# Patient Record
Sex: MALE | Race: BLACK OR AFRICAN AMERICAN | NOT HISPANIC OR LATINO | Employment: OTHER | URBAN - METROPOLITAN AREA
[De-identification: names, ages, dates, MRNs, and addresses within clinical notes are randomized per-mention and may not be internally consistent; named-entity substitution may affect disease eponyms.]

---

## 2018-05-19 ENCOUNTER — HOSPITAL ENCOUNTER (INPATIENT)
Facility: HOSPITAL | Age: 48
LOS: 27 days | Discharge: HOME HEALTH | DRG: 981 | End: 2018-06-15
Attending: PSYCHIATRY & NEUROLOGY | Admitting: PSYCHIATRY & NEUROLOGY
Payer: MEDICAID

## 2018-05-19 DIAGNOSIS — I10 ESSENTIAL HYPERTENSION: ICD-10-CM

## 2018-05-19 DIAGNOSIS — K29.01 ACUTE SUPERFICIAL GASTRITIS WITH HEMORRHAGE: ICD-10-CM

## 2018-05-19 DIAGNOSIS — Z99.2 HEMODIALYSIS STATUS: ICD-10-CM

## 2018-05-19 DIAGNOSIS — E87.20 METABOLIC ACIDOSIS: ICD-10-CM

## 2018-05-19 DIAGNOSIS — E66.01 CLASS 3 SEVERE OBESITY DUE TO EXCESS CALORIES WITH SERIOUS COMORBIDITY AND BODY MASS INDEX (BMI) OF 40.0 TO 44.9 IN ADULT: ICD-10-CM

## 2018-05-19 DIAGNOSIS — R09.2 RESPIRATORY ARREST: ICD-10-CM

## 2018-05-19 DIAGNOSIS — N17.9 AKI (ACUTE KIDNEY INJURY): ICD-10-CM

## 2018-05-19 DIAGNOSIS — I50.9 CHF (CONGESTIVE HEART FAILURE): ICD-10-CM

## 2018-05-19 DIAGNOSIS — R56.9 SEIZURE: ICD-10-CM

## 2018-05-19 DIAGNOSIS — R57.0 CARDIOGENIC SHOCK: ICD-10-CM

## 2018-05-19 DIAGNOSIS — J81.0 ACUTE PULMONARY EDEMA: ICD-10-CM

## 2018-05-19 DIAGNOSIS — G93.1 ANOXIC BRAIN INJURY: ICD-10-CM

## 2018-05-19 DIAGNOSIS — I50.41: ICD-10-CM

## 2018-05-19 DIAGNOSIS — T68.XXXA HYPOTHERMIA, INITIAL ENCOUNTER: ICD-10-CM

## 2018-05-19 DIAGNOSIS — N17.0 ACUTE RENAL FAILURE WITH TUBULAR NECROSIS: ICD-10-CM

## 2018-05-19 DIAGNOSIS — R20.9 COLD HANDS: ICD-10-CM

## 2018-05-19 DIAGNOSIS — D50.0 IRON DEFICIENCY ANEMIA DUE TO CHRONIC BLOOD LOSS: ICD-10-CM

## 2018-05-19 DIAGNOSIS — I50.9 ACUTE DECOMPENSATED HEART FAILURE: ICD-10-CM

## 2018-05-19 DIAGNOSIS — R53.81 DEBILITY: ICD-10-CM

## 2018-05-19 DIAGNOSIS — I47.19 ATRIAL TACHYCARDIA: ICD-10-CM

## 2018-05-19 DIAGNOSIS — G93.6 CYTOTOXIC CEREBRAL EDEMA: ICD-10-CM

## 2018-05-19 DIAGNOSIS — I42.9 CARDIOMYOPATHY, UNSPECIFIED TYPE: ICD-10-CM

## 2018-05-19 DIAGNOSIS — G93.1 ANOXIC BRAIN DAMAGE, NOT ELSEWHERE CLASSIFIED: ICD-10-CM

## 2018-05-19 DIAGNOSIS — I49.9 ARRHYTHMIA: ICD-10-CM

## 2018-05-19 DIAGNOSIS — R00.0 TACHYCARDIA: ICD-10-CM

## 2018-05-19 DIAGNOSIS — I63.512 ACUTE ISCHEMIC LEFT MCA STROKE: ICD-10-CM

## 2018-05-19 DIAGNOSIS — G93.6 CYTOTOXIC BRAIN EDEMA: ICD-10-CM

## 2018-05-19 DIAGNOSIS — I46.9 CARDIAC ARREST: ICD-10-CM

## 2018-05-19 DIAGNOSIS — I63.89 ACUTE BILAT WATERSHED INFARCTION: ICD-10-CM

## 2018-05-19 PROBLEM — R79.1 ELEVATED INR: Status: ACTIVE | Noted: 2018-05-19

## 2018-05-19 PROBLEM — J96.01 ACUTE RESPIRATORY FAILURE WITH HYPOXIA: Status: ACTIVE | Noted: 2018-05-19

## 2018-05-19 PROBLEM — J81.1 PULMONARY EDEMA: Status: ACTIVE | Noted: 2018-05-19

## 2018-05-19 LAB
ABO + RH BLD: NORMAL
ALBUMIN SERPL BCP-MCNC: 2.8 G/DL
ALBUMIN SERPL BCP-MCNC: 3 G/DL
ALLENS TEST: ABNORMAL
ALLENS TEST: ABNORMAL
ALP SERPL-CCNC: 103 U/L
ALP SERPL-CCNC: 97 U/L
ALT SERPL W/O P-5'-P-CCNC: 450 U/L
ALT SERPL W/O P-5'-P-CCNC: 485 U/L
ANION GAP SERPL CALC-SCNC: 15 MMOL/L
AST SERPL-CCNC: 725 U/L
AST SERPL-CCNC: 847 U/L
BACTERIA #/AREA URNS AUTO: ABNORMAL /HPF
BASOPHILS # BLD AUTO: 0.04 K/UL
BASOPHILS NFR BLD: 0.1 %
BILIRUB SERPL-MCNC: 1.2 MG/DL
BILIRUB SERPL-MCNC: 1.3 MG/DL
BILIRUB UR QL STRIP: NEGATIVE
BLD GP AB SCN CELLS X3 SERPL QL: NORMAL
BNP SERPL-MCNC: 3485 PG/ML
BUN SERPL-MCNC: 31 MG/DL
BUN SERPL-MCNC: 31 MG/DL
BUN SERPL-MCNC: 33 MG/DL
CALCIUM SERPL-MCNC: 8.4 MG/DL
CHLORIDE SERPL-SCNC: 104 MMOL/L
CHLORIDE SERPL-SCNC: 104 MMOL/L
CHLORIDE SERPL-SCNC: 105 MMOL/L
CK SERPL-CCNC: 346 U/L
CK SERPL-CCNC: 346 U/L
CLARITY UR REFRACT.AUTO: ABNORMAL
CO2 SERPL-SCNC: 19 MMOL/L
CO2 SERPL-SCNC: 19 MMOL/L
CO2 SERPL-SCNC: 21 MMOL/L
COLOR UR AUTO: ABNORMAL
CREAT SERPL-MCNC: 2.8 MG/DL
CREAT SERPL-MCNC: 2.8 MG/DL
CREAT SERPL-MCNC: 2.9 MG/DL
DELSYS: ABNORMAL
DELSYS: ABNORMAL
DIFFERENTIAL METHOD: ABNORMAL
EOSINOPHIL # BLD AUTO: 0 K/UL
EOSINOPHIL NFR BLD: 0 %
ERYTHROCYTE [DISTWIDTH] IN BLOOD BY AUTOMATED COUNT: 21.2 %
ERYTHROCYTE [SEDIMENTATION RATE] IN BLOOD BY WESTERGREN METHOD: 18 MM/H
ERYTHROCYTE [SEDIMENTATION RATE] IN BLOOD BY WESTERGREN METHOD: 18 MM/H
EST. GFR  (AFRICAN AMERICAN): 28.5 ML/MIN/1.73 M^2
EST. GFR  (AFRICAN AMERICAN): 29.7 ML/MIN/1.73 M^2
EST. GFR  (AFRICAN AMERICAN): 29.7 ML/MIN/1.73 M^2
EST. GFR  (NON AFRICAN AMERICAN): 24.6 ML/MIN/1.73 M^2
EST. GFR  (NON AFRICAN AMERICAN): 25.7 ML/MIN/1.73 M^2
EST. GFR  (NON AFRICAN AMERICAN): 25.7 ML/MIN/1.73 M^2
FIO2: 50
FIO2: 50
GLUCOSE SERPL-MCNC: 150 MG/DL
GLUCOSE SERPL-MCNC: 168 MG/DL
GLUCOSE UR QL STRIP: ABNORMAL
HCO3 UR-SCNC: 16.6 MMOL/L (ref 24–28)
HCO3 UR-SCNC: 19.1 MMOL/L (ref 24–28)
HCT VFR BLD AUTO: 30.8 %
HCT VFR BLD CALC: 32 %PCV (ref 36–54)
HGB BLD-MCNC: 8.5 G/DL
HGB UR QL STRIP: ABNORMAL
HYALINE CASTS UR QL AUTO: 0 /LPF
IMM GRANULOCYTES # BLD AUTO: 0.22 K/UL
IMM GRANULOCYTES NFR BLD AUTO: 0.7 %
INR PPP: 2
KETONES UR QL STRIP: NEGATIVE
LACTATE SERPL-SCNC: 6.4 MMOL/L
LEUKOCYTE ESTERASE UR QL STRIP: NEGATIVE
LYMPHOCYTES # BLD AUTO: 1 K/UL
LYMPHOCYTES NFR BLD: 3.3 %
MAGNESIUM SERPL-MCNC: 2 MG/DL
MAGNESIUM SERPL-MCNC: 2.1 MG/DL
MCH RBC QN AUTO: 18.2 PG
MCHC RBC AUTO-ENTMCNC: 27.6 G/DL
MCV RBC AUTO: 66 FL
MICROSCOPIC COMMENT: ABNORMAL
MIN VOL: 9.2
MIN VOL: 9.22
MODE: ABNORMAL
MODE: ABNORMAL
MONOCYTES # BLD AUTO: 1.8 K/UL
MONOCYTES NFR BLD: 5.9 %
NEUTROPHILS # BLD AUTO: 26.8 K/UL
NEUTROPHILS NFR BLD: 90 %
NITRITE UR QL STRIP: NEGATIVE
NRBC BLD-RTO: 0 /100 WBC
PCO2 BLDA: 38 MMHG (ref 35–45)
PCO2 BLDA: 48.9 MMHG (ref 35–45)
PEEP: 10
PEEP: 10
PH SMN: 7.14 [PH] (ref 7.35–7.45)
PH SMN: 7.31 [PH] (ref 7.35–7.45)
PH UR STRIP: 5 [PH] (ref 5–8)
PHOSPHATE SERPL-MCNC: 5.7 MG/DL
PHOSPHATE SERPL-MCNC: 6.7 MG/DL
PIP: 30
PIP: 31
PLATELET # BLD AUTO: 189 K/UL
PMV BLD AUTO: 9.8 FL
PO2 BLDA: 141 MMHG (ref 80–100)
PO2 BLDA: 45 MMHG (ref 40–60)
POC BE: -12 MMOL/L
POC BE: -7 MMOL/L
POC IONIZED CALCIUM: 1.15 MMOL/L (ref 1.06–1.42)
POC SATURATED O2: 67 % (ref 95–100)
POC SATURATED O2: 99 % (ref 95–100)
POC TCO2: 18 MMOL/L (ref 24–29)
POC TCO2: 20 MMOL/L (ref 23–27)
POTASSIUM BLD-SCNC: 4.7 MMOL/L (ref 3.5–5.1)
POTASSIUM SERPL-SCNC: 4.8 MMOL/L
POTASSIUM SERPL-SCNC: 4.9 MMOL/L
POTASSIUM SERPL-SCNC: 4.9 MMOL/L
PROT SERPL-MCNC: 6.1 G/DL
PROT SERPL-MCNC: 6.4 G/DL
PROT UR QL STRIP: ABNORMAL
PROTHROMBIN TIME: 19.4 SEC
RBC # BLD AUTO: 4.68 M/UL
RBC #/AREA URNS AUTO: 3 /HPF (ref 0–4)
SAMPLE: ABNORMAL
SAMPLE: ABNORMAL
SITE: ABNORMAL
SITE: ABNORMAL
SODIUM BLD-SCNC: 139 MMOL/L (ref 136–145)
SODIUM SERPL-SCNC: 138 MMOL/L
SODIUM SERPL-SCNC: 138 MMOL/L
SODIUM SERPL-SCNC: 141 MMOL/L
SP GR UR STRIP: 1 (ref 1–1.03)
SP02: 100
SP02: 100
SQUAMOUS #/AREA URNS AUTO: 1 /HPF
TROPONIN I SERPL DL<=0.01 NG/ML-MCNC: 3.38 NG/ML
URN SPEC COLLECT METH UR: ABNORMAL
UROBILINOGEN UR STRIP-ACNC: NEGATIVE EU/DL
VT: 500
VT: 500
WBC # BLD AUTO: 29.86 K/UL
WBC #/AREA URNS AUTO: 3 /HPF (ref 0–5)

## 2018-05-19 PROCEDURE — 82550 ASSAY OF CK (CPK): CPT

## 2018-05-19 PROCEDURE — 99900035 HC TECH TIME PER 15 MIN (STAT)

## 2018-05-19 PROCEDURE — 5A1955Z RESPIRATORY VENTILATION, GREATER THAN 96 CONSECUTIVE HOURS: ICD-10-PCS | Performed by: PSYCHIATRY & NEUROLOGY

## 2018-05-19 PROCEDURE — 81001 URINALYSIS AUTO W/SCOPE: CPT

## 2018-05-19 PROCEDURE — 25000003 PHARM REV CODE 250: Performed by: NURSE PRACTITIONER

## 2018-05-19 PROCEDURE — 84295 ASSAY OF SERUM SODIUM: CPT

## 2018-05-19 PROCEDURE — 85014 HEMATOCRIT: CPT

## 2018-05-19 PROCEDURE — 87205 SMEAR GRAM STAIN: CPT

## 2018-05-19 PROCEDURE — 82330 ASSAY OF CALCIUM: CPT

## 2018-05-19 PROCEDURE — 87070 CULTURE OTHR SPECIMN AEROBIC: CPT

## 2018-05-19 PROCEDURE — 93306 TTE W/DOPPLER COMPLETE: CPT | Mod: 26,,, | Performed by: INTERNAL MEDICINE

## 2018-05-19 PROCEDURE — 36415 COLL VENOUS BLD VENIPUNCTURE: CPT

## 2018-05-19 PROCEDURE — 37799 UNLISTED PX VASCULAR SURGERY: CPT

## 2018-05-19 PROCEDURE — 99291 CRITICAL CARE FIRST HOUR: CPT | Mod: ,,, | Performed by: PSYCHIATRY & NEUROLOGY

## 2018-05-19 PROCEDURE — 84100 ASSAY OF PHOSPHORUS: CPT | Mod: 91

## 2018-05-19 PROCEDURE — 86850 RBC ANTIBODY SCREEN: CPT

## 2018-05-19 PROCEDURE — 94002 VENT MGMT INPAT INIT DAY: CPT

## 2018-05-19 PROCEDURE — 63600175 PHARM REV CODE 636 W HCPCS: Performed by: STUDENT IN AN ORGANIZED HEALTH CARE EDUCATION/TRAINING PROGRAM

## 2018-05-19 PROCEDURE — 83520 IMMUNOASSAY QUANT NOS NONAB: CPT

## 2018-05-19 PROCEDURE — 85610 PROTHROMBIN TIME: CPT

## 2018-05-19 PROCEDURE — 80053 COMPREHEN METABOLIC PANEL: CPT

## 2018-05-19 PROCEDURE — 93306 TTE W/DOPPLER COMPLETE: CPT

## 2018-05-19 PROCEDURE — 87086 URINE CULTURE/COLONY COUNT: CPT

## 2018-05-19 PROCEDURE — 25000003 PHARM REV CODE 250: Performed by: STUDENT IN AN ORGANIZED HEALTH CARE EDUCATION/TRAINING PROGRAM

## 2018-05-19 PROCEDURE — 84484 ASSAY OF TROPONIN QUANT: CPT

## 2018-05-19 PROCEDURE — S0028 INJECTION, FAMOTIDINE, 20 MG: HCPCS | Performed by: PSYCHIATRY & NEUROLOGY

## 2018-05-19 PROCEDURE — 84132 ASSAY OF SERUM POTASSIUM: CPT

## 2018-05-19 PROCEDURE — 80053 COMPREHEN METABOLIC PANEL: CPT | Mod: 91

## 2018-05-19 PROCEDURE — 83880 ASSAY OF NATRIURETIC PEPTIDE: CPT

## 2018-05-19 PROCEDURE — 87040 BLOOD CULTURE FOR BACTERIA: CPT | Mod: 59

## 2018-05-19 PROCEDURE — 20000000 HC ICU ROOM

## 2018-05-19 PROCEDURE — 93010 ELECTROCARDIOGRAM REPORT: CPT | Mod: ,,, | Performed by: INTERNAL MEDICINE

## 2018-05-19 PROCEDURE — 63600175 PHARM REV CODE 636 W HCPCS: Performed by: NURSE PRACTITIONER

## 2018-05-19 PROCEDURE — 85025 COMPLETE CBC W/AUTO DIFF WBC: CPT

## 2018-05-19 PROCEDURE — 25000003 PHARM REV CODE 250: Performed by: PSYCHIATRY & NEUROLOGY

## 2018-05-19 PROCEDURE — 63600175 PHARM REV CODE 636 W HCPCS: Performed by: PSYCHIATRY & NEUROLOGY

## 2018-05-19 PROCEDURE — 99900026 HC AIRWAY MAINTENANCE (STAT)

## 2018-05-19 PROCEDURE — 83605 ASSAY OF LACTIC ACID: CPT

## 2018-05-19 PROCEDURE — 83735 ASSAY OF MAGNESIUM: CPT

## 2018-05-19 PROCEDURE — 82803 BLOOD GASES ANY COMBINATION: CPT

## 2018-05-19 RX ORDER — BUSPIRONE HYDROCHLORIDE 10 MG/1
30 TABLET ORAL ONCE
Status: DISCONTINUED | OUTPATIENT
Start: 2018-05-19 | End: 2018-05-21

## 2018-05-19 RX ORDER — HEPARIN SODIUM 5000 [USP'U]/ML
5000 INJECTION, SOLUTION INTRAVENOUS; SUBCUTANEOUS EVERY 8 HOURS
Status: DISCONTINUED | OUTPATIENT
Start: 2018-05-19 | End: 2018-05-21

## 2018-05-19 RX ORDER — NOREPINEPHRINE BITARTRATE/D5W 4MG/250ML
0.02 PLASTIC BAG, INJECTION (ML) INTRAVENOUS CONTINUOUS
Status: DISCONTINUED | OUTPATIENT
Start: 2018-05-19 | End: 2018-05-22

## 2018-05-19 RX ORDER — SODIUM CHLORIDE, SODIUM LACTATE, POTASSIUM CHLORIDE, CALCIUM CHLORIDE 600; 310; 30; 20 MG/100ML; MG/100ML; MG/100ML; MG/100ML
INJECTION, SOLUTION INTRAVENOUS CONTINUOUS
Status: DISCONTINUED | OUTPATIENT
Start: 2018-05-19 | End: 2018-05-19

## 2018-05-19 RX ORDER — DOBUTAMINE HYDROCHLORIDE 400 MG/100ML
2 INJECTION, SOLUTION INTRAVENOUS CONTINUOUS
Status: DISCONTINUED | OUTPATIENT
Start: 2018-05-19 | End: 2018-05-20

## 2018-05-19 RX ORDER — INDOMETHACIN 25 MG/1
100 CAPSULE ORAL ONCE
Status: COMPLETED | OUTPATIENT
Start: 2018-05-19 | End: 2018-05-19

## 2018-05-19 RX ORDER — CEFEPIME HYDROCHLORIDE 1 G/1
1 INJECTION, POWDER, FOR SOLUTION INTRAMUSCULAR; INTRAVENOUS
Status: DISCONTINUED | OUTPATIENT
Start: 2018-05-19 | End: 2018-05-19

## 2018-05-19 RX ORDER — MAGNESIUM SULFATE HEPTAHYDRATE 40 MG/ML
2 INJECTION, SOLUTION INTRAVENOUS
Status: DISCONTINUED | OUTPATIENT
Start: 2018-05-19 | End: 2018-05-21

## 2018-05-19 RX ORDER — SODIUM BICARBONATE 1 MEQ/ML
SYRINGE (ML) INTRAVENOUS
Status: DISPENSED
Start: 2018-05-19 | End: 2018-05-20

## 2018-05-19 RX ORDER — FAMOTIDINE 10 MG/ML
20 INJECTION INTRAVENOUS EVERY 12 HOURS
Status: DISCONTINUED | OUTPATIENT
Start: 2018-05-19 | End: 2018-05-21

## 2018-05-19 RX ORDER — CEFEPIME HYDROCHLORIDE 1 G/1
1 INJECTION, POWDER, FOR SOLUTION INTRAMUSCULAR; INTRAVENOUS
Status: DISCONTINUED | OUTPATIENT
Start: 2018-05-19 | End: 2018-05-21

## 2018-05-19 RX ORDER — ATROPINE SULFATE 0.4 MG/ML
INJECTION, SOLUTION ENDOTRACHEAL; INTRAMEDULLARY; INTRAMUSCULAR; INTRAVENOUS; SUBCUTANEOUS
Status: DISPENSED
Start: 2018-05-19 | End: 2018-05-20

## 2018-05-19 RX ORDER — SODIUM BICARBONATE 1 MEQ/ML
100 SYRINGE (ML) INTRAVENOUS ONCE
Status: COMPLETED | OUTPATIENT
Start: 2018-05-19 | End: 2018-05-19

## 2018-05-19 RX ORDER — SODIUM CHLORIDE 0.9 % (FLUSH) 0.9 %
3 SYRINGE (ML) INJECTION
Status: DISCONTINUED | OUTPATIENT
Start: 2018-05-19 | End: 2018-06-06

## 2018-05-19 RX ORDER — FUROSEMIDE 10 MG/ML
80 INJECTION INTRAMUSCULAR; INTRAVENOUS ONCE
Status: COMPLETED | OUTPATIENT
Start: 2018-05-19 | End: 2018-05-19

## 2018-05-19 RX ORDER — ONDANSETRON HYDROCHLORIDE 4 MG/5ML
4 SOLUTION ORAL EVERY 8 HOURS PRN
Status: DISCONTINUED | OUTPATIENT
Start: 2018-05-19 | End: 2018-06-06

## 2018-05-19 RX ORDER — ACETAMINOPHEN 650 MG/20.3ML
650 LIQUID ORAL EVERY 6 HOURS
Status: DISCONTINUED | OUTPATIENT
Start: 2018-05-19 | End: 2018-05-19

## 2018-05-19 RX ADMIN — VANCOMYCIN HYDROCHLORIDE 1750 MG: 1 INJECTION, POWDER, LYOPHILIZED, FOR SOLUTION INTRAVENOUS at 08:05

## 2018-05-19 RX ADMIN — FAMOTIDINE 20 MG: 10 INJECTION INTRAVENOUS at 08:05

## 2018-05-19 RX ADMIN — PROPOFOL 20 MCG/KG/MIN: 10 INJECTION, EMULSION INTRAVENOUS at 04:05

## 2018-05-19 RX ADMIN — DEXTROSE 1 MCG/KG/MIN: 50 INJECTION, SOLUTION INTRAVENOUS at 04:05

## 2018-05-19 RX ADMIN — SODIUM BICARBONATE 100 MEQ: 84 INJECTION, SOLUTION INTRAVENOUS at 07:05

## 2018-05-19 RX ADMIN — PROPOFOL 20 MCG/KG/MIN: 10 INJECTION, EMULSION INTRAVENOUS at 08:05

## 2018-05-19 RX ADMIN — FUROSEMIDE 80 MG: 10 INJECTION, SOLUTION INTRAMUSCULAR; INTRAVENOUS at 07:05

## 2018-05-19 RX ADMIN — MINERAL OIL AND WHITE PETROLATUM: 150; 830 OINTMENT OPHTHALMIC at 10:05

## 2018-05-19 RX ADMIN — SODIUM CHLORIDE, SODIUM LACTATE, POTASSIUM CHLORIDE, AND CALCIUM CHLORIDE: .6; .31; .03; .02 INJECTION, SOLUTION INTRAVENOUS at 05:05

## 2018-05-19 RX ADMIN — CEFEPIME 1 G: 1 INJECTION, POWDER, FOR SOLUTION INTRAMUSCULAR; INTRAVENOUS at 07:05

## 2018-05-19 RX ADMIN — SODIUM BICARBONATE 100 MEQ: 84 INJECTION, SOLUTION INTRAVENOUS at 04:05

## 2018-05-19 RX ADMIN — HEPARIN SODIUM 5000 UNITS: 5000 INJECTION, SOLUTION INTRAVENOUS; SUBCUTANEOUS at 10:05

## 2018-05-19 RX ADMIN — Medication 0.02 MCG/KG/MIN: at 05:05

## 2018-05-19 NOTE — ASSESSMENT & PLAN NOTE
- intubated on arrival  - last abg 5/19@ 7:20: 7.03/42/121/-20/11.3  Vent Mode: A/C  Resp Rate Total:  [18 br/min] 18 br/min  Vt Set:  [500 mL] 500 mL  PEEP/CPAP:  [10 cmH20] 10 cmH20  Mean Airway Pressure:  [18 cmH20] 18 cmH20  - 2 amps bicarb now  - f/u ABG

## 2018-05-19 NOTE — PROGRESS NOTES
1550- TTM restarted, pt currently at 34.4 C core esophageal, and 34.5 core bladder, goal temp 33C will continue to monitor closely

## 2018-05-19 NOTE — HOSPITAL COURSE
05/19/18:  Arrived intubated, sedated, artic sun blanket  05/20/18:  Pressor requirment going up, NO DIURESIS, continue TTM, nimbex off, LFT improving, trop improving, family updated.  05/21/18:  Reach normothermia, MRI today, remove EEG, place HD line, consult nephrology, LFTs trending down, family updated.  05/22/18:  Anoxic brain injury. Pt responding. Last night placed on propofol  MRI--L head of caudate and cerebellar small infarctions.  05/23/18:  Anoxic brain injury.  On CRRT. BNP 1046. Back on propofol, pressors. Still agitated. Started on Buspar. Trop I normalizing.  05/25/18:  CRRT today. Amiodarone dc'd, metoprolol started. CBC i5u--wl H/H continues to drop will consult GI. Vascular surgery consulted concerning  Possible arterial occlusion. RUE cold. Arterial line dcd. US RUE pending. Abdominal distention, KUB showed gas pattern. Bladder pressures q4h. Initial 21.  05/28/18:  H/H 6.8/23, 1 unit PRBC transfused. Keep Hgb greater than or equal to 8. CRRT stopped, line clotted. Changed trialysis catheter changed. Wean sedation today. Once off sedation begin weaning ventilator settings to CPAP.  Weaned vent to CPAP and able to Extubated in afternoon without s/s stridor or difficulty breathing. HR elevated 140s - 160s fentanyl x2 prn for pain/agitation. Metoprolol 5mg IV x3 given remains ST 140s.  Restless, will start precedex.  05/30/18:  Leukocytosis, pan cx; kelley discontinued; remove IJ, speech eval for diet   05/31/18:  No acute events, still with gastric secretions coming out of NG tube will start reglan; remains encephalopathic  06/01/18:  Will hold NGT suction, NPO per SLP.  Continue metoclopramide.  Hgb stable at 8.3 g/dL.  6/2 Continue NG tube for now. Will advance to pureed diet as recommended by SLP and evaluate intake before removing NG.. WBC remain elevated but afebrile, cultures negative and USUE/Diamond 5/24 showed no DVT only thrombophlebitis of LUE cephalic vein. Continue to monitor daily CBC.  Nephrology following will hold HD today and possibly tomorrow depending on labs  6/4: patient fell from bed en route to bathroom (unassisted), CT no ICH, patient neuro stable, step down  6/5: transfer to medicine

## 2018-05-19 NOTE — ASSESSMENT & PLAN NOTE
- secondary to anoxic brain injury, 22 minutes PEA  - hypothermia protocol in place  - MRI when stable  - monitor exam following hypothermia protocol

## 2018-05-19 NOTE — ASSESSMENT & PLAN NOTE
- unknown baseline, possible acute on CKD  - CMP daily, follow up echo prior to aggressive volume resuscitation given concern for undiagnosed HF

## 2018-05-19 NOTE — PROGRESS NOTES
Patient arrived to [facility] from [facility] by [employer]    Type of stroke/diagnosis: anoxic brain injury     Skin assessment done: Y  Wounds noted: none noted    NCC notified: Dr Cohen

## 2018-05-19 NOTE — ASSESSMENT & PLAN NOTE
Patient with dilated cardiomyopathy EF looks about 10-15% and LVEDD is 7.3 cm, unknown etiology at this time but could be related to heavy alcohol use as he drinks about 1 case of beer and 2/5 of vodka per day. Per the family he has not felt well over the past 2 weeks and had progressive shortness of breath. It is very likely that he was acutely decompensated and had VT or VF as the etiology of his syncope and cardiac arrest. Agree with continued cooling for now. Based on an O2 consumption of 441 his cardiac output is 10.5 which is high, His SVR is 655. This is more consistent with sepsis than cardiogenic shock. Would recommend d/c  for now as he is already over vasodilated but continue with low dose levophed as you are doing. Would consider broad spectrum Abx as he may have aspirated yesterday. Additionally he is very volume overloaded so would recommend an 80 mg bolus of  IV lasix and start a lasix gtt at 10 mg/hr with goal diuresis of ~2L negative per day. Should he recover from the this and be weaned off pressors would recommend GDMT with lisinopril and metoprolol but only when more euvolemic.

## 2018-05-19 NOTE — ASSESSMENT & PLAN NOTE
- no hx of HF, patient with URI symptoms for last month per discussion with transporting paramedic  - f/u ECHO, BNP  - lasix as needed

## 2018-05-19 NOTE — SUBJECTIVE & OBJECTIVE
No past medical history on file.    No past surgical history on file.    Review of patient's allergies indicates:  No Known Allergies    No current facility-administered medications on file prior to encounter.      No current outpatient prescriptions on file prior to encounter.     Family History     None        Social History Main Topics    Smoking status: Not on file    Smokeless tobacco: Not on file    Alcohol use Not on file    Drug use: Unknown    Sexual activity: Not on file     Review of Systems   All other systems reviewed and are negative.    Objective:     Vital Signs (Most Recent):  Temp: (!) 92.7 °F (33.7 °C) (05/19/18 1708)  Pulse: 63 (05/19/18 1708)  Resp: 18 (05/19/18 1708)  BP: 122/82 (05/19/18 1620)  SpO2: 100 % (05/19/18 1708) Vital Signs (24h Range):  Temp:  [92.7 °F (33.7 °C)-97 °F (36.1 °C)] 92.7 °F (33.7 °C)  Pulse:  [] 63  Resp:  [18-28] 18  SpO2:  [100 %] 100 %  BP: (122-140)/(81-86) 122/82  Arterial Line BP: (145)/(89) 145/89     Weight: 126 kg (277 lb 12.5 oz)  There is no height or weight on file to calculate BMI.    SpO2: 100 %       No intake or output data in the 24 hours ending 05/19/18 1803    Lines/Drains/Airways     Central Venous Catheter Line                 Percutaneous Central Line Insertion/Assessment - triple lumen  05/19/18 right internal jugular less than 1 day          Drain                 NG/OG Tube 05/19/18 orogastric less than 1 day         Urethral Catheter 05/19/18 Temperature probe less than 1 day          Airway                 Airway - Non-Surgical 05/18/18 0900 Endotracheal Tube 1 day          Arterial Line                 Arterial Line 05/19/18 1654 Right Radial less than 1 day          Peripheral Intravenous Line                 Peripheral IV - Single Lumen 05/19/18 Left Antecubital less than 1 day         Peripheral IV - Single Lumen 05/19/18 Left Hand less than 1 day         Peripheral IV - Single Lumen 05/19/18 Right Hand less than 1 day                 Physical Exam  GEN: Intubated and sedated  NECK: + JVD appreciated to ear  CVS: RRR, s1/s2, no MRG  PULM: CTAB no rales  ABD: NT/ND BS +  Extremities: warm and dry, palpable pulses, mild edema  NEURO: Intubated and sedated        Significant Labs:   Recent Lab Results       05/19/18  1625 05/19/18  1609 05/19/18  1607      Albumin  3.0(L)      Alkaline Phosphatase  103      Allens Test   N/A     ALT  450(H)      Anion Gap  15        15      AST  725(H)      Total Bilirubin  1.3  Comment:  For infants and newborns, interpretation of results should be based  on gestational age, weight and in agreement with clinical  observations.  Premature Infant recommended reference ranges:  Up to 24 hours.............<8.0 mg/dL  Up to 48 hours............<12.0 mg/dL  3-5 days..................<15.0 mg/dL  6-29 days.................<15.0 mg/dL  (H)      BNP  3,485  Comment:  Values of less than 100 pg/ml are consistent with non-CHF populations.(H)      Site   Other     BUN, Bld  31(H)        31(H)      Calcium  8.4(L)        8.4(L)      Chloride  104        104      CO2  19(L)        19(L)      CPK  346(H)        346(H)      Creatinine  2.8(H)        2.8(H)      DelSys   Adult Vent     eGFR if   29.7(A)        29.7(A)      eGFR if non   25.7  Comment:  Calculation used to obtain the estimated glomerular filtration  rate (eGFR) is the CKD-EPI equation.   (A)        25.7  Comment:  Calculation used to obtain the estimated glomerular filtration  rate (eGFR) is the CKD-EPI equation.   (A)      FiO2   50     Glucose  168(H)        168(H)        168(H)        168(H)      Group & Rh O POS       INDIRECT LAZARO NEG       Coumadin Monitoring INR  2.0  Comment:  Coumadin Therapy:  2.0 - 3.0 for INR for all indicators except mechanical heart valves  and antiphospholipid syndromes which should use 2.5 - 3.5.  (H)      Lactate, Salazar  6.4  Comment:  Falsely low lactic acid results can be found in samples    containing >=13.0 mg/dL total bilirubin and/or >=3.5 mg/dL   direct bilirubin.  *Critical value -   Results called to and read back by:JESSICA LIANG RN  ()      Magnesium  2.1      Min Vol   9.20     Mode   AC/PRVC     PEEP   10     Phosphorus  6.7(H)      PiP   30     POC BE   -12     POC HCO3   16.6(L)     POC PCO2   48.9(H)     POC PH   7.140(L)     POC PO2   45     POC SATURATED O2   67(L)     POC TCO2   18(L)     Potassium  4.9        4.9      Total Protein  6.4      Protime  19.4(H)      Rate   18     Sample   VENOUS     Sodium  138        138      Sp02   100     Troponin I  3.383  Comment:  The reference interval for Troponin I represents the 99th percentile   cutoff   for our facility and is consistent with 3rd generation assay   performance.  (H)      Vt   500           Significant Imaging: reviewed

## 2018-05-19 NOTE — H&P
[facility]  Neurocritical Care  History & Physical    Admit Date: 5/19/2018  Service Date: 05/19/2018  Length of Stay: 0    Subjective:     Chief Complaint: Anoxic brain injury    History of Present Illness: 46 y/o man w/ hx of HTN, COPD? Transferred from [facility] following witnessed cardiac arrest while in the ED waiting room. Patient had presented complaining of URI/SOB symptoms, while in the ED waiting room he was noted to be choking, became apneic, which led to witnessed seizure like activity followed by cardiac arrest. Per discussion with ED staff, patient required about 22 minutes of ACLS/5 shocks before returning to normal sinus rhythm. During intubation a lozenge was removed from patients airway. Hyperthermia protocol was initiated prior to transferring to [facility] for NCC care. Per ED records, patient was acidotic with ph of 7.1 this morning. At the time of arrival to [facility], patient was on paralytics, however pupillary reflex was present. Will continue hypothermia protocol for additional 24 h.    No past medical history on file.  No past surgical history on file.   No current facility-administered medications on file prior to encounter.      No current outpatient prescriptions on file prior to encounter.      Allergies: Patient has no known allergies.    No family history on file.  Social History   Substance Use Topics    Smoking status: Not on file    Smokeless tobacco: Not on file    Alcohol use Not on file     Review of Systems   Unable to perform ROS: Intubated     Objective:     Vitals:         Temp  Min: 97 °F (36.1 °C)  Max: 97 °F (36.1 °C)  Pulse  Min: 65  Max: 110  BP  Min: 125/81  Max: 133/82  Resp  Min: 27  Max: 28  SpO2  Min: 100 %  Max: 100 %    No intake/output data recorded.       Physical Exam   Constitutional: He appears well-developed and well-nourished. No distress.   Cardiovascular: Normal rate.    Pulmonary/Chest:   intubated   Neurological: GCS eye subscore is 1. GCS  verbal subscore is 1. GCS motor subscore is 1.   Intubated, sedated or paralytics; cooling protocol in place  Pupils reactive, sluggish     Nursing note and vitals reviewed.    Today I personally reviewed pertinent medications, lines/drains/airways, cardiology, notably:        Assessment/Plan:     Neuro   * Anoxic brain injury    - Cardiac arrest x 3 at OSH; longest code was 22 minutes  - intubated/sedated/paralyzed  - hypothermia protocol in place goal 33 degrees  - MRI in 24-48 hours to assess level of damage  - spot EEG to r/o NCSE  - follow neuro exam          Cytotoxic brain edema    - secondary to anoxic brain injury, 22 minutes PEA  - hypothermia protocol in place  - MRI when stable  - monitor exam following hypothermia protocol         Seizure    - witnessed seizure in ED waiting room, possibly secondary to hypoperfusion/hypoxia  - spot EEG to r/o NCSE  - patient at risk for myoclonic status epilepticus secondary to MARIA ELENA        Pulmonary   Pulmonary edema    - no hx of HF, patient with URI symptoms for last month per discussion with transporting paramedic  - f/u ECHO, BNP  - lasix as needed        Acute respiratory failure with hypoxia    - intubated on arrival  - last abg 5/19@ 7:20: 7.03/42/121/-20/11.3  Vent Mode: A/C  Resp Rate Total:  [18 br/min] 18 br/min  Vt Set:  [500 mL] 500 mL  PEEP/CPAP:  [10 cmH20] 10 cmH20  Mean Airway Pressure:  [18 cmH20] 18 cmH20  - 2 amps bicarb now  - f/u ABG        Cardiac/Vascular   Essential hypertension    - holding home medications        Cardiogenic shock    - possible undiagnosed cardiomyopathy based on CXR w/ evidence of pulmonary edema and multiple cardiac arrests over last 24 h  - f/u TTE  - f/u central venous O2 sat; titrate dobutamine to sVO2>70  - cardiology consult        Cardiac arrest    - 3 arrests prior to arrival    - currently in NSR  - f/u troponin         Renal/   MARY (acute kidney injury)    - unknown baseline, possible acute on CKD  - CMP daily,  follow up echo prior to aggressive volume resuscitation given concern for undiagnosed HF        Metabolic acidosis    - repeat ABG on arrival not much improved, ph 7.1  - 2 amps bicarb now; f/u ABG        Hematology   Elevated INR    - increased since PEA  - monitor INR, CBC        Endocrine   Morbid obesity    - nutrition consult        Orthopedic   Hypothermia    - therpeutic, on paralytics            Prophylaxis:  Venous Thromboembolism: chemical  Stress Ulcer: H2B  Ventilator Pneumonia: no     Activity Orders          None        Full Code    Mike Clarke MD  Neurocritical Care  [facility]

## 2018-05-19 NOTE — CONSULTS
[facility]  Cardiology  Consult Note    Patient Name: Los Mendez  MRN: [id number]  Admission Date: 5/19/2018  Hospital Length of Stay: 0 days  Code Status: Full Code   Attending Provider: Keith Cohen MD   Consulting Provider: Braxton Gonzalez MD  Primary Care Physician: Provider Notinsystem  Principal Problem:Anoxic brain injury    Patient information was obtained from family and ER records.     Inpatient consult to Cardiology  Consult performed by: BRAXTON GONZALEZ  Consult ordered by: KEITH COHEN  Reason for consult: cardiomyopathy        Subjective:     Chief Complaint:  Shortness of breath      HPI:   Pt is a 47 year old gentleman who we are consulted for possible cardiomyopathy.    He has a hx of hypertension, significant drinking hx and doesn't really follow with a doctor however over the past few weeks per his aunt he has not felt well as he has been very short of breath, fatigued, and overall weak so he was urged to present to the ER at [facility]. While there in the waiting room he had a choking episode had a witnessed possible seizure activity followed by cardiac arrest. He required 22 minutes of ACLS/5 shocks for VT/VF before ROSC. He was intubated and a lozenge was removed from airway. Hyperthermia protocol was initiated and he was transferred to [facility]. He is currently on paralytics and still has pupillary reflex.     On further questioning he drinks about 1 case of beer and 2/5 of vodka per day.     PMH: HTN, Obesity    No past surgical history on file.    Review of patient's allergies indicates:  No Known Allergies    No current facility-administered medications on file prior to encounter.      No current outpatient prescriptions on file prior to encounter.     Family History     None        Social History Main Topics    Smoking status: Not on file    Smokeless tobacco: Not on file    Alcohol use Not on file    Drug use: Unknown    Sexual activity: Not on file      Review of Systems   Intubated and sedated    Objective:     Vital Signs (Most Recent):  Temp: (!) 92.7 °F (33.7 °C) (05/19/18 1708)  Pulse: 63 (05/19/18 1708)  Resp: 18 (05/19/18 1708)  BP: 122/82 (05/19/18 1620)  SpO2: 100 % (05/19/18 1708) Vital Signs (24h Range):  Temp:  [92.7 °F (33.7 °C)-97 °F (36.1 °C)] 92.7 °F (33.7 °C)  Pulse:  [] 63  Resp:  [18-28] 18  SpO2:  [100 %] 100 %  BP: (122-140)/(81-86) 122/82  Arterial Line BP: (145)/(89) 145/89     Weight: 126 kg (277 lb 12.5 oz)  There is no height or weight on file to calculate BMI.    SpO2: 100 %       No intake or output data in the 24 hours ending 05/19/18 1803    Lines/Drains/Airways     Central Venous Catheter Line                 Percutaneous Central Line Insertion/Assessment - triple lumen  05/19/18 right internal jugular less than 1 day          Drain                 NG/OG Tube 05/19/18 orogastric less than 1 day         Urethral Catheter 05/19/18 Temperature probe less than 1 day          Airway                 Airway - Non-Surgical 05/18/18 0900 Endotracheal Tube 1 day          Arterial Line                 Arterial Line 05/19/18 1654 Right Radial less than 1 day          Peripheral Intravenous Line                 Peripheral IV - Single Lumen 05/19/18 Left Antecubital less than 1 day         Peripheral IV - Single Lumen 05/19/18 Left Hand less than 1 day         Peripheral IV - Single Lumen 05/19/18 Right Hand less than 1 day                Physical Exam  GEN: Intubated and sedated  NECK: + JVD appreciated to ear  CVS: RRR, s1/s2, no MRG  PULM: CTAB no rales  ABD: NT/ND BS +  Extremities: Cool, palpable pulses, mild edema  NEURO: Intubated and sedated        Significant Labs:   Recent Lab Results       05/19/18  1625 05/19/18  1609 05/19/18  1607      Albumin  3.0(L)      Alkaline Phosphatase  103      Allens Test   N/A     ALT  450(H)      Anion Gap  15        15      AST  725(H)      Total Bilirubin  1.3  Comment:  For infants and  newborns, interpretation of results should be based  on gestational age, weight and in agreement with clinical  observations.  Premature Infant recommended reference ranges:  Up to 24 hours.............<8.0 mg/dL  Up to 48 hours............<12.0 mg/dL  3-5 days..................<15.0 mg/dL  6-29 days.................<15.0 mg/dL  (H)      BNP  3,485  Comment:  Values of less than 100 pg/ml are consistent with non-CHF populations.(H)      Site   Other     BUN, Bld  31(H)        31(H)      Calcium  8.4(L)        8.4(L)      Chloride  104        104      CO2  19(L)        19(L)      CPK  346(H)        346(H)      Creatinine  2.8(H)        2.8(H)      DelSys   Adult Vent     eGFR if   29.7(A)        29.7(A)      eGFR if non   25.7  Comment:  Calculation used to obtain the estimated glomerular filtration  rate (eGFR) is the CKD-EPI equation.   (A)        25.7  Comment:  Calculation used to obtain the estimated glomerular filtration  rate (eGFR) is the CKD-EPI equation.   (A)      FiO2   50     Glucose  168(H)        168(H)        168(H)        168(H)      Group & Rh O POS       INDIRECT LAZARO NEG       Coumadin Monitoring INR  2.0  Comment:  Coumadin Therapy:  2.0 - 3.0 for INR for all indicators except mechanical heart valves  and antiphospholipid syndromes which should use 2.5 - 3.5.  (H)      Lactate, Salazar  6.4  Comment:  Falsely low lactic acid results can be found in samples   containing >=13.0 mg/dL total bilirubin and/or >=3.5 mg/dL   direct bilirubin.  *Critical value -   Results called to and read back by:JESSICA LIANG RN  ()      Magnesium  2.1      Min Vol   9.20     Mode   AC/PRVC     PEEP   10     Phosphorus  6.7(H)      PiP   30     POC BE   -12     POC HCO3   16.6(L)     POC PCO2   48.9(H)     POC PH   7.140(L)     POC PO2   45     POC SATURATED O2   67(L)     POC TCO2   18(L)     Potassium  4.9        4.9      Total Protein  6.4      Protime  19.4(H)      Rate   18      Sample   VENOUS     Sodium  138        138      Sp02   100     Troponin I  3.383  Comment:  The reference interval for Troponin I represents the 99th percentile   cutoff   for our facility and is consistent with 3rd generation assay   performance.  (H)      Vt   500           Significant Imaging: reviewed    Assessment and Plan:     Acute decompensated heart failure    Patient with dilated cardiomyopathy EF looks about 10-15% and LVEDD is 7.3 cm, unknown etiology at this time but could be related to heavy alcohol use as he drinks about 1 case of beer and 2/5 of vodka per day. Per the family he has not felt well over the past 2 weeks and had progressive shortness of breath. It is very likely that he was acutely decompensated and had VT or VF as the etiology of his syncope and cardiac arrest. Agree with continued cooling for now. Based on an O2 consumption of 441 his cardiac output is 10.5 which is high, His SVR is 655. This is more consistent with sepsis than cardiogenic shock. Would recommend d/c  for now as he is already over vasodilated but continue with low dose levophed as you are doing. Would consider broad spectrum Abx as he may have aspirated yesterday. Additionally he is very volume overloaded so would recommend an 80 mg bolus of  IV lasix and start a lasix gtt at 10 mg/hr with goal diuresis of ~2L negative per day. Should he recover from the this and be weaned off pressors would recommend GDMT with lisinopril and metoprolol but only when more euvolemic.             VTE Risk Mitigation         Ordered     heparin (porcine) injection 5,000 Units  Every 8 hours      05/19/18 1623     Place sequential compression device  Until discontinued      05/19/18 1529     IP VTE LOW RISK PATIENT  Once      05/19/18 1529          Thank you for your consult. I will follow-up with patient. Please contact us if you have any additional questions.    Braxton Gonzalez MD  Cardiology   [facility]

## 2018-05-19 NOTE — ASSESSMENT & PLAN NOTE
- Cardiac arrest x 3 at OSH; longest code was 22 minutes  - intubated/sedated/paralyzed  - hypothermia protocol in place goal 33 degrees  - MRI in 24-48 hours to assess level of damage  - spot EEG to r/o NCSE  - follow neuro exam

## 2018-05-19 NOTE — ASSESSMENT & PLAN NOTE
- witnessed seizure in ED waiting room, possibly secondary to hypoperfusion/hypoxia  - spot EEG to r/o NCSE  - patient at risk for myoclonic status epilepticus secondary to MARIA ELENA

## 2018-05-19 NOTE — PROGRESS NOTES
[facility]  Neurocritical Care  Progress Note    Admit Date: 5/19/2018  Service Date: 05/19/2018  Length of Stay: 0    Subjective:     Chief Complaint: Anoxic brain injury    History of Present Illness: 48 y/o man w/ hx of HTN, COPD? Transferred from [facility] following witnessed cardiac arrest while in the ED waiting room. Patient had presented complaining of URI/SOB symptoms, while in the ED waiting room he was noted to be choking, became apneic, which led to witnessed seizure like activity followed by cardiac arrest. Per discussion with ED staff, patient required about 22 minutes of ACLS/5 shocks before returning to normal sinus rhythm. During intubation a lozenge was removed from patients airway. Hyperthermia protocol was initiated prior to transferring to [facility] for NCC care. Per ED records, patient was acidotic with ph of 7.1 this morning. At the time of arrival to [facility], patient was on paralytics, however pupillary reflex was present. Will continue hypothermia protocol for additional 24 h.    Hospital Course: No notes on file    No past medical history on file.  No past surgical history on file.   No current facility-administered medications on file prior to encounter.      No current outpatient prescriptions on file prior to encounter.      Allergies: Patient has no known allergies.    No family history on file.  Social History   Substance Use Topics    Smoking status: Not on file    Smokeless tobacco: Not on file    Alcohol use Not on file     Review of Systems   Unable to perform ROS: Intubated     Objective:     Vitals:         Temp  Min: 97 °F (36.1 °C)  Max: 97 °F (36.1 °C)  Pulse  Min: 65  Max: 110  BP  Min: 125/81  Max: 133/82  Resp  Min: 27  Max: 28  SpO2  Min: 100 %  Max: 100 %    No intake/output data recorded.       Physical Exam   Constitutional: He appears well-developed and well-nourished. No distress.   Cardiovascular: Normal rate.    Neurological:   Intubated, sedated or  paralytics; cooling protocol in place  Pupils reactive, sluggish     Nursing note and vitals reviewed.    Today I personally reviewed pertinent medications, lines/drains/airways, cardiology, notably:        Assessment/Plan:     Neuro   * Anoxic brain injury    - Cardiac arrest x 3 at OSH; longest code was 22 minutes  - intubated/sedated/paralyzed  - hypothermia protocol in place goal 33 degrees  - MRI in 24-48 hours to assess level of damage  - spot EEG to r/o NCSE  - follow neuro exam          Cytotoxic brain edema    - secondary to anoxic brain injury, 22 minutes PEA  - hypothermia protocol in place  - MRI when stable  - monitor exam following hypothermia protocol         Seizure    - witnessed seizure in ED waiting room, possibly secondary to hypoperfusion/hypoxia  - spot EEG to r/o NCSE  - patient at risk for myoclonic status epilepticus secondary to MARIA ELENA        Pulmonary   Pulmonary edema    - no hx of HF, patient with URI symptoms for last month per discussion with transporting paramedic  - f/u ECHO, BNP  - CXR  - lasix as needed        Acute respiratory failure with hypoxia    - intubated on arrival  - last abg 5/19@ 7:20: 7.03/42/121/-20/11.3  Vent Mode: A/C  Resp Rate Total:  [18 br/min] 18 br/min  Vt Set:  [500 mL] 500 mL  PEEP/CPAP:  [10 cmH20] 10 cmH20  Mean Airway Pressure:  [18 cmH20] 18 cmH20  - 2 amps bicarb now  - f/u ABG        Cardiac/Vascular   Essential hypertension    - holding home medications        Cardiogenic shock    - possible undiagnosed cardiomyopathy based on CXR w/ evidence of pulmonary edema and multiple cardiac arrests over last 24 h  - f/u TTE  - f/u central venous O2 sat; titrate dobutamine to sVO2>70  - cardiology consult        Cardiac arrest    - 3 arrests prior to arrival    - currently in NSR  - f/u troponin         Renal/   MARY (acute kidney injury)    - unknown baseline, possible acute on CKD  - CMP daily, follow up echo prior to aggressive volume resuscitation given  concern for undiagnosed HF        Metabolic acidosis    - repeat ABG on arrival not much improved, ph 7.1  - 2 amps bicarb now; f/u ABG        Hematology   Elevated INR    - increased since PEA  - monitor INR, CBC        Endocrine   Morbid obesity    - nutrition consult        Orthopedic   Hypothermia    - therpeutic, on paralytics            Prophylaxis:  Venous Thromboembolism: chemical  Stress Ulcer: H2B  Ventilator Pneumonia: no     Activity Orders          None        Full Code    Mike Clarke MD  Neurocritical Care  [facility]

## 2018-05-19 NOTE — HPI
46 y/o man w/ hx of HTN, COPD? Transferred from [facility] following witnessed cardiac arrest while in the ED waiting room. Patient had presented complaining of URI/SOB symptoms, while in the ED waiting room he was noted to be choking, became apneic, which led to witnessed seizure like activity followed by cardiac arrest. Per discussion with ED staff, patient required about 22 minutes of ACLS/5 shocks before returning to normal sinus rhythm. During intubation a lozenge was removed from patients airway. Hyperthermia protocol was initiated prior to transferring to [facility] for NCC care. Per ED records, patient was acidotic with ph of 7.1 this morning. At the time of arrival to NICU, patient was on paralytics, however pupillary reflex was present. Will continue hypothermia protocol for additional 24 h.

## 2018-05-19 NOTE — ASSESSMENT & PLAN NOTE
- possible undiagnosed cardiomyopathy based on CXR w/ evidence of pulmonary edema and multiple cardiac arrests over last 24 h  - f/u TTE  - f/u central venous O2 sat; titrate dobutamine to sVO2>70  - cardiology consult

## 2018-05-19 NOTE — PROGRESS NOTES
Received patient from [facility] transfer from [facility]. Patient was intubated at outside hospital; inserted bite block and placed ETT in tube garcia.   ETT is 25cm at the lip and secure and patent.  Patient is on Ventilator at documented settings.

## 2018-05-19 NOTE — SUBJECTIVE & OBJECTIVE
No past medical history on file.  No past surgical history on file.   No current facility-administered medications on file prior to encounter.      No current outpatient prescriptions on file prior to encounter.      Allergies: Patient has no known allergies.    No family history on file.  Social History   Substance Use Topics    Smoking status: Not on file    Smokeless tobacco: Not on file    Alcohol use Not on file     Review of Systems   Unable to perform ROS: Intubated     Objective:     Vitals:         Temp  Min: 97 °F (36.1 °C)  Max: 97 °F (36.1 °C)  Pulse  Min: 65  Max: 110  BP  Min: 125/81  Max: 133/82  Resp  Min: 27  Max: 28  SpO2  Min: 100 %  Max: 100 %    No intake/output data recorded.       Physical Exam   Constitutional: He appears well-developed and well-nourished. No distress.   Cardiovascular: Normal rate.    Pulmonary/Chest:   intubated   Neurological: GCS eye subscore is 1. GCS verbal subscore is 1. GCS motor subscore is 1.   Intubated, sedated or paralytics; cooling protocol in place  Pupils reactive, sluggish     Nursing note and vitals reviewed.    Today I personally reviewed pertinent medications, lines/drains/airways, cardiology, notably:

## 2018-05-20 LAB
ALBUMIN SERPL BCP-MCNC: 2.6 G/DL
ALBUMIN SERPL BCP-MCNC: 2.7 G/DL
ALBUMIN SERPL BCP-MCNC: 2.7 G/DL
ALBUMIN SERPL BCP-MCNC: 2.8 G/DL
ALLENS TEST: ABNORMAL
ALP SERPL-CCNC: 87 U/L
ALP SERPL-CCNC: 88 U/L
ALP SERPL-CCNC: 89 U/L
ALP SERPL-CCNC: 90 U/L
ALP SERPL-CCNC: 91 U/L
ALP SERPL-CCNC: 92 U/L
ALP SERPL-CCNC: 93 U/L
ALT SERPL W/O P-5'-P-CCNC: 411 U/L
ALT SERPL W/O P-5'-P-CCNC: 412 U/L
ALT SERPL W/O P-5'-P-CCNC: 431 U/L
ALT SERPL W/O P-5'-P-CCNC: 437 U/L
ALT SERPL W/O P-5'-P-CCNC: 443 U/L
ALT SERPL W/O P-5'-P-CCNC: 463 U/L
ALT SERPL W/O P-5'-P-CCNC: 469 U/L
ANION GAP SERPL CALC-SCNC: 14 MMOL/L
ANION GAP SERPL CALC-SCNC: 15 MMOL/L
APTT BLDCRRT: 30.3 SEC
AST SERPL-CCNC: 533 U/L
AST SERPL-CCNC: 608 U/L
AST SERPL-CCNC: 659 U/L
AST SERPL-CCNC: 702 U/L
AST SERPL-CCNC: 785 U/L
AST SERPL-CCNC: 849 U/L
AST SERPL-CCNC: 868 U/L
BACTERIA UR CULT: NO GROWTH
BASOPHILS # BLD AUTO: 0.02 K/UL
BASOPHILS NFR BLD: 0.1 %
BILIRUB SERPL-MCNC: 1.1 MG/DL
BILIRUB SERPL-MCNC: 1.3 MG/DL
BILIRUB SERPL-MCNC: 1.4 MG/DL
BILIRUB SERPL-MCNC: 1.6 MG/DL
BILIRUB SERPL-MCNC: 1.7 MG/DL
BUN SERPL-MCNC: 35 MG/DL
BUN SERPL-MCNC: 36 MG/DL
BUN SERPL-MCNC: 38 MG/DL
BUN SERPL-MCNC: 41 MG/DL
BUN SERPL-MCNC: 41 MG/DL
BUN SERPL-MCNC: 43 MG/DL
BUN SERPL-MCNC: 44 MG/DL
CALCIUM SERPL-MCNC: 8.1 MG/DL
CALCIUM SERPL-MCNC: 8.2 MG/DL
CALCIUM SERPL-MCNC: 8.4 MG/DL
CALCIUM SERPL-MCNC: 8.5 MG/DL
CHLORIDE SERPL-SCNC: 104 MMOL/L
CHLORIDE SERPL-SCNC: 104 MMOL/L
CHLORIDE SERPL-SCNC: 105 MMOL/L
CHLORIDE SERPL-SCNC: 106 MMOL/L
CHLORIDE SERPL-SCNC: 106 MMOL/L
CO2 SERPL-SCNC: 18 MMOL/L
CO2 SERPL-SCNC: 19 MMOL/L
CO2 SERPL-SCNC: 20 MMOL/L
CO2 SERPL-SCNC: 21 MMOL/L
CO2 SERPL-SCNC: 21 MMOL/L
CREAT SERPL-MCNC: 3.1 MG/DL
CREAT SERPL-MCNC: 3.2 MG/DL
CREAT SERPL-MCNC: 3.2 MG/DL
CREAT SERPL-MCNC: 3.4 MG/DL
CREAT SERPL-MCNC: 3.6 MG/DL
CREAT SERPL-MCNC: 3.8 MG/DL
CREAT SERPL-MCNC: 3.9 MG/DL
DELSYS: ABNORMAL
DIFFERENTIAL METHOD: ABNORMAL
EOSINOPHIL # BLD AUTO: 0 K/UL
EOSINOPHIL NFR BLD: 0 %
ERYTHROCYTE [DISTWIDTH] IN BLOOD BY AUTOMATED COUNT: 20.7 %
ERYTHROCYTE [SEDIMENTATION RATE] IN BLOOD BY WESTERGREN METHOD: 18 MM/H
EST. GFR  (AFRICAN AMERICAN): 19.9 ML/MIN/1.73 M^2
EST. GFR  (AFRICAN AMERICAN): 20.5 ML/MIN/1.73 M^2
EST. GFR  (AFRICAN AMERICAN): 21.9 ML/MIN/1.73 M^2
EST. GFR  (AFRICAN AMERICAN): 23.5 ML/MIN/1.73 M^2
EST. GFR  (AFRICAN AMERICAN): 25.3 ML/MIN/1.73 M^2
EST. GFR  (AFRICAN AMERICAN): 25.3 ML/MIN/1.73 M^2
EST. GFR  (AFRICAN AMERICAN): 26.3 ML/MIN/1.73 M^2
EST. GFR  (NON AFRICAN AMERICAN): 17.2 ML/MIN/1.73 M^2
EST. GFR  (NON AFRICAN AMERICAN): 17.8 ML/MIN/1.73 M^2
EST. GFR  (NON AFRICAN AMERICAN): 19 ML/MIN/1.73 M^2
EST. GFR  (NON AFRICAN AMERICAN): 20.3 ML/MIN/1.73 M^2
EST. GFR  (NON AFRICAN AMERICAN): 21.9 ML/MIN/1.73 M^2
EST. GFR  (NON AFRICAN AMERICAN): 21.9 ML/MIN/1.73 M^2
EST. GFR  (NON AFRICAN AMERICAN): 22.7 ML/MIN/1.73 M^2
FIO2: 50
GLUCOSE SERPL-MCNC: 101 MG/DL
GLUCOSE SERPL-MCNC: 102 MG/DL
GLUCOSE SERPL-MCNC: 108 MG/DL
GLUCOSE SERPL-MCNC: 109 MG/DL
GLUCOSE SERPL-MCNC: 111 MG/DL
GLUCOSE SERPL-MCNC: 115 MG/DL
GLUCOSE SERPL-MCNC: 115 MG/DL
GLUCOSE SERPL-MCNC: 127 MG/DL
HCO3 UR-SCNC: 23.5 MMOL/L (ref 24–28)
HCT VFR BLD AUTO: 29 %
HGB BLD-MCNC: 8.3 G/DL
IMM GRANULOCYTES # BLD AUTO: 0.11 K/UL
IMM GRANULOCYTES NFR BLD AUTO: 0.5 %
LACTATE SERPL-SCNC: 3.7 MMOL/L
LYMPHOCYTES # BLD AUTO: 1.5 K/UL
LYMPHOCYTES NFR BLD: 6.6 %
MAGNESIUM SERPL-MCNC: 1.9 MG/DL
MAGNESIUM SERPL-MCNC: 2 MG/DL
MAGNESIUM SERPL-MCNC: 2.1 MG/DL
MAGNESIUM SERPL-MCNC: 2.2 MG/DL
MCH RBC QN AUTO: 18.4 PG
MCHC RBC AUTO-ENTMCNC: 28.6 G/DL
MCV RBC AUTO: 64 FL
MITRAL VALVE MOBILITY: NORMAL
MITRAL VALVE REGURGITATION: ABNORMAL
MODE: ABNORMAL
MONOCYTES # BLD AUTO: 1.5 K/UL
MONOCYTES NFR BLD: 6.6 %
NEUTROPHILS # BLD AUTO: 19.9 K/UL
NEUTROPHILS NFR BLD: 86.2 %
NRBC BLD-RTO: 0 /100 WBC
PCO2 BLDA: 43.3 MMHG (ref 35–45)
PEEP: 10
PH SMN: 7.34 [PH] (ref 7.35–7.45)
PHOSPHATE SERPL-MCNC: 4.8 MG/DL
PHOSPHATE SERPL-MCNC: 5.1 MG/DL
PHOSPHATE SERPL-MCNC: 5.5 MG/DL
PHOSPHATE SERPL-MCNC: 5.6 MG/DL
PHOSPHATE SERPL-MCNC: 5.6 MG/DL
PHOSPHATE SERPL-MCNC: 5.9 MG/DL
PLATELET # BLD AUTO: 182 K/UL
PMV BLD AUTO: 9.6 FL
PO2 BLDA: 192 MMHG (ref 80–100)
POC BE: -2 MMOL/L
POC SATURATED O2: 100 % (ref 95–100)
POC TCO2: 25 MMOL/L (ref 23–27)
POTASSIUM SERPL-SCNC: 4 MMOL/L
POTASSIUM SERPL-SCNC: 4.2 MMOL/L
POTASSIUM SERPL-SCNC: 4.3 MMOL/L
POTASSIUM SERPL-SCNC: 4.4 MMOL/L
POTASSIUM SERPL-SCNC: 4.4 MMOL/L
POTASSIUM SERPL-SCNC: 4.5 MMOL/L
POTASSIUM SERPL-SCNC: 4.6 MMOL/L
PROT SERPL-MCNC: 5.7 G/DL
PROT SERPL-MCNC: 5.8 G/DL
PROT SERPL-MCNC: 5.9 G/DL
PROT SERPL-MCNC: 6 G/DL
RBC # BLD AUTO: 4.52 M/UL
RETIRED EF AND QEF - SEE NOTES: 10 (ref 55–65)
SAMPLE: ABNORMAL
SITE: ABNORMAL
SODIUM SERPL-SCNC: 138 MMOL/L
SODIUM SERPL-SCNC: 139 MMOL/L
SODIUM SERPL-SCNC: 140 MMOL/L
SODIUM SERPL-SCNC: 140 MMOL/L
SODIUM SERPL-SCNC: 141 MMOL/L
TRICUSPID VALVE REGURGITATION: ABNORMAL
TROPONIN I SERPL DL<=0.01 NG/ML-MCNC: 1.38 NG/ML
TROPONIN I SERPL DL<=0.01 NG/ML-MCNC: 2.4 NG/ML
VT: 500
WBC # BLD AUTO: 23.1 K/UL

## 2018-05-20 PROCEDURE — 63600175 PHARM REV CODE 636 W HCPCS: Performed by: NURSE PRACTITIONER

## 2018-05-20 PROCEDURE — 80053 COMPREHEN METABOLIC PANEL: CPT | Mod: 91

## 2018-05-20 PROCEDURE — 84100 ASSAY OF PHOSPHORUS: CPT | Mod: 91

## 2018-05-20 PROCEDURE — 85730 THROMBOPLASTIN TIME PARTIAL: CPT

## 2018-05-20 PROCEDURE — 83735 ASSAY OF MAGNESIUM: CPT | Mod: 91

## 2018-05-20 PROCEDURE — 99233 SBSQ HOSP IP/OBS HIGH 50: CPT | Mod: ,,, | Performed by: INTERNAL MEDICINE

## 2018-05-20 PROCEDURE — 99900026 HC AIRWAY MAINTENANCE (STAT)

## 2018-05-20 PROCEDURE — 37799 UNLISTED PX VASCULAR SURGERY: CPT

## 2018-05-20 PROCEDURE — 27200966 HC CLOSED SUCTION SYSTEM

## 2018-05-20 PROCEDURE — 83735 ASSAY OF MAGNESIUM: CPT

## 2018-05-20 PROCEDURE — S0028 INJECTION, FAMOTIDINE, 20 MG: HCPCS | Performed by: PSYCHIATRY & NEUROLOGY

## 2018-05-20 PROCEDURE — 84100 ASSAY OF PHOSPHORUS: CPT

## 2018-05-20 PROCEDURE — 82803 BLOOD GASES ANY COMBINATION: CPT

## 2018-05-20 PROCEDURE — 80053 COMPREHEN METABOLIC PANEL: CPT

## 2018-05-20 PROCEDURE — 85025 COMPLETE CBC W/AUTO DIFF WBC: CPT

## 2018-05-20 PROCEDURE — 84484 ASSAY OF TROPONIN QUANT: CPT

## 2018-05-20 PROCEDURE — 99900035 HC TECH TIME PER 15 MIN (STAT)

## 2018-05-20 PROCEDURE — 83605 ASSAY OF LACTIC ACID: CPT

## 2018-05-20 PROCEDURE — 95951 HC EEG MONITORING/VIDEO RECORD: CPT

## 2018-05-20 PROCEDURE — 94761 N-INVAS EAR/PLS OXIMETRY MLT: CPT

## 2018-05-20 PROCEDURE — 25000003 PHARM REV CODE 250: Performed by: NURSE PRACTITIONER

## 2018-05-20 PROCEDURE — 25000003 PHARM REV CODE 250: Performed by: PSYCHIATRY & NEUROLOGY

## 2018-05-20 PROCEDURE — 95951 PR EEG MONITORING/VIDEORECORD: CPT | Mod: 26,,, | Performed by: PSYCHIATRY & NEUROLOGY

## 2018-05-20 PROCEDURE — 63600175 PHARM REV CODE 636 W HCPCS: Performed by: STUDENT IN AN ORGANIZED HEALTH CARE EDUCATION/TRAINING PROGRAM

## 2018-05-20 PROCEDURE — 99291 CRITICAL CARE FIRST HOUR: CPT | Mod: ,,, | Performed by: PSYCHIATRY & NEUROLOGY

## 2018-05-20 PROCEDURE — 94003 VENT MGMT INPAT SUBQ DAY: CPT

## 2018-05-20 PROCEDURE — 20000000 HC ICU ROOM

## 2018-05-20 PROCEDURE — 27000221 HC OXYGEN, UP TO 24 HOURS

## 2018-05-20 RX ORDER — FENTANYL CITRATE 50 UG/ML
50 INJECTION, SOLUTION INTRAMUSCULAR; INTRAVENOUS ONCE
Status: COMPLETED | OUTPATIENT
Start: 2018-05-20 | End: 2018-05-20

## 2018-05-20 RX ADMIN — PROPOFOL 50 MCG/KG/MIN: 10 INJECTION, EMULSION INTRAVENOUS at 09:05

## 2018-05-20 RX ADMIN — MINERAL OIL AND WHITE PETROLATUM: 150; 830 OINTMENT OPHTHALMIC at 05:05

## 2018-05-20 RX ADMIN — MINERAL OIL AND WHITE PETROLATUM: 150; 830 OINTMENT OPHTHALMIC at 01:05

## 2018-05-20 RX ADMIN — DEXTROSE 1 MCG/KG/MIN: 50 INJECTION, SOLUTION INTRAVENOUS at 02:05

## 2018-05-20 RX ADMIN — PROPOFOL 50 MCG/KG/MIN: 10 INJECTION, EMULSION INTRAVENOUS at 11:05

## 2018-05-20 RX ADMIN — PROPOFOL 50 MCG/KG/MIN: 10 INJECTION, EMULSION INTRAVENOUS at 07:05

## 2018-05-20 RX ADMIN — VANCOMYCIN HYDROCHLORIDE 1750 MG: 1 INJECTION, POWDER, LYOPHILIZED, FOR SOLUTION INTRAVENOUS at 07:05

## 2018-05-20 RX ADMIN — HEPARIN SODIUM 5000 UNITS: 5000 INJECTION, SOLUTION INTRAVENOUS; SUBCUTANEOUS at 09:05

## 2018-05-20 RX ADMIN — FAMOTIDINE 20 MG: 10 INJECTION INTRAVENOUS at 08:05

## 2018-05-20 RX ADMIN — CEFEPIME 1 G: 1 INJECTION, POWDER, FOR SOLUTION INTRAMUSCULAR; INTRAVENOUS at 02:05

## 2018-05-20 RX ADMIN — HEPARIN SODIUM 5000 UNITS: 5000 INJECTION, SOLUTION INTRAVENOUS; SUBCUTANEOUS at 01:05

## 2018-05-20 RX ADMIN — Medication 0.05 MCG/KG/MIN: at 10:05

## 2018-05-20 RX ADMIN — HEPARIN SODIUM 5000 UNITS: 5000 INJECTION, SOLUTION INTRAVENOUS; SUBCUTANEOUS at 05:05

## 2018-05-20 RX ADMIN — PROPOFOL 50 MCG/KG/MIN: 10 INJECTION, EMULSION INTRAVENOUS at 12:05

## 2018-05-20 RX ADMIN — PROPOFOL 40 MCG/KG/MIN: 10 INJECTION, EMULSION INTRAVENOUS at 01:05

## 2018-05-20 RX ADMIN — Medication 0.08 MCG/KG/MIN: at 02:05

## 2018-05-20 RX ADMIN — PROPOFOL 50 MCG/KG/MIN: 10 INJECTION, EMULSION INTRAVENOUS at 05:05

## 2018-05-20 RX ADMIN — PROPOFOL 50 MCG/KG/MIN: 10 INJECTION, EMULSION INTRAVENOUS at 02:05

## 2018-05-20 RX ADMIN — Medication 0.07 MCG/KG/MIN: at 08:05

## 2018-05-20 RX ADMIN — MINERAL OIL AND WHITE PETROLATUM: 150; 830 OINTMENT OPHTHALMIC at 09:05

## 2018-05-20 RX ADMIN — PROPOFOL 50 MCG/KG/MIN: 10 INJECTION, EMULSION INTRAVENOUS at 04:05

## 2018-05-20 RX ADMIN — CEFEPIME 1 G: 1 INJECTION, POWDER, FOR SOLUTION INTRAMUSCULAR; INTRAVENOUS at 07:05

## 2018-05-20 RX ADMIN — FENTANYL CITRATE 50 MCG: 50 INJECTION, SOLUTION INTRAMUSCULAR; INTRAVENOUS at 01:05

## 2018-05-20 RX ADMIN — CEFEPIME 1 G: 1 INJECTION, POWDER, FOR SOLUTION INTRAMUSCULAR; INTRAVENOUS at 11:05

## 2018-05-20 NOTE — ASSESSMENT & PLAN NOTE
- Cardiac arrest x 3 at OSH; longest code was 22 minutes  - intubated/sedated/paralyzed  - hypothermia protocol in place goal 33 degrees  - MRI in 24-48 hours to assess level of damage  - spot EEG to r/o NCSE  - follow neuro exam  - family updated wife and mother at bedside

## 2018-05-20 NOTE — ASSESSMENT & PLAN NOTE
- no hx of HF, patient with URI symptoms for last month per discussion with transporting paramedic  - f/u ECHO, BNP

## 2018-05-20 NOTE — PLAN OF CARE
Problem: Patient Care Overview  Goal: Plan of Care Review  POC reviewed with family at 0500. Family verbalized understanding. Pt progressing toward goals. Will continue to monitor. See flowsheets for full assessment and VS info

## 2018-05-20 NOTE — PROGRESS NOTES
[facility]  Cardiology  Progress Note    Patient Name: Los Mendez  MRN: [id number]  Admission Date: 5/19/2018  Hospital Length of Stay: 1 days  Code Status: Full Code   Attending Physician: Keith Cohen MD   Primary Care Physician: Provider Notinsystem  Expected Discharge Date:   Principal Problem:Anoxic brain injury    Subjective:     Hospital Course:   No notes on file    Interval History:   SVO2 was 70. Levophed 0.07  Bradycardic in 40's  Sedated and intubated  On hypotherimic protocol.   Anuric 10 cc documented.   Cr is 3.4 today    Review of Systems   Unable to perform ROS: intubated     Objective:     Vital Signs (Most Recent):  Temp: (!) 89.2 °F (31.8 °C) (05/20/18 1105)  Pulse: (!) 42 (05/20/18 1105)  Resp: 18 (05/20/18 1105)  BP: (!) 88/60 (05/20/18 1105)  SpO2: 100 % (05/20/18 1105) Vital Signs (24h Range):  Temp:  [86.4 °F (30.2 °C)-94.1 °F (34.5 °C)] 89.2 °F (31.8 °C)  Pulse:  [42-84] 42  Resp:  [10-27] 18  SpO2:  [95 %-100 %] 100 %  BP: ()/(58-95) 88/60  Arterial Line BP: ()/(60-95) 101/65     Weight: 126 kg (277 lb 12.5 oz)  Body mass index is 42.24 kg/m².     SpO2: 100 %  O2 Device (Oxygen Therapy): ventilator      Intake/Output Summary (Last 24 hours) at 05/20/18 1139  Last data filed at 05/20/18 1000   Gross per 24 hour   Intake          1292.23 ml   Output              224 ml   Net          1068.23 ml       Lines/Drains/Airways     Central Venous Catheter Line                 Percutaneous Central Line Insertion/Assessment - triple lumen  05/19/18 right internal jugular 1 day          Drain                 NG/OG Tube 05/19/18 orogastric 1 day         Urethral Catheter 05/19/18 Temperature probe 1 day          Airway                 Airway - Non-Surgical 05/18/18 0900 Endotracheal Tube 2 days          Arterial Line                 Arterial Line 05/19/18 1654 Right Radial less than 1 day          Peripheral Intravenous Line                 Peripheral IV - Single  Lumen 05/19/18 Left Antecubital 1 day         Peripheral IV - Single Lumen 05/19/18 Left Hand 1 day         Peripheral IV - Single Lumen 05/19/18 Right Hand 1 day                Physical Exam  GEN: Intubated and sedated  NECK: + JVD appreciated to ear  CVS: RRR, s1/s2, no MRG  PULM: CTAB no rales  ABD: NT/ND BS +  Extremities: Cool, palpable pulses, mild edema  NEURO: Intubated and sedated    Significant Labs:   CMP   Recent Labs  Lab 05/20/18  0211 05/20/18  0406 05/20/18  0806    139 139   K 4.4 4.5 4.4    105 105   CO2 20* 20* 20*    108 101   BUN 36* 38* 41*   CREATININE 3.2* 3.2* 3.4*   CALCIUM 8.4* 8.4* 8.4*   PROT 5.8* 5.8* 5.8*   ALBUMIN 2.7* 2.8* 2.8*   BILITOT 1.1* 1.1* 1.3*   ALKPHOS 93 88 89   * 785* 702*   * 431* 411*   ANIONGAP 15 14 14   ESTGFRAFRICA 25.3* 25.3* 23.5*   EGFRNONAA 21.9* 21.9* 20.3*   , CBC   Recent Labs  Lab 05/19/18  1818  05/20/18  0211   WBC 29.86*  --  23.10*   HGB 8.5*  --  8.3*   HCT 30.8*  < > 29.0*     --  182   < > = values in this interval not displayed., Lipid Panel No results for input(s): CHOL, HDL, LDLCALC, TRIG, CHOLHDL in the last 48 hours., Troponin   Recent Labs  Lab 05/19/18  1609 05/19/18  2307 05/20/18  0806   TROPONINI 3.383* 2.403* 1.376*    and All pertinent lab results from the last 24 hours have been reviewed.    Significant Imaging: Echocardiogram: 2D echo with color flow doppler: No results found for this or any previous visit.    Assessment and Plan:     Brief HPI:     Acute decompensated heart failure    Patient with dilated cardiomyopathy EF looks about 10-15% and LVEDD is 7.3 cm, unknown etiology at this time but could be related to heavy alcohol use as he drinks about 1 case of beer and 2/5 of vodka per day. Per the family he has not felt well over the past 2 weeks and had progressive shortness of breath. It is very likely that he was acutely decompensated and had VT or VF as the etiology of his syncope and  cardiac arrest.     Recommendations:  - c/w levophed as the patient has a SVO2 of 70 this morning  - Currently hypervolemic CVP was 18-20. Anuric. Recommend diuresis 80 mg IV x 1 and then lasix gtt at 20 mg/hr. Still no urine then could add diuril 250 mg x1. Likely secondary to ATN so may not responding to diuresis. Would involve nephrology for dialysis if patient's CVP remains elevated in the setting of CM of 10-15%.     - Should he recover from the this and be weaned off pressors would recommend GDMT with lisinopril and metoprolol but only when more euvolemic.     Discussed with Dr. Archer,            VTE Risk Mitigation         Ordered     heparin (porcine) injection 5,000 Units  Every 8 hours      05/19/18 1623     Place sequential compression device  Until discontinued      05/19/18 1529     IP VTE LOW RISK PATIENT  Once      05/19/18 1529          Vinita Baker MD  Cardiology  [facility]

## 2018-05-20 NOTE — PROGRESS NOTES
1430-Pt temp increasing currently 33.4C,  During hypothermia, extensor posturing spontaneously frequently without pain stimulus, notified Dr. Cohen will restart Nimbex. Will carry out orders will continue to monitor    1520- pt temp 33.7C ice packs applied to bring temp back to goal 33C, will continue to monitor closely    1530- pt temp 33.8C, notified Serg NP, no new orders at this time will continue to monitor

## 2018-05-20 NOTE — NURSING
NCC notified of pt spontaneously moving extremities, breathing over the vent and coughing, and BIS above 60. Orders given to increase propofol infusion from 30 mcg/kg/min to 40 mcg/kg/min and 50 mcg of fentanyl. Will continue to monitor pt.

## 2018-05-20 NOTE — ASSESSMENT & PLAN NOTE
- possible undiagnosed cardiomyopathy based on CXR w/ evidence of pulmonary edema and multiple cardiac arrests over last 24 h  - f/u TTE  -svo2 67 % yesterday  70% today  Trop improving  - cardiology consult  Do not diurese

## 2018-05-20 NOTE — ASSESSMENT & PLAN NOTE
Patient with dilated cardiomyopathy EF looks about 10-15% and LVEDD is 7.3 cm, unknown etiology at this time but could be related to heavy alcohol use as he drinks about 1 case of beer and 2/5 of vodka per day. Per the family he has not felt well over the past 2 weeks and had progressive shortness of breath. It is very likely that he was acutely decompensated and had VT or VF as the etiology of his syncope and cardiac arrest.     Recommendations:  - c/w levophed as the patient has a SVO2 of 70 this morning  - Currently hypervolemic CVP was 18-20. Anuric. Recommend diuresis 80 mg IV x 1 and then lasix gtt at 20 mg/hr. Still no urine then could add diuril 250 mg x1. Likely secondary to ATN so may not responding to diuresis. Would involve nephrology for dialysis if patient's CVP remains elevated in the setting of CM of 10-15%.     - Should he recover from the this and be weaned off pressors would recommend GDMT with lisinopril and metoprolol but only when more euvolemic.     Discussed with Dr. Archer,

## 2018-05-20 NOTE — PLAN OF CARE
Problem: Patient Care Overview  Goal: Plan of Care Review  Outcome: Ongoing (interventions implemented as appropriate)  POC reviewed with pt and family at 1400. Pt unable to verbalize an understanding, pt's wife verbalizes an understanding.  Questions and concerns addressed. No acute events today. Pt remains intubated, medically sedated and paralyzed, hypothermia in progress, plan to rewarm tonight at 1915, eeg, in place, cardiac ouput monitored with flow trac, skin assessed every 2 hrs, remains intact. Pt progressing toward goals. Will continue to monitor. See flowsheets for full assessment and VS info.

## 2018-05-20 NOTE — PROCEDURES
DATE OF PROCEDURE:  05/20/2018    ICU EEG/VIDEO MONITORING REPORT     METHODOLOGY:  Electroencephalographic (EEG) is recorded with electrodes placed   according to the International 10-20 placement system.  Thirty two (32) channels   of digital signal (sampling rate of 512/sec), including T1 and T2, were   simultaneously recorded from the scalp and may include EKG, EMG, and/or eye   monitors.  Recording band pass was 0.1 to 512 Hz.  Digital video recording of   the patient is simultaneously recorded with the EEG.  The patient is instructed   to report clinical symptoms which may occur during the recording session.  EEG   and video recording are stored and archived in digital format.  Activation   procedures, which include photic stimulation, hyperventilation and instructing   patients to perform simple tasks, are done in selected patients  The EEG is displayed on a monitor screen and can be reviewed using different   montages.  Computer-assisted analysis is employed to detect spike and   electrographic seizure activity.   The entire record is submitted for computer   analysis.  The entire recording is visually reviewed, and the times identified   by computer analysis as being spikes or seizures are reviewed again.    Compressed spectral analysis (CSA) is also performed on the activity recorded   from each individual channel.  This is displayed as a power display of   frequencies from 0 to 30 Hz over time.   The CSA is reviewed looking for   asymmetries in power between homologous areas of the scalp, then compared with   the original EEG recording.    Pivot[employer] software was also utilized in the review of this study.  This software   suite analyzes the EEG recording in multiple domains.  Coherence and rhythmicity   are computed to identify EEG sections which may contain organized seizures.    Each channel undergoes analysis to detect the presence of spike and sharp waves   which have special and morphological  characteristics of epileptic activity.  The   routine EEG recording is converted from special into frequency domain.  This is   then displayed comparing homologous areas to identify areas of significant   asymmetry.  Algorithm to identify non-cortically generated artifact is used to   separate artifact from the EEG.      RECORDING TIMES:  The study begins on 05/20/2018 at 0110 and ends on 05/20/2018   at 0933.  Duration of this portion of the study is 8 hours and 22 minutes.    FINDINGS:  This study is done with electrode cap overnight and is of marginal   technical quality with extensive electrical artifact throughout.  Overall, the   record displays low amplitude cerebral activity with predominantly low amplitude   delta and only minimal superimposed faster frequencies seen throughout   indicating global diffuse encephalopathy without focal findings or evidence of   seizures.  The amount of 60 Hz artifact on the record makes it impossible to   determine finer features of the record, but overall record is of sufficient   quality to rule out ongoing seizures and to confirm severe encephalopathy.    INTERPRETATION:  Abnormal overnight electrode cap EEG due to severe diffuse   slowing of the background without evidence of focal findings or seizures.      NBB/HN  dd: 05/20/2018 12:34:41 (CDT)  td: 05/20/2018 13:02:52 (CDT)  Doc ID   #[id number]  Job ID #[id number]    CC:

## 2018-05-20 NOTE — ASSESSMENT & PLAN NOTE
- intubated on arrival  Vent Mode: A/C  Oxygen Concentration (%):  [] 51  Resp Rate Total:  [18 br/min-19 br/min] 18 br/min  Vt Set:  [500 mL] 500 mL  PEEP/CPAP:  [10 cmH20] 10 cmH20  Mean Airway Pressure:  [15 chV60-94 cmH20] 15 cmH20  - f/u ABG

## 2018-05-20 NOTE — PROGRESS NOTES
[facility]  Neurocritical Care  Progress Note    Admit Date: 5/19/2018  Service Date: 05/20/2018  Length of Stay: 1    Subjective:     Chief Complaint: Anoxic brain injury    History of Present Illness: 46 y/o man w/ hx of HTN, COPD? Transferred from [facility] following witnessed cardiac arrest while in the ED waiting room. Patient had presented complaining of URI/SOB symptoms, while in the ED waiting room he was noted to be choking, became apneic, which led to witnessed seizure like activity followed by cardiac arrest. Per discussion with ED staff, patient required about 22 minutes of ACLS/5 shocks before returning to normal sinus rhythm. During intubation a lozenge was removed from patients airway. Hyperthermia protocol was initiated prior to transferring to [facility] for [place] care. Per ED records, patient was acidotic with ph of 7.1 this morning. At the time of arrival to NICU, patient was on paralytics, however pupillary reflex was present. Will continue hypothermia protocol for additional 24 h.    Hospital Course: 5/19: arrived intubated, sedated, artic sun blanket  5/20: pressor requirment going up, DO NOT DIURESE, continue TTM, nimbex off, LFT improving, trop improving, family updated    Review of Symptoms:   Unable to obtain, intubated, neuro-exam    Physical Exam:  GA: comfortable, no acute distress.   HEENT: No scleral icterus or JVD.   Pulmonary: Clear to auscultation A/L. No wheezing, crackles, or rhonchi. intubated  Cardiac: RRR S1 & S2 w/o rubs/murmurs/gallops.   Abdominal: Bowel sounds present x 4. No appreciable hepatosplenomegaly.  Skin: No jaundice, rashes, or visible lesions.arctic sun pads in place  Pulses: 1+ Dp bilat    Neuro:  --sedation: propofol  --GCS: [id number]  --Mental Status: sedated, encephalopathic   --CN II-XII grossly intact.   --Pupils 2-->1.5mm, PERRL.   --brainstem: weak cough and gag  --Motor: extensor trhoughout  --sensory: see motor  --Reflexes: not tested  --Gait:  deferred      Recent Labs  Lab 05/20/18  0211   WBC 23.10*   RBC 4.52*   HGB 8.3*   HCT 29.0*      MCV 64*   MCH 18.4*   MCHC 28.6*       Recent Labs  Lab 05/20/18  0806   CALCIUM 8.4*   PROT 5.8*      K 4.4   CO2 20*      BUN 41*   CREATININE 3.4*   ALKPHOS 89   *   *   BILITOT 1.3*       Recent Labs  Lab 05/19/18  1609 05/20/18  0211   INR 2.0*  --    APTT  --  30.3     Vent Mode: A/C  Oxygen Concentration (%):  [] 51  Resp Rate Total:  [18 br/min-19 br/min] 18 br/min  Vt Set:  [500 mL] 500 mL  PEEP/CPAP:  [10 cmH20] 10 cmH20  Mean Airway Pressure:  [15 vmN13-54 cmH20] 15 cmH20    Temp: (!) 89.8 °F (32.1 °C)  Pulse: (!) 44  Rhythm: sinus bradycardia  BP: (!) 92/58  MAP (mmHg): 69  CVP (mean): 17 mmHg  CI (L/min/m2): 0.9 L/min/m2  SVI: 20  SVV: 8 %  Resp: 20  SpO2: 100 %  Oxygen Concentration (%): 51  O2 Device (Oxygen Therapy): ventilator  Vent Mode: A/C  Set Rate: 18 bmp  Vt Set: 500 mL  PEEP/CPAP: 10 cmH20  Peak Airway Pressure: 28 cmH2O  Mean Airway Pressure: 15 cmH20  Plateau Pressure: 0 cmH20    Temp  Min: 86.4 °F (30.2 °C)  Max: 94.1 °F (34.5 °C)  Pulse  Min: 43  Max: 84  BP  Min: 91/62  Max: 140/86  MAP (mmHg)  Min: 69  Max: 107  CVP (mean)  Min: 13 mmHg  Max: 19 mmHg  CI (L/min/m2)  Min: 0.8 L/min/m2  Max: 2.7 L/min/m2  SVI  Min: 17  Max: 51  SVV  Min: 4 %  Max: 16 %  Resp  Min: 10  Max: 27  SpO2  Min: 95 %  Max: 100 %  Oxygen Concentration (%)  Min: 50  Max: 100    05/19 0701 - 05/20 0700  In: 987.1 [I.V.:487.1]  Out: 214 [Urine:214]   Unmeasured Output  Stool Occurrence: 0    Last Bowel Movement: 05/16/18    Body mass index is 42.24 kg/m².      I have personally reviewed all labs, imaging, and studies today      Assessment/Plan:     Neuro   * Anoxic brain injury    - Cardiac arrest x 3 at OSH; longest code was 22 minutes  - intubated/sedated/paralyzed  - hypothermia protocol in place goal 33 degrees  - MRI in 24-48 hours to assess level of damage  - spot EEG to r/o  NCSE  - follow neuro exam  - family updated wife and mother at bedside          Cytotoxic brain edema    - secondary to anoxic brain injury, 22 minutes PEA  - hypothermia protocol in place  - MRI when stable  - monitor exam following hypothermia protocol         Seizure    - witnessed seizure in ED waiting room, possibly secondary to hypoperfusion/hypoxia  - spot EEG to r/o NCSE  - patient at risk for myoclonic status epilepticus secondary to MARIA ELENA        Pulmonary   Pulmonary edema    - no hx of HF, patient with URI symptoms for last month per discussion with transporting paramedic  - f/u ECHO, BNP          Acute respiratory failure with hypoxia    - intubated on arrival  Vent Mode: A/C  Oxygen Concentration (%):  [] 51  Resp Rate Total:  [18 br/min-19 br/min] 18 br/min  Vt Set:  [500 mL] 500 mL  PEEP/CPAP:  [10 cmH20] 10 cmH20  Mean Airway Pressure:  [15 sjO62-04 cmH20] 15 cmH20  - f/u ABG        Cardiac/Vascular   Acute decompensated heart failure    Appreciate cards consult  Though will not further diurese patient        Cardiogenic shock    - possible undiagnosed cardiomyopathy based on CXR w/ evidence of pulmonary edema and multiple cardiac arrests over last 24 h  - f/u TTE  -svo2 67 % yesterday  70% today  Trop improving  - cardiology consult  Do not diurese        Cardiac arrest    - 3 arrests prior to arrival    - currently in NSR  - f/u troponin trending down        Renal/   MARY (acute kidney injury)    CRT climbing  Oliguric  Monitor closely  May need RRT  Hold diureses        Metabolic acidosis    Monitor  Improved after bicarb        Endocrine   Morbid obesity    - nutrition consult        Orthopedic   Hypothermia    - therpeutic, on paralytics  TTM            Prophylaxis:  Venous Thromboembolism: chemical  Stress Ulcer: H2B  Ventilator Pneumonia: yes     Activity Orders          None        Full Code    Keith Cohen MD  Neurocritical Care  [facility]    Cc time 40  minutes  Critical Care was time spent personally by me on the following activities: development of treatment plan with patient or surrogate and bedside caregivers, discussions with consultants, evaluation of patient's response to treatment, examination of patient, ordering and performing treatments and interventions, ordering and review of laboratory studies, ordering and review of radiographic studies, pulse oximetry, re-evaluation of patient's condition. This critical care time did not overlap with that of any other provider or involve time for any procedures.

## 2018-05-20 NOTE — NURSING
NCC notified of pt spontaneously moving upper and lower extremities. Propofol infusion increased from 20 mcg/kg/min to 30 mcg/kg/min. EEG cap ordered. Will continue to monitor pt.

## 2018-05-20 NOTE — SUBJECTIVE & OBJECTIVE
Interval History:   SVO2 was 70. Levophed 0.07  Bradycardic in 40's  Sedated and intubated  On hypotherimic protocol.   Anuric 10 cc documented.   Cr is 3.4 today    Review of Systems   Unable to perform ROS: intubated     Objective:     Vital Signs (Most Recent):  Temp: (!) 89.2 °F (31.8 °C) (05/20/18 1105)  Pulse: (!) 42 (05/20/18 1105)  Resp: 18 (05/20/18 1105)  BP: (!) 88/60 (05/20/18 1105)  SpO2: 100 % (05/20/18 1105) Vital Signs (24h Range):  Temp:  [86.4 °F (30.2 °C)-94.1 °F (34.5 °C)] 89.2 °F (31.8 °C)  Pulse:  [42-84] 42  Resp:  [10-27] 18  SpO2:  [95 %-100 %] 100 %  BP: ()/(58-95) 88/60  Arterial Line BP: ()/(60-95) 101/65     Weight: 126 kg (277 lb 12.5 oz)  Body mass index is 42.24 kg/m².     SpO2: 100 %  O2 Device (Oxygen Therapy): ventilator      Intake/Output Summary (Last 24 hours) at 05/20/18 1139  Last data filed at 05/20/18 1000   Gross per 24 hour   Intake          1292.23 ml   Output              224 ml   Net          1068.23 ml       Lines/Drains/Airways     Central Venous Catheter Line                 Percutaneous Central Line Insertion/Assessment - triple lumen  05/19/18 right internal jugular 1 day          Drain                 NG/OG Tube 05/19/18 orogastric 1 day         Urethral Catheter 05/19/18 Temperature probe 1 day          Airway                 Airway - Non-Surgical 05/18/18 0900 Endotracheal Tube 2 days          Arterial Line                 Arterial Line 05/19/18 1654 Right Radial less than 1 day          Peripheral Intravenous Line                 Peripheral IV - Single Lumen 05/19/18 Left Antecubital 1 day         Peripheral IV - Single Lumen 05/19/18 Left Hand 1 day         Peripheral IV - Single Lumen 05/19/18 Right Hand 1 day                Physical Exam  GEN: Intubated and sedated  NECK: + JVD appreciated to ear  CVS: RRR, s1/s2, no MRG  PULM: CTAB no rales  ABD: NT/ND BS +  Extremities: Cool, palpable pulses, mild edema  NEURO: Intubated and  sedated    Significant Labs:   CMP   Recent Labs  Lab 05/20/18  0211 05/20/18  0406 05/20/18  0806    139 139   K 4.4 4.5 4.4    105 105   CO2 20* 20* 20*    108 101   BUN 36* 38* 41*   CREATININE 3.2* 3.2* 3.4*   CALCIUM 8.4* 8.4* 8.4*   PROT 5.8* 5.8* 5.8*   ALBUMIN 2.7* 2.8* 2.8*   BILITOT 1.1* 1.1* 1.3*   ALKPHOS 93 88 89   * 785* 702*   * 431* 411*   ANIONGAP 15 14 14   ESTGFRAFRICA 25.3* 25.3* 23.5*   EGFRNONAA 21.9* 21.9* 20.3*   , CBC   Recent Labs  Lab 05/19/18  1818  05/20/18  0211   WBC 29.86*  --  23.10*   HGB 8.5*  --  8.3*   HCT 30.8*  < > 29.0*     --  182   < > = values in this interval not displayed., Lipid Panel No results for input(s): CHOL, HDL, LDLCALC, TRIG, CHOLHDL in the last 48 hours., Troponin   Recent Labs  Lab 05/19/18  1609 05/19/18  2307 05/20/18  0806   TROPONINI 3.383* 2.403* 1.376*    and All pertinent lab results from the last 24 hours have been reviewed.    Significant Imaging: Echocardiogram: 2D echo with color flow doppler: No results found for this or any previous visit.

## 2018-05-21 PROBLEM — N17.9 AKI (ACUTE KIDNEY INJURY): Status: ACTIVE | Noted: 2018-05-21

## 2018-05-21 PROBLEM — R09.2 RESPIRATORY ARREST: Status: ACTIVE | Noted: 2018-05-21

## 2018-05-21 PROBLEM — I63.29 ACUTE ISCHEMIC VERTEBROBASILAR ARTERY CEREBELLAR STROKE: Status: ACTIVE | Noted: 2018-05-21

## 2018-05-21 PROBLEM — N17.0 ACUTE RENAL FAILURE WITH TUBULAR NECROSIS: Status: ACTIVE | Noted: 2018-05-19

## 2018-05-21 PROBLEM — I63.441 CEREBRAL INFARCTION DUE TO EMBOLISM OF RIGHT CEREBELLAR ARTERY: Status: ACTIVE | Noted: 2018-05-21

## 2018-05-21 PROBLEM — I63.512 ACUTE ISCHEMIC LEFT MCA STROKE: Status: ACTIVE | Noted: 2018-05-21

## 2018-05-21 LAB
ALBUMIN SERPL BCP-MCNC: 2.6 G/DL
ALBUMIN SERPL BCP-MCNC: 2.8 G/DL
ALLENS TEST: ABNORMAL
ALLENS TEST: ABNORMAL
ALP SERPL-CCNC: 100 U/L
ALP SERPL-CCNC: 102 U/L
ALP SERPL-CCNC: 87 U/L
ALP SERPL-CCNC: 93 U/L
ALP SERPL-CCNC: 93 U/L
ALP SERPL-CCNC: 96 U/L
ALP SERPL-CCNC: 97 U/L
ALT SERPL W/O P-5'-P-CCNC: 365 U/L
ALT SERPL W/O P-5'-P-CCNC: 373 U/L
ALT SERPL W/O P-5'-P-CCNC: 385 U/L
ALT SERPL W/O P-5'-P-CCNC: 394 U/L
ALT SERPL W/O P-5'-P-CCNC: 394 U/L
ALT SERPL W/O P-5'-P-CCNC: 396 U/L
ALT SERPL W/O P-5'-P-CCNC: 407 U/L
ANION GAP SERPL CALC-SCNC: 11 MMOL/L
ANION GAP SERPL CALC-SCNC: 13 MMOL/L
ANION GAP SERPL CALC-SCNC: 14 MMOL/L
ANION GAP SERPL CALC-SCNC: 14 MMOL/L
ANION GAP SERPL CALC-SCNC: 15 MMOL/L
ANION GAP SERPL CALC-SCNC: 16 MMOL/L
APTT BLDCRRT: 29 SEC
AST SERPL-CCNC: 263 U/L
AST SERPL-CCNC: 279 U/L
AST SERPL-CCNC: 300 U/L
AST SERPL-CCNC: 361 U/L
AST SERPL-CCNC: 393 U/L
AST SERPL-CCNC: 393 U/L
AST SERPL-CCNC: 454 U/L
BACTERIA #/AREA URNS AUTO: ABNORMAL /HPF
BACTERIA SPEC AEROBE CULT: NO GROWTH
BASOPHILS # BLD AUTO: 0.02 K/UL
BASOPHILS NFR BLD: 0.1 %
BILIRUB SERPL-MCNC: 1.6 MG/DL
BILIRUB SERPL-MCNC: 1.6 MG/DL
BILIRUB SERPL-MCNC: 1.7 MG/DL
BILIRUB SERPL-MCNC: 1.8 MG/DL
BILIRUB SERPL-MCNC: 1.8 MG/DL
BILIRUB SERPL-MCNC: 1.9 MG/DL
BILIRUB SERPL-MCNC: 2 MG/DL
BILIRUB UR QL STRIP: NEGATIVE
BUN SERPL-MCNC: 36 MG/DL
BUN SERPL-MCNC: 44 MG/DL
BUN SERPL-MCNC: 48 MG/DL
BUN SERPL-MCNC: 48 MG/DL
BUN SERPL-MCNC: 50 MG/DL
BUN SERPL-MCNC: 51 MG/DL
BUN SERPL-MCNC: 52 MG/DL
BUN SERPL-MCNC: 52 MG/DL
BUN SERPL-MCNC: 53 MG/DL
CALCIUM SERPL-MCNC: 8.1 MG/DL
CALCIUM SERPL-MCNC: 8.3 MG/DL
CALCIUM SERPL-MCNC: 8.4 MG/DL
CHLORIDE SERPL-SCNC: 100 MMOL/L
CHLORIDE SERPL-SCNC: 101 MMOL/L
CHLORIDE SERPL-SCNC: 102 MMOL/L
CHLORIDE SERPL-SCNC: 102 MMOL/L
CHLORIDE SERPL-SCNC: 103 MMOL/L
CK SERPL-CCNC: 155 U/L
CK SERPL-CCNC: 155 U/L
CLARITY UR REFRACT.AUTO: ABNORMAL
CO2 SERPL-SCNC: 17 MMOL/L
CO2 SERPL-SCNC: 19 MMOL/L
CO2 SERPL-SCNC: 20 MMOL/L
CO2 SERPL-SCNC: 21 MMOL/L
CO2 SERPL-SCNC: 24 MMOL/L
COLOR UR AUTO: YELLOW
CREAT SERPL-MCNC: 3.8 MG/DL
CREAT SERPL-MCNC: 4 MG/DL
CREAT SERPL-MCNC: 4.1 MG/DL
CREAT SERPL-MCNC: 4.1 MG/DL
CREAT SERPL-MCNC: 4.6 MG/DL
CREAT SERPL-MCNC: 5.1 MG/DL
CREAT SERPL-MCNC: 5.2 MG/DL
CREAT SERPL-MCNC: 5.2 MG/DL
CREAT SERPL-MCNC: 5.4 MG/DL
DELSYS: ABNORMAL
DELSYS: ABNORMAL
DIFFERENTIAL METHOD: ABNORMAL
EOSINOPHIL # BLD AUTO: 0 K/UL
EOSINOPHIL NFR BLD: 0 %
ERYTHROCYTE [DISTWIDTH] IN BLOOD BY AUTOMATED COUNT: 20.1 %
ERYTHROCYTE [SEDIMENTATION RATE] IN BLOOD BY WESTERGREN METHOD: 18 MM/H
ERYTHROCYTE [SEDIMENTATION RATE] IN BLOOD BY WESTERGREN METHOD: 18 MM/H
EST. GFR  (AFRICAN AMERICAN): 13.4 ML/MIN/1.73 M^2
EST. GFR  (AFRICAN AMERICAN): 14.1 ML/MIN/1.73 M^2
EST. GFR  (AFRICAN AMERICAN): 14.1 ML/MIN/1.73 M^2
EST. GFR  (AFRICAN AMERICAN): 14.4 ML/MIN/1.73 M^2
EST. GFR  (AFRICAN AMERICAN): 16.3 ML/MIN/1.73 M^2
EST. GFR  (AFRICAN AMERICAN): 18.7 ML/MIN/1.73 M^2
EST. GFR  (AFRICAN AMERICAN): 18.7 ML/MIN/1.73 M^2
EST. GFR  (AFRICAN AMERICAN): 19.3 ML/MIN/1.73 M^2
EST. GFR  (AFRICAN AMERICAN): 20.5 ML/MIN/1.73 M^2
EST. GFR  (NON AFRICAN AMERICAN): 11.6 ML/MIN/1.73 M^2
EST. GFR  (NON AFRICAN AMERICAN): 12.2 ML/MIN/1.73 M^2
EST. GFR  (NON AFRICAN AMERICAN): 12.2 ML/MIN/1.73 M^2
EST. GFR  (NON AFRICAN AMERICAN): 12.4 ML/MIN/1.73 M^2
EST. GFR  (NON AFRICAN AMERICAN): 14.1 ML/MIN/1.73 M^2
EST. GFR  (NON AFRICAN AMERICAN): 16.2 ML/MIN/1.73 M^2
EST. GFR  (NON AFRICAN AMERICAN): 16.2 ML/MIN/1.73 M^2
EST. GFR  (NON AFRICAN AMERICAN): 16.7 ML/MIN/1.73 M^2
EST. GFR  (NON AFRICAN AMERICAN): 17.8 ML/MIN/1.73 M^2
FIO2: 50
FIO2: 50
GLUCOSE SERPL-MCNC: 119 MG/DL
GLUCOSE SERPL-MCNC: 86 MG/DL
GLUCOSE SERPL-MCNC: 86 MG/DL
GLUCOSE SERPL-MCNC: 93 MG/DL
GLUCOSE SERPL-MCNC: 96 MG/DL
GLUCOSE SERPL-MCNC: 99 MG/DL
GLUCOSE SERPL-MCNC: 99 MG/DL
GLUCOSE UR QL STRIP: NEGATIVE
GRAM STN SPEC: NORMAL
HCO3 UR-SCNC: 16.1 MMOL/L (ref 24–28)
HCO3 UR-SCNC: 21.8 MMOL/L (ref 24–28)
HCT VFR BLD AUTO: 26.9 %
HGB BLD-MCNC: 7.9 G/DL
HGB UR QL STRIP: ABNORMAL
HYALINE CASTS UR QL AUTO: 0 /LPF
IMM GRANULOCYTES # BLD AUTO: 0.18 K/UL
IMM GRANULOCYTES NFR BLD AUTO: 1 %
KETONES UR QL STRIP: NEGATIVE
LACTATE SERPL-SCNC: 1.5 MMOL/L
LEUKOCYTE ESTERASE UR QL STRIP: ABNORMAL
LYMPHOCYTES # BLD AUTO: 1.1 K/UL
LYMPHOCYTES NFR BLD: 5.6 %
MAGNESIUM SERPL-MCNC: 1.7 MG/DL
MAGNESIUM SERPL-MCNC: 1.8 MG/DL
MAGNESIUM SERPL-MCNC: 1.9 MG/DL
MAGNESIUM SERPL-MCNC: 2 MG/DL
MAGNESIUM SERPL-MCNC: 2.1 MG/DL
MAGNESIUM SERPL-MCNC: 2.1 MG/DL
MAGNESIUM SERPL-MCNC: 2.2 MG/DL
MCH RBC QN AUTO: 18.2 PG
MCHC RBC AUTO-ENTMCNC: 29.4 G/DL
MCV RBC AUTO: 62 FL
MICROSCOPIC COMMENT: ABNORMAL
MIN VOL: 9.17
MODE: ABNORMAL
MODE: ABNORMAL
MONOCYTES # BLD AUTO: 1.3 K/UL
MONOCYTES NFR BLD: 6.9 %
NEUTROPHILS # BLD AUTO: 16.2 K/UL
NEUTROPHILS NFR BLD: 86.4 %
NITRITE UR QL STRIP: NEGATIVE
NRBC BLD-RTO: 0 /100 WBC
PCO2 BLDA: 34.6 MMHG (ref 35–45)
PCO2 BLDA: 43.5 MMHG (ref 35–45)
PEEP: 10
PEEP: 10
PH SMN: 7.18 [PH] (ref 7.35–7.45)
PH SMN: 7.41 [PH] (ref 7.35–7.45)
PH UR STRIP: 5 [PH] (ref 5–8)
PHOSPHATE SERPL-MCNC: 4.8 MG/DL
PHOSPHATE SERPL-MCNC: 4.9 MG/DL
PHOSPHATE SERPL-MCNC: 5.6 MG/DL
PHOSPHATE SERPL-MCNC: 6.3 MG/DL
PHOSPHATE SERPL-MCNC: 6.9 MG/DL
PHOSPHATE SERPL-MCNC: 7 MG/DL
PHOSPHATE SERPL-MCNC: 7 MG/DL
PHOSPHATE SERPL-MCNC: 7.2 MG/DL
PIP: 29
PLATELET # BLD AUTO: 178 K/UL
PMV BLD AUTO: 9.9 FL
PO2 BLDA: 152 MMHG (ref 80–100)
PO2 BLDA: 96 MMHG (ref 80–100)
POC BE: -12 MMOL/L
POC BE: -3 MMOL/L
POC SATURATED O2: 95 % (ref 95–100)
POC SATURATED O2: 99 % (ref 95–100)
POC TCO2: 17 MMOL/L (ref 23–27)
POC TCO2: 23 MMOL/L (ref 23–27)
POTASSIUM SERPL-SCNC: 4.2 MMOL/L
POTASSIUM SERPL-SCNC: 4.2 MMOL/L
POTASSIUM SERPL-SCNC: 4.3 MMOL/L
POTASSIUM SERPL-SCNC: 4.3 MMOL/L
POTASSIUM SERPL-SCNC: 4.5 MMOL/L
POTASSIUM SERPL-SCNC: 4.6 MMOL/L
POTASSIUM SERPL-SCNC: 4.7 MMOL/L
POTASSIUM SERPL-SCNC: 4.9 MMOL/L
POTASSIUM SERPL-SCNC: 5 MMOL/L
PROT SERPL-MCNC: 5.7 G/DL
PROT SERPL-MCNC: 6.3 G/DL
PROT SERPL-MCNC: 6.4 G/DL
PROT UR QL STRIP: ABNORMAL
RBC # BLD AUTO: 4.35 M/UL
RBC #/AREA URNS AUTO: 53 /HPF (ref 0–4)
SAMPLE: ABNORMAL
SAMPLE: ABNORMAL
SITE: ABNORMAL
SITE: ABNORMAL
SODIUM SERPL-SCNC: 134 MMOL/L
SODIUM SERPL-SCNC: 135 MMOL/L
SODIUM SERPL-SCNC: 135 MMOL/L
SODIUM SERPL-SCNC: 136 MMOL/L
SODIUM SERPL-SCNC: 137 MMOL/L
SP GR UR STRIP: 1.01 (ref 1–1.03)
SP02: 100
SQUAMOUS #/AREA URNS AUTO: 1 /HPF
URATE SERPL-MCNC: 13.3 MG/DL
URN SPEC COLLECT METH UR: ABNORMAL
UROBILINOGEN UR STRIP-ACNC: NEGATIVE EU/DL
VANCOMYCIN SERPL-MCNC: 50.8 UG/ML
VT: 500
VT: 500
WBC # BLD AUTO: 18.79 K/UL
WBC #/AREA URNS AUTO: >100 /HPF (ref 0–5)
WBC CLUMPS UR QL AUTO: ABNORMAL
YEAST UR QL AUTO: ABNORMAL

## 2018-05-21 PROCEDURE — 63600175 PHARM REV CODE 636 W HCPCS: Performed by: NURSE PRACTITIONER

## 2018-05-21 PROCEDURE — 27000221 HC OXYGEN, UP TO 24 HOURS

## 2018-05-21 PROCEDURE — 94003 VENT MGMT INPAT SUBQ DAY: CPT

## 2018-05-21 PROCEDURE — 83735 ASSAY OF MAGNESIUM: CPT | Mod: 91

## 2018-05-21 PROCEDURE — 76937 US GUIDE VASCULAR ACCESS: CPT | Mod: 26,,, | Performed by: PSYCHIATRY & NEUROLOGY

## 2018-05-21 PROCEDURE — 37799 UNLISTED PX VASCULAR SURGERY: CPT

## 2018-05-21 PROCEDURE — 25000003 PHARM REV CODE 250: Performed by: INTERNAL MEDICINE

## 2018-05-21 PROCEDURE — 99900035 HC TECH TIME PER 15 MIN (STAT)

## 2018-05-21 PROCEDURE — 83735 ASSAY OF MAGNESIUM: CPT

## 2018-05-21 PROCEDURE — 82803 BLOOD GASES ANY COMBINATION: CPT

## 2018-05-21 PROCEDURE — 83605 ASSAY OF LACTIC ACID: CPT

## 2018-05-21 PROCEDURE — 63600175 PHARM REV CODE 636 W HCPCS: Performed by: STUDENT IN AN ORGANIZED HEALTH CARE EDUCATION/TRAINING PROGRAM

## 2018-05-21 PROCEDURE — 36800 INSERTION OF CANNULA: CPT | Mod: ,,, | Performed by: PSYCHIATRY & NEUROLOGY

## 2018-05-21 PROCEDURE — 81001 URINALYSIS AUTO W/SCOPE: CPT

## 2018-05-21 PROCEDURE — 80202 ASSAY OF VANCOMYCIN: CPT

## 2018-05-21 PROCEDURE — 85730 THROMBOPLASTIN TIME PARTIAL: CPT

## 2018-05-21 PROCEDURE — 80069 RENAL FUNCTION PANEL: CPT

## 2018-05-21 PROCEDURE — 94761 N-INVAS EAR/PLS OXIMETRY MLT: CPT

## 2018-05-21 PROCEDURE — 25000003 PHARM REV CODE 250: Performed by: PSYCHIATRY & NEUROLOGY

## 2018-05-21 PROCEDURE — 99900026 HC AIRWAY MAINTENANCE (STAT)

## 2018-05-21 PROCEDURE — 27200966 HC CLOSED SUCTION SYSTEM

## 2018-05-21 PROCEDURE — 82550 ASSAY OF CK (CPK): CPT

## 2018-05-21 PROCEDURE — 02HV33Z INSERTION OF INFUSION DEVICE INTO SUPERIOR VENA CAVA, PERCUTANEOUS APPROACH: ICD-10-PCS | Performed by: PSYCHIATRY & NEUROLOGY

## 2018-05-21 PROCEDURE — 90945 DIALYSIS ONE EVALUATION: CPT

## 2018-05-21 PROCEDURE — 36556 INSERT NON-TUNNEL CV CATH: CPT

## 2018-05-21 PROCEDURE — 63600175 PHARM REV CODE 636 W HCPCS: Performed by: PSYCHIATRY & NEUROLOGY

## 2018-05-21 PROCEDURE — 80053 COMPREHEN METABOLIC PANEL: CPT

## 2018-05-21 PROCEDURE — 85025 COMPLETE CBC W/AUTO DIFF WBC: CPT

## 2018-05-21 PROCEDURE — 25000003 PHARM REV CODE 250: Performed by: NURSE PRACTITIONER

## 2018-05-21 PROCEDURE — 84100 ASSAY OF PHOSPHORUS: CPT

## 2018-05-21 PROCEDURE — 83520 IMMUNOASSAY QUANT NOS NONAB: CPT

## 2018-05-21 PROCEDURE — 20000000 HC ICU ROOM

## 2018-05-21 PROCEDURE — 80053 COMPREHEN METABOLIC PANEL: CPT | Mod: 91

## 2018-05-21 PROCEDURE — 99291 CRITICAL CARE FIRST HOUR: CPT | Mod: 25,,, | Performed by: PSYCHIATRY & NEUROLOGY

## 2018-05-21 PROCEDURE — S0028 INJECTION, FAMOTIDINE, 20 MG: HCPCS | Performed by: PSYCHIATRY & NEUROLOGY

## 2018-05-21 PROCEDURE — 84550 ASSAY OF BLOOD/URIC ACID: CPT

## 2018-05-21 RX ORDER — HEPARIN SODIUM 5000 [USP'U]/ML
7500 INJECTION, SOLUTION INTRAVENOUS; SUBCUTANEOUS EVERY 8 HOURS
Status: DISCONTINUED | OUTPATIENT
Start: 2018-05-21 | End: 2018-05-23

## 2018-05-21 RX ORDER — PROPOFOL 10 MG/ML
5 INJECTION, EMULSION INTRAVENOUS CONTINUOUS
Status: DISCONTINUED | OUTPATIENT
Start: 2018-05-21 | End: 2018-05-22

## 2018-05-21 RX ORDER — MAGNESIUM SULFATE HEPTAHYDRATE 40 MG/ML
2 INJECTION, SOLUTION INTRAVENOUS
Status: DISCONTINUED | OUTPATIENT
Start: 2018-05-21 | End: 2018-05-22

## 2018-05-21 RX ORDER — CHLORHEXIDINE GLUCONATE ORAL RINSE 1.2 MG/ML
15 SOLUTION DENTAL 2 TIMES DAILY
Status: DISCONTINUED | OUTPATIENT
Start: 2018-05-21 | End: 2018-05-31

## 2018-05-21 RX ORDER — FAMOTIDINE 20 MG/1
20 TABLET, FILM COATED ORAL DAILY
Status: DISCONTINUED | OUTPATIENT
Start: 2018-05-22 | End: 2018-05-23

## 2018-05-21 RX ORDER — FENTANYL CITRATE 50 UG/ML
50 INJECTION, SOLUTION INTRAMUSCULAR; INTRAVENOUS ONCE
Status: DISCONTINUED | OUTPATIENT
Start: 2018-05-21 | End: 2018-05-21

## 2018-05-21 RX ORDER — CEFEPIME HYDROCHLORIDE 2 G/1
2 INJECTION, POWDER, FOR SOLUTION INTRAVENOUS
Status: DISCONTINUED | OUTPATIENT
Start: 2018-05-22 | End: 2018-05-23

## 2018-05-21 RX ORDER — LIDOCAINE HYDROCHLORIDE 20 MG/ML
INJECTION, SOLUTION INFILTRATION; PERINEURAL
Status: DISPENSED
Start: 2018-05-21 | End: 2018-05-22

## 2018-05-21 RX ORDER — AMOXICILLIN 250 MG
1 CAPSULE ORAL DAILY
Status: DISCONTINUED | OUTPATIENT
Start: 2018-05-21 | End: 2018-05-30

## 2018-05-21 RX ORDER — FAMOTIDINE 10 MG/ML
20 INJECTION INTRAVENOUS DAILY
Status: DISCONTINUED | OUTPATIENT
Start: 2018-05-22 | End: 2018-05-21

## 2018-05-21 RX ORDER — HYDROCODONE BITARTRATE AND ACETAMINOPHEN 500; 5 MG/1; MG/1
TABLET ORAL CONTINUOUS
Status: DISCONTINUED | OUTPATIENT
Start: 2018-05-21 | End: 2018-05-22

## 2018-05-21 RX ADMIN — PROPOFOL 50 MCG/KG/MIN: 10 INJECTION, EMULSION INTRAVENOUS at 08:05

## 2018-05-21 RX ADMIN — HEPARIN SODIUM 7500 UNITS: 5000 INJECTION, SOLUTION INTRAVENOUS; SUBCUTANEOUS at 09:05

## 2018-05-21 RX ADMIN — FAMOTIDINE 20 MG: 10 INJECTION INTRAVENOUS at 08:05

## 2018-05-21 RX ADMIN — CEFEPIME 1 G: 1 INJECTION, POWDER, FOR SOLUTION INTRAMUSCULAR; INTRAVENOUS at 03:05

## 2018-05-21 RX ADMIN — PROPOFOL 39.95 MCG/KG/MIN: 10 INJECTION, EMULSION INTRAVENOUS at 12:05

## 2018-05-21 RX ADMIN — HEPARIN SODIUM 5000 UNITS: 5000 INJECTION, SOLUTION INTRAVENOUS; SUBCUTANEOUS at 05:05

## 2018-05-21 RX ADMIN — HEPARIN SODIUM 7500 UNITS: 5000 INJECTION, SOLUTION INTRAVENOUS; SUBCUTANEOUS at 01:05

## 2018-05-21 RX ADMIN — Medication 0.1 MCG/KG/MIN: at 12:05

## 2018-05-21 RX ADMIN — CHLORHEXIDINE GLUCONATE 15 ML: 1.2 RINSE ORAL at 09:05

## 2018-05-21 RX ADMIN — Medication 0.08 MCG/KG/MIN: at 05:05

## 2018-05-21 RX ADMIN — Medication 0.06 MCG/KG/MIN: at 09:05

## 2018-05-21 RX ADMIN — STANDARDIZED SENNA CONCENTRATE AND DOCUSATE SODIUM 1 TABLET: 8.6; 5 TABLET, FILM COATED ORAL at 12:05

## 2018-05-21 RX ADMIN — CHLORHEXIDINE GLUCONATE 15 ML: 1.2 RINSE ORAL at 12:05

## 2018-05-21 RX ADMIN — MINERAL OIL AND WHITE PETROLATUM: 150; 830 OINTMENT OPHTHALMIC at 05:05

## 2018-05-21 RX ADMIN — PROPOFOL 50 MCG/KG/MIN: 10 INJECTION, EMULSION INTRAVENOUS at 10:05

## 2018-05-21 RX ADMIN — SODIUM CHLORIDE: 0.9 INJECTION, SOLUTION INTRAVENOUS at 08:05

## 2018-05-21 RX ADMIN — PROPOFOL 50 MCG/KG/MIN: 10 INJECTION, EMULSION INTRAVENOUS at 05:05

## 2018-05-21 NOTE — ASSESSMENT & PLAN NOTE
- witnessed seizure in ED waiting room, possibly secondary to hypoperfusion/hypoxia  - remove EEG  - no seizure

## 2018-05-21 NOTE — CONSULTS
[facility]  Nephrology  Consult Note    Patient Name: Los Mendez  MRN: [id number]  Admission Date: 5/19/2018  Hospital Length of Stay: 2 days  Attending Provider: Keith Cohen MD   Primary Care Physician: Provider Notinsystem  Principal Problem:Anoxic brain injury    Inpatient consult to Nephrology  Consult performed by: ANIKA RICO  Consult ordered by: VLADIMIR KIRK  Reason for consult: MARY, anuric        Subjective:     HPI: Los Mendez is a 47 year old male with past medical history of HTN, heavy alcohol user and COPD. Transferred from [facility] following witnessed cardiac arrest while in the ED waiting room. Patient had presented complaining of upper respiratory track infection and shortness of breath symptoms, while in the ED waiting room he was noted to be choking, became apneic, which led to witnessed seizure like activity followed by cardiac arrest. Per discussion with ED staff, patient required about 22 minutes of ACLS/5 shocks before returning to normal sinus rhythm. During intubation a lozenge was removed from patients airway. Hyperthermia protocol was initiated prior to [place] [facility] to [facility] Arrived with increased serum creatinine from 2.9-->5.1 (unkown baseline), BUN 35--> 52, Trop 2.4-->1.3, Lactic acidosis 6.4-->1.3, chest x ray with increased parenchymal interstitial attenuation and parenchymal opacities suggestive of pulmonary edema. On mechanical ventilation with a FiO2 50%. Currently also anuric at present moment. Per cardiology patient has a dilated cardiomyopathy EF looks about 10-15% and LVEDD is 7.3 cm.    History reviewed. No pertinent past medical history.    History reviewed. No pertinent surgical history.    Review of patient's allergies indicates:   Allergen Reactions    Penicillins      Current Facility-Administered Medications   Medication Frequency    [START ON 5/22/2018] ceFEPIme injection 2 g Q24H    chlorhexidine 0.12 %  solution 15 mL BID    [START ON 5/22/2018] famotidine tablet 20 mg Daily    heparin (porcine) injection 7,500 Units Q8H    lidocaine HCL 20 mg/ml (2%) 20 mg/mL (2 %) injection     norepinephrine 4 mg in dextrose 5% 250 mL infusion (premix) (titrating) Continuous    ondansetron 4 mg/5 mL solution 4 mg Q8H PRN    propofol (DIPRIVAN) 10 mg/mL infusion Continuous    senna-docusate 8.6-50 mg per tablet 1 tablet Daily    sodium chloride 0.9% flush 3 mL PRN     Family History     None        Social History Main Topics    Smoking status: Unknown If Ever Smoked    Smokeless tobacco: Current User     Types: Chew    Alcohol use Not on file    Drug use: Unknown    Sexual activity: Not on file     Review of Systems   Unable to perform ROS: Intubated     Objective:     Vital Signs (Most Recent):  Temp: 98.6 °F (37 °C) (05/21/18 1205)  Pulse: 91 (05/21/18 1324)  Resp: (!) 24 (05/21/18 1324)  BP: 120/61 (05/21/18 1208)  SpO2: 100 % (05/21/18 1324)  O2 Device (Oxygen Therapy): ventilator (05/21/18 1324) Vital Signs (24h Range):  Temp:  [86.9 °F (30.5 °C)-98.6 °F (37 °C)] 98.6 °F (37 °C)  Pulse:  [38-92] 91  Resp:  [15-41] 24  SpO2:  [96 %-100 %] 100 %  BP: ()/(53-97) 120/61  Arterial Line BP: (101-146)/(53-98) 102/53     Weight: 126 kg (277 lb 12.5 oz) (05/19/18 1815)  Body mass index is 42.24 kg/m².  Body surface area is 2.46 meters squared.    I/O last 3 completed shifts:  In: 3156.6 [I.V.:2156.6; IV Piggyback:1000]  Out: 269 [Urine:269]    Physical Exam   Constitutional: He is intubated.   Obesity    HENT:   Head: Normocephalic and atraumatic.   Right Ear: External ear normal.   Left Ear: External ear normal.   Eyes: Right eye exhibits no discharge. Left eye exhibits no discharge.   Neck: No JVD present.   Cardiovascular: Normal rate.    Pulmonary/Chest: Effort normal. He is intubated.   Abdominal: Soft.   Musculoskeletal: He exhibits edema.   Neurological: He is unresponsive.   Skin: Skin is warm.        Significant Labs:  ABGs:   Recent Labs  Lab 05/21/18  0340   PH 7.407   PCO2 34.6*   HCO3 21.8*   POCSATURATED 99   BE -3     BMP:   Recent Labs  Lab 05/21/18  1212   GLU 96      CO2 20*   BUN 52*   CREATININE 5.1*   CALCIUM 8.4*   MG 2.2     CBC:   Recent Labs  Lab 05/21/18  0340   WBC 18.79*   RBC 4.35*   HGB 7.9*   HCT 26.9*      MCV 62*   MCH 18.2*   MCHC 29.4*     CMP:   Recent Labs  Lab 05/21/18  1212   GLU 96   CALCIUM 8.4*   ALBUMIN 2.8*   PROT 6.4      K 4.6   CO2 20*      BUN 52*   CREATININE 5.1*   ALKPHOS 97   *   *   BILITOT 1.8*     All labs within the past 24 hours have been reviewed.      Assessment/Plan:     MARY (acute kidney injury)    Los Mendez is a 47 year old male who is consulted for MARY, which is mostly secondary to iATN from previous cardiac arrest where he presented with hypotension which decrease perfusion to the kidneys (required to be started on pressors) and currently have component of cardio renal with dilated cardiomyopathy EF looks about 10-15%.Their could be also high suspicion of rhabdomyolysis since on UA has +2 occult blood and only 2 RBCon high power field. He has started to become anuric.     Plan:  - Patient serum creatinine has been worsening   - Currently anuric  - Acid/base: Respiratory alkalosis with HAGMA  - Lactic acid trendind downward 6.4-->1.5  - Phosphate levels increasing, will repeat CPK if any evidence of rhabdomyolysis and repeat UA once able to obtain.   - Will have low threshold to start patient on dialysis (SLED) if worsening of acidosis, volume overload and electrolytes.  - Trialysis line will be placed by primary    - Blood pressure stable but require pressors for control   - On mechanical ventilation with FiO2 50%   - Will order renal ultrasound to discard any type of obstruction   - Once able to obtain urine will evaluate urine sediment   - Avoid nephrotoxic medications           Sascha Alcantar  [place]  Nephrology  Fellow  [facility]    Pager [phone]    Patient seen and examined with Dr Alcantar;   I have reviewed and agree with assessment and plan

## 2018-05-21 NOTE — PROGRESS NOTES
[facility]  Neurocritical Care  Progress Note    Admit Date: 5/19/2018  Service Date: 05/21/2018  Length of Stay: 2    Subjective:     Chief Complaint: Anoxic brain injury    History of Present Illness: 46 y/o man w/ hx of HTN, COPD? Transferred from [facility] following witnessed cardiac arrest while in the ED waiting room. Patient had presented complaining of URI/SOB symptoms, while in the ED waiting room he was noted to be choking, became apneic, which led to witnessed seizure like activity followed by cardiac arrest. Per discussion with ED staff, patient required about 22 minutes of ACLS/5 shocks before returning to normal sinus rhythm. During intubation a lozenge was removed from patients airway. Hyperthermia protocol was initiated prior to transferring to [facility] for NCC care. Per ED records, patient was acidotic with ph of 7.1 this morning. At the time of arrival to NICU, patient was on paralytics, however pupillary reflex was present. Will continue hypothermia protocol for additional 24 h.    Hospital Course: 5/19: arrived intubated, sedated, artic sun blanket  5/20: pressor requirment going up, DO NOT DIURESE, continue TTM, nimbex off, LFT improving, trop improving, family updated.  5/21: reach normothermia, MRI today, remove EEG, place HD line, consult nephrology, LFTs trending down, family updated    Review of Symptoms:   Unable to obtain, intubated, neuro-exam    Physical Exam:  GA: comfortable, no acute distress.   HEENT: No scleral icterus or JVD.   Pulmonary: Clear to auscultation A/L. No wheezing, crackles, or rhonchi. intubated  Cardiac: RRR S1 & S2 w/o rubs/murmurs/gallops.   Abdominal: Bowel sounds present x 4. No appreciable hepatosplenomegaly.  Skin: No jaundice, rashes, or visible lesions.  Pulses: 1+ Dp bilat    Neuro:  --sedation: propofol  --GCS: [id number]  --Mental Status: coma, sedated   --CN II-XII grossly intact.   --Pupils 4-->2mm, PERRL.   --brainstem: weak cough and gag,  + corneals, + pupils  --Motor: bilat upper flaccid to pain, bilat lower triple flexion  --sensory: see motor  --Reflexes: not tested  --Gait: deferred      Recent Labs  Lab 05/21/18  0340   WBC 18.79*   RBC 4.35*   HGB 7.9*   HCT 26.9*      MCV 62*   MCH 18.2*   MCHC 29.4*       Recent Labs  Lab 05/21/18  0758   CALCIUM 8.4*   PROT 6.3   *   K 4.5   CO2 21*      BUN 50*   CREATININE 4.6*   ALKPHOS 96   *   *   BILITOT 1.8*       Recent Labs  Lab 05/21/18  0340   APTT 29.0     Vent Mode: A/C  Oxygen Concentration (%):  [] 51  Resp Rate Total:  [18 br/min-29 br/min] 29 br/min  Vt Set:  [500 mL] 500 mL  PEEP/CPAP:  [10 cmH20] 10 cmH20  Mean Airway Pressure:  [15 zuV15-52 cmH20] 18 cmH20    Temp: 97.5 °F (36.4 °C)  Pulse: 92  Rhythm: normal sinus rhythm  BP: 120/61  MAP (mmHg): 101  CVP (mean): 27 mmHg  CI (L/min/m2): 2.4 L/min/m2  SVI: 27  SVV: 4 %  Resp: (!) 41  SpO2: 100 %  Oxygen Concentration (%): 51  O2 Device (Oxygen Therapy): ventilator  Vent Mode: A/C  Set Rate: 20 bmp  Vt Set: 500 mL  PEEP/CPAP: 10 cmH20  Peak Airway Pressure: 28 cmH2O  Mean Airway Pressure: 18 cmH20  Plateau Pressure: 0 cmH20    Temp  Min: 86.9 °F (30.5 °C)  Max: 97.5 °F (36.4 °C)  Pulse  Min: 38  Max: 92  BP  Min: 82/53  Max: 142/86  MAP (mmHg)  Min: 68  Max: 114  CVP (mean)  Min: 15 mmHg  Max: 27 mmHg  CI (L/min/m2)  Min: 1.2 L/min/m2  Max: 33 L/min/m2  SVI  Min: 18  Max: 40  SVV  Min: 3 %  Max: 20 %  Resp  Min: 15  Max: 41  SpO2  Min: 98 %  Max: 100 %  Oxygen Concentration (%)  Min: 50  Max: 100    05/20 0701 - 05/21 0700  In: 2304.3 [I.V.:1804.3]  Out: 85 [Urine:85]   Unmeasured Output  Stool Occurrence: 0    Last Bowel Movement: 05/26/18    Body mass index is 42.24 kg/m².      I have personally reviewed all labs, imaging, and studies today        Assessment/Plan:     Neuro   * Anoxic brain injury    - Cardiac arrest x 3 at OSH; longest code was 22 minutes  - intubated  - sedated wean sedation  -  normothermic now  - MRI today  - spot EEG to r/o NCSE  - follow neuro exam  - family updated wife and mother at bedside          Cytotoxic brain edema    - secondary to anoxic brain injury, 22 minutes PEA-->vfib  - MRI brain today for anoxic assessment           Seizure    - witnessed seizure in ED waiting room, possibly secondary to hypoperfusion/hypoxia  - remove EEG  - no seizure        Pulmonary   Pulmonary edema    - no hx of HF, patient with URI symptoms for last month per discussion with transporting paramedic  - f/u ECHO, BNP          Acute respiratory failure with hypoxia    - intubated on arrival  Vent Mode: A/C  Oxygen Concentration (%):  [] 51  Resp Rate Total:  [18 br/min-26 br/min] 22 br/min  Vt Set:  [500 mL] 500 mL  PEEP/CPAP:  [10 cmH20] 10 cmH20  Mean Airway Pressure:  [15 ojC72-94 cmH20] 16 cmH20  daily abg  daily cxr        Cardiac/Vascular   Acute decompensated heart failure    Appreciate cards consult  See cardiogenic shock        Cardiogenic shock    - possible undiagnosed cardiomyopathy based on CXR w/ evidence of pulmonary edema and multiple cardiac arrests over last 24 h  - f/u TTE  -svo2 70 % yesterday  79% today when extrapolated from ABG  Appreciate cards recs though will not diurese with lasix  Place HD line for clearance and volume off later  LFTs improving          Cardiac arrest    - 3 arrests prior to arrival  - trop trending down  - heart failure          Renal/   Acute renal failure with tubular necrosis    CRT climbing  Oliguric-->anuric   Monitor closely  will need RRT  Place HD line  Family updated  Consult nephrology for RRT        Metabolic acidosis    Monitor  Improved after bicarb  Daily ABG        Orthopedic   Hypothermia    - TTM  Now normothermic  MRI today          Other   Respiratory arrest    Arrest at OSH  Here for MARIA ELENA  TTM protocol  See ARF        Class 3 severe obesity due to excess calories with serious comorbidity and body mass index (BMI) of 40.0 to 44.9  in adult    - nutrition consult  Body mass index is 42.24 kg/m².              Prophylaxis:  Venous Thromboembolism: chemical  Stress Ulcer: H2B  Ventilator Pneumonia: yes     Activity Orders          None        Full Code       NOTE: After reviewing MRI there are multiple scattered bilateral cerebellar CVAs and left caudate punctate CVA. Currently there is no neurological reason to not expect a good chance of meaningful neurologic recovery from an anoxic standpoint (should he not suffer delayed post anoxic injury). Patient is hemodynamically tenuous currently with acute heart failure, renal failure, and improving liver dysfunction.  HD line placed for clearance and slow volume removal, should diuresis fail. May benefit from cardiac output augmentation, maybe balloon, LVAD.     Family updated    Keith Cohen MD  Neurocritical Care  [facility]    Cc time 60 mins  Upon my evaluation, this patient has a high probability of imminent or life-threatening deterioration due to cardiac arrest x3, Respiratory arrest, anoxic brain injury, renal failure, which required my direct attention, intervention, and personal management.  I have personally provided 60minutes of critical care time exclusive of time spent on separately billable procedures. Time includes review of laboratory data, radiology results, discussion with consultants, and monitoring for potential decompensation. Interventions were performed as documented above.

## 2018-05-21 NOTE — PLAN OF CARE
Problem: Patient Care Overview  Goal: Plan of Care Review  POC reviewed with pt and family at 1400. Pt unable to verbalize understanding due to underlying condition.  Spouse verbalized understanding. Questions and concerns addressed. Patient rewarmed and kept normothermic.  MRI obtained to assess damage done by anoxic event.  Dialysis catheter placed because of increasing kidney enzymes and lack of urine output.  Nephrology consulted.  Cardiology consulted for low EF. Pt progressing toward goals. Will continue to monitor. See flowsheets for full assessment and VS info.

## 2018-05-21 NOTE — ASSESSMENT & PLAN NOTE
CRT climbing  Oliguric-->anuric   Monitor closely  will need RRT  Place HD line  Family updated  Consult nephrology for RRT

## 2018-05-21 NOTE — HPI
Los Mendez is a 47 year old male with past medical history of HTN, heavy alcohol user and COPD. Transferred from [facility] following witnessed cardiac arrest while in the ED waiting room. Patient had presented complaining of upper respiratory track infection and shortness of breath symptoms, while in the ED waiting room he was noted to be choking, became apneic, which led to witnessed seizure like activity followed by cardiac arrest. Per discussion with ED staff, patient required about 22 minutes of ACLS/5 shocks before returning to normal sinus rhythm. During intubation a lozenge was removed from patients airway. Hypothermia protocol was initiated prior to [place] [facility] [facility] [place]rived with increased serum creatinine from 2.9-->5.1 (unkown baseline), BUN 35--> 52, Trop 2.4-->1.3, Lactic acidosis 6.4-->1.3, chest x ray with increased parenchymal interstitial attenuation and parenchymal opacities suggestive of pulmonary edema. On mechanical ventilation with a FiO2 50%. Currently also anuric at present moment. Per cardiology patient has a dilated cardiomyopathy EF looks about 10-15% and LVEDD is 7.3 cm.

## 2018-05-21 NOTE — PROCEDURES
ICU EEG/VIDEO MONITORING REPORT    Los Mendez  [id number]  1970    DATE OF SERVICE: 5/20-21/18    DATE OF ADMISSION: 5/19/2018  3:21 PM    ADMITTING PROVIDER: Keith Cohen MD    REASON FOR CONSULT: 48yo M s/p cardiac arrest     MEDICATIONS:   Current Facility-Administered Medications   Medication    ceFEPIme injection 1 g    cisatracurium (NIMBEX) 100 mg in dextrose 5 % 100 mL infusion    famotidine (PF) injection 20 mg    fentaNYL injection 50 mcg    heparin (porcine) injection 5,000 Units    magnesium sulfate 2g in water 50mL IVPB (premix)    norepinephrine 4 mg in dextrose 5% 250 mL infusion (premix) (titrating)    ondansetron 4 mg/5 mL solution 4 mg    propofol (DIPRIVAN) 10 mg/mL infusion (NON-TITRATING)    sodium chloride 0.9% flush 3 mL    vancomycin (VANCOCIN) 1,750 mg in sodium chloride 0.9% 500 mL IVPB    white petrolatum-mineral oil (LUBIFRESH P.M.) ophthalmic ointment     METHODOLOGY   Electroencephalographic (EEG) recording is with electrodes placed according to the International 10-20 placement system.  Thirty two (32) channels of digital signal are simultaneously recorded from the scalp and may include EKG, EMG, and/or eye monitors.   Recording band pass was 0.1 to 512 hz.  Digital video recording of the patient is simultaneously recorded with the EEG.  The nursing staff report clinical symptoms and may press an event button when the patient has symptoms of clinical interest to the treating physicians.  EEG and video recording is stored and archived in digital format.  The entire recording is visually reviewed and the times identified by computer analysis as being spikes or seizures are reviewed again.  Activation procedures which include photic stimulation, hyperventilation and instructing patients to perform simple task are done in selected patients.   Compresses spectral analysis (CSA) is also performed on the activity recorded from each individual channel.  This is  displayed as a power display of frequencies from 0 to 30 Hz over time.   The CSA analysis is done and displayed continuously.  This is reviewed for asymmetries in power between homologous areas of the scalp and for presence of changes in power which canbe seen when seizures occur.  Sections of suspected abnormalities on the CSA is then compared with the original EEG recording.     Baiyaxuan software was also utilized in the review of this study.  This software suite analyzes the EEG recording in multiple domains.  Coherence and rhythmicity is computed to identify EEG sections which may contain organized seizures.  Each channel undergoes analysis to detect presence of spike and sharp waves which have special and morphological characteristic of epileptic activity.  The routine EEG recording is converted from spacial into frequency domain.  This is then displayed comparing homologous areas to identify areas of significant asymmetry.  Algorithm to identify non-cortically generated artifact is used to separate eye movement, EMG and other artifact from the EEG.      Recording Times  Start on 5/20/18, 10:08  Stop on 5/21/18, 10:18    A total of 24 hours and 10 minutes of EEG was recorded.    EEG FINDINGS  Burst suppression pattern seen with periods of generalized suppression (3-10 seconds) alternating with bursts of sharply contoured delta-theta (3-5 Hz) activity that are prolonged (>10 seconds) at times.    After ~23:00 the background activity appears more continuous with occasional short periods of generalized suppression (1-2 seconds), until the end of the study.    Abnormal activity:   No epileptiform discharges, periodic discharges, lateralized rhythmic delta activity or electrographic seizures were seen.    Irregular heart rate noted on EKG.    IMPRESSION:   This is an abnormal C-EEG due to:  1) initial parts of the study showing the burst suppression pattern as described  2) latter parts of the study showing more  continuous severe slowing, suggestive of severe cerebral dysfunction.     Irregular heart rate noted on EKG.    CLINICAL CORRELATION IS RECOMMENDED.    Megan Samayoa MD, THANH(), [facility].  Neurology-Epilepsy.  [facility].

## 2018-05-21 NOTE — PLAN OF CARE
PCP DR MARTINEZ ON [facility] [place]  PHARMACY [facility] ON [place]   WIFE JOSH   KAYLAH RAHMAN       05/21/18 0952   Discharge Assessment   Assessment Type Discharge Planning Assessment   Confirmed/corrected address and phone number on facesheet? Yes   Assessment information obtained from? Caregiver   Expected Length of Stay (days) 7   Communicated expected length of stay with patient/caregiver no   Prior to hospitilization cognitive status: Alert/Oriented   Prior to hospitalization functional status: Assistive Equipment   Current cognitive status: Alert/Oriented   Current Functional Status: Assistive Equipment   Lives With spouse   Able to Return to Prior Arrangements unable to determine at this time (comments)   Is patient able to care for self after discharge? Unable to determine at this time (comments)   Patient's perception of discharge disposition home or selfcare   Readmission Within The Last 30 Days no previous admission in last 30 days   Patient currently being followed by outpatient case management? No   Patient currently receives any other outside agency services? No   Equipment Currently Used at Home cane, straight   Do you have any problems affording any of your prescribed medications? No   Is the patient taking medications as prescribed? no   Does the patient have transportation home? Yes   Transportation Available car;family or friend will provide   Does the patient receive services at the [facility]? No   Discharge Plan A Rehab   Discharge Plan B Skilled Nursing Facility   Patient/Family In Agreement With Plan unable to assess

## 2018-05-21 NOTE — SUBJECTIVE & OBJECTIVE
History reviewed. No pertinent past medical history.    History reviewed. No pertinent surgical history.    Review of patient's allergies indicates:   Allergen Reactions    Penicillins      Current Facility-Administered Medications   Medication Frequency    [START ON 5/22/2018] ceFEPIme injection 2 g Q24H    chlorhexidine 0.12 % solution 15 mL BID    [START ON 5/22/2018] famotidine tablet 20 mg Daily    heparin (porcine) injection 7,500 Units Q8H    lidocaine HCL 20 mg/ml (2%) 20 mg/mL (2 %) injection     norepinephrine 4 mg in dextrose 5% 250 mL infusion (premix) (titrating) Continuous    ondansetron 4 mg/5 mL solution 4 mg Q8H PRN    propofol (DIPRIVAN) 10 mg/mL infusion Continuous    senna-docusate 8.6-50 mg per tablet 1 tablet Daily    sodium chloride 0.9% flush 3 mL PRN     Family History     None        Social History Main Topics    Smoking status: Unknown If Ever Smoked    Smokeless tobacco: Current User     Types: Chew    Alcohol use Not on file    Drug use: Unknown    Sexual activity: Not on file     Review of Systems   Unable to perform ROS: Intubated     Objective:     Vital Signs (Most Recent):  Temp: 98.6 °F (37 °C) (05/21/18 1205)  Pulse: 91 (05/21/18 1324)  Resp: (!) 24 (05/21/18 1324)  BP: 120/61 (05/21/18 1208)  SpO2: 100 % (05/21/18 1324)  O2 Device (Oxygen Therapy): ventilator (05/21/18 1324) Vital Signs (24h Range):  Temp:  [86.9 °F (30.5 °C)-98.6 °F (37 °C)] 98.6 °F (37 °C)  Pulse:  [38-92] 91  Resp:  [15-41] 24  SpO2:  [96 %-100 %] 100 %  BP: ()/(53-97) 120/61  Arterial Line BP: (101-146)/(53-98) 102/53     Weight: 126 kg (277 lb 12.5 oz) (05/19/18 1815)  Body mass index is 42.24 kg/m².  Body surface area is 2.46 meters squared.    I/O last 3 completed shifts:  In: 3156.6 [I.V.:2156.6; IV Piggyback:1000]  Out: 269 [Urine:269]    Physical Exam   Constitutional: He is intubated.   Obesity    HENT:   Head: Normocephalic and atraumatic.   Right Ear: External ear normal.   Left  Ear: External ear normal.   Eyes: Right eye exhibits no discharge. Left eye exhibits no discharge.   Neck: No JVD present.   Cardiovascular: Normal rate.    Pulmonary/Chest: Effort normal. He is intubated.   Abdominal: Soft.   Musculoskeletal: He exhibits edema.   Neurological: He is unresponsive.   Skin: Skin is warm.       Significant Labs:  ABGs:   Recent Labs  Lab 05/21/18  0340   PH 7.407   PCO2 34.6*   HCO3 21.8*   POCSATURATED 99   BE -3     BMP:   Recent Labs  Lab 05/21/18  1212   GLU 96      CO2 20*   BUN 52*   CREATININE 5.1*   CALCIUM 8.4*   MG 2.2     CBC:   Recent Labs  Lab 05/21/18  0340   WBC 18.79*   RBC 4.35*   HGB 7.9*   HCT 26.9*      MCV 62*   MCH 18.2*   MCHC 29.4*     CMP:   Recent Labs  Lab 05/21/18  1212   GLU 96   CALCIUM 8.4*   ALBUMIN 2.8*   PROT 6.4      K 4.6   CO2 20*      BUN 52*   CREATININE 5.1*   ALKPHOS 97   *   *   BILITOT 1.8*     All labs within the past 24 hours have been reviewed.

## 2018-05-21 NOTE — NURSING
Manometry for Central Line Procedure     Manometry Performed: yes    Manometry performed by: Mukesh Cohen MD

## 2018-05-21 NOTE — ASSESSMENT & PLAN NOTE
Los Mendez is a 47 year old male who is consulted for MARY, which is mostly secondary to iATN from previous cardiac arrest where he presented with hypotension which decrease perfusion to the kidneys (required to be started on pressors) and currently have component of cardio renal with dilated cardiomyopathy EF looks about 10-15%.Their could be also high suspicion of rhabdomyolysis since on UA has +2 occult blood and only 2 RBCon high power field. He has started to become anuric.     Plan:  - Patient serum creatinine has been worsening   - Currently anuric  - Acid/base: Respiratory alkalosis with HAGMA  - Lactic acid trendind downward 6.4-->1.5  - Phosphate levels increasing, will repeat CPK if any evidence of rhabdomyolysis and repeat UA once able to obtain.   - Will have low threshold to start patient on dialysis (SLED) if worsening of acidosis, volume overload and electrolytes.  - Trialysis line will be placed by primary    - Blood pressure stable but require pressors for control   - On mechanical ventilation with FiO2 50%   - Will order renal ultrasound to discard any type of obstruction   - Once able to obtain urine will evaluate urine sediment   - Avoid nephrotoxic medications

## 2018-05-21 NOTE — ASSESSMENT & PLAN NOTE
- Cardiac arrest x 3 at OSH; longest code was 22 minutes  - intubated  - sedated wean sedation  - normothermic now  - MRI today  - spot EEG to r/o NCSE  - follow neuro exam  - family updated wife and mother at bedside

## 2018-05-21 NOTE — PROCEDURES
"Los Mendez is a 47 y.o. male patient.    Temp: 98.6 °F (37 °C) (05/21/18 1205)  Pulse: 91 (05/21/18 1324)  Resp: (!) 24 (05/21/18 1324)  BP: 120/61 (05/21/18 1208)  SpO2: 100 % (05/21/18 1324)  Weight: 126 kg (277 lb 12.5 oz) (05/19/18 1815)  Height: 5' 8" (172.7 cm) (05/19/18 1815)       Central Line  Date/Time: 5/21/2018 2:32 PM  Performed by: RICHARD MAYORGA  Supervising provider: TENISHA BAIG  Assisting provider: TENISHA BAIG  Consent Done: Yes  Time out: Immediately prior to procedure a "time out" was called to verify the correct patient, procedure, equipment, support staff and site/side marked as required.  Indications: med administration, hemodialysis and vascular access  Anesthesia: local infiltration and see MAR for details    Anesthesia:  Local Anesthetic: lidocaine 1% without epinephrine  Preparation: skin prepped with ChloraPrep  Skin prep agent dried: skin prep agent completely dried prior to procedure  Sterile barriers: all five maximum sterile barriers used - cap, mask, sterile gown, sterile gloves, and large sterile sheet  Hand hygiene: hand hygiene performed prior to central venous catheter insertion  Location details: left femoral  Site selection rationale: cardiac instability  Catheter type: dialysis  Ultrasound guidance: yes  Vessel Caliber: large, patent, compressibility normal  Needle advanced into vessel with real time Ultrasound guidance.  Guidewire confirmed in vessel.  Image recorded and saved.  Sterile sheath used.  Manometry: Yes  Number of attempts: 2  Assessment: successful placement  Complications: none  Estimated blood loss (mL): 15  Post-procedure: line sutured,  chlorhexidine patch,  sterile dressing applied and blood return through all ports                  Richard Mayorga  5/21/2018  "

## 2018-05-21 NOTE — ASSESSMENT & PLAN NOTE
- intubated on arrival  Vent Mode: A/C  Oxygen Concentration (%):  [] 51  Resp Rate Total:  [18 br/min-26 br/min] 22 br/min  Vt Set:  [500 mL] 500 mL  PEEP/CPAP:  [10 cmH20] 10 cmH20  Mean Airway Pressure:  [15 tbS28-13 cmH20] 16 cmH20  daily abg  daily cxr

## 2018-05-21 NOTE — ASSESSMENT & PLAN NOTE
- possible undiagnosed cardiomyopathy based on CXR w/ evidence of pulmonary edema and multiple cardiac arrests over last 24 h  - f/u TTE  -svo2 70 % yesterday  79% today when extrapolated from ABG  Appreciate cards recs though will not diurese with lasix  Place HD line for clearance and volume off later  LFTs improving

## 2018-05-21 NOTE — ASSESSMENT & PLAN NOTE
- secondary to anoxic brain injury, 22 minutes PEA-->vfib  - MRI brain today for anoxic assessment

## 2018-05-21 NOTE — PLAN OF CARE
Problem: Patient Care Overview  Goal: Plan of Care Review  POC reviewed with family at 0500. Family verbalized understanding. No acute events overnight. Pt progressing toward goals. Will continue to monitor. See flowsheets for full assessment and VS info

## 2018-05-22 LAB
ALBUMIN SERPL BCP-MCNC: 2.4 G/DL
ALBUMIN SERPL BCP-MCNC: 2.5 G/DL
ALBUMIN SERPL BCP-MCNC: 2.6 G/DL
ALBUMIN SERPL BCP-MCNC: 2.7 G/DL
ALP SERPL-CCNC: 100 U/L
ALP SERPL-CCNC: 102 U/L
ALP SERPL-CCNC: 103 U/L
ALP SERPL-CCNC: 109 U/L
ALP SERPL-CCNC: 115 U/L
ALP SERPL-CCNC: 98 U/L
ALT SERPL W/O P-5'-P-CCNC: 282 U/L
ALT SERPL W/O P-5'-P-CCNC: 292 U/L
ALT SERPL W/O P-5'-P-CCNC: 299 U/L
ALT SERPL W/O P-5'-P-CCNC: 307 U/L
ALT SERPL W/O P-5'-P-CCNC: 324 U/L
ALT SERPL W/O P-5'-P-CCNC: 329 U/L
ANION GAP SERPL CALC-SCNC: 10 MMOL/L
ANION GAP SERPL CALC-SCNC: 12 MMOL/L
ANION GAP SERPL CALC-SCNC: 13 MMOL/L
ANION GAP SERPL CALC-SCNC: 14 MMOL/L
APTT BLDCRRT: 29.8 SEC
AST SERPL-CCNC: 165 U/L
AST SERPL-CCNC: 171 U/L
AST SERPL-CCNC: 175 U/L
AST SERPL-CCNC: 178 U/L
AST SERPL-CCNC: 200 U/L
AST SERPL-CCNC: 201 U/L
BASOPHILS # BLD AUTO: 0.05 K/UL
BASOPHILS NFR BLD: 0.3 %
BILIRUB SERPL-MCNC: 2.1 MG/DL
BILIRUB SERPL-MCNC: 2.2 MG/DL
BILIRUB SERPL-MCNC: 2.3 MG/DL
BILIRUB SERPL-MCNC: 2.4 MG/DL
BILIRUB SERPL-MCNC: 2.5 MG/DL
BILIRUB SERPL-MCNC: 2.6 MG/DL
BNP SERPL-MCNC: 1046 PG/ML
BUN SERPL-MCNC: 23 MG/DL
BUN SERPL-MCNC: 25 MG/DL
BUN SERPL-MCNC: 25 MG/DL
BUN SERPL-MCNC: 26 MG/DL
BUN SERPL-MCNC: 26 MG/DL
BUN SERPL-MCNC: 27 MG/DL
BUN SERPL-MCNC: 30 MG/DL
BUN SERPL-MCNC: 31 MG/DL
CALCIUM SERPL-MCNC: 7.2 MG/DL
CALCIUM SERPL-MCNC: 8 MG/DL
CALCIUM SERPL-MCNC: 8.1 MG/DL
CALCIUM SERPL-MCNC: 8.1 MG/DL
CALCIUM SERPL-MCNC: 8.2 MG/DL
CALCIUM SERPL-MCNC: 8.2 MG/DL
CALCIUM SERPL-MCNC: 8.3 MG/DL
CALCIUM SERPL-MCNC: 8.7 MG/DL
CHLORIDE SERPL-SCNC: 100 MMOL/L
CHLORIDE SERPL-SCNC: 101 MMOL/L
CHLORIDE SERPL-SCNC: 98 MMOL/L
CHLORIDE SERPL-SCNC: 99 MMOL/L
CHLORIDE SERPL-SCNC: 99 MMOL/L
CK SERPL-CCNC: 107 U/L
CO2 SERPL-SCNC: 23 MMOL/L
CO2 SERPL-SCNC: 24 MMOL/L
CO2 SERPL-SCNC: 25 MMOL/L
CO2 SERPL-SCNC: 25 MMOL/L
CO2 SERPL-SCNC: 27 MMOL/L
CREAT SERPL-MCNC: 3.2 MG/DL
CREAT SERPL-MCNC: 3.3 MG/DL
CREAT SERPL-MCNC: 3.3 MG/DL
CREAT SERPL-MCNC: 3.5 MG/DL
CREAT SERPL-MCNC: 3.6 MG/DL
CREAT SERPL-MCNC: 3.6 MG/DL
CREAT SERPL-MCNC: 3.8 MG/DL
CREAT SERPL-MCNC: 4 MG/DL
DIFFERENTIAL METHOD: ABNORMAL
EOSINOPHIL # BLD AUTO: 0 K/UL
EOSINOPHIL NFR BLD: 0.1 %
ERYTHROCYTE [DISTWIDTH] IN BLOOD BY AUTOMATED COUNT: 19.9 %
EST. GFR  (AFRICAN AMERICAN): 19.3 ML/MIN/1.73 M^2
EST. GFR  (AFRICAN AMERICAN): 20.5 ML/MIN/1.73 M^2
EST. GFR  (AFRICAN AMERICAN): 21.9 ML/MIN/1.73 M^2
EST. GFR  (AFRICAN AMERICAN): 21.9 ML/MIN/1.73 M^2
EST. GFR  (AFRICAN AMERICAN): 22.7 ML/MIN/1.73 M^2
EST. GFR  (AFRICAN AMERICAN): 24.4 ML/MIN/1.73 M^2
EST. GFR  (AFRICAN AMERICAN): 24.4 ML/MIN/1.73 M^2
EST. GFR  (AFRICAN AMERICAN): 25.3 ML/MIN/1.73 M^2
EST. GFR  (NON AFRICAN AMERICAN): 16.7 ML/MIN/1.73 M^2
EST. GFR  (NON AFRICAN AMERICAN): 17.8 ML/MIN/1.73 M^2
EST. GFR  (NON AFRICAN AMERICAN): 19 ML/MIN/1.73 M^2
EST. GFR  (NON AFRICAN AMERICAN): 19 ML/MIN/1.73 M^2
EST. GFR  (NON AFRICAN AMERICAN): 19.6 ML/MIN/1.73 M^2
EST. GFR  (NON AFRICAN AMERICAN): 21.1 ML/MIN/1.73 M^2
EST. GFR  (NON AFRICAN AMERICAN): 21.1 ML/MIN/1.73 M^2
EST. GFR  (NON AFRICAN AMERICAN): 21.9 ML/MIN/1.73 M^2
ESTIMATED AVG GLUCOSE: 117 MG/DL
GLUCOSE SERPL-MCNC: 100 MG/DL
GLUCOSE SERPL-MCNC: 105 MG/DL
GLUCOSE SERPL-MCNC: 87 MG/DL
GLUCOSE SERPL-MCNC: 88 MG/DL
GLUCOSE SERPL-MCNC: 88 MG/DL
GLUCOSE SERPL-MCNC: 91 MG/DL
GLUCOSE SERPL-MCNC: 91 MG/DL
GLUCOSE SERPL-MCNC: 99 MG/DL
HBA1C MFR BLD HPLC: 5.7 %
HCT VFR BLD AUTO: 24.5 %
HGB BLD-MCNC: 7.3 G/DL
IMM GRANULOCYTES # BLD AUTO: 0.22 K/UL
IMM GRANULOCYTES NFR BLD AUTO: 1.3 %
LACTATE SERPL-SCNC: 1.2 MMOL/L
LYMPHOCYTES # BLD AUTO: 2 K/UL
LYMPHOCYTES NFR BLD: 11.6 %
MAGNESIUM SERPL-MCNC: 1.9 MG/DL
MAGNESIUM SERPL-MCNC: 1.9 MG/DL
MAGNESIUM SERPL-MCNC: 2 MG/DL
MAGNESIUM SERPL-MCNC: 2.1 MG/DL
MAGNESIUM SERPL-MCNC: 2.2 MG/DL
MAGNESIUM SERPL-MCNC: 2.3 MG/DL
MCH RBC QN AUTO: 18.3 PG
MCHC RBC AUTO-ENTMCNC: 29.8 G/DL
MCV RBC AUTO: 61 FL
MONOCYTES # BLD AUTO: 1.6 K/UL
MONOCYTES NFR BLD: 9.1 %
NEUTROPHILS # BLD AUTO: 13.2 K/UL
NEUTROPHILS NFR BLD: 77.6 %
NRBC BLD-RTO: 1 /100 WBC
NSE SERPL-MCNC: 22 NG/ML
NSE SERPL-MCNC: 24 NG/ML
PHOSPHATE SERPL-MCNC: 4 MG/DL
PHOSPHATE SERPL-MCNC: 4.4 MG/DL
PHOSPHATE SERPL-MCNC: 4.4 MG/DL
PHOSPHATE SERPL-MCNC: 4.5 MG/DL
PHOSPHATE SERPL-MCNC: 4.6 MG/DL
PHOSPHATE SERPL-MCNC: 4.6 MG/DL
PHOSPHATE SERPL-MCNC: 4.7 MG/DL
PHOSPHATE SERPL-MCNC: 4.7 MG/DL
PLATELET # BLD AUTO: 194 K/UL
PMV BLD AUTO: 11 FL
POCT GLUCOSE: 103 MG/DL (ref 70–110)
POCT GLUCOSE: 108 MG/DL (ref 70–110)
POTASSIUM SERPL-SCNC: 4.1 MMOL/L
POTASSIUM SERPL-SCNC: 4.2 MMOL/L
POTASSIUM SERPL-SCNC: 4.3 MMOL/L
POTASSIUM SERPL-SCNC: 5 MMOL/L
PROT SERPL-MCNC: 6.1 G/DL
PROT SERPL-MCNC: 6.2 G/DL
PROT SERPL-MCNC: 6.2 G/DL
PROT SERPL-MCNC: 6.3 G/DL
PROT SERPL-MCNC: 6.4 G/DL
PROT SERPL-MCNC: 6.6 G/DL
RBC # BLD AUTO: 3.99 M/UL
SODIUM SERPL-SCNC: 135 MMOL/L
SODIUM SERPL-SCNC: 136 MMOL/L
SODIUM SERPL-SCNC: 136 MMOL/L
SODIUM SERPL-SCNC: 137 MMOL/L
TROPONIN I SERPL DL<=0.01 NG/ML-MCNC: 0.34 NG/ML
TROPONIN I SERPL DL<=0.01 NG/ML-MCNC: 0.38 NG/ML
TSH SERPL DL<=0.005 MIU/L-ACNC: 1.56 UIU/ML
VANCOMYCIN SERPL-MCNC: 35.1 UG/ML
WBC # BLD AUTO: 17.07 K/UL

## 2018-05-22 PROCEDURE — 86920 COMPATIBILITY TEST SPIN: CPT

## 2018-05-22 PROCEDURE — 27100171 HC OXYGEN HIGH FLOW UP TO 24 HOURS

## 2018-05-22 PROCEDURE — 80053 COMPREHEN METABOLIC PANEL: CPT

## 2018-05-22 PROCEDURE — 83605 ASSAY OF LACTIC ACID: CPT

## 2018-05-22 PROCEDURE — 86901 BLOOD TYPING SEROLOGIC RH(D): CPT

## 2018-05-22 PROCEDURE — 84100 ASSAY OF PHOSPHORUS: CPT | Mod: 91

## 2018-05-22 PROCEDURE — 80202 ASSAY OF VANCOMYCIN: CPT

## 2018-05-22 PROCEDURE — 27000221 HC OXYGEN, UP TO 24 HOURS

## 2018-05-22 PROCEDURE — 25000003 PHARM REV CODE 250: Performed by: STUDENT IN AN ORGANIZED HEALTH CARE EDUCATION/TRAINING PROGRAM

## 2018-05-22 PROCEDURE — 25000003 PHARM REV CODE 250: Performed by: NURSE PRACTITIONER

## 2018-05-22 PROCEDURE — 94668 MNPJ CHEST WALL SBSQ: CPT

## 2018-05-22 PROCEDURE — 84484 ASSAY OF TROPONIN QUANT: CPT

## 2018-05-22 PROCEDURE — 94640 AIRWAY INHALATION TREATMENT: CPT

## 2018-05-22 PROCEDURE — 25000003 PHARM REV CODE 250: Performed by: PHYSICIAN ASSISTANT

## 2018-05-22 PROCEDURE — 87205 SMEAR GRAM STAIN: CPT

## 2018-05-22 PROCEDURE — 63600175 PHARM REV CODE 636 W HCPCS: Performed by: NURSE PRACTITIONER

## 2018-05-22 PROCEDURE — 63600175 PHARM REV CODE 636 W HCPCS: Performed by: INTERNAL MEDICINE

## 2018-05-22 PROCEDURE — 82550 ASSAY OF CK (CPK): CPT

## 2018-05-22 PROCEDURE — 63600175 PHARM REV CODE 636 W HCPCS: Performed by: ANESTHESIOLOGY

## 2018-05-22 PROCEDURE — 63600175 PHARM REV CODE 636 W HCPCS

## 2018-05-22 PROCEDURE — 83735 ASSAY OF MAGNESIUM: CPT | Mod: 91

## 2018-05-22 PROCEDURE — 25000003 PHARM REV CODE 250: Performed by: PSYCHIATRY & NEUROLOGY

## 2018-05-22 PROCEDURE — 84443 ASSAY THYROID STIM HORMONE: CPT

## 2018-05-22 PROCEDURE — 94761 N-INVAS EAR/PLS OXIMETRY MLT: CPT

## 2018-05-22 PROCEDURE — 99291 CRITICAL CARE FIRST HOUR: CPT | Mod: ,,, | Performed by: PSYCHIATRY & NEUROLOGY

## 2018-05-22 PROCEDURE — 83520 IMMUNOASSAY QUANT NOS NONAB: CPT

## 2018-05-22 PROCEDURE — 80053 COMPREHEN METABOLIC PANEL: CPT | Mod: 91

## 2018-05-22 PROCEDURE — 80069 RENAL FUNCTION PANEL: CPT | Mod: 91

## 2018-05-22 PROCEDURE — 83036 HEMOGLOBIN GLYCOSYLATED A1C: CPT

## 2018-05-22 PROCEDURE — 25000003 PHARM REV CODE 250: Performed by: INTERNAL MEDICINE

## 2018-05-22 PROCEDURE — 63600175 PHARM REV CODE 636 W HCPCS: Performed by: PSYCHIATRY & NEUROLOGY

## 2018-05-22 PROCEDURE — 25000242 PHARM REV CODE 250 ALT 637 W/ HCPCS: Performed by: STUDENT IN AN ORGANIZED HEALTH CARE EDUCATION/TRAINING PROGRAM

## 2018-05-22 PROCEDURE — 85730 THROMBOPLASTIN TIME PARTIAL: CPT

## 2018-05-22 PROCEDURE — 87070 CULTURE OTHR SPECIMN AEROBIC: CPT

## 2018-05-22 PROCEDURE — 20000000 HC ICU ROOM

## 2018-05-22 PROCEDURE — 94003 VENT MGMT INPAT SUBQ DAY: CPT

## 2018-05-22 PROCEDURE — 99900035 HC TECH TIME PER 15 MIN (STAT)

## 2018-05-22 PROCEDURE — 99900026 HC AIRWAY MAINTENANCE (STAT)

## 2018-05-22 PROCEDURE — 85025 COMPLETE CBC W/AUTO DIFF WBC: CPT

## 2018-05-22 PROCEDURE — 83880 ASSAY OF NATRIURETIC PEPTIDE: CPT

## 2018-05-22 RX ORDER — FENTANYL CITRATE/PF 100MCG/2ML
25 SYRINGE (ML) INTRAVENOUS
Status: DISCONTINUED | OUTPATIENT
Start: 2018-05-22 | End: 2018-05-23

## 2018-05-22 RX ORDER — FENTANYL CITRATE 50 UG/ML
INJECTION, SOLUTION INTRAMUSCULAR; INTRAVENOUS
Status: COMPLETED
Start: 2018-05-22 | End: 2018-05-22

## 2018-05-22 RX ORDER — MILRINONE LACTATE 0.2 MG/ML
0.2 INJECTION, SOLUTION INTRAVENOUS CONTINUOUS
Status: DISCONTINUED | OUTPATIENT
Start: 2018-05-22 | End: 2018-05-22

## 2018-05-22 RX ORDER — IPRATROPIUM BROMIDE AND ALBUTEROL SULFATE 2.5; .5 MG/3ML; MG/3ML
3 SOLUTION RESPIRATORY (INHALATION) EVERY 6 HOURS
Status: DISCONTINUED | OUTPATIENT
Start: 2018-05-22 | End: 2018-05-29

## 2018-05-22 RX ORDER — HYDROCODONE BITARTRATE AND ACETAMINOPHEN 500; 5 MG/1; MG/1
TABLET ORAL CONTINUOUS
Status: DISCONTINUED | OUTPATIENT
Start: 2018-05-22 | End: 2018-05-23

## 2018-05-22 RX ORDER — DEXMEDETOMIDINE HYDROCHLORIDE 4 UG/ML
0.2 INJECTION, SOLUTION INTRAVENOUS CONTINUOUS
Status: DISCONTINUED | OUTPATIENT
Start: 2018-05-22 | End: 2018-05-22

## 2018-05-22 RX ORDER — INSULIN ASPART 100 [IU]/ML
1-10 INJECTION, SOLUTION INTRAVENOUS; SUBCUTANEOUS EVERY 6 HOURS PRN
Status: DISCONTINUED | OUTPATIENT
Start: 2018-05-22 | End: 2018-06-06

## 2018-05-22 RX ORDER — PROPOFOL 10 MG/ML
5 INJECTION, EMULSION INTRAVENOUS CONTINUOUS
Status: DISCONTINUED | OUTPATIENT
Start: 2018-05-22 | End: 2018-05-28

## 2018-05-22 RX ORDER — GLUCAGON 1 MG
1 KIT INJECTION
Status: DISCONTINUED | OUTPATIENT
Start: 2018-05-22 | End: 2018-06-06

## 2018-05-22 RX ORDER — PROPOFOL 10 MG/ML
INJECTION, EMULSION INTRAVENOUS
Status: COMPLETED
Start: 2018-05-22 | End: 2018-05-22

## 2018-05-22 RX ORDER — MAGNESIUM SULFATE HEPTAHYDRATE 40 MG/ML
2 INJECTION, SOLUTION INTRAVENOUS
Status: DISCONTINUED | OUTPATIENT
Start: 2018-05-22 | End: 2018-05-23

## 2018-05-22 RX ORDER — SODIUM CHLORIDE 9 MG/ML
INJECTION, SOLUTION INTRAVENOUS CONTINUOUS
Status: DISCONTINUED | OUTPATIENT
Start: 2018-05-22 | End: 2018-05-23

## 2018-05-22 RX ADMIN — SODIUM CHLORIDE: 0.9 INJECTION, SOLUTION INTRAVENOUS at 07:05

## 2018-05-22 RX ADMIN — NOREPINEPHRINE BITARTRATE 0.02 MCG/KG/MIN: 1 INJECTION, SOLUTION, CONCENTRATE INTRAVENOUS at 10:05

## 2018-05-22 RX ADMIN — SODIUM CHLORIDE: 0.9 INJECTION, SOLUTION INTRAVENOUS at 05:05

## 2018-05-22 RX ADMIN — Medication 0.07 MCG/KG/MIN: at 06:05

## 2018-05-22 RX ADMIN — Medication 0.06 MCG/KG/MIN: at 04:05

## 2018-05-22 RX ADMIN — HEPARIN SODIUM 7500 UNITS: 5000 INJECTION, SOLUTION INTRAVENOUS; SUBCUTANEOUS at 01:05

## 2018-05-22 RX ADMIN — Medication 25 MCG: at 07:05

## 2018-05-22 RX ADMIN — MAGNESIUM SULFATE IN WATER 2 G: 40 INJECTION, SOLUTION INTRAVENOUS at 05:05

## 2018-05-22 RX ADMIN — PROPOFOL 50 MCG/KG/MIN: 10 INJECTION, EMULSION INTRAVENOUS at 09:05

## 2018-05-22 RX ADMIN — CHLORHEXIDINE GLUCONATE 15 ML: 1.2 RINSE ORAL at 09:05

## 2018-05-22 RX ADMIN — DEXMEDETOMIDINE HYDROCHLORIDE 1.4 MCG/KG/HR: 100 INJECTION, SOLUTION, CONCENTRATE INTRAVENOUS at 09:05

## 2018-05-22 RX ADMIN — HEPARIN SODIUM 7500 UNITS: 5000 INJECTION, SOLUTION INTRAVENOUS; SUBCUTANEOUS at 05:05

## 2018-05-22 RX ADMIN — PROPOFOL 50 MCG/KG/MIN: 10 INJECTION, EMULSION INTRAVENOUS at 12:05

## 2018-05-22 RX ADMIN — PROPOFOL 50 MCG/KG/MIN: 10 INJECTION, EMULSION INTRAVENOUS at 07:05

## 2018-05-22 RX ADMIN — CEFEPIME HYDROCHLORIDE 2 G: 2 INJECTION, POWDER, FOR SOLUTION INTRAVENOUS at 04:05

## 2018-05-22 RX ADMIN — STANDARDIZED SENNA CONCENTRATE AND DOCUSATE SODIUM 1 TABLET: 8.6; 5 TABLET, FILM COATED ORAL at 08:05

## 2018-05-22 RX ADMIN — IPRATROPIUM BROMIDE AND ALBUTEROL SULFATE 3 ML: .5; 3 SOLUTION RESPIRATORY (INHALATION) at 11:05

## 2018-05-22 RX ADMIN — HEPARIN SODIUM 7500 UNITS: 5000 INJECTION, SOLUTION INTRAVENOUS; SUBCUTANEOUS at 09:05

## 2018-05-22 RX ADMIN — DEXMEDETOMIDINE HYDROCHLORIDE 1.4 MCG/KG/HR: 100 INJECTION, SOLUTION, CONCENTRATE INTRAVENOUS at 06:05

## 2018-05-22 RX ADMIN — PROPOFOL 10 MCG/KG/MIN: 10 INJECTION, EMULSION INTRAVENOUS at 04:05

## 2018-05-22 RX ADMIN — Medication 0.06 MCG/KG/MIN: at 08:05

## 2018-05-22 RX ADMIN — DEXMEDETOMIDINE HYDROCHLORIDE 0.2 MCG/KG/HR: 100 INJECTION, SOLUTION, CONCENTRATE INTRAVENOUS at 04:05

## 2018-05-22 RX ADMIN — CHLORHEXIDINE GLUCONATE 15 ML: 1.2 RINSE ORAL at 08:05

## 2018-05-22 RX ADMIN — FAMOTIDINE 20 MG: 20 TABLET ORAL at 08:05

## 2018-05-22 RX ADMIN — PROPOFOL 5 MCG/KG/MIN: 10 INJECTION, EMULSION INTRAVENOUS at 09:05

## 2018-05-22 RX ADMIN — FENTANYL CITRATE 25 MCG: 50 INJECTION, SOLUTION INTRAMUSCULAR; INTRAVENOUS at 07:05

## 2018-05-22 RX ADMIN — PROPOFOL 50 MCG/KG/MIN: 10 INJECTION, EMULSION INTRAVENOUS at 04:05

## 2018-05-22 NOTE — ASSESSMENT & PLAN NOTE
Los Mendez is a 47 year old male who is consulted for MARY, which is mostly secondary to iATN from previous cardiac arrest where he presented with hypotension which decrease perfusion to the kidneys (required to be started on pressors) and currently have component of cardio renal with dilated cardiomyopathy EF looks about 10-15%.Their could be also high suspicion of rhabdomyolysis since on UA has +2 occult blood and only 2 RBCon high power field. He has started to become anuric.     Plan:  - Continues to be anuric  - Lactic acid trendind downward 6.4-->1.5  - Will switch to SCUF for volume assistance,  cc/hr.   - Last CVP =15  - Blood pressure stable but require pressors for control   - On mechanical ventilation with FiO2 50%   - Will order renal ultrasound without any sign of obstruction, normal size kidney   - Urine sediment with abundant hyaline nay, RBCs (non dysmorphic) and few muddy brown, cast confirming ATN, no crystals.   - Avoid nephrotoxic medications   - CPK negative for rhabdomyolysis

## 2018-05-22 NOTE — PLAN OF CARE
Problem: Patient Care Overview  Goal: Plan of Care Review  POC reviewed with pt and pt's wife at 0500. Pt unable to verbalized understanding, wife verbalized understanding and is agreeable to POC. Questions and concerns addressed. Pt started on CRRT overnight, tolerated well. Pt remains intubated. Restraints initiated overnight, pt and pt's wife educated on need for restraints. Levophed and Propofol infusing. No acute events overnight. Pt progressing toward goals. Will continue to monitor. See flowsheets for full assessment and VS info

## 2018-05-22 NOTE — NURSING
Dr. Parks made aware that while RT was suctioning patient, patient had 13 beat run of VT. Vital signs currently stable. Patient currently in NSR. Patient did not lose pulse during event. No new orders at this time. RN to monitor.

## 2018-05-22 NOTE — SUBJECTIVE & OBJECTIVE
Interval History:  Patient evaluated at bedside, no significant event overnight. Has been on SLED overnight. Started to make some urine. His current CVP 15. Total intake yesterday 3.7 L and output 2.4 L (244 cc of UOP). Net positive 4 L.     Review of patient's allergies indicates:   Allergen Reactions    Penicillins      Current Facility-Administered Medications   Medication Frequency    0.9%  NaCl infusion (CRRT USE ONLY) Continuous    ceFEPIme injection 2 g Q24H    chlorhexidine 0.12 % solution 15 mL BID    famotidine tablet 20 mg Daily    heparin (porcine) injection 7,500 Units Q8H    magnesium sulfate 2g in water 50mL IVPB (premix) PRN    norepinephrine 4 mg in dextrose 5% 250 mL infusion (premix) (titrating) Continuous    ondansetron 4 mg/5 mL solution 4 mg Q8H PRN    propofol (DIPRIVAN) 10 mg/mL infusion Continuous    senna-docusate 8.6-50 mg per tablet 1 tablet Daily    sodium chloride 0.9% flush 3 mL PRN    sodium phosphate 20.01 mmol in dextrose 5 % 250 mL IVPB PRN    sodium phosphate 30 mmol in dextrose 5 % 250 mL IVPB PRN    sodium phosphate 39.99 mmol in dextrose 5 % 250 mL IVPB PRN       Objective:     Vital Signs (Most Recent):  Temp: 98.2 °F (36.8 °C) (05/22/18 0705)  Pulse: 81 (05/22/18 0905)  Resp: 20 (05/22/18 0905)  BP: 111/62 (05/22/18 0905)  SpO2: 100 % (05/22/18 0905)  O2 Device (Oxygen Therapy): ventilator (05/22/18 0905) Vital Signs (24h Range):  Temp:  [97.3 °F (36.3 °C)-98.8 °F (37.1 °C)] 98.2 °F (36.8 °C)  Pulse:  [80-92] 81  Resp:  [20-41] 20  SpO2:  [96 %-100 %] 100 %  BP: (105-135)/(61-88) 111/62  Arterial Line BP: ()/(52-90) 96/54     Weight: (!) 144.6 kg (318 lb 12.6 oz) (05/22/18 0500)  Body mass index is 48.47 kg/m².  Body surface area is 2.63 meters squared.    I/O last 3 completed shifts:  In: 4981 [I.V.:4481; IV Piggyback:500]  Out: 2775 [Urine:304; Other:2471]    Physical Exam   Constitutional: He is intubated.   Obesity    HENT:   Head: Normocephalic and  atraumatic.   Right Ear: External ear normal.   Left Ear: External ear normal.   Eyes: Right eye exhibits no discharge. Left eye exhibits no discharge.   Neck: No JVD present.   Cardiovascular: Normal rate.    Pulmonary/Chest: Effort normal. He is intubated.   Abdominal: Soft.   Musculoskeletal: He exhibits edema.   Neurological: He is unresponsive.   Skin: Skin is warm.       Significant Labs:  ABGs:   Recent Labs  Lab 05/21/18  0340   PH 7.407   PCO2 34.6*   HCO3 21.8*   POCSATURATED 99   BE -3     BMP:   Recent Labs  Lab 05/22/18  0838   GLU 91      CO2 24   BUN 26*   CREATININE 3.3*   CALCIUM 8.1*   MG 2.1     CBC:   Recent Labs  Lab 05/22/18  0339   WBC 17.07*   RBC 3.99*   HGB 7.3*   HCT 24.5*      MCV 61*   MCH 18.3*   MCHC 29.8*     CMP:   Recent Labs  Lab 05/22/18  0838   GLU 91   CALCIUM 8.1*   ALBUMIN 2.5*   PROT 6.1      K 4.1   CO2 24      BUN 26*   CREATININE 3.3*   ALKPHOS 100   *   *   BILITOT 2.3*     All labs within the past 24 hours have been reviewed.

## 2018-05-22 NOTE — PLAN OF CARE
Problem: Patient Care Overview  Goal: Plan of Care Review  Outcome: Ongoing (interventions implemented as appropriate)  POC reviewed with patient and family at 1600. No evidence of learning from the patient. Questions and concerns addressed with spouse. NCC team discontinued propofol. NS at 40 ml/hr. RN to wean levo off, patient's BP is labile. MAPs range from 100s - 40s. Patient is currently on Levo at 0.05 for MAPs > 65. Patient is on 1.4 of precedex. Patient is restless and is fighting the ventilator.  RN will continue to monitor. See flowsheets for full assessment and VS info.

## 2018-05-23 LAB
ABO + RH BLD: NORMAL
ALBUMIN SERPL BCP-MCNC: 2.6 G/DL
ALBUMIN SERPL BCP-MCNC: 2.7 G/DL
ALBUMIN SERPL BCP-MCNC: 2.8 G/DL
ALLENS TEST: ABNORMAL
ALP SERPL-CCNC: 110 U/L
ALP SERPL-CCNC: 111 U/L
ALP SERPL-CCNC: 111 U/L
ALP SERPL-CCNC: 120 U/L
ALP SERPL-CCNC: 120 U/L
ALT SERPL W/O P-5'-P-CCNC: 256 U/L
ALT SERPL W/O P-5'-P-CCNC: 267 U/L
ALT SERPL W/O P-5'-P-CCNC: 274 U/L
ALT SERPL W/O P-5'-P-CCNC: 276 U/L
ALT SERPL W/O P-5'-P-CCNC: 296 U/L
ANION GAP SERPL CALC-SCNC: 10 MMOL/L
ANION GAP SERPL CALC-SCNC: 12 MMOL/L
ANION GAP SERPL CALC-SCNC: 12 MMOL/L
ANION GAP SERPL CALC-SCNC: 15 MMOL/L
ANION GAP SERPL CALC-SCNC: 15 MMOL/L
ANION GAP SERPL CALC-SCNC: 8 MMOL/L
ANION GAP SERPL CALC-SCNC: 9 MMOL/L
ANION GAP SERPL CALC-SCNC: 9 MMOL/L
ANISOCYTOSIS BLD QL SMEAR: SLIGHT
APTT BLDCRRT: 28.9 SEC
AST SERPL-CCNC: 155 U/L
AST SERPL-CCNC: 155 U/L
AST SERPL-CCNC: 156 U/L
AST SERPL-CCNC: 163 U/L
AST SERPL-CCNC: 172 U/L
BASOPHILS # BLD AUTO: 0.04 K/UL
BASOPHILS # BLD AUTO: 0.05 K/UL
BASOPHILS NFR BLD: 0.3 %
BASOPHILS NFR BLD: 0.3 %
BILIRUB SERPL-MCNC: 2.6 MG/DL
BILIRUB SERPL-MCNC: 2.7 MG/DL
BILIRUB SERPL-MCNC: 2.7 MG/DL
BILIRUB SERPL-MCNC: 2.8 MG/DL
BILIRUB SERPL-MCNC: 3 MG/DL
BLD GP AB SCN CELLS X3 SERPL QL: NORMAL
BUN SERPL-MCNC: 10 MG/DL
BUN SERPL-MCNC: 12 MG/DL
BUN SERPL-MCNC: 13 MG/DL
BUN SERPL-MCNC: 15 MG/DL
BUN SERPL-MCNC: 20 MG/DL
BUN SERPL-MCNC: 27 MG/DL
BUN SERPL-MCNC: 27 MG/DL
BUN SERPL-MCNC: 9 MG/DL
BURR CELLS BLD QL SMEAR: ABNORMAL
CALCIUM SERPL-MCNC: 8.3 MG/DL
CALCIUM SERPL-MCNC: 8.4 MG/DL
CALCIUM SERPL-MCNC: 8.5 MG/DL
CALCIUM SERPL-MCNC: 8.6 MG/DL
CALCIUM SERPL-MCNC: 9 MG/DL
CHLORIDE SERPL-SCNC: 100 MMOL/L
CHLORIDE SERPL-SCNC: 101 MMOL/L
CHLORIDE SERPL-SCNC: 98 MMOL/L
CHLORIDE SERPL-SCNC: 99 MMOL/L
CO2 SERPL-SCNC: 22 MMOL/L
CO2 SERPL-SCNC: 22 MMOL/L
CO2 SERPL-SCNC: 25 MMOL/L
CO2 SERPL-SCNC: 26 MMOL/L
CO2 SERPL-SCNC: 28 MMOL/L
CO2 SERPL-SCNC: 28 MMOL/L
CO2 SERPL-SCNC: 29 MMOL/L
CO2 SERPL-SCNC: 31 MMOL/L
CREAT SERPL-MCNC: 1.8 MG/DL
CREAT SERPL-MCNC: 1.8 MG/DL
CREAT SERPL-MCNC: 2 MG/DL
CREAT SERPL-MCNC: 2.3 MG/DL
CREAT SERPL-MCNC: 2.5 MG/DL
CREAT SERPL-MCNC: 3 MG/DL
CREAT SERPL-MCNC: 3.9 MG/DL
CREAT SERPL-MCNC: 3.9 MG/DL
DELSYS: ABNORMAL
DIFFERENTIAL METHOD: ABNORMAL
DIFFERENTIAL METHOD: ABNORMAL
EOSINOPHIL # BLD AUTO: 0 K/UL
EOSINOPHIL # BLD AUTO: 0.1 K/UL
EOSINOPHIL NFR BLD: 0.2 %
EOSINOPHIL NFR BLD: 0.4 %
ERYTHROCYTE [DISTWIDTH] IN BLOOD BY AUTOMATED COUNT: 20.1 %
ERYTHROCYTE [DISTWIDTH] IN BLOOD BY AUTOMATED COUNT: 20.3 %
ERYTHROCYTE [SEDIMENTATION RATE] IN BLOOD BY WESTERGREN METHOD: 20 MM/H
ERYTHROCYTE [SEDIMENTATION RATE] IN BLOOD BY WESTERGREN METHOD: 20 MM/H
ERYTHROCYTE [SEDIMENTATION RATE] IN BLOOD BY WESTERGREN METHOD: 25 MM/H
EST. GFR  (AFRICAN AMERICAN): 19.9 ML/MIN/1.73 M^2
EST. GFR  (AFRICAN AMERICAN): 19.9 ML/MIN/1.73 M^2
EST. GFR  (AFRICAN AMERICAN): 27.3 ML/MIN/1.73 M^2
EST. GFR  (AFRICAN AMERICAN): 34.1 ML/MIN/1.73 M^2
EST. GFR  (AFRICAN AMERICAN): 37.7 ML/MIN/1.73 M^2
EST. GFR  (AFRICAN AMERICAN): 44.6 ML/MIN/1.73 M^2
EST. GFR  (AFRICAN AMERICAN): 50.7 ML/MIN/1.73 M^2
EST. GFR  (AFRICAN AMERICAN): 50.7 ML/MIN/1.73 M^2
EST. GFR  (NON AFRICAN AMERICAN): 17.2 ML/MIN/1.73 M^2
EST. GFR  (NON AFRICAN AMERICAN): 17.2 ML/MIN/1.73 M^2
EST. GFR  (NON AFRICAN AMERICAN): 23.6 ML/MIN/1.73 M^2
EST. GFR  (NON AFRICAN AMERICAN): 29.5 ML/MIN/1.73 M^2
EST. GFR  (NON AFRICAN AMERICAN): 32.6 ML/MIN/1.73 M^2
EST. GFR  (NON AFRICAN AMERICAN): 38.6 ML/MIN/1.73 M^2
EST. GFR  (NON AFRICAN AMERICAN): 43.8 ML/MIN/1.73 M^2
EST. GFR  (NON AFRICAN AMERICAN): 43.8 ML/MIN/1.73 M^2
FIO2: 40
FIO2: 40
FIO2: 50
GLUCOSE SERPL-MCNC: 77 MG/DL
GLUCOSE SERPL-MCNC: 78 MG/DL
GLUCOSE SERPL-MCNC: 78 MG/DL
GLUCOSE SERPL-MCNC: 82 MG/DL
GLUCOSE SERPL-MCNC: 83 MG/DL
GLUCOSE SERPL-MCNC: 87 MG/DL
GLUCOSE SERPL-MCNC: 87 MG/DL
GLUCOSE SERPL-MCNC: 94 MG/DL
HCO3 UR-SCNC: 25.4 MMOL/L (ref 24–28)
HCO3 UR-SCNC: 29.2 MMOL/L (ref 24–28)
HCO3 UR-SCNC: 29.6 MMOL/L (ref 24–28)
HCT VFR BLD AUTO: 22.7 %
HCT VFR BLD AUTO: 24.8 %
HGB BLD-MCNC: 6.5 G/DL
HGB BLD-MCNC: 7.2 G/DL
HYPOCHROMIA BLD QL SMEAR: ABNORMAL
IMM GRANULOCYTES # BLD AUTO: 0.16 K/UL
IMM GRANULOCYTES # BLD AUTO: 0.26 K/UL
IMM GRANULOCYTES NFR BLD AUTO: 1.3 %
IMM GRANULOCYTES NFR BLD AUTO: 1.7 %
INR PPP: 1.2
IP: 16
IP: 16
IT: 0.9
IT: 0.9
LYMPHOCYTES # BLD AUTO: 1.4 K/UL
LYMPHOCYTES # BLD AUTO: 1.5 K/UL
LYMPHOCYTES NFR BLD: 11.8 %
LYMPHOCYTES NFR BLD: 9 %
MAGNESIUM SERPL-MCNC: 1.9 MG/DL
MAGNESIUM SERPL-MCNC: 2 MG/DL
MAGNESIUM SERPL-MCNC: 2 MG/DL
MAGNESIUM SERPL-MCNC: 2.1 MG/DL
MAP: 16
MAP: 85
MCH RBC QN AUTO: 17.9 PG
MCH RBC QN AUTO: 18.2 PG
MCHC RBC AUTO-ENTMCNC: 28.6 G/DL
MCHC RBC AUTO-ENTMCNC: 29 G/DL
MCV RBC AUTO: 63 FL
MCV RBC AUTO: 63 FL
MIN VOL: 10
MODE: ABNORMAL
MONOCYTES # BLD AUTO: 0.9 K/UL
MONOCYTES # BLD AUTO: 2.9 K/UL
MONOCYTES NFR BLD: 19.1 %
MONOCYTES NFR BLD: 7.5 %
NEUTROPHILS # BLD AUTO: 10.4 K/UL
NEUTROPHILS # BLD AUTO: 9.8 K/UL
NEUTROPHILS NFR BLD: 69.5 %
NEUTROPHILS NFR BLD: 78.9 %
NRBC BLD-RTO: 1 /100 WBC
NRBC BLD-RTO: 1 /100 WBC
NSE SERPL-MCNC: NORMAL UG/L
PCO2 BLDA: 34.6 MMHG (ref 35–45)
PCO2 BLDA: 50.5 MMHG (ref 35–45)
PCO2 BLDA: 52.8 MMHG (ref 35–45)
PEEP: 10
PEEP: 8
PEEP: 8
PH SMN: 7.36 [PH] (ref 7.35–7.45)
PH SMN: 7.37 [PH] (ref 7.35–7.45)
PH SMN: 7.47 [PH] (ref 7.35–7.45)
PHOSPHATE SERPL-MCNC: 3.3 MG/DL
PHOSPHATE SERPL-MCNC: 3.5 MG/DL
PHOSPHATE SERPL-MCNC: 4.1 MG/DL
PHOSPHATE SERPL-MCNC: 4.6 MG/DL
PHOSPHATE SERPL-MCNC: 4.6 MG/DL
PIP: 25
PIP: 26
PIP: 26
PLATELET # BLD AUTO: 188 K/UL
PLATELET # BLD AUTO: 198 K/UL
PMV BLD AUTO: 10.1 FL
PMV BLD AUTO: 10.9 FL
PO2 BLDA: 22 MMHG (ref 40–60)
PO2 BLDA: 234 MMHG (ref 80–100)
PO2 BLDA: 35 MMHG (ref 40–60)
POC BE: 2 MMOL/L
POC BE: 4 MMOL/L
POC BE: 4 MMOL/L
POC SATURATED O2: 100 % (ref 95–100)
POC SATURATED O2: 34 % (ref 95–100)
POC SATURATED O2: 64 % (ref 95–100)
POC TCO2: 26 MMOL/L (ref 23–27)
POC TCO2: 31 MMOL/L (ref 24–29)
POC TCO2: 31 MMOL/L (ref 24–29)
POCT GLUCOSE: 108 MG/DL (ref 70–110)
POCT GLUCOSE: 64 MG/DL (ref 70–110)
POCT GLUCOSE: 85 MG/DL (ref 70–110)
POCT GLUCOSE: 91 MG/DL (ref 70–110)
POIKILOCYTOSIS BLD QL SMEAR: SLIGHT
POLYCHROMASIA BLD QL SMEAR: ABNORMAL
POTASSIUM SERPL-SCNC: 4.2 MMOL/L
POTASSIUM SERPL-SCNC: 4.4 MMOL/L
POTASSIUM SERPL-SCNC: 4.5 MMOL/L
POTASSIUM SERPL-SCNC: 5.1 MMOL/L
POTASSIUM SERPL-SCNC: 5.1 MMOL/L
PROT SERPL-MCNC: 6.7 G/DL
PROT SERPL-MCNC: 6.7 G/DL
PROT SERPL-MCNC: 6.9 G/DL
PROT SERPL-MCNC: 6.9 G/DL
PROT SERPL-MCNC: 7.1 G/DL
PROTHROMBIN TIME: 12.2 SEC
RBC # BLD AUTO: 3.63 M/UL
RBC # BLD AUTO: 3.96 M/UL
SAMPLE: ABNORMAL
SITE: ABNORMAL
SODIUM SERPL-SCNC: 136 MMOL/L
SODIUM SERPL-SCNC: 136 MMOL/L
SODIUM SERPL-SCNC: 137 MMOL/L
SODIUM SERPL-SCNC: 137 MMOL/L
SODIUM SERPL-SCNC: 138 MMOL/L
SODIUM SERPL-SCNC: 139 MMOL/L
SP02: 100
SP02: 100
SP02: 99
TARGETS BLD QL SMEAR: ABNORMAL
TROPONIN I SERPL DL<=0.01 NG/ML-MCNC: 0.28 NG/ML
TROPONIN I SERPL DL<=0.01 NG/ML-MCNC: 0.28 NG/ML
VANCOMYCIN SERPL-MCNC: 31.5 UG/ML
VT: 412
VT: 475
VT: 560
WBC # BLD AUTO: 12.46 K/UL
WBC # BLD AUTO: 14.93 K/UL

## 2018-05-23 PROCEDURE — 83735 ASSAY OF MAGNESIUM: CPT | Mod: 91

## 2018-05-23 PROCEDURE — 20000000 HC ICU ROOM

## 2018-05-23 PROCEDURE — 37799 UNLISTED PX VASCULAR SURGERY: CPT

## 2018-05-23 PROCEDURE — 82271 OCCULT BLOOD OTHER SOURCES: CPT

## 2018-05-23 PROCEDURE — 63600175 PHARM REV CODE 636 W HCPCS: Performed by: STUDENT IN AN ORGANIZED HEALTH CARE EDUCATION/TRAINING PROGRAM

## 2018-05-23 PROCEDURE — 93005 ELECTROCARDIOGRAM TRACING: CPT

## 2018-05-23 PROCEDURE — 80069 RENAL FUNCTION PANEL: CPT

## 2018-05-23 PROCEDURE — 25000003 PHARM REV CODE 250: Performed by: STUDENT IN AN ORGANIZED HEALTH CARE EDUCATION/TRAINING PROGRAM

## 2018-05-23 PROCEDURE — 63600175 PHARM REV CODE 636 W HCPCS: Performed by: PSYCHIATRY & NEUROLOGY

## 2018-05-23 PROCEDURE — 85025 COMPLETE CBC W/AUTO DIFF WBC: CPT | Mod: 91

## 2018-05-23 PROCEDURE — 93010 ELECTROCARDIOGRAM REPORT: CPT | Mod: 76,,, | Performed by: INTERNAL MEDICINE

## 2018-05-23 PROCEDURE — 25000003 PHARM REV CODE 250: Performed by: PHYSICIAN ASSISTANT

## 2018-05-23 PROCEDURE — 82803 BLOOD GASES ANY COMBINATION: CPT

## 2018-05-23 PROCEDURE — 90945 DIALYSIS ONE EVALUATION: CPT

## 2018-05-23 PROCEDURE — P9021 RED BLOOD CELLS UNIT: HCPCS

## 2018-05-23 PROCEDURE — 25000003 PHARM REV CODE 250: Performed by: NURSE PRACTITIONER

## 2018-05-23 PROCEDURE — 94640 AIRWAY INHALATION TREATMENT: CPT

## 2018-05-23 PROCEDURE — 99231 SBSQ HOSP IP/OBS SF/LOW 25: CPT | Mod: ,,, | Performed by: INTERNAL MEDICINE

## 2018-05-23 PROCEDURE — 94668 MNPJ CHEST WALL SBSQ: CPT

## 2018-05-23 PROCEDURE — 85610 PROTHROMBIN TIME: CPT

## 2018-05-23 PROCEDURE — 84100 ASSAY OF PHOSPHORUS: CPT | Mod: 91

## 2018-05-23 PROCEDURE — 63600175 PHARM REV CODE 636 W HCPCS: Performed by: PHYSICIAN ASSISTANT

## 2018-05-23 PROCEDURE — 80053 COMPREHEN METABOLIC PANEL: CPT | Mod: 91

## 2018-05-23 PROCEDURE — 99900035 HC TECH TIME PER 15 MIN (STAT)

## 2018-05-23 PROCEDURE — 80202 ASSAY OF VANCOMYCIN: CPT

## 2018-05-23 PROCEDURE — 93010 ELECTROCARDIOGRAM REPORT: CPT | Mod: ,,, | Performed by: INTERNAL MEDICINE

## 2018-05-23 PROCEDURE — 25000003 PHARM REV CODE 250: Performed by: PSYCHIATRY & NEUROLOGY

## 2018-05-23 PROCEDURE — 99291 CRITICAL CARE FIRST HOUR: CPT | Mod: ,,, | Performed by: PSYCHIATRY & NEUROLOGY

## 2018-05-23 PROCEDURE — C9113 INJ PANTOPRAZOLE SODIUM, VIA: HCPCS | Performed by: PHYSICIAN ASSISTANT

## 2018-05-23 PROCEDURE — 25000003 PHARM REV CODE 250: Performed by: INTERNAL MEDICINE

## 2018-05-23 PROCEDURE — 80100008 HC CRRT DAILY MAINTENANCE

## 2018-05-23 PROCEDURE — 94003 VENT MGMT INPAT SUBQ DAY: CPT

## 2018-05-23 PROCEDURE — 84484 ASSAY OF TROPONIN QUANT: CPT

## 2018-05-23 PROCEDURE — 63600175 PHARM REV CODE 636 W HCPCS: Performed by: ANESTHESIOLOGY

## 2018-05-23 PROCEDURE — 85730 THROMBOPLASTIN TIME PARTIAL: CPT

## 2018-05-23 PROCEDURE — 25000242 PHARM REV CODE 250 ALT 637 W/ HCPCS: Performed by: STUDENT IN AN ORGANIZED HEALTH CARE EDUCATION/TRAINING PROGRAM

## 2018-05-23 PROCEDURE — 99900026 HC AIRWAY MAINTENANCE (STAT)

## 2018-05-23 PROCEDURE — 27100171 HC OXYGEN HIGH FLOW UP TO 24 HOURS

## 2018-05-23 PROCEDURE — 94761 N-INVAS EAR/PLS OXIMETRY MLT: CPT

## 2018-05-23 RX ORDER — HYDROCODONE BITARTRATE AND ACETAMINOPHEN 500; 5 MG/1; MG/1
TABLET ORAL CONTINUOUS
Status: ACTIVE | OUTPATIENT
Start: 2018-05-23 | End: 2018-05-24

## 2018-05-23 RX ORDER — HYDROCODONE BITARTRATE AND ACETAMINOPHEN 500; 5 MG/1; MG/1
TABLET ORAL
Status: DISCONTINUED | OUTPATIENT
Start: 2018-05-23 | End: 2018-05-24

## 2018-05-23 RX ORDER — MAGNESIUM SULFATE HEPTAHYDRATE 40 MG/ML
2 INJECTION, SOLUTION INTRAVENOUS
Status: ACTIVE | OUTPATIENT
Start: 2018-05-23 | End: 2018-05-24

## 2018-05-23 RX ORDER — ACETAMINOPHEN 325 MG/1
325 TABLET ORAL EVERY 6 HOURS PRN
Status: DISCONTINUED | OUTPATIENT
Start: 2018-05-23 | End: 2018-06-06

## 2018-05-23 RX ORDER — BUSPIRONE HYDROCHLORIDE 5 MG/1
10 TABLET ORAL 3 TIMES DAILY
Status: DISCONTINUED | OUTPATIENT
Start: 2018-05-23 | End: 2018-06-02

## 2018-05-23 RX ORDER — DEXMEDETOMIDINE HYDROCHLORIDE 4 UG/ML
0.2 INJECTION, SOLUTION INTRAVENOUS CONTINUOUS
Status: DISCONTINUED | OUTPATIENT
Start: 2018-05-23 | End: 2018-05-25

## 2018-05-23 RX ORDER — CEFEPIME HYDROCHLORIDE 2 G/1
2 INJECTION, POWDER, FOR SOLUTION INTRAVENOUS
Status: DISCONTINUED | OUTPATIENT
Start: 2018-05-23 | End: 2018-05-24

## 2018-05-23 RX ORDER — FUROSEMIDE 10 MG/ML
80 INJECTION INTRAMUSCULAR; INTRAVENOUS ONCE
Status: DISCONTINUED | OUTPATIENT
Start: 2018-05-23 | End: 2018-05-23

## 2018-05-23 RX ORDER — FENTANYL CITRATE 50 UG/ML
25 INJECTION, SOLUTION INTRAMUSCULAR; INTRAVENOUS
Status: DISCONTINUED | OUTPATIENT
Start: 2018-05-23 | End: 2018-06-06

## 2018-05-23 RX ADMIN — DEXTROSE MONOHYDRATE 12.5 G: 25 INJECTION, SOLUTION INTRAVENOUS at 06:05

## 2018-05-23 RX ADMIN — CEFEPIME HYDROCHLORIDE 2 G: 2 INJECTION, POWDER, FOR SOLUTION INTRAVENOUS at 03:05

## 2018-05-23 RX ADMIN — IPRATROPIUM BROMIDE AND ALBUTEROL SULFATE 3 ML: .5; 3 SOLUTION RESPIRATORY (INHALATION) at 08:05

## 2018-05-23 RX ADMIN — FAMOTIDINE 20 MG: 20 TABLET ORAL at 09:05

## 2018-05-23 RX ADMIN — NOREPINEPHRINE BITARTRATE 0.06 MCG/KG/MIN: 1 INJECTION, SOLUTION, CONCENTRATE INTRAVENOUS at 10:05

## 2018-05-23 RX ADMIN — CEFEPIME HYDROCHLORIDE 2 G: 2 INJECTION, POWDER, FOR SOLUTION INTRAVENOUS at 04:05

## 2018-05-23 RX ADMIN — PROPOFOL 30 MCG/KG/MIN: 10 INJECTION, EMULSION INTRAVENOUS at 06:05

## 2018-05-23 RX ADMIN — BUSPIRONE HYDROCHLORIDE 10 MG: 10 TABLET ORAL at 03:05

## 2018-05-23 RX ADMIN — IPRATROPIUM BROMIDE AND ALBUTEROL SULFATE 3 ML: .5; 3 SOLUTION RESPIRATORY (INHALATION) at 01:05

## 2018-05-23 RX ADMIN — CHLORHEXIDINE GLUCONATE 15 ML: 1.2 RINSE ORAL at 09:05

## 2018-05-23 RX ADMIN — ACETAMINOPHEN 325 MG: 325 TABLET, FILM COATED ORAL at 12:05

## 2018-05-23 RX ADMIN — IPRATROPIUM BROMIDE AND ALBUTEROL SULFATE 3 ML: .5; 3 SOLUTION RESPIRATORY (INHALATION) at 07:05

## 2018-05-23 RX ADMIN — BUSPIRONE HYDROCHLORIDE 10 MG: 10 TABLET ORAL at 11:05

## 2018-05-23 RX ADMIN — STANDARDIZED SENNA CONCENTRATE AND DOCUSATE SODIUM 1 TABLET: 8.6; 5 TABLET, FILM COATED ORAL at 09:05

## 2018-05-23 RX ADMIN — SODIUM CHLORIDE: 0.9 INJECTION, SOLUTION INTRAVENOUS at 09:05

## 2018-05-23 RX ADMIN — BUSPIRONE HYDROCHLORIDE 10 MG: 10 TABLET ORAL at 09:05

## 2018-05-23 RX ADMIN — HEPARIN SODIUM 7500 UNITS: 5000 INJECTION, SOLUTION INTRAVENOUS; SUBCUTANEOUS at 02:05

## 2018-05-23 RX ADMIN — PROPOFOL 20 MCG/KG/MIN: 10 INJECTION, EMULSION INTRAVENOUS at 03:05

## 2018-05-23 RX ADMIN — HEPARIN SODIUM 7500 UNITS: 5000 INJECTION, SOLUTION INTRAVENOUS; SUBCUTANEOUS at 05:05

## 2018-05-23 RX ADMIN — PROPOFOL 30 MCG/KG/MIN: 10 INJECTION, EMULSION INTRAVENOUS at 09:05

## 2018-05-23 RX ADMIN — PANTOPRAZOLE SODIUM 40 MG: 40 INJECTION, POWDER, FOR SOLUTION INTRAVENOUS at 10:05

## 2018-05-23 NOTE — PROGRESS NOTES
Would recommend titrating pressors to SBP > 85, start afterload reduction with hydralazine 10mg tid and isordil 10mg tid when able.  Afterload reduction will actually help with forward flow (cardiac output) in this patient with a severely reduced EF.  Would prefer to avoid dobutamine at this time in this patient with an arrhythmic arrest and not on optimal heart failure therapy, will repeat SvO2 to determine cardiac output.  Would consider transfusion to goal Hb > 8 (ideally 10) to improve oxygen delivery.     Chago Allen MD  Cardiology Fellow    Addendum: SvO2 on VBG from IJ 64.  Would not favor dobutamine at this time as cardiac output appears adequate.  Would titrate levophed as above and favor volume removal to get CVP less than 10.

## 2018-05-23 NOTE — SUBJECTIVE & OBJECTIVE
Interval History: >4 elements OR status of 3 inpatient conditions  Anoxic brain injury.  On CRRT. BNP 1046. Got back on Propofol and pressors. Still agitated. He will be started on Buspar.  Trop I normalizing.  Review of Systems   Unable to perform ROS: Intubated     2 systems OR Unable to obtain a complete ROS due to level of consciousness.  Objective:     Vitals:  Temp: 99.3 °F (37.4 °C)  Pulse: 85  Rhythm: sinus arrhythmia  BP: 114/61  MAP (mmHg): 97  CVP (mean): 12 mmHg  CI (L/min/m2): 2.1 L/min/m2  SVI: 27  SVV: 9 %  Resp: (!) 25  SpO2: 99 %  Oxygen Concentration (%): 99  O2 Device (Oxygen Therapy): ventilator  Vent Mode: A/C  Set Rate: 20 bmp  PEEP/CPAP: 8 cmH20  Peak Airway Pressure: 25 cmH2O  Mean Airway Pressure: 13 cmH20  Plateau Pressure: 0 cmH20    Temp  Min: 97.9 °F (36.6 °C)  Max: 100.2 °F (37.9 °C)  Pulse  Min: 67  Max: 129  BP  Min: 71/39  Max: 136/86  MAP (mmHg)  Min: 51  Max: 103  CVP (mean)  Min: 7 mmHg  Max: 16 mmHg  CI (L/min/m2)  Min: 1.7 L/min/m2  Max: 2.8 L/min/m2  SVI  Min: 24  Max: 35  SVV  Min: 2 %  Max: 36 %  Resp  Min: 10  Max: 36  SpO2  Min: 99 %  Max: 100 %  Oxygen Concentration (%)  Min: 40  Max: 99    05/22 0701 - 05/23 0700  In: 6218.1 [I.V.:6218.1]  Out: 7103 [Urine:136]   Unmeasured Output  Stool Occurrence: 1       Physical Exam  Unable to test orientation, language, memory, judgment, insight, fund of knowledge, hearing, shoulder shrug, tongue protrusion, coordination, gait due to level of consciousness.    General   HEENT: ETT  Chest Heart RRR / Lungs Clear to auscultation  Abdomen: Distended hypoactive abdomen.  Extremities: OK distal pulses.  Skin: UK  Neurological Exam:  MS; Coma/ sedation Gets agitated off sedation.  CN: II-XII  Limited.  Motor: LUE   3/5 / RUE  3/5  Tone normal bilaterally Limited assessment because of sedation             LLE  3 /5 /  RLE 3 /5  Tone normal bilaterally Limited assessment because of sedation  Sensory: LT/PP/T/ Vibration Limited assessment  because of sedation                 Complex sensory modalities: not tested  DTR:  normal throughout.  Coordination /Fine motor: Limited assessment because of sedation  Gait: Not tested.  Meningeal signs: Absent    Medications:  Continuous  sodium chloride 0.9% Last Rate: 200 mL/hr at 05/23/18 1305   dexmedetomidine (PRECEDEX) infusion    norepinephrine bitartrate-D5W Last Rate: 0.06 mcg/kg/min (05/23/18 1200)   propofol Last Rate: 25.012 mcg/kg/min (05/23/18 1000)   Scheduled  albuterol-ipratropium 3 mL Q6H   busPIRone 10 mg TID   ceFEPime (MAXIPIME) IVPB 2 g Q12H   chlorhexidine 15 mL BID   famotidine 20 mg Daily   heparin (porcine) 7,500 Units Q8H   senna-docusate 8.6-50 mg 1 tablet Daily   PRN  acetaminophen 325 mg Q6H PRN   dextrose 50% 12.5 g PRN   fentaNYL 25 mcg Q2H PRN   glucagon (human recombinant) 1 mg PRN   insulin aspart U-100 1-10 Units Q6H PRN   magnesium sulfate IVPB 2 g PRN   ondansetron 4 mg Q8H PRN   sodium chloride 0.9% 3 mL PRN   sodium phosphate IVPB 20.01 mmol PRN   sodium phosphate IVPB 30 mmol PRN   sodium phosphate IVPB 39.99 mmol PRN     Today I personally reviewed pertinent medications, lines/drains/airways, imaging, cardiology, lab results, microbiology results, notably:    Diet  Diet NPO  CMP:      Recent Labs  Lab 05/23/18  0826   CALCIUM 8.3*   ALBUMIN 2.7*   PROT 6.9      K 4.4   CO2 25   CL 99   BUN 20   CREATININE 3.0*   ALKPHOS 111   *   *   BILITOT 2.6*      BMP:      Recent Labs  Lab 05/23/18  0826      K 4.4   CL 99   CO2 25   BUN 20   CREATININE 3.0*   CALCIUM 8.3*      CBC:      Recent Labs  Lab 05/23/18  0316   WBC 12.46   RBC 3.96*   HGB 7.2*   HCT 24.8*      MCV 63*   MCH 18.2*   MCHC 29.0*      Lipid Panel: No results for input(s): CHOL, LDLCALC, HDL, TRIG in the last 168 hours.  Coagulation:   Recent Labs  Lab 05/19/18  1609   05/23/18  0316   INR 2.0*  --   --    APTT  --   < > 28.9   < > = values in this interval not displayed.  Platelet  Aggregation Study: No results for input(s): PLTAGG, PLTAGINTERP, PLTAGREGLACO, ADPPLTAGGREG in the last 168 hours.  Hgb A1C:      Recent Labs  Lab 05/22/18  1459   HGBA1C 5.7*      TSH:      Recent Labs  Lab 05/22/18  1500   TSH 1.561         Recent Labs  Lab 05/23/18  0409   PH 7.473*   PCO2 34.6*   PO2 234*   HCO3 25.4   POCSATURATED 100   BE 2

## 2018-05-23 NOTE — PROGRESS NOTES
[facility]  Neurocritical Care  Progress Note    Admit Date: 5/19/2018  Service Date: 05/22/2018  Length of Stay: 3    Subjective:     Chief Complaint: Anoxic brain injury    History of Present Illness: 46 y/o man w/ hx of HTN, COPD? Transferred from [facility] following witnessed cardiac arrest while in the ED waiting room. Patient had presented complaining of URI/SOB symptoms, while in the ED waiting room he was noted to be choking, became apneic, which led to witnessed seizure like activity followed by cardiac arrest. Per discussion with ED staff, patient required about 22 minutes of ACLS/5 shocks before returning to normal sinus rhythm. During intubation a lozenge was removed from patients airway. Hyperthermia protocol was initiated prior to transferring to [facility] for NCC care. Per ED records, patient was acidotic with ph of 7.1 this morning. At the time of arrival to NICU, patient was on paralytics, however pupillary reflex was present. Will continue hypothermia protocol for additional 24 h.    Hospital Course: 5/19: arrived intubated, sedated, artic sun blanket  5/20: pressor requirment going up, DO NOT DIURESE, continue TTM, nimbex off, LFT improving, trop improving, family updated.  5/21: reach normothermia, MRI today, remove EEG, place HD line, consult nephrology, LFTs trending down, family updated  5/22: Anoxic brain injury. Has been responding. Last night placed on propofol  MRI only with L head of caudate infarc and some cerebellar small infarctions    IntervAnoxic brain injury. Has been responding. Last night placed on propofol  MRI only with L head of caudate infarc and some cerebellar small infarctionsal History:  >4 elements OR status of 3 inpatient conditions  Review of Systems   Unable to perform ROS: Intubated     2 systems OR Unable to obtain a complete ROS due to level of consciousness.  Objective:     Vitals:  Temp: 98.8 °F (37.1 °C)  Pulse: 71  Rhythm: normal sinus rhythm  BP:  116/71  MAP (mmHg): 88  CVP (mean): 15 mmHg  CI (L/min/m2): 2 L/min/m2  SVI: 28  SVV: 4 %  Resp: 20  SpO2: 100 %  Oxygen Concentration (%): 51  O2 Device (Oxygen Therapy): ventilator  Vent Mode: A/C  Set Rate: 20 bmp  PEEP/CPAP: 10 cmH20  Peak Airway Pressure: 26 cmH2O  Mean Airway Pressure: 15 cmH20  Plateau Pressure: 0 cmH20    Temp  Min: 97.9 °F (36.6 °C)  Max: 98.8 °F (37.1 °C)  Pulse  Min: 67  Max: 92  BP  Min: 71/39  Max: 128/80  MAP (mmHg)  Min: 51  Max: 99  CVP (mean)  Min: 7 mmHg  Max: 15 mmHg  CI (L/min/m2)  Min: 1.7 L/min/m2  Max: 2.8 L/min/m2  SVI  Min: 24  Max: 35  SVV  Min: 2 %  Max: 36 %  Resp  Min: 20  Max: 29  SpO2  Min: 100 %  Max: 100 %  Oxygen Concentration (%)  Min: 50  Max: 51    05/22 0701 - 05/23 0700  In: 5935.8 [I.V.:5935.8]  Out: 6872 [Urine:136]   Unmeasured Output  Stool Occurrence: 1       Physical Exam  Unable to test orientation, language, memory, judgment, insight, fund of knowledge, hearing, shoulder shrug, tongue protrusion, coordination, gait due to level of consciousness.  General   HEENT: ETT  Chest Heart RRR / Lungs Clear to auscultation  Abdomen: Soft nontender + BS  Extremities: OK distal pulses.  Skin: UK  Neurological Exam:  MS; Coma/ sedation  CN: II-XII   Motor: LUE   /5 / RUE  /5  Tone normal bilaterally Limited assessment because of sedation             LLE   /5 /  RLE  /5  Tone normal bilaterally Limited assessment because of sedation  Sensory: LT/PP/T/ Vibration Limited assessment because of sedation                 Complex sensory modalities: not tested  DTR:  normal throughout.  Coordination /Fine motor: Limited assessment because of sedation  Gait: Not tested.s: Absent  Medications:  Continuous  sodium chloride 0.9% Last Rate: 200 mL/hr at 05/23/18 0500   sodium chloride 0.9% Last Rate: 40 mL/hr at 05/23/18 0500   norepinephrine bitartrate-D5W Last Rate: 0.06 mcg/kg/min (05/23/18 0500)   propofol Last Rate: 30 mcg/kg/min (05/23/18 0500)    Scheduled  albuterol-ipratropium 3 mL Q6H   ceFEPime (MAXIPIME) IVPB 2 g Q24H   chlorhexidine 15 mL BID   famotidine 20 mg Daily   heparin (porcine) 7,500 Units Q8H   senna-docusate 8.6-50 mg 1 tablet Daily   PRN  dextrose 50% 12.5 g PRN   fentaNYL 25 mcg Q2H PRN   glucagon (human recombinant) 1 mg PRN   insulin aspart U-100 1-10 Units Q6H PRN   magnesium sulfate IVPB 2 g PRN   ondansetron 4 mg Q8H PRN   sodium chloride 0.9% 3 mL PRN   sodium phosphate IVPB 20.01 mmol PRN   sodium phosphate IVPB 30 mmol PRN   sodium phosphate IVPB 39.99 mmol PRN     Today I personally reviewed pertinent medications, lines/drains/airways, imaging, cardiology, lab results, microbiology results, notably:    Diet  Diet NPO    CMP:      Recent Labs  Lab 05/22/18  1213 05/22/18  1357   CALCIUM 8.0* 8.3*   ALBUMIN 2.5* 2.4*   PROT 6.2  --    * 137   K 4.1 4.2   CO2 24 27   CL 99 100   BUN 25* 23*   CREATININE 3.3* 3.2*   ALKPHOS 98  --    *  --    *  --    BILITOT 2.4*  --       BMP:      Recent Labs  Lab 05/22/18  1357      K 4.2      CO2 27   BUN 23*   CREATININE 3.2*   CALCIUM 8.3*      CBC:      Recent Labs  Lab 05/22/18  0339   WBC 17.07*   RBC 3.99*   HGB 7.3*   HCT 24.5*      MCV 61*   MCH 18.3*   MCHC 29.8*      Lipid Panel: No results for input(s): CHOL, LDLCALC, HDL, TRIG in the last 168 hours.  Coagulation:   Recent Labs  Lab 05/19/18  1609   05/22/18  0339   INR 2.0*  --   --    APTT  --   < > 29.8   < > = values in this interval not displayed.  Platelet Aggregation Study: No results for input(s): PLTAGG, PLTAGINTERP, PLTAGREGLACO, ADPPLTAGGREG in the last 168 hours.  Hgb A1C: No results for input(s): HGBA1C in the last 168 hours.  TSH: No results for input(s): TSH in the last 168 hours.  No results for input(s): PH, PCO2, PO2, HCO3, POCSATURATED, BE in the last 24 hours.               Assessment/Plan:     Active Problem List:   1. S/P Cardiac Arrest with secondary anoxic  encephalopathy  2. COPD  3. HTN  4. Severe cardiomyopathy.  5. Aute on CRF on CRRT.       Assessment / Plan:     Neuro:  -Wean Propofol  -Precedex titrate to REJI of 0 PRN  Resp: CXR  : effusion and infiltrate, ABG OK.  Vent Mode: A/C  Oxygen Concentration (%):  [50-51] 51  Resp Rate Total:  [20 br/min-30 br/min] 20 br/min  PEEP/CPAP:  [10 cmH20] 10 cmH20  Mean Airway Pressure:  [15 nmF82-08 cmH20] 15 cmH20  -Dual nebs q 6hrs   -Chest PT q 6hrs  CV: Keep  MAPs > 65 mmHg. TTE: 10-15 % LVDDF, LVH  -A line  -Flow track  -Levophed wean to off.  -Pending to start Carvediolol 3.125 mg q 12hrs.   -Keep SVVV < 10% at all times.  -Trop I q 8hrs  -Daily ECG  -BNP  -Cardiology consult.  IVF/nutrition/Renal/GI:  -D/C Lactic acid and CPK,  -CRRT  -Renal following.  -NS at  40cc/hrs.  -BR  Hem / ID:  WBC normalizing.  -Cefepime  -Vanc diosed by level.  -Vanc random levels q 24hrs.  -Mini BAL.  Endo:  -SSI q6hrs  -HgA1c  -TSH /Free T4.   -Prophylaxis:  -SC Heparin 7500 U q 8hrs  -Famotidine  -VAP  Advance Directives and Disposition:    Full Code. Keep in the NICU.        Uninterrupted Critical Care/Counseling Time (directly spent today by me not including procedures 30-74 min (92222)): = 35  min      Activity Orders          None        Full Code    Phil Cervantes MD  Neurocritical Care  [facility]

## 2018-05-23 NOTE — PROGRESS NOTES
Notified of [facility] of patient's flotrac again. Unable to obtain numbers due to waveforms, switched out flotrac machine and troubleshoot with RNx2.  Also notified of pt's abdominal breathing and tachypnea. No new orders received at this time. Will continue to monitor.

## 2018-05-23 NOTE — SUBJECTIVE & OBJECTIVE
Interval History:   Patient evaluated at bedside, yesterday did present with one episode of clotting, Blood pressure stable, but still require pressors for control. On mechanical ventilation. Total intake yesterday 6.2 L and output 7.1 L. For net negative 884 cc.      Review of patient's allergies indicates:   Allergen Reactions    Penicillins      Current Facility-Administered Medications   Medication Frequency    0.9%  NaCl infusion (CRRT USE ONLY) Continuous    0.9%  NaCl infusion Continuous    albuterol-ipratropium 2.5 mg-0.5 mg/3 mL nebulizer solution 3 mL Q6H    ceFEPIme injection 2 g Q24H    chlorhexidine 0.12 % solution 15 mL BID    dextrose 50% injection 12.5 g PRN    famotidine tablet 20 mg Daily    fentaNYL injection 25 mcg Q2H PRN    glucagon (human recombinant) injection 1 mg PRN    heparin (porcine) injection 7,500 Units Q8H    insulin aspart U-100 pen 1-10 Units Q6H PRN    magnesium sulfate 2g in water 50mL IVPB (premix) PRN    norepinephrine 8 mg in dextrose 5 % 250 mL infusion Continuous    ondansetron 4 mg/5 mL solution 4 mg Q8H PRN    propofol (DIPRIVAN) 10 mg/mL infusion Continuous    senna-docusate 8.6-50 mg per tablet 1 tablet Daily    sodium chloride 0.9% flush 3 mL PRN    sodium phosphate 20.01 mmol in dextrose 5 % 250 mL IVPB PRN    sodium phosphate 30 mmol in dextrose 5 % 250 mL IVPB PRN    sodium phosphate 39.99 mmol in dextrose 5 % 250 mL IVPB PRN       Objective:     Vital Signs (Most Recent):  Temp: 99.5 °F (37.5 °C) (05/23/18 1000)  Pulse: 79 (05/23/18 1000)  Resp: (!) 32 (05/23/18 1000)  BP: 109/77 (05/23/18 1000)  SpO2: 100 % (05/23/18 1000)  O2 Device (Oxygen Therapy): ventilator (05/23/18 0900) Vital Signs (24h Range):  Temp:  [97.9 °F (36.6 °C)-99.5 °F (37.5 °C)] 99.5 °F (37.5 °C)  Pulse:  [67-92] 79  Resp:  [19-36] 32  SpO2:  [100 %] 100 %  BP: ()/(39-86) 109/77  Arterial Line BP: ()/(38-75) 109/65     Weight: (!) 144.6 kg (318 lb 12.6 oz)  (05/22/18 6260)  Body mass index is 48.47 kg/m².  Body surface area is 2.63 meters squared.    I/O last 3 completed shifts:  In: 8935.8 [I.V.:8935.8]  Out: 9768 [Urine:330; Other:9438]    Physical Exam   Constitutional: He is intubated.   Obesity    HENT:   Head: Normocephalic and atraumatic.   Right Ear: External ear normal.   Left Ear: External ear normal.   Eyes: Right eye exhibits no discharge. Left eye exhibits no discharge.   Neck: No JVD present.   Cardiovascular: Normal rate.    Pulmonary/Chest: Effort normal. He is intubated.   Abdominal: Soft.   Musculoskeletal: He exhibits edema.   Neurological: He is unresponsive.   Skin: Skin is warm.       Significant Labs:  ABGs:   Recent Labs  Lab 05/23/18  0409   PH 7.473*   PCO2 34.6*   HCO3 25.4   POCSATURATED 100   BE 2     BMP:   Recent Labs  Lab 05/23/18  0826   GLU 82   CL 99   CO2 25   BUN 20   CREATININE 3.0*   CALCIUM 8.3*   MG 2.0     CBC:   Recent Labs  Lab 05/23/18  0316   WBC 12.46   RBC 3.96*   HGB 7.2*   HCT 24.8*      MCV 63*   MCH 18.2*   MCHC 29.0*     CMP:   Recent Labs  Lab 05/23/18  0826   GLU 82   CALCIUM 8.3*   ALBUMIN 2.7*   PROT 6.9      K 4.4   CO2 25   CL 99   BUN 20   CREATININE 3.0*   ALKPHOS 111   *   *   BILITOT 2.6*     All labs within the past 24 hours have been reviewed.

## 2018-05-23 NOTE — PROGRESS NOTES
[facility]  Nephrology  Progress Note    Patient Name: Los Mendez  MRN: [id number]  Admission Date: 5/19/2018  Hospital Length of Stay: 4 days  Attending Provider: Phil Cervantes MD   Primary Care Physician: Provider Notinsystem  Principal Problem:Anoxic brain injury    Subjective:     HPI: Los Mendez is a 47 year old male with past medical history of HTN, heavy alcohol user and COPD. Transferred from [facility] following witnessed cardiac arrest while in the ED waiting room. Patient had presented complaining of upper respiratory track infection and shortness of breath symptoms, while in the ED waiting room he was noted to be choking, became apneic, which led to witnessed seizure like activity followed by cardiac arrest. Per discussion with ED staff, patient required about 22 minutes of ACLS/5 shocks before returning to normal sinus rhythm. During intubation a lozenge was removed from patients airway. Hyperthermia protocol was initiated prior to transferring to [facility]. On arrival to [facility] Arrived with increased serum creatinine from 2.9-->5.1 (unkown baseline), BUN 35--> 52, Trop 2.4-->1.3, Lactic acidosis 6.4-->1.3, chest x ray with increased parenchymal interstitial attenuation and parenchymal opacities suggestive of pulmonary edema. On mechanical ventilation with a FiO2 50%. Currently also anuric at present moment. Per cardiology patient has a dilated cardiomyopathy EF looks about 10-15% and LVEDD is 7.3 cm.    Interval History:  Patient evaluated at bedside, no significant event overnight. Has been on SLED overnight. Started to make some urine. His current CVP 15. Total intake yesterday 3.7 L and output 2.4 L (244 cc of UOP). Net positive 4 L.     Review of patient's allergies indicates:   Allergen Reactions    Penicillins      Current Facility-Administered Medications   Medication Frequency    0.9%  NaCl infusion (CRRT USE ONLY) Continuous    ceFEPIme injection 2 g Q24H    chlorhexidine  0.12 % solution 15 mL BID    famotidine tablet 20 mg Daily    heparin (porcine) injection 7,500 Units Q8H    magnesium sulfate 2g in water 50mL IVPB (premix) PRN    norepinephrine 4 mg in dextrose 5% 250 mL infusion (premix) (titrating) Continuous    ondansetron 4 mg/5 mL solution 4 mg Q8H PRN    propofol (DIPRIVAN) 10 mg/mL infusion Continuous    senna-docusate 8.6-50 mg per tablet 1 tablet Daily    sodium chloride 0.9% flush 3 mL PRN    sodium phosphate 20.01 mmol in dextrose 5 % 250 mL IVPB PRN    sodium phosphate 30 mmol in dextrose 5 % 250 mL IVPB PRN    sodium phosphate 39.99 mmol in dextrose 5 % 250 mL IVPB PRN       Objective:     Vital Signs (Most Recent):  Temp: 98.2 °F (36.8 °C) (05/22/18 0705)  Pulse: 81 (05/22/18 0905)  Resp: 20 (05/22/18 0905)  BP: 111/62 (05/22/18 0905)  SpO2: 100 % (05/22/18 0905)  O2 Device (Oxygen Therapy): ventilator (05/22/18 0905) Vital Signs (24h Range):  Temp:  [97.3 °F (36.3 °C)-98.8 °F (37.1 °C)] 98.2 °F (36.8 °C)  Pulse:  [80-92] 81  Resp:  [20-41] 20  SpO2:  [96 %-100 %] 100 %  BP: (105-135)/(61-88) 111/62  Arterial Line BP: ()/(52-90) 96/54     Weight: (!) 144.6 kg (318 lb 12.6 oz) (05/22/18 0500)  Body mass index is 48.47 kg/m².  Body surface area is 2.63 meters squared.    I/O last 3 completed shifts:  In: 4981 [I.V.:4481; IV Piggyback:500]  Out: 2775 [Urine:304; Other:2471]    Physical Exam   Constitutional: He is intubated.   Obesity    HENT:   Head: Normocephalic and atraumatic.   Right Ear: External ear normal.   Left Ear: External ear normal.   Eyes: Right eye exhibits no discharge. Left eye exhibits no discharge.   Neck: No JVD present.   Cardiovascular: Normal rate.    Pulmonary/Chest: Effort normal. He is intubated.   Abdominal: Soft.   Musculoskeletal: He exhibits edema.   Neurological: He is unresponsive.   Skin: Skin is warm.       Significant Labs:  ABGs:   Recent Labs  Lab 05/21/18  0340   PH 7.407   PCO2 34.6*   HCO3 21.8*   POCSATURATED  99   BE -3     BMP:   Recent Labs  Lab 05/22/18  0838   GLU 91      CO2 24   BUN 26*   CREATININE 3.3*   CALCIUM 8.1*   MG 2.1     CBC:   Recent Labs  Lab 05/22/18  0339   WBC 17.07*   RBC 3.99*   HGB 7.3*   HCT 24.5*      MCV 61*   MCH 18.3*   MCHC 29.8*     CMP:   Recent Labs  Lab 05/22/18  0838   GLU 91   CALCIUM 8.1*   ALBUMIN 2.5*   PROT 6.1      K 4.1   CO2 24      BUN 26*   CREATININE 3.3*   ALKPHOS 100   *   *   BILITOT 2.3*     All labs within the past 24 hours have been reviewed.     Assessment/Plan:     MARY (acute kidney injury)    Los Mendez is a 47 year old male who is consulted for MARY, which is mostly secondary to iATN from previous cardiac arrest where he presented with hypotension which decrease perfusion to the kidneys (required to be started on pressors) and currently have component of cardio renal with dilated cardiomyopathy EF looks about 10-15%.Their could be also high suspicion of rhabdomyolysis since on UA has +2 occult blood and only 2 RBCon high power field. He has started to become anuric.     Plan:  - Continues to be anuric  - Lactic acid trendind downward 6.4-->1.5  - Will switch to SCUF for volume assistance,  cc/hr.   - Last CVP =15  - Blood pressure stable but require pressors for control   - On mechanical ventilation with FiO2 50%   - Will order renal ultrasound without any sign of obstruction, normal size kidney   - Urine sediment with abundant hyaline nay, RBCs (non dysmorphic) and few muddy brown, cast confirming ATN, no crystals.   - Avoid nephrotoxic medications   - CPK negative for rhabdomyolysis             Sascha Leone  Nephrology  Fellow  [facility]    Pager [phone]    Patient seen and examined with Dr Alcantar;  I have reviewed and agree with assessment and plan

## 2018-05-23 NOTE — PLAN OF CARE
Problem: Patient Care Overview  Goal: Plan of Care Review  POC reviewed with pt and family at 1100. Pt family verbalized understanding. Questions and concerns addressed. Pt progressing toward goals. Will continue to monitor. See flowsheets for full assessment and VS info.

## 2018-05-23 NOTE — PROGRESS NOTES
[facility]  Neurocritical Care  Progress Note    Admit Date: 5/19/2018  Service Date: 05/23/2018  Length of Stay: 4    Subjective:     Chief Complaint: Anoxic brain injury    History of Present Illness: 46 y/o man w/ hx of HTN, COPD? Transferred from [facility] following witnessed cardiac arrest while in the ED waiting room. Patient had presented complaining of URI/SOB symptoms, while in the ED waiting room he was noted to be choking, became apneic, which led to witnessed seizure like activity followed by cardiac arrest. Per discussion with ED staff, patient required about 22 minutes of ACLS/5 shocks before returning to normal sinus rhythm. During intubation a lozenge was removed from patients airway. Hyperthermia protocol was initiated prior to transferring to [facility] for NCC care. Per ED records, patient was acidotic with ph of 7.1 this morning. At the time of arrival to NICU, patient was on paralytics, however pupillary reflex was present. Will continue hypothermia protocol for additional 24 h.    Hospital Course: 5/19: arrived intubated, sedated, artic sun blanket  5/20: pressor requirment going up, DO NOT DIURESE, continue TTM, nimbex off, LFT improving, trop improving, family updated.  5/21: reach normothermia, MRI today, remove EEG, place HD line, consult nephrology, LFTs trending down, family updated  5/22: Anoxic brain injury. Has been responding. Last night placed on propofol  MRI only with L head of caudate infarc and some cerebellar small infarctions.  5/23: Anoxic brain injury.  On CRRT. BNP 1046. Got back on Propofol and pressors. Still agitated. He will be started on Buspar.  Trop I normalizing.    Interval History: >4 elements OR status of 3 inpatient conditions  Anoxic brain injury.  On CRRT. BNP 1046. Got back on Propofol and pressors. Still agitated. He will be started on Buspar.  Trop I normalizing.  Review of Systems   Unable to perform ROS: Intubated     2 systems OR Unable to  obtain a complete ROS due to level of consciousness.  Objective:     Vitals:  Temp: 99.3 °F (37.4 °C)  Pulse: 85  Rhythm: sinus arrhythmia  BP: 114/61  MAP (mmHg): 97  CVP (mean): 12 mmHg  CI (L/min/m2): 2.1 L/min/m2  SVI: 27  SVV: 9 %  Resp: (!) 25  SpO2: 99 %  Oxygen Concentration (%): 99  O2 Device (Oxygen Therapy): ventilator  Vent Mode: A/C  Set Rate: 20 bmp  PEEP/CPAP: 8 cmH20  Peak Airway Pressure: 25 cmH2O  Mean Airway Pressure: 13 cmH20  Plateau Pressure: 0 cmH20    Temp  Min: 97.9 °F (36.6 °C)  Max: 100.2 °F (37.9 °C)  Pulse  Min: 67  Max: 129  BP  Min: 71/39  Max: 136/86  MAP (mmHg)  Min: 51  Max: 103  CVP (mean)  Min: 7 mmHg  Max: 16 mmHg  CI (L/min/m2)  Min: 1.7 L/min/m2  Max: 2.8 L/min/m2  SVI  Min: 24  Max: 35  SVV  Min: 2 %  Max: 36 %  Resp  Min: 10  Max: 36  SpO2  Min: 99 %  Max: 100 %  Oxygen Concentration (%)  Min: 40  Max: 99    05/22 0701 - 05/23 0700  In: 6218.1 [I.V.:6218.1]  Out: 7103 [Urine:136]   Unmeasured Output  Stool Occurrence: 1       Physical Exam  Unable to test orientation, language, memory, judgment, insight, fund of knowledge, hearing, shoulder shrug, tongue protrusion, coordination, gait due to level of consciousness.    General   HEENT: ETT  Chest Heart RRR / Lungs Clear to auscultation  Abdomen: Distended hypoactive abdomen.  Extremities: OK distal pulses.  Skin: UK  Neurological Exam:  MS; Coma/ sedation Gets agitated off sedation.  CN: II-XII  Limited.  Motor: LUE   3/5 / RUE  3/5  Tone normal bilaterally Limited assessment because of sedation             LLE  3 /5 /  RLE 3 /5  Tone normal bilaterally Limited assessment because of sedation  Sensory: LT/PP/T/ Vibration Limited assessment because of sedation                 Complex sensory modalities: not tested  DTR:  normal throughout.  Coordination /Fine motor: Limited assessment because of sedation  Gait: Not tested.  Meningeal signs: Absent    Medications:  Continuous  sodium chloride 0.9% Last Rate: 200 mL/hr at 05/23/18  1305   dexmedetomidine (PRECEDEX) infusion    norepinephrine bitartrate-D5W Last Rate: 0.06 mcg/kg/min (05/23/18 1200)   propofol Last Rate: 25.012 mcg/kg/min (05/23/18 1000)   Scheduled  albuterol-ipratropium 3 mL Q6H   busPIRone 10 mg TID   ceFEPime (MAXIPIME) IVPB 2 g Q12H   chlorhexidine 15 mL BID   famotidine 20 mg Daily   heparin (porcine) 7,500 Units Q8H   senna-docusate 8.6-50 mg 1 tablet Daily   PRN  acetaminophen 325 mg Q6H PRN   dextrose 50% 12.5 g PRN   fentaNYL 25 mcg Q2H PRN   glucagon (human recombinant) 1 mg PRN   insulin aspart U-100 1-10 Units Q6H PRN   magnesium sulfate IVPB 2 g PRN   ondansetron 4 mg Q8H PRN   sodium chloride 0.9% 3 mL PRN   sodium phosphate IVPB 20.01 mmol PRN   sodium phosphate IVPB 30 mmol PRN   sodium phosphate IVPB 39.99 mmol PRN     Today I personally reviewed pertinent medications, lines/drains/airways, imaging, cardiology, lab results, microbiology results, notably:    Diet  Diet NPO  CMP:      Recent Labs  Lab 05/23/18  0826   CALCIUM 8.3*   ALBUMIN 2.7*   PROT 6.9      K 4.4   CO2 25   CL 99   BUN 20   CREATININE 3.0*   ALKPHOS 111   *   *   BILITOT 2.6*      BMP:      Recent Labs  Lab 05/23/18  0826      K 4.4   CL 99   CO2 25   BUN 20   CREATININE 3.0*   CALCIUM 8.3*      CBC:      Recent Labs  Lab 05/23/18  0316   WBC 12.46   RBC 3.96*   HGB 7.2*   HCT 24.8*      MCV 63*   MCH 18.2*   MCHC 29.0*      Lipid Panel: No results for input(s): CHOL, LDLCALC, HDL, TRIG in the last 168 hours.  Coagulation:   Recent Labs  Lab 05/19/18  1609   05/23/18  0316   INR 2.0*  --   --    APTT  --   < > 28.9   < > = values in this interval not displayed.  Platelet Aggregation Study: No results for input(s): PLTAGG, PLTAGINTERP, PLTAGREGLACO, ADPPLTAGGREG in the last 168 hours.  Hgb A1C:      Recent Labs  Lab 05/22/18  1459   HGBA1C 5.7*      TSH:      Recent Labs  Lab 05/22/18  1500   TSH 1.561         Recent Labs  Lab 05/23/18  0409   PH 7.473*   PCO2  34.6*   PO2 234*   HCO3 25.4   POCSATURATED 100   BE 2                Assessment/Plan:     No new Assessment & Plan notes have been filed under this hospital service since the last note was generated.  Service: Neuro Critical Care      Active Problem List:   1. S/P Cardiac Arrest with secondary anoxic encephalopathy  2. COPD  3. HTN  4. Severe cardiomyopathy. Cardiogenic shock.  5. Agitation  6. Ileus  7. Aute on CRF on CRRT.       Assessment / Plan:     Neuro:  -Wean Propofol  -Precedex titrate to REJI of 0 PRN  -Buspar 10mg q 8hrs. Start.  Resp: CXR  : effusion and infiltrate, ABG OK.  Vent Mode: A/C  Oxygen Concentration (%):  [50-51] 51  Resp Rate Total:  [20 br/min-34 br/min] 34 br/min  PEEP/CPAP:  [8 cmH20-10 cmH20] 8 cmH20  Mean Airway Pressure:  [13 soB86-83 cmH20] 14 cmH20  -Dual nebs q 6hrs   -Chest PT q 6hrs  -Decrease FIO2 to 40%.   CV: Keep  MAPs > 65 mmHg. TTE: 10-15 % LVDDF, LVH  -A line  -Flow track  -Levophed wean to off.  -Pending to start Carvediolol 3.125 mg q 12hrs.   -Keep SVVV < 10% at all times.  -Trop I q 8hrs  -Daily ECG  -Cardiology consult.  IVF/nutrition/Renal/GI: Abdominal distension   -CRRT  -Renal following.  -KUB stat.  -NS at  40cc/hrs.D/C.  -BR  Hem / ID:  WBC normalizing. Random still high.  -Cefepime  -Vanc diosed by level.  -Vanc random levels q 24hrs.  -Mini BAL.  Endo:  -SSI q6hrs  -Prophylaxis:  -SC Heparin 7500 U q 8hrs  -Famotidine  -VAP  Advance Directives and Disposition:    Full Code. Keep in the NICU.                 Uninterrupted Critical Care/Counseling Time (directly spent today by me not including procedures 30-74 min (27059)): = 35  min    Activity Orders          None        Full Code    Phil Cervantes MD  Neurocritical Care  [facility]

## 2018-05-23 NOTE — PROGRESS NOTES
[facility]  Nephrology  Progress Note    Patient Name: Los Mendez  MRN: [id number]  Admission Date: 5/19/2018  Hospital Length of Stay: 4 days  Attending Provider: Phil Cervantes MD   Primary Care Physician: Provider Notinsystem  Principal Problem:Anoxic brain injury    Subjective:     HPI: Los Mendez is a 47 year old male with past medical history of HTN, heavy alcohol user and COPD. Transferred from [facility] following witnessed cardiac arrest while in the ED waiting room. Patient had presented complaining of upper respiratory track infection and shortness of breath symptoms, while in the ED waiting room he was noted to be choking, became apneic, which led to witnessed seizure like activity followed by cardiac arrest. Per discussion with ED staff, patient required about 22 minutes of ACLS/5 shocks before returning to normal sinus rhythm. During intubation a lozenge was removed from patients airway. Hyperthermia protocol was initiated prior to transferring to [facility]. On arrival to [facility] Arrived with increased serum creatinine from 2.9-->5.1 (unkown baseline), BUN 35--> 52, Trop 2.4-->1.3, Lactic acidosis 6.4-->1.3, chest x ray with increased parenchymal interstitial attenuation and parenchymal opacities suggestive of pulmonary edema. On mechanical ventilation with a FiO2 50%. Currently also anuric at present moment. Per cardiology patient has a dilated cardiomyopathy EF looks about 10-15% and LVEDD is 7.3 cm.    Interval History:   Patient evaluated at bedside, yesterday did present with one episode of clotting, Blood pressure stable, but still require pressors for control. On mechanical ventilation. Total intake yesterday 6.2 L and output 7.1 L. For net negative 884 cc.      Review of patient's allergies indicates:   Allergen Reactions    Penicillins      Current Facility-Administered Medications   Medication Frequency    0.9%  NaCl infusion (CRRT USE ONLY) Continuous    0.9%  NaCl infusion  Continuous    albuterol-ipratropium 2.5 mg-0.5 mg/3 mL nebulizer solution 3 mL Q6H    ceFEPIme injection 2 g Q24H    chlorhexidine 0.12 % solution 15 mL BID    dextrose 50% injection 12.5 g PRN    famotidine tablet 20 mg Daily    fentaNYL injection 25 mcg Q2H PRN    glucagon (human recombinant) injection 1 mg PRN    heparin (porcine) injection 7,500 Units Q8H    insulin aspart U-100 pen 1-10 Units Q6H PRN    magnesium sulfate 2g in water 50mL IVPB (premix) PRN    norepinephrine 8 mg in dextrose 5 % 250 mL infusion Continuous    ondansetron 4 mg/5 mL solution 4 mg Q8H PRN    propofol (DIPRIVAN) 10 mg/mL infusion Continuous    senna-docusate 8.6-50 mg per tablet 1 tablet Daily    sodium chloride 0.9% flush 3 mL PRN    sodium phosphate 20.01 mmol in dextrose 5 % 250 mL IVPB PRN    sodium phosphate 30 mmol in dextrose 5 % 250 mL IVPB PRN    sodium phosphate 39.99 mmol in dextrose 5 % 250 mL IVPB PRN       Objective:     Vital Signs (Most Recent):  Temp: 99.5 °F (37.5 °C) (05/23/18 1000)  Pulse: 79 (05/23/18 1000)  Resp: (!) 32 (05/23/18 1000)  BP: 109/77 (05/23/18 1000)  SpO2: 100 % (05/23/18 1000)  O2 Device (Oxygen Therapy): ventilator (05/23/18 0900) Vital Signs (24h Range):  Temp:  [97.9 °F (36.6 °C)-99.5 °F (37.5 °C)] 99.5 °F (37.5 °C)  Pulse:  [67-92] 79  Resp:  [19-36] 32  SpO2:  [100 %] 100 %  BP: ()/(39-86) 109/77  Arterial Line BP: ()/(38-75) 109/65     Weight: (!) 144.6 kg (318 lb 12.6 oz) (05/22/18 0500)  Body mass index is 48.47 kg/m².  Body surface area is 2.63 meters squared.    I/O last 3 completed shifts:  In: 8935.8 [I.V.:8935.8]  Out: 9768 [Urine:330; Other:9438]    Physical Exam   Constitutional: He is intubated.   Obesity    HENT:   Head: Normocephalic and atraumatic.   Right Ear: External ear normal.   Left Ear: External ear normal.   Eyes: Right eye exhibits no discharge. Left eye exhibits no discharge.   Neck: No JVD present.   Cardiovascular: Normal rate.     Pulmonary/Chest: Effort normal. He is intubated.   Abdominal: Soft.   Musculoskeletal: He exhibits edema.   Neurological: He is unresponsive.   Skin: Skin is warm.       Significant Labs:  ABGs:   Recent Labs  Lab 05/23/18  0409   PH 7.473*   PCO2 34.6*   HCO3 25.4   POCSATURATED 100   BE 2     BMP:   Recent Labs  Lab 05/23/18  0826   GLU 82   CL 99   CO2 25   BUN 20   CREATININE 3.0*   CALCIUM 8.3*   MG 2.0     CBC:   Recent Labs  Lab 05/23/18  0316   WBC 12.46   RBC 3.96*   HGB 7.2*   HCT 24.8*      MCV 63*   MCH 18.2*   MCHC 29.0*     CMP:   Recent Labs  Lab 05/23/18  0826   GLU 82   CALCIUM 8.3*   ALBUMIN 2.7*   PROT 6.9      K 4.4   CO2 25   CL 99   BUN 20   CREATININE 3.0*   ALKPHOS 111   *   *   BILITOT 2.6*     All labs within the past 24 hours have been reviewed.       Assessment/Plan:     MARY (acute kidney injury)    Los Mendez is a 47 year old male who is consulted for MARY, which is mostly secondary to iATN from previous cardiac arrest where he presented with hypotension which decrease perfusion to the kidneys (required to be started on pressors) and currently have component of cardio renal with dilated cardiomyopathy EF looks about 10-15%.Their could be also high suspicion of rhabdomyolysis since on UA has +2 occult blood and only 2 RBCon high power field. He has started to become anuric.     Plan:  - Continues to be anuric  - Will continue with SLED today for clearance and volume assistance,  cc/hr as tolerated by patient, maintain MAP>65   - Last CVP =15-16  - Blood pressure stable but require pressors for control   - On mechanical ventilation with FiO2 50%   - Renal ultrasound without any sign of obstruction, normal size kidney   - Urine sediment with abundant hyaline nay, RBCs (non dysmorphic) and few muddy brown, cast confirming ATN, no crystals.   - Avoid nephrotoxic medications               Sascha Leone  Nephrology  Fellow  [facility]    Pager [phone]    Patient seen and examined with Dr Alcantar;  I have reviewed and agree with assessment and plan

## 2018-05-23 NOTE — PHYSICIAN QUERY
PT Name: Los Mendez  MR #: [id number]     Physician Query Form - Documentation Clarification      CDS/: Keara Kulkarni RN, CDS               Contact information: [email or web address]    This form is a permanent document in the medical record.     Query Date: May 23, 2018    By submitting this query, we are merely seeking further clarification of documentation. Please utilize your independent clinical judgment when addressing the question(s) below.    The Medical record reflects the following:    Supporting Clinical Findings Location in Medical Record     --Active Problem List:       -6. Ileus  --Abdominal distension   --Abdomen: Distended hypoactive abdomen     NCC Note 5/23 (Dr Cervantes)     --Orders given to increase propofol infusion from 30 mcg/kg/min to 40 mcg/kg/min and 50 mcg of fentanyl.    --No significant localized bowel dilatation       Nursing Note 5/20 @ 145a      Abd XR 5/23                                                                            Doctor, Please specify diagnosis or diagnoses associated with above clinical findings.    Please further specify the ileus:    Provider Use Only      [X  ] Paralytic Ileus    [  ] Other Ileus (please specify): ___________    [  ] Other diagnosis (please specify): ________________    [  ] Clinically undetermined

## 2018-05-23 NOTE — PROGRESS NOTES
NSCC Serg, NP at bedside to assess arterial waveform. No new orders received. States ok to for flotrac q4h. Pt still febrile, ice packs in place, tylenol given, NSCC aware. No other orders. Will continue to monitor.

## 2018-05-23 NOTE — SUBJECTIVE & OBJECTIVE
IntervAnoxic brain injury. Has been responding. Last night placed on propofol  MRI only with L head of caudate infarc and some cerebellar small infarctionsal History:  >4 elements OR status of 3 inpatient conditions  Review of Systems   Unable to perform ROS: Intubated     2 systems OR Unable to obtain a complete ROS due to level of consciousness.  Objective:     Vitals:  Temp: 98.8 °F (37.1 °C)  Pulse: 71  Rhythm: normal sinus rhythm  BP: 116/71  MAP (mmHg): 88  CVP (mean): 15 mmHg  CI (L/min/m2): 2 L/min/m2  SVI: 28  SVV: 4 %  Resp: 20  SpO2: 100 %  Oxygen Concentration (%): 51  O2 Device (Oxygen Therapy): ventilator  Vent Mode: A/C  Set Rate: 20 bmp  PEEP/CPAP: 10 cmH20  Peak Airway Pressure: 26 cmH2O  Mean Airway Pressure: 15 cmH20  Plateau Pressure: 0 cmH20    Temp  Min: 97.9 °F (36.6 °C)  Max: 98.8 °F (37.1 °C)  Pulse  Min: 67  Max: 92  BP  Min: 71/39  Max: 128/80  MAP (mmHg)  Min: 51  Max: 99  CVP (mean)  Min: 7 mmHg  Max: 15 mmHg  CI (L/min/m2)  Min: 1.7 L/min/m2  Max: 2.8 L/min/m2  SVI  Min: 24  Max: 35  SVV  Min: 2 %  Max: 36 %  Resp  Min: 20  Max: 29  SpO2  Min: 100 %  Max: 100 %  Oxygen Concentration (%)  Min: 50  Max: 51    05/22 0701 - 05/23 0700  In: 5935.8 [I.V.:5935.8]  Out: 6872 [Urine:136]   Unmeasured Output  Stool Occurrence: 1       Physical Exam  Unable to test orientation, language, memory, judgment, insight, fund of knowledge, hearing, shoulder shrug, tongue protrusion, coordination, gait due to level of consciousness.  General   HEENT: ETT  Chest Heart RRR / Lungs Clear to auscultation  Abdomen: Soft nontender + BS  Extremities: OK distal pulses.  Skin: UK  Neurological Exam:  MS; Coma/ sedation  CN: II-XII UK  Motor: LUE   /5 / RUE  /5  Tone normal bilaterally Limited assessment because of sedation             LLE   /5 /  RLE  /5  Tone normal bilaterally Limited assessment because of sedation  Sensory: LT/PP/T/ Vibration Limited assessment because of sedation                 Complex  sensory modalities: not tested  DTR:  normal throughout.  Coordination /Fine motor: Limited assessment because of sedation  Gait: Not tested.s: Absent  Medications:  Continuous  sodium chloride 0.9% Last Rate: 200 mL/hr at 05/23/18 0500   sodium chloride 0.9% Last Rate: 40 mL/hr at 05/23/18 0500   norepinephrine bitartrate-D5W Last Rate: 0.06 mcg/kg/min (05/23/18 0500)   propofol Last Rate: 30 mcg/kg/min (05/23/18 0500)   Scheduled  albuterol-ipratropium 3 mL Q6H   ceFEPime (MAXIPIME) IVPB 2 g Q24H   chlorhexidine 15 mL BID   famotidine 20 mg Daily   heparin (porcine) 7,500 Units Q8H   senna-docusate 8.6-50 mg 1 tablet Daily   PRN  dextrose 50% 12.5 g PRN   fentaNYL 25 mcg Q2H PRN   glucagon (human recombinant) 1 mg PRN   insulin aspart U-100 1-10 Units Q6H PRN   magnesium sulfate IVPB 2 g PRN   ondansetron 4 mg Q8H PRN   sodium chloride 0.9% 3 mL PRN   sodium phosphate IVPB 20.01 mmol PRN   sodium phosphate IVPB 30 mmol PRN   sodium phosphate IVPB 39.99 mmol PRN     Today I personally reviewed pertinent medications, lines/drains/airways, imaging, cardiology, lab results, microbiology results, notably:    Diet  Diet NPO    CMP:      Recent Labs  Lab 05/22/18  1213 05/22/18  1357   CALCIUM 8.0* 8.3*   ALBUMIN 2.5* 2.4*   PROT 6.2  --    * 137   K 4.1 4.2   CO2 24 27   CL 99 100   BUN 25* 23*   CREATININE 3.3* 3.2*   ALKPHOS 98  --    *  --    *  --    BILITOT 2.4*  --       BMP:      Recent Labs  Lab 05/22/18  1357      K 4.2      CO2 27   BUN 23*   CREATININE 3.2*   CALCIUM 8.3*      CBC:      Recent Labs  Lab 05/22/18  0339   WBC 17.07*   RBC 3.99*   HGB 7.3*   HCT 24.5*      MCV 61*   MCH 18.3*   MCHC 29.8*      Lipid Panel: No results for input(s): CHOL, LDLCALC, HDL, TRIG in the last 168 hours.  Coagulation:   Recent Labs  Lab 05/19/18  1609   05/22/18  0339   INR 2.0*  --   --    APTT  --   < > 29.8   < > = values in this interval not displayed.  Platelet Aggregation  Study: No results for input(s): PLTAGG, PLTAGINTERP, PLTAGREGLACO, ADPPLTAGGREG in the last 168 hours.  Hgb A1C: No results for input(s): HGBA1C in the last 168 hours.  TSH: No results for input(s): TSH in the last 168 hours.  No results for input(s): PH, PCO2, PO2, HCO3, POCSATURATED, BE in the last 24 hours.

## 2018-05-23 NOTE — PLAN OF CARE
Problem: Patient Care Overview  Goal: Plan of Care Review  POC reviewed with pt and pt's wife at 0500. Pt unable to verbalized understanding, pt's wife verbalized understanding and is agreeable to POC. Questions and concerns addressed. Pt switched back to propofol from precedex s/t pt being at high risk for self extubation/line removal/fall while maxed at 1.4mcg/kg/hr of precedex. Levophed strength increased overnight to 8mg/250ml, infusing at 0.06mcg/kg/min. Propofol infusing at 30mcg/kg/min, RASS -1. Pt remained in bilat soft wrist restraints s/t attempting to remove medical devices. Pt remained intubated and on vent throughout shift, tolerated vent settings. Pt remained on CRRT throughout shift, urine output remains low, trending lab work per MD's orders. Pt progressing toward goals. Will continue to monitor. See flowsheets for full assessment and VS info

## 2018-05-23 NOTE — PROGRESS NOTES
[facility] notified of pt's rhythm change -130. 12 Lead EKG shows accelerated junctional rhythm. Notified of patient's tachypnea and abdominal mm use. Pt's temperature increasing. No prn tylenol orders. Stated would put orders in and place ice packs.    1200- Pt now sinus rhythm. HR in 80s. Ice packs placed to groin and arm pits. No new orders received at this time. Will continue to monitor. Per MD, ok to hold precedex until pt has received third dose of buspar and then begin titrating off propofol and switching to precedex. Will continue to monitor.

## 2018-05-23 NOTE — NURSING
Dr. Taylor and LUTHER Leung called to bedside. Upon attempting to do neuro exam, patient wakes up, follows commands, and then attempts to get out of bed. Patient thrashing head, attempting to sit up. Patient at risk for self extubation, fall, and central line removal.     Orders given to given Fentanyl 25mcg IVP now and restart patient on Propofol.

## 2018-05-23 NOTE — PROGRESS NOTES
[facility]  Cardiology Progress Note    Attending Physician: Phil Cervantes MD  Reason for Consult: Cardiomyopathy    Subjective/Interval History:   Still on sedation, agitated but following some commands with sedation weaned.  Minimal urine output, still on CRRT, shock liver and acidosis improving. Troponin trended down.    Objective:     Vitals:  Temp:  [97.9 °F (36.6 °C)-99.9 °F (37.7 °C)]   Pulse:  [67-92]   Resp:  [19-36]   BP: ()/(39-86)   SpO2:  [100 %]   Arterial Line BP: ()/(38-75)   I/O's:    Intake/Output Summary (Last 24 hours) at 05/23/18 1114  Last data filed at 05/23/18 1100   Gross per 24 hour   Intake          6144.94 ml   Output             7329 ml   Net         -1184.06 ml      Physical Exam:  GEN: Intubated and sedated  NECK: central line in place.  CV: R/R/R, no m,r,g  PULM: Mechanically ventilated  ABD: non-distended  EXT: warm throughout  NEURO: Sedated    Medications:   albuterol-ipratropium  3 mL Nebulization Q6H    ceFEPime (MAXIPIME) IVPB  2 g Intravenous Q24H    chlorhexidine  15 mL Mouth/Throat BID    famotidine  20 mg Per OG tube Daily    heparin (porcine)  7,500 Units Subcutaneous Q8H    senna-docusate 8.6-50 mg  1 tablet Per OG tube Daily       Labs:  Recent Results (from the past 336 hour(s))   CBC auto differential    Collection Time: 05/23/18  3:16 AM   Result Value Ref Range    WBC 12.46 3.90 - 12.70 K/uL    Hemoglobin 7.2 (L) 14.0 - 18.0 g/dL    Hematocrit 24.8 (L) 40.0 - 54.0 %    Platelets 188 150 - 350 K/uL   CBC auto differential    Collection Time: 05/22/18  3:39 AM   Result Value Ref Range    WBC 17.07 (H) 3.90 - 12.70 K/uL    Hemoglobin 7.3 (L) 14.0 - 18.0 g/dL    Hematocrit 24.5 (L) 40.0 - 54.0 %    Platelets 194 150 - 350 K/uL   CBC auto differential    Collection Time: 05/21/18  3:40 AM   Result Value Ref Range    WBC 18.79 (H) 3.90 - 12.70 K/uL    Hemoglobin 7.9 (L) 14.0 - 18.0 g/dL    Hematocrit 26.9 (L) 40.0 - 54.0 %    Platelets 178 150  - 350 K/uL       Recent Results (from the past 336 hour(s))   Basic metabolic panel    Collection Time: 05/19/18  4:09 PM   Result Value Ref Range    Sodium 138 136 - 145 mmol/L    Potassium 4.9 3.5 - 5.1 mmol/L    Chloride 104 95 - 110 mmol/L    CO2 19 (L) 23 - 29 mmol/L    BUN, Bld 31 (H) 6 - 20 mg/dL    Creatinine 2.8 (H) 0.5 - 1.4 mg/dL    Calcium 8.4 (L) 8.7 - 10.5 mg/dL    Anion Gap 15 8 - 16 mmol/L       Recent Labs  Lab 05/20/18  0806  05/22/18  0339 05/22/18  1621 05/22/18  2256 05/23/18  0826   TROPONINI 1.376*  --   --  0.379* 0.345* 0.280*   CPK  --   < > 107  --   --   --    < > = values in this interval not displayed.    Lab Results   Component Value Date    HGBA1C 5.7 (H) 05/22/2018       Estimated Creatinine Clearance: 42.6 mL/min (A) (based on SCr of 3 mg/dL (H)).    Echo:  5/19/2018  CONCLUSIONS     1 - Severe left ventricular enlargement.     2 - Eccentric hypertrophy.     3 - Severely depressed left ventricular systolic function (EF 10-15%).     4 - Impaired LV relaxation, normal LAP (grade 1 diastolic dysfunction).     5 - Right atrial enlargement.     6 - Right ventricular enlargement with moderately depressed systolic function.     7 - Moderate mitral regurgitation.     8 - Mild tricuspid regurgitation.     9 - Increased central venous pressure.       Assessment & Recommendations:     47 y.o. Man with witnessed cardiac arrest, s/p multiple defibrillations in the field, prolonged resuscitation, now with multi-organ anoxic injury.  Found to have dilated cardiomyopathy.    Acute decompensated heart failure     Patient with dilated cardiomyopathy EF looks about 10-15% and LVEDD is 7.3 cm, unknown etiology at this time but could be related to heavy alcohol use as he drinks about 1 case of beer and 2/5 of vodka per day. Per the family he has not felt well over the past 2 weeks and had progressive shortness of breath. It is very likely that he was acutely decompensated and had VT or VF as the  etiology of his syncope and cardiac arrest.      Recommendations:  - Continue levophed to support blood pressure, with shock liver improving no evidence of low cardiac output contributing to symptoms.  No indication for advanced mechanical support at this time.  - If he should have any recurrent arrhythmias can consider anti-arrhythmic therapy/amiodarone however currently stable in sinus  - If he develops full functional recovery then can consider ICD prior to discharge for secondary prevention.  - Should he recover from the this and be weaned off pressors would recommend GDMT with lisinopril and metoprolol but only when more euvolemic.             Signed:  Tyrel Peng M.D.  Cardiology Fellow  Pager: [phone]  5/23/2018 11:14 AM    Attending Addendum:

## 2018-05-23 NOTE — PHYSICIAN QUERY
"PT Name: Los Mendez  MR #: [id number]    Physician Query Form - Heart  Condition Clarification     CDS/: Keara Kulkarni RN, CDS               Contact information: [email or web address]  This form is a permanent document in the medical record.     Query Date: May 23, 2018    By submitting this query, we are merely seeking further clarification of documentation. Please utilize your independent clinical judgment when addressing the question(s) below.    The medical record contains the following   Indicators     Supporting Clinical Findings Location in Medical Record   x BNP BNP: 3485->1046   Labs: 5/19->5/22   x EF EF: 10%   Echo 5/19    Radiology findings     x Echo Results --3 - Severely depressed left ventricular systolic function (EF 10-15%).     4 - Impaired LV relaxation, normal LAP (grade 1 diastolic dysfunction).     5 - Right atrial enlargement.     6 - Right ventricular enlargement with moderately depressed systolic function.    Echo 5/19    "Ascites" documented      "SOB" or "BATISTA" documented      "Hypoxia" documented     x Heart Failure documented --Acute decompensated heart failure        -could be related to heavy alcohol use         -Per the family he has not felt well over the past 2 weeks and had progressive shortness of breath. It is very likely that he was acutely decompensated and had VT or VF as the etiology of his syncope and cardiac arrest    --- no hx of HF  --Patient is hemodynamically tenuous currently with acute heart failure   Cards C/S 5/19                NCC Note 5/20 (DR Cohen)    "Edema" documented      Diuretics/Meds      Treatment:     x Other:  --cardiogenic Shock, cardiac Arrest x 3 prior to arrival  --Pulmonary edema, possible undiagnosed cardiomyopathy     --Patient with dilated cardiomyopathy    NCC Note 5/21 (Dr Cohen)        Cards C/S 5/19       Provider, please specify diagnosis or diagnoses associated with above clinical findings.    Please further " clarify the acuity and type of Heart Failure:                               [  ] Acute Systolic Heart Failure ( EF < 40)*  [  ] Acute on Chronic Systolic Heart Failure ( EF < 40)*    [  ] Acute Combined Systolic and Diastolic Heart Failure  [  ] Acute on Chronic Combined Systolic and Diastolic Heart Failure    [X  ] Other (please specify): Chronic Cardiomyopathy with Left ventricular systolic and diastolic dysfunction___________________________________  [  ] Clinically Undetermined            *American [employer] Association                                                                                                          Please document in your progress notes daily for the duration of treatment until resolved and include in your discharge summary.

## 2018-05-23 NOTE — NURSING
1917- Call placed to [facility] informing the team that patient attempting to get out of bed and is going to self extubate. Patient currently maxed on Precedex gtt at 1.4mcg/kg/hr. Patient in bilat upper wrist restraints. RN currently attempting to keep patient in bed.   Serg NP at bedside. Orders given for 25mg Fentanyl IVP stat.   Medication administered per NP's orders. RN to monitor.

## 2018-05-23 NOTE — PHYSICIAN QUERY
PT Name: Los Mendez  MR #: [id number]     Physician Query Form - Alcohol Clarification      CDS/: Keara Kulkarni RN, CDS               Contact information: [email or web address]    This form is a permanent document in the medical record.     Query Date: May 23, 2018    By submitting this query, we are merely seeking further clarification of documentation.  Please utilize your independent clinical judgment when addressing the question(s) below.     The medical record contains the following:    Findings Supporting Clinical Information Location in Medical Record      --On further questioning he drinks about 1 case of beer and 2/5 of vodka per day.   --could be related to heavy alcohol use as he drinks about 1 case of beer and 2/5 of vodka per day.   Cards C/S 5/19       (1a) Please document the applicable diagnosis for the above clinical findings:   [ ] Alcohol Use   [X ] Alcoholism   [ ] Alcohol Abuse   [ ] Alcohol Dependence   [ ] Clinically Undetermined   [ ] Other/Clarification of findings: _________________    (1b) Please clarify/document the course of illness as:   [X ] Continuous   [ ] Episodic   [ ] In Remission   [ ] Unspecified      [  ] Clinically Undetermined     Please document in your progress notes daily for the duration of treatment, until resolved, and include in your discharge summary.

## 2018-05-24 LAB
ALBUMIN SERPL BCP-MCNC: 2.6 G/DL
ALBUMIN SERPL BCP-MCNC: 2.8 G/DL
ALLENS TEST: ABNORMAL
ALP SERPL-CCNC: 117 U/L
ALP SERPL-CCNC: 123 U/L
ALP SERPL-CCNC: 125 U/L
ALP SERPL-CCNC: 125 U/L
ALP SERPL-CCNC: 128 U/L
ALT SERPL W/O P-5'-P-CCNC: 206 U/L
ALT SERPL W/O P-5'-P-CCNC: 221 U/L
ALT SERPL W/O P-5'-P-CCNC: 228 U/L
ALT SERPL W/O P-5'-P-CCNC: 248 U/L
ALT SERPL W/O P-5'-P-CCNC: 269 U/L
ANION GAP SERPL CALC-SCNC: 10 MMOL/L
ANION GAP SERPL CALC-SCNC: 11 MMOL/L
ANION GAP SERPL CALC-SCNC: 12 MMOL/L
ANION GAP SERPL CALC-SCNC: 12 MMOL/L
ANION GAP SERPL CALC-SCNC: 9 MMOL/L
ANISOCYTOSIS BLD QL SMEAR: ABNORMAL
ANISOCYTOSIS BLD QL SMEAR: SLIGHT
APTT BLDCRRT: 30.1 SEC
AST SERPL-CCNC: 139 U/L
AST SERPL-CCNC: 144 U/L
AST SERPL-CCNC: 147 U/L
AST SERPL-CCNC: 148 U/L
AST SERPL-CCNC: 158 U/L
BACTERIA BLD CULT: NORMAL
BACTERIA BLD CULT: NORMAL
BACTERIA SPEC AEROBE CULT: NORMAL
BASOPHILS # BLD AUTO: 0.04 K/UL
BASOPHILS # BLD AUTO: 0.06 K/UL
BASOPHILS NFR BLD: 0.3 %
BASOPHILS NFR BLD: 0.4 %
BILIRUB SERPL-MCNC: 2.9 MG/DL
BILIRUB SERPL-MCNC: 3 MG/DL
BILIRUB SERPL-MCNC: 3.1 MG/DL
BLD PROD TYP BPU: NORMAL
BLOOD UNIT EXPIRATION DATE: NORMAL
BLOOD UNIT TYPE CODE: 5100
BLOOD UNIT TYPE: NORMAL
BUN SERPL-MCNC: 10 MG/DL
BUN SERPL-MCNC: 14 MG/DL
BUN SERPL-MCNC: 7 MG/DL
BUN SERPL-MCNC: 8 MG/DL
BUN SERPL-MCNC: 8 MG/DL
CALCIUM SERPL-MCNC: 8.7 MG/DL
CALCIUM SERPL-MCNC: 8.8 MG/DL
CALCIUM SERPL-MCNC: 8.9 MG/DL
CALCIUM SERPL-MCNC: 9 MG/DL
CALCIUM SERPL-MCNC: 9.1 MG/DL
CHLORIDE SERPL-SCNC: 100 MMOL/L
CHLORIDE SERPL-SCNC: 101 MMOL/L
CHLORIDE SERPL-SCNC: 102 MMOL/L
CHLORIDE SERPL-SCNC: 99 MMOL/L
CO2 SERPL-SCNC: 25 MMOL/L
CO2 SERPL-SCNC: 26 MMOL/L
CO2 SERPL-SCNC: 26 MMOL/L
CO2 SERPL-SCNC: 27 MMOL/L
CO2 SERPL-SCNC: 28 MMOL/L
CO2 SERPL-SCNC: 29 MMOL/L
CO2 SERPL-SCNC: 29 MMOL/L
CODING SYSTEM: NORMAL
CREAT SERPL-MCNC: 1.5 MG/DL
CREAT SERPL-MCNC: 1.7 MG/DL
CREAT SERPL-MCNC: 1.7 MG/DL
CREAT SERPL-MCNC: 1.8 MG/DL
CREAT SERPL-MCNC: 1.8 MG/DL
CREAT SERPL-MCNC: 2.1 MG/DL
CREAT SERPL-MCNC: 2.5 MG/DL
DELSYS: ABNORMAL
DIASTOLIC DYSFUNCTION: YES
DIFFERENTIAL METHOD: ABNORMAL
DISPENSE STATUS: NORMAL
EOSINOPHIL # BLD AUTO: 0.1 K/UL
EOSINOPHIL # BLD AUTO: 0.1 K/UL
EOSINOPHIL # BLD AUTO: 0.2 K/UL
EOSINOPHIL # BLD AUTO: 0.2 K/UL
EOSINOPHIL NFR BLD: 0.5 %
EOSINOPHIL NFR BLD: 0.9 %
EOSINOPHIL NFR BLD: 1.2 %
EOSINOPHIL NFR BLD: 2 %
ERYTHROCYTE [DISTWIDTH] IN BLOOD BY AUTOMATED COUNT: 22.4 %
ERYTHROCYTE [DISTWIDTH] IN BLOOD BY AUTOMATED COUNT: 22.6 %
ERYTHROCYTE [DISTWIDTH] IN BLOOD BY AUTOMATED COUNT: 24.4 %
ERYTHROCYTE [DISTWIDTH] IN BLOOD BY AUTOMATED COUNT: 25.5 %
ERYTHROCYTE [SEDIMENTATION RATE] IN BLOOD BY WESTERGREN METHOD: 20 MM/H
EST. GFR  (AFRICAN AMERICAN): 34.1 ML/MIN/1.73 M^2
EST. GFR  (AFRICAN AMERICAN): 42.1 ML/MIN/1.73 M^2
EST. GFR  (AFRICAN AMERICAN): 50.7 ML/MIN/1.73 M^2
EST. GFR  (AFRICAN AMERICAN): 50.7 ML/MIN/1.73 M^2
EST. GFR  (AFRICAN AMERICAN): 54.3 ML/MIN/1.73 M^2
EST. GFR  (AFRICAN AMERICAN): 54.3 ML/MIN/1.73 M^2
EST. GFR  (AFRICAN AMERICAN): >60 ML/MIN/1.73 M^2
EST. GFR  (NON AFRICAN AMERICAN): 29.5 ML/MIN/1.73 M^2
EST. GFR  (NON AFRICAN AMERICAN): 36.4 ML/MIN/1.73 M^2
EST. GFR  (NON AFRICAN AMERICAN): 43.8 ML/MIN/1.73 M^2
EST. GFR  (NON AFRICAN AMERICAN): 43.8 ML/MIN/1.73 M^2
EST. GFR  (NON AFRICAN AMERICAN): 47 ML/MIN/1.73 M^2
EST. GFR  (NON AFRICAN AMERICAN): 47 ML/MIN/1.73 M^2
EST. GFR  (NON AFRICAN AMERICAN): 54.7 ML/MIN/1.73 M^2
ESTIMATED PA SYSTOLIC PRESSURE: 37.47
FIO2: 40
GIANT PLATELETS BLD QL SMEAR: PRESENT
GIANT PLATELETS BLD QL SMEAR: PRESENT
GLUCOSE SERPL-MCNC: 101 MG/DL
GLUCOSE SERPL-MCNC: 79 MG/DL
GLUCOSE SERPL-MCNC: 81 MG/DL
GLUCOSE SERPL-MCNC: 89 MG/DL
GLUCOSE SERPL-MCNC: 91 MG/DL
GRAM STN SPEC: NORMAL
HCO3 UR-SCNC: 28.9 MMOL/L (ref 24–28)
HCT VFR BLD AUTO: 24.1 %
HCT VFR BLD AUTO: 24.2 %
HCT VFR BLD AUTO: 24.3 %
HCT VFR BLD AUTO: 25.8 %
HGB BLD-MCNC: 6.7 G/DL
HGB BLD-MCNC: 6.9 G/DL
HGB BLD-MCNC: 7.1 G/DL
HGB BLD-MCNC: 7.6 G/DL
HYPOCHROMIA BLD QL SMEAR: ABNORMAL
IMM GRANULOCYTES # BLD AUTO: 0.12 K/UL
IMM GRANULOCYTES # BLD AUTO: 0.18 K/UL
IMM GRANULOCYTES # BLD AUTO: 0.19 K/UL
IMM GRANULOCYTES # BLD AUTO: 0.24 K/UL
IMM GRANULOCYTES NFR BLD AUTO: 1 %
IMM GRANULOCYTES NFR BLD AUTO: 1.4 %
IMM GRANULOCYTES NFR BLD AUTO: 1.5 %
IMM GRANULOCYTES NFR BLD AUTO: 1.6 %
INR PPP: 1
LYMPHOCYTES # BLD AUTO: 1.3 K/UL
LYMPHOCYTES # BLD AUTO: 1.4 K/UL
LYMPHOCYTES NFR BLD: 11 %
LYMPHOCYTES NFR BLD: 11.4 %
LYMPHOCYTES NFR BLD: 11.7 %
LYMPHOCYTES NFR BLD: 8.5 %
MAGNESIUM SERPL-MCNC: 1.9 MG/DL
MAGNESIUM SERPL-MCNC: 2 MG/DL
MAGNESIUM SERPL-MCNC: 2 MG/DL
MAGNESIUM SERPL-MCNC: 2.2 MG/DL
MCH RBC QN AUTO: 18.4 PG
MCH RBC QN AUTO: 18.8 PG
MCH RBC QN AUTO: 19.5 PG
MCH RBC QN AUTO: 20.1 PG
MCHC RBC AUTO-ENTMCNC: 27.8 G/DL
MCHC RBC AUTO-ENTMCNC: 28.5 G/DL
MCHC RBC AUTO-ENTMCNC: 29.2 G/DL
MCHC RBC AUTO-ENTMCNC: 29.5 G/DL
MCV RBC AUTO: 66 FL
MCV RBC AUTO: 66 FL
MCV RBC AUTO: 67 FL
MCV RBC AUTO: 68 FL
MITRAL VALVE REGURGITATION: ABNORMAL
MODE: ABNORMAL
MONOCYTES # BLD AUTO: 2.5 K/UL
MONOCYTES # BLD AUTO: 2.5 K/UL
MONOCYTES # BLD AUTO: 2.6 K/UL
MONOCYTES # BLD AUTO: 3 K/UL
MONOCYTES NFR BLD: 19.4 %
MONOCYTES NFR BLD: 19.7 %
MONOCYTES NFR BLD: 20.7 %
MONOCYTES NFR BLD: 21.4 %
NEUTROPHILS # BLD AUTO: 10.4 K/UL
NEUTROPHILS # BLD AUTO: 7.8 K/UL
NEUTROPHILS # BLD AUTO: 7.9 K/UL
NEUTROPHILS # BLD AUTO: 8.5 K/UL
NEUTROPHILS NFR BLD: 64.2 %
NEUTROPHILS NFR BLD: 64.3 %
NEUTROPHILS NFR BLD: 67 %
NEUTROPHILS NFR BLD: 69.3 %
NRBC BLD-RTO: 0 /100 WBC
NRBC BLD-RTO: 1 /100 WBC
OB PNL GAST: POSITIVE
PCO2 BLDA: 43.5 MMHG (ref 35–45)
PEEP: 8
PH SMN: 7.43 [PH] (ref 7.35–7.45)
PHOSPHATE SERPL-MCNC: 3.2 MG/DL
PHOSPHATE SERPL-MCNC: 3.3 MG/DL
PHOSPHATE SERPL-MCNC: 3.8 MG/DL
PHOSPHATE SERPL-MCNC: 4.3 MG/DL
PHOSPHATE SERPL-MCNC: 4.8 MG/DL
PIP: 16
PLATELET # BLD AUTO: 158 K/UL
PLATELET # BLD AUTO: 169 K/UL
PLATELET # BLD AUTO: 182 K/UL
PLATELET # BLD AUTO: 209 K/UL
PLATELET BLD QL SMEAR: ABNORMAL
PMV BLD AUTO: 10.1 FL
PMV BLD AUTO: 10.2 FL
PMV BLD AUTO: 10.3 FL
PMV BLD AUTO: 9.9 FL
PO2 BLDA: 147 MMHG (ref 80–100)
POC BE: 5 MMOL/L
POC SATURATED O2: 99 % (ref 95–100)
POC TCO2: 30 MMOL/L (ref 23–27)
POCT GLUCOSE: 83 MG/DL (ref 70–110)
POCT GLUCOSE: 91 MG/DL (ref 70–110)
POCT GLUCOSE: 91 MG/DL (ref 70–110)
POCT GLUCOSE: 98 MG/DL (ref 70–110)
POCT GLUCOSE: 99 MG/DL (ref 70–110)
POIKILOCYTOSIS BLD QL SMEAR: ABNORMAL
POIKILOCYTOSIS BLD QL SMEAR: SLIGHT
POIKILOCYTOSIS BLD QL SMEAR: SLIGHT
POLYCHROMASIA BLD QL SMEAR: ABNORMAL
POTASSIUM SERPL-SCNC: 4.3 MMOL/L
POTASSIUM SERPL-SCNC: 4.4 MMOL/L
POTASSIUM SERPL-SCNC: 4.4 MMOL/L
POTASSIUM SERPL-SCNC: 4.5 MMOL/L
POTASSIUM SERPL-SCNC: 4.6 MMOL/L
PROT SERPL-MCNC: 7 G/DL
PROT SERPL-MCNC: 7 G/DL
PROT SERPL-MCNC: 7.1 G/DL
PROT SERPL-MCNC: 7.1 G/DL
PROT SERPL-MCNC: 7.3 G/DL
PROTHROMBIN TIME: 10.7 SEC
RBC # BLD AUTO: 3.64 M/UL
RBC # BLD AUTO: 3.65 M/UL
RBC # BLD AUTO: 3.68 M/UL
RBC # BLD AUTO: 3.78 M/UL
RETIRED EF AND QEF - SEE NOTES: 10 (ref 55–65)
SAMPLE: ABNORMAL
SITE: ABNORMAL
SODIUM SERPL-SCNC: 136 MMOL/L
SODIUM SERPL-SCNC: 137 MMOL/L
SODIUM SERPL-SCNC: 138 MMOL/L
SODIUM SERPL-SCNC: 139 MMOL/L
SODIUM SERPL-SCNC: 140 MMOL/L
SPHEROCYTES BLD QL SMEAR: ABNORMAL
SPHEROCYTES BLD QL SMEAR: ABNORMAL
TARGETS BLD QL SMEAR: ABNORMAL
TRANS ERYTHROCYTES VOL PATIENT: NORMAL ML
TRICUSPID VALVE REGURGITATION: ABNORMAL
TROPONIN I SERPL DL<=0.01 NG/ML-MCNC: 0.27 NG/ML
VANCOMYCIN SERPL-MCNC: 16.5 UG/ML
WBC # BLD AUTO: 12.08 K/UL
WBC # BLD AUTO: 12.36 K/UL
WBC # BLD AUTO: 12.69 K/UL
WBC # BLD AUTO: 15.04 K/UL

## 2018-05-24 PROCEDURE — 25000003 PHARM REV CODE 250: Performed by: PSYCHIATRY & NEUROLOGY

## 2018-05-24 PROCEDURE — 63600175 PHARM REV CODE 636 W HCPCS: Performed by: STUDENT IN AN ORGANIZED HEALTH CARE EDUCATION/TRAINING PROGRAM

## 2018-05-24 PROCEDURE — 83735 ASSAY OF MAGNESIUM: CPT | Mod: 91

## 2018-05-24 PROCEDURE — 80069 RENAL FUNCTION PANEL: CPT | Mod: 91

## 2018-05-24 PROCEDURE — 36430 TRANSFUSION BLD/BLD COMPNT: CPT

## 2018-05-24 PROCEDURE — P9021 RED BLOOD CELLS UNIT: HCPCS

## 2018-05-24 PROCEDURE — 25000242 PHARM REV CODE 250 ALT 637 W/ HCPCS: Performed by: STUDENT IN AN ORGANIZED HEALTH CARE EDUCATION/TRAINING PROGRAM

## 2018-05-24 PROCEDURE — C8929 TTE W OR WO FOL WCON,DOPPLER: HCPCS

## 2018-05-24 PROCEDURE — C9113 INJ PANTOPRAZOLE SODIUM, VIA: HCPCS | Performed by: PHYSICIAN ASSISTANT

## 2018-05-24 PROCEDURE — 84100 ASSAY OF PHOSPHORUS: CPT

## 2018-05-24 PROCEDURE — 84484 ASSAY OF TROPONIN QUANT: CPT

## 2018-05-24 PROCEDURE — 99900035 HC TECH TIME PER 15 MIN (STAT)

## 2018-05-24 PROCEDURE — 63600175 PHARM REV CODE 636 W HCPCS: Performed by: PHYSICIAN ASSISTANT

## 2018-05-24 PROCEDURE — 63600175 PHARM REV CODE 636 W HCPCS: Performed by: ANESTHESIOLOGY

## 2018-05-24 PROCEDURE — 85025 COMPLETE CBC W/AUTO DIFF WBC: CPT

## 2018-05-24 PROCEDURE — 94640 AIRWAY INHALATION TREATMENT: CPT

## 2018-05-24 PROCEDURE — 94003 VENT MGMT INPAT SUBQ DAY: CPT

## 2018-05-24 PROCEDURE — 97802 MEDICAL NUTRITION INDIV IN: CPT

## 2018-05-24 PROCEDURE — 63600175 PHARM REV CODE 636 W HCPCS: Performed by: PSYCHIATRY & NEUROLOGY

## 2018-05-24 PROCEDURE — 25000003 PHARM REV CODE 250: Performed by: STUDENT IN AN ORGANIZED HEALTH CARE EDUCATION/TRAINING PROGRAM

## 2018-05-24 PROCEDURE — 90945 DIALYSIS ONE EVALUATION: CPT

## 2018-05-24 PROCEDURE — 94761 N-INVAS EAR/PLS OXIMETRY MLT: CPT

## 2018-05-24 PROCEDURE — 80202 ASSAY OF VANCOMYCIN: CPT

## 2018-05-24 PROCEDURE — 85730 THROMBOPLASTIN TIME PARTIAL: CPT

## 2018-05-24 PROCEDURE — 85610 PROTHROMBIN TIME: CPT

## 2018-05-24 PROCEDURE — 80100008 HC CRRT DAILY MAINTENANCE

## 2018-05-24 PROCEDURE — 93306 TTE W/DOPPLER COMPLETE: CPT | Mod: 26,,, | Performed by: INTERNAL MEDICINE

## 2018-05-24 PROCEDURE — 94668 MNPJ CHEST WALL SBSQ: CPT

## 2018-05-24 PROCEDURE — 83735 ASSAY OF MAGNESIUM: CPT

## 2018-05-24 PROCEDURE — 37799 UNLISTED PX VASCULAR SURGERY: CPT

## 2018-05-24 PROCEDURE — 25000003 PHARM REV CODE 250: Performed by: NURSE PRACTITIONER

## 2018-05-24 PROCEDURE — 80053 COMPREHEN METABOLIC PANEL: CPT | Mod: 91

## 2018-05-24 PROCEDURE — 20000000 HC ICU ROOM

## 2018-05-24 PROCEDURE — 99900026 HC AIRWAY MAINTENANCE (STAT)

## 2018-05-24 PROCEDURE — 99291 CRITICAL CARE FIRST HOUR: CPT | Mod: ,,, | Performed by: ANESTHESIOLOGY

## 2018-05-24 PROCEDURE — 84100 ASSAY OF PHOSPHORUS: CPT | Mod: 91

## 2018-05-24 PROCEDURE — 82803 BLOOD GASES ANY COMBINATION: CPT

## 2018-05-24 RX ORDER — HYDROCODONE BITARTRATE AND ACETAMINOPHEN 500; 5 MG/1; MG/1
TABLET ORAL CONTINUOUS
Status: ACTIVE | OUTPATIENT
Start: 2018-05-24 | End: 2018-05-25

## 2018-05-24 RX ORDER — MAGNESIUM SULFATE HEPTAHYDRATE 40 MG/ML
2 INJECTION, SOLUTION INTRAVENOUS
Status: ACTIVE | OUTPATIENT
Start: 2018-05-24 | End: 2018-05-25

## 2018-05-24 RX ORDER — CEFEPIME HYDROCHLORIDE 2 G/1
2 INJECTION, POWDER, FOR SOLUTION INTRAVENOUS
Status: DISCONTINUED | OUTPATIENT
Start: 2018-05-24 | End: 2018-05-25

## 2018-05-24 RX ORDER — HYDROCODONE BITARTRATE AND ACETAMINOPHEN 500; 5 MG/1; MG/1
TABLET ORAL
Status: DISCONTINUED | OUTPATIENT
Start: 2018-05-24 | End: 2018-05-25

## 2018-05-24 RX ADMIN — IPRATROPIUM BROMIDE AND ALBUTEROL SULFATE 3 ML: .5; 3 SOLUTION RESPIRATORY (INHALATION) at 12:05

## 2018-05-24 RX ADMIN — Medication 1250 MG: at 08:05

## 2018-05-24 RX ADMIN — IPRATROPIUM BROMIDE AND ALBUTEROL SULFATE 3 ML: .5; 3 SOLUTION RESPIRATORY (INHALATION) at 08:05

## 2018-05-24 RX ADMIN — PANTOPRAZOLE SODIUM 40 MG: 40 INJECTION, POWDER, FOR SOLUTION INTRAVENOUS at 08:05

## 2018-05-24 RX ADMIN — CEFEPIME HYDROCHLORIDE 2 G: 2 INJECTION, POWDER, FOR SOLUTION INTRAVENOUS at 04:05

## 2018-05-24 RX ADMIN — DEXMEDETOMIDINE HYDROCHLORIDE 0.2 MCG/KG/HR: 4 INJECTION, SOLUTION INTRAVENOUS at 11:05

## 2018-05-24 RX ADMIN — STANDARDIZED SENNA CONCENTRATE AND DOCUSATE SODIUM 1 TABLET: 8.6; 5 TABLET, FILM COATED ORAL at 10:05

## 2018-05-24 RX ADMIN — PROPOFOL 45 MCG/KG/MIN: 10 INJECTION, EMULSION INTRAVENOUS at 12:05

## 2018-05-24 RX ADMIN — CHLORHEXIDINE GLUCONATE 15 ML: 1.2 RINSE ORAL at 10:05

## 2018-05-24 RX ADMIN — CEFEPIME HYDROCHLORIDE 2 G: 2 INJECTION, POWDER, FOR SOLUTION INTRAVENOUS at 08:05

## 2018-05-24 RX ADMIN — PROPOFOL 35 MCG/KG/MIN: 10 INJECTION, EMULSION INTRAVENOUS at 06:05

## 2018-05-24 RX ADMIN — AMIODARONE HYDROCHLORIDE 1 MG/MIN: 1.8 INJECTION, SOLUTION INTRAVENOUS at 11:05

## 2018-05-24 RX ADMIN — BUSPIRONE HYDROCHLORIDE 10 MG: 10 TABLET ORAL at 10:05

## 2018-05-24 RX ADMIN — CHLORHEXIDINE GLUCONATE 15 ML: 1.2 RINSE ORAL at 08:05

## 2018-05-24 RX ADMIN — BUSPIRONE HYDROCHLORIDE 10 MG: 10 TABLET ORAL at 09:05

## 2018-05-24 RX ADMIN — PROPOFOL 35.04 MCG/KG/MIN: 10 INJECTION, EMULSION INTRAVENOUS at 10:05

## 2018-05-24 RX ADMIN — BUSPIRONE HYDROCHLORIDE 10 MG: 10 TABLET ORAL at 03:05

## 2018-05-24 RX ADMIN — PROPOFOL 14.98 MCG/KG/MIN: 10 INJECTION, EMULSION INTRAVENOUS at 01:05

## 2018-05-24 RX ADMIN — PANTOPRAZOLE SODIUM 40 MG: 40 INJECTION, POWDER, FOR SOLUTION INTRAVENOUS at 10:05

## 2018-05-24 RX ADMIN — CEFEPIME HYDROCHLORIDE 2 G: 2 INJECTION, POWDER, FOR SOLUTION INTRAVENOUS at 12:05

## 2018-05-24 RX ADMIN — IPRATROPIUM BROMIDE AND ALBUTEROL SULFATE 3 ML: .5; 3 SOLUTION RESPIRATORY (INHALATION) at 01:05

## 2018-05-24 RX ADMIN — PROPOFOL 20 MCG/KG/MIN: 10 INJECTION, EMULSION INTRAVENOUS at 09:05

## 2018-05-24 NOTE — ASSESSMENT & PLAN NOTE
Patient with dilated cardiomyopathy EF looks about 10-15% and LVEDD is 7.3 cm, unknown etiology at this time but could be related to heavy alcohol use as he drinks about 1 case of beer and 2/5 of vodka per day. Per the family he has not felt well over the past 2 weeks and had progressive shortness of breath. It is very likely that he was acutely decompensated and had VT or VF as the etiology of his syncope and cardiac arrest.     Recommendations:  - Currently hypervolemic CVP was 14. Anuric. Continue CRRT as CVP remains elevated in the setting of CM of 10-15% with goal CVP of 10.   - would start with hydralazine 10mg tid and isordil 10mg tid for afterload reduction  - would transfuse for goal Hb >8  - if/when patient does recover from a neurological standpoint, patient will require ischemic evaluation and ICD    Cardiology will sign off at this point - please re-consult when patient has made significant neurological recovery/response    Discussed with Dr. Archer

## 2018-05-24 NOTE — PROGRESS NOTES
Patient identified via spelling of name and date of birth. Patient denies blood transfusion reaction. Consent obtained from wife for use of contrast. Optison 3ml IVP vorvicki Chavez. Saline lock flushed pre and post use under aseptic technique. Optison 3ml IVP used as contrast for 2 d imaging. Tolerated procedure well.

## 2018-05-24 NOTE — SUBJECTIVE & OBJECTIVE
Interval History: see hospital course    Review of Systems   Unable to perform ROS: intubated     Objective:     Vital Signs (Most Recent):  Temp: 99.7 °F (37.6 °C) (05/24/18 0905)  Pulse: 86 (05/24/18 0905)  Resp: 15 (05/24/18 0905)  BP: 128/70 (05/24/18 0905)  SpO2: 100 % (05/24/18 0905) Vital Signs (24h Range):  Temp:  [99 °F (37.2 °C)-100.2 °F (37.9 °C)] 99.7 °F (37.6 °C)  Pulse:  [] 86  Resp:  [10-32] 15  SpO2:  [92 %-100 %] 100 %  BP: (102-143)/(59-94) 128/70  Arterial Line BP: (103-136)/(56-84) 132/73     Weight: (!) 144.6 kg (318 lb 12.6 oz)  Body mass index is 48.47 kg/m².     SpO2: 100 %  O2 Device (Oxygen Therapy): ventilator      Intake/Output Summary (Last 24 hours) at 05/24/18 0917  Last data filed at 05/24/18 0905   Gross per 24 hour   Intake          6527.56 ml   Output             8531 ml   Net         -2003.44 ml       Lines/Drains/Airways     Central Venous Catheter Line                 Percutaneous Central Line Insertion/Assessment - triple lumen  05/19/18 right internal jugular 5 days         Trialysis (Dialysis) Catheter 05/21/18 1430 left femoral 2 days          Drain                 NG/OG Tube 05/19/18 orogastric 5 days         Urethral Catheter 05/19/18 Temperature probe 5 days          Airway                 Airway - Non-Surgical 05/18/18 0900 Endotracheal Tube 6 days          Arterial Line                 Arterial Line 05/19/18 1654 Right Radial 4 days          Peripheral Intravenous Line                 Peripheral IV - Single Lumen 05/19/18 Right Hand 5 days                Physical Exam   Constitutional: Vital signs are normal. He appears well-nourished. He appears ill. He is sedated and intubated.   HENT:   Head: Normocephalic and atraumatic.   Cardiovascular: Normal rate, regular rhythm, S1 normal, S2 normal and intact distal pulses.    Pulmonary/Chest: He is intubated.   Coarse intubated breath sounds  Patient appears to be breath stacking   Abdominal: Soft. Bowel sounds are  normal. He exhibits distension.   Musculoskeletal: He exhibits edema.   Neurological: He is unresponsive.   Nursing note and vitals reviewed.      Significant Labs:   Recent Results (from the past 24 hour(s))   Magnesium    Collection Time: 05/23/18 12:08 PM   Result Value Ref Range    Magnesium 2.1 1.6 - 2.6 mg/dL   Comprehensive metabolic panel    Collection Time: 05/23/18 12:08 PM   Result Value Ref Range    Sodium 136 136 - 145 mmol/L    Potassium 4.2 3.5 - 5.1 mmol/L    Chloride 98 95 - 110 mmol/L    CO2 28 23 - 29 mmol/L    Glucose 78 70 - 110 mg/dL    BUN, Bld 15 6 - 20 mg/dL    Creatinine 2.5 (H) 0.5 - 1.4 mg/dL    Calcium 8.4 (L) 8.7 - 10.5 mg/dL    Total Protein 6.9 6.0 - 8.4 g/dL    Albumin 2.7 (L) 3.5 - 5.2 g/dL    Total Bilirubin 2.7 (H) 0.1 - 1.0 mg/dL    Alkaline Phosphatase 110 55 - 135 U/L     (H) 10 - 40 U/L     (H) 10 - 44 U/L    Anion Gap 10 8 - 16 mmol/L    eGFR if African American 34.1 (A) >60 mL/min/1.73 m^2    eGFR if non African American 29.5 (A) >60 mL/min/1.73 m^2   Phosphorus    Collection Time: 05/23/18 12:08 PM   Result Value Ref Range    Phosphorus 3.5 2.7 - 4.5 mg/dL   POCT glucose    Collection Time: 05/23/18 12:10 PM   Result Value Ref Range    POCT Glucose 85 70 - 110 mg/dL   Renal function panel    Collection Time: 05/23/18  2:11 PM   Result Value Ref Range    Glucose 77 70 - 110 mg/dL    Sodium 138 136 - 145 mmol/L    Potassium 4.4 3.5 - 5.1 mmol/L    Chloride 100 95 - 110 mmol/L    CO2 26 23 - 29 mmol/L    BUN, Bld 13 6 - 20 mg/dL    Calcium 8.5 (L) 8.7 - 10.5 mg/dL    Creatinine 2.3 (H) 0.5 - 1.4 mg/dL    Albumin 2.7 (L) 3.5 - 5.2 g/dL    Phosphorus 3.5 2.7 - 4.5 mg/dL    eGFR if  37.7 (A) >60 mL/min/1.73 m^2    eGFR if non  32.6 (A) >60 mL/min/1.73 m^2    Anion Gap 12 8 - 16 mmol/L   Magnesium    Collection Time: 05/23/18  2:11 PM   Result Value Ref Range    Magnesium 1.9 1.6 - 2.6 mg/dL   Magnesium    Collection Time: 05/23/18   4:01 PM   Result Value Ref Range    Magnesium 1.9 1.6 - 2.6 mg/dL   Comprehensive metabolic panel    Collection Time: 05/23/18  4:01 PM   Result Value Ref Range    Sodium 138 136 - 145 mmol/L    Potassium 4.4 3.5 - 5.1 mmol/L    Chloride 101 95 - 110 mmol/L    CO2 28 23 - 29 mmol/L    Glucose 78 70 - 110 mg/dL    BUN, Bld 12 6 - 20 mg/dL    Creatinine 2.0 (H) 0.5 - 1.4 mg/dL    Calcium 8.5 (L) 8.7 - 10.5 mg/dL    Total Protein 6.7 6.0 - 8.4 g/dL    Albumin 2.6 (L) 3.5 - 5.2 g/dL    Total Bilirubin 2.8 (H) 0.1 - 1.0 mg/dL    Alkaline Phosphatase 111 55 - 135 U/L     (H) 10 - 40 U/L     (H) 10 - 44 U/L    Anion Gap 9 8 - 16 mmol/L    eGFR if African American 44.6 (A) >60 mL/min/1.73 m^2    eGFR if non  38.6 (A) >60 mL/min/1.73 m^2   Troponin I    Collection Time: 05/23/18  4:01 PM   Result Value Ref Range    Troponin I 0.279 (H) 0.000 - 0.026 ng/mL   Phosphorus    Collection Time: 05/23/18  4:01 PM   Result Value Ref Range    Phosphorus 3.3 2.7 - 4.5 mg/dL   ISTAT PROCEDURE    Collection Time: 05/23/18  4:44 PM   Result Value Ref Range    POC PH 7.357 7.35 - 7.45    POC PCO2 52.8 (H) 35 - 45 mmHg    POC PO2 22 (LL) 40 - 60 mmHg    POC HCO3 29.6 (H) 24 - 28 mmol/L    POC BE 4 -2 to 2 mmol/L    POC SATURATED O2 34 (L) 95 - 100 %    POC TCO2 31 (H) 24 - 29 mmol/L    Rate 20     Sample VENOUS     Site Other     Allens Test N/A     DelSys Adult Vent     Mode PCV     Vt 412     PEEP 8     PiP 25     MAP 16     FiO2 40     Sp02 99     IP 16     IT .90    POCT glucose    Collection Time: 05/23/18  5:53 PM   Result Value Ref Range    POCT Glucose 64 (L) 70 - 110 mg/dL   ISTAT PROCEDURE    Collection Time: 05/23/18  6:14 PM   Result Value Ref Range    POC PH 7.371 7.35 - 7.45    POC PCO2 50.5 (H) 35 - 45 mmHg    POC PO2 35 (LL) 40 - 60 mmHg    POC HCO3 29.2 (H) 24 - 28 mmol/L    POC BE 4 -2 to 2 mmol/L    POC SATURATED O2 64 (L) 95 - 100 %    POC TCO2 31 (H) 24 - 29 mmol/L    Rate 25     Sample  VENOUS     Site Other     Allens Test N/A     DelSys Adult Vent     Mode PCV     Vt 475     PEEP 8     PiP 26     MAP 85     FiO2 40     Sp02 100     IP 16     IT .90    POCT glucose    Collection Time: 05/23/18  6:32 PM   Result Value Ref Range    POCT Glucose 108 70 - 110 mg/dL   Magnesium    Collection Time: 05/23/18  7:32 PM   Result Value Ref Range    Magnesium 2.0 1.6 - 2.6 mg/dL   Comprehensive metabolic panel    Collection Time: 05/23/18  7:32 PM   Result Value Ref Range    Sodium 138 136 - 145 mmol/L    Potassium 4.4 3.5 - 5.1 mmol/L    Chloride 100 95 - 110 mmol/L    CO2 29 23 - 29 mmol/L    Glucose 94 70 - 110 mg/dL    BUN, Bld 10 6 - 20 mg/dL    Creatinine 1.8 (H) 0.5 - 1.4 mg/dL    Calcium 8.6 (L) 8.7 - 10.5 mg/dL    Total Protein 7.1 6.0 - 8.4 g/dL    Albumin 2.8 (L) 3.5 - 5.2 g/dL    Total Bilirubin 3.0 (H) 0.1 - 1.0 mg/dL    Alkaline Phosphatase 120 55 - 135 U/L     (H) 10 - 40 U/L     (H) 10 - 44 U/L    Anion Gap 9 8 - 16 mmol/L    eGFR if African American 50.7 (A) >60 mL/min/1.73 m^2    eGFR if non  43.8 (A) >60 mL/min/1.73 m^2   CBC auto differential    Collection Time: 05/23/18  7:32 PM   Result Value Ref Range    WBC 14.93 (H) 3.90 - 12.70 K/uL    RBC 3.63 (L) 4.60 - 6.20 M/uL    Hemoglobin 6.5 (L) 14.0 - 18.0 g/dL    Hematocrit 22.7 (L) 40.0 - 54.0 %    MCV 63 (L) 82 - 98 fL    MCH 17.9 (L) 27.0 - 31.0 pg    MCHC 28.6 (L) 32.0 - 36.0 g/dL    RDW 20.3 (H) 11.5 - 14.5 %    Platelets 198 150 - 350 K/uL    MPV 10.1 9.2 - 12.9 fL    Immature Granulocytes 1.7 (H) 0.0 - 0.5 %    Gran # (ANC) 10.4 (H) 1.8 - 7.7 K/uL    Immature Grans (Abs) 0.26 (H) 0.00 - 0.04 K/uL    Lymph # 1.4 1.0 - 4.8 K/uL    Mono # 2.9 (H) 0.3 - 1.0 K/uL    Eos # 0.1 0.0 - 0.5 K/uL    Baso # 0.05 0.00 - 0.20 K/uL    nRBC 1 (A) 0 /100 WBC    Gran% 69.5 38.0 - 73.0 %    Lymph% 9.0 (L) 18.0 - 48.0 %    Mono% 19.1 (H) 4.0 - 15.0 %    Eosinophil% 0.4 0.0 - 8.0 %    Basophil% 0.3 0.0 - 1.9 %    Aniso  Slight     Poik Slight     Poly Occasional     Hypo Moderate     Target Cells Occasional     Raisa Cells Occasional     Differential Method Automated    Renal function panel    Collection Time: 05/23/18  9:50 PM   Result Value Ref Range    Glucose 83 70 - 110 mg/dL    Sodium 139 136 - 145 mmol/L    Potassium 4.5 3.5 - 5.1 mmol/L    Chloride 100 95 - 110 mmol/L    CO2 31 (H) 23 - 29 mmol/L    BUN, Bld 9 6 - 20 mg/dL    Calcium 9.0 8.7 - 10.5 mg/dL    Creatinine 1.8 (H) 0.5 - 1.4 mg/dL    Albumin 2.7 (L) 3.5 - 5.2 g/dL    Phosphorus 3.5 2.7 - 4.5 mg/dL    eGFR if  50.7 (A) >60 mL/min/1.73 m^2    eGFR if non  43.8 (A) >60 mL/min/1.73 m^2    Anion Gap 8 8 - 16 mmol/L   Magnesium    Collection Time: 05/23/18  9:50 PM   Result Value Ref Range    Magnesium 1.9 1.6 - 2.6 mg/dL   Protime-INR    Collection Time: 05/23/18  9:50 PM   Result Value Ref Range    Prothrombin Time 12.2 9.0 - 12.5 sec    INR 1.2 0.8 - 1.2   Occult blood, other sources    Collection Time: 05/23/18  9:51 PM   Result Value Ref Range    Occult Blood Positive (A) Negative   Magnesium    Collection Time: 05/24/18 12:14 AM   Result Value Ref Range    Magnesium 1.9 1.6 - 2.6 mg/dL   Comprehensive metabolic panel    Collection Time: 05/24/18 12:14 AM   Result Value Ref Range    Sodium 138 136 - 145 mmol/L    Potassium 4.6 3.5 - 5.1 mmol/L    Chloride 99 95 - 110 mmol/L    CO2 29 23 - 29 mmol/L    Glucose 91 70 - 110 mg/dL    BUN, Bld 8 6 - 20 mg/dL    Creatinine 1.8 (H) 0.5 - 1.4 mg/dL    Calcium 8.8 8.7 - 10.5 mg/dL    Total Protein 7.3 6.0 - 8.4 g/dL    Albumin 2.8 (L) 3.5 - 5.2 g/dL    Total Bilirubin 3.1 (H) 0.1 - 1.0 mg/dL    Alkaline Phosphatase 128 55 - 135 U/L     (H) 10 - 40 U/L     (H) 10 - 44 U/L    Anion Gap 10 8 - 16 mmol/L    eGFR if African American 50.7 (A) >60 mL/min/1.73 m^2    eGFR if non  43.8 (A) >60 mL/min/1.73 m^2   Troponin I    Collection Time: 05/24/18 12:14 AM   Result  Value Ref Range    Troponin I 0.267 (H) 0.000 - 0.026 ng/mL   Phosphorus    Collection Time: 05/24/18 12:14 AM   Result Value Ref Range    Phosphorus 3.8 2.7 - 4.5 mg/dL   CBC auto differential    Collection Time: 05/24/18 12:14 AM   Result Value Ref Range    WBC 15.04 (H) 3.90 - 12.70 K/uL    RBC 3.68 (L) 4.60 - 6.20 M/uL    Hemoglobin 6.9 (L) 14.0 - 18.0 g/dL    Hematocrit 24.2 (L) 40.0 - 54.0 %    MCV 66 (L) 82 - 98 fL    MCH 18.8 (L) 27.0 - 31.0 pg    MCHC 28.5 (L) 32.0 - 36.0 g/dL    RDW 22.4 (H) 11.5 - 14.5 %    Platelets 209 150 - 350 K/uL    MPV 10.2 9.2 - 12.9 fL    Immature Granulocytes 1.6 (H) 0.0 - 0.5 %    Gran # (ANC) 10.4 (H) 1.8 - 7.7 K/uL    Immature Grans (Abs) 0.24 (H) 0.00 - 0.04 K/uL    Lymph # 1.3 1.0 - 4.8 K/uL    Mono # 3.0 (H) 0.3 - 1.0 K/uL    Eos # 0.1 0.0 - 0.5 K/uL    Baso # 0.06 0.00 - 0.20 K/uL    nRBC 1 (A) 0 /100 WBC    Gran% 69.3 38.0 - 73.0 %    Lymph% 8.5 (L) 18.0 - 48.0 %    Mono% 19.7 (H) 4.0 - 15.0 %    Eosinophil% 0.5 0.0 - 8.0 %    Basophil% 0.4 0.0 - 1.9 %    Platelet Estimate Appears normal     Aniso Moderate     Poik Moderate     Poly Occasional     Target Cells Moderate     Large/Giant Platelets Present     Differential Method Automated    POCT glucose    Collection Time: 05/24/18 12:16 AM   Result Value Ref Range    POCT Glucose 99 70 - 110 mg/dL   ISTAT PROCEDURE    Collection Time: 05/24/18  3:39 AM   Result Value Ref Range    POC PH 7.431 7.35 - 7.45    POC PCO2 43.5 35 - 45 mmHg    POC PO2 147 (H) 80 - 100 mmHg    POC HCO3 28.9 (H) 24 - 28 mmol/L    POC BE 5 -2 to 2 mmol/L    POC SATURATED O2 99 95 - 100 %    POC TCO2 30 (H) 23 - 27 mmol/L    Rate 20     Sample ARTERIAL     Site Gosia/UAC     Allens Test N/A     DelSys Adult Vent     Mode AC/PRVC     PEEP 8     PiP 16     FiO2 40    Magnesium    Collection Time: 05/24/18  4:08 AM   Result Value Ref Range    Magnesium 1.9 1.6 - 2.6 mg/dL   APTT    Collection Time: 05/24/18  4:08 AM   Result Value Ref Range    aPTT  30.1 21.0 - 32.0 sec   Comprehensive metabolic panel    Collection Time: 05/24/18  4:08 AM   Result Value Ref Range    Sodium 138 136 - 145 mmol/L    Potassium 4.5 3.5 - 5.1 mmol/L    Chloride 100 95 - 110 mmol/L    CO2 26 23 - 29 mmol/L    Glucose 79 70 - 110 mg/dL    BUN, Bld 7 6 - 20 mg/dL    Creatinine 1.7 (H) 0.5 - 1.4 mg/dL    Calcium 8.7 8.7 - 10.5 mg/dL    Total Protein 7.1 6.0 - 8.4 g/dL    Albumin 2.6 (L) 3.5 - 5.2 g/dL    Total Bilirubin 3.0 (H) 0.1 - 1.0 mg/dL    Alkaline Phosphatase 125 55 - 135 U/L     (H) 10 - 40 U/L     (H) 10 - 44 U/L    Anion Gap 12 8 - 16 mmol/L    eGFR if African American 54.3 (A) >60 mL/min/1.73 m^2    eGFR if non  47.0 (A) >60 mL/min/1.73 m^2   Vancomycin, random    Collection Time: 05/24/18  4:08 AM   Result Value Ref Range    Vancomycin, Random 16.5 Not established ug/mL   Renal function panel    Collection Time: 05/24/18  4:08 AM   Result Value Ref Range    Glucose 79 70 - 110 mg/dL    Sodium 138 136 - 145 mmol/L    Potassium 4.5 3.5 - 5.1 mmol/L    Chloride 100 95 - 110 mmol/L    CO2 26 23 - 29 mmol/L    BUN, Bld 7 6 - 20 mg/dL    Calcium 8.7 8.7 - 10.5 mg/dL    Creatinine 1.7 (H) 0.5 - 1.4 mg/dL    Albumin 2.6 (L) 3.5 - 5.2 g/dL    Phosphorus 3.3 2.7 - 4.5 mg/dL    eGFR if  54.3 (A) >60 mL/min/1.73 m^2    eGFR if non  47.0 (A) >60 mL/min/1.73 m^2    Anion Gap 12 8 - 16 mmol/L   Magnesium    Collection Time: 05/24/18  4:08 AM   Result Value Ref Range    Magnesium 1.9 1.6 - 2.6 mg/dL   Protime-INR    Collection Time: 05/24/18  4:08 AM   Result Value Ref Range    Prothrombin Time 10.7 9.0 - 12.5 sec    INR 1.0 0.8 - 1.2   CBC auto differential    Collection Time: 05/24/18  4:08 AM   Result Value Ref Range    WBC 12.69 3.90 - 12.70 K/uL    RBC 3.64 (L) 4.60 - 6.20 M/uL    Hemoglobin 6.7 (L) 14.0 - 18.0 g/dL    Hematocrit 24.1 (L) 40.0 - 54.0 %    MCV 66 (L) 82 - 98 fL    MCH 18.4 (L) 27.0 - 31.0 pg    MCHC 27.8 (L)  32.0 - 36.0 g/dL    RDW 22.6 (H) 11.5 - 14.5 %    Platelets 182 150 - 350 K/uL    MPV 10.1 9.2 - 12.9 fL    Immature Granulocytes 1.4 (H) 0.0 - 0.5 %    Gran # (ANC) 8.5 (H) 1.8 - 7.7 K/uL    Immature Grans (Abs) 0.18 (H) 0.00 - 0.04 K/uL    Lymph # 1.4 1.0 - 4.8 K/uL    Mono # 2.5 (H) 0.3 - 1.0 K/uL    Eos # 0.1 0.0 - 0.5 K/uL    Baso # 0.04 0.00 - 0.20 K/uL    nRBC 1 (A) 0 /100 WBC    Gran% 67.0 38.0 - 73.0 %    Lymph% 11.0 (L) 18.0 - 48.0 %    Mono% 19.4 (H) 4.0 - 15.0 %    Eosinophil% 0.9 0.0 - 8.0 %    Basophil% 0.3 0.0 - 1.9 %    Platelet Estimate Appears normal     Aniso Slight     Poik Slight     Poly Occasional     Hypo Occasional     Target Cells Occasional     Spherocytes Occasional     Differential Method Automated    POCT glucose    Collection Time: 05/24/18  6:46 AM   Result Value Ref Range    POCT Glucose 91 70 - 110 mg/dL   Magnesium    Collection Time: 05/24/18  8:09 AM   Result Value Ref Range    Magnesium 1.9 1.6 - 2.6 mg/dL   Comprehensive metabolic panel    Collection Time: 05/24/18  8:09 AM   Result Value Ref Range    Sodium 140 136 - 145 mmol/L    Potassium 4.5 3.5 - 5.1 mmol/L    Chloride 101 95 - 110 mmol/L    CO2 28 23 - 29 mmol/L    Glucose 79 70 - 110 mg/dL    BUN, Bld 7 6 - 20 mg/dL    Creatinine 1.5 (H) 0.5 - 1.4 mg/dL    Calcium 8.7 8.7 - 10.5 mg/dL    Total Protein 7.1 6.0 - 8.4 g/dL    Albumin 2.6 (L) 3.5 - 5.2 g/dL    Total Bilirubin 3.0 (H) 0.1 - 1.0 mg/dL    Alkaline Phosphatase 125 55 - 135 U/L     (H) 10 - 40 U/L     (H) 10 - 44 U/L    Anion Gap 11 8 - 16 mmol/L    eGFR if African American >60.0 >60 mL/min/1.73 m^2    eGFR if non  54.7 (A) >60 mL/min/1.73 m^2   Phosphorus    Collection Time: 05/24/18  8:09 AM   Result Value Ref Range    Phosphorus 3.2 2.7 - 4.5 mg/dL         Significant Imaging: Echocardiogram:   2D echo with color flow doppler:   Results for orders placed or performed during the hospital encounter of 05/19/18   2D echo with color  flow doppler   Result Value Ref Range    EF 10 (A) 55 - 65    Mitral Valve Regurgitation MODERATE (A)     Mitral Valve Mobility NORMAL     Tricuspid Valve Regurgitation MILD

## 2018-05-24 NOTE — ASSESSMENT & PLAN NOTE
Los Mendez is a 47 year old male who is consulted for MARY, which is mostly secondary to iATN from previous cardiac arrest where he presented with hypotension which decrease perfusion to the kidneys (required to be started on pressors) and currently have component of cardiorenal  with dilated cardiomyopathy with EF 10-15%.    Plan:  - Continues to be anuric  - Will switch to SCUF today for volume assistance, -450 cc/hr as tolerated by patient, maintain MAP>65  - Currently of pressors this morning and blood pressures have been stable.    - Last CVP =14, goal of 10.   - On mechanical ventilation, had decrease of oxygen requirement to a FiO2 40 %.   - Renal ultrasound without any sign of obstruction, normal size kidney.   - Urine sediment with abundant hyaline cast, RBCs (non dysmorphic) and few muddy brown, cast confirming ATN, no crystals.   - Avoid nephrotoxic medications.

## 2018-05-24 NOTE — PLAN OF CARE
Problem: Patient Care Overview  Goal: Plan of Care Review  POC reviewed with pt and family at 1100. Pt spouse verbalized understanding. Questions and concerns addressed. Pt progressing toward goals. Will continue to monitor. See flowsheets for full assessment and VS info.

## 2018-05-24 NOTE — PLAN OF CARE
Problem: Patient Care Overview  Goal: Plan of Care Review  Outcome: Ongoing (interventions implemented as appropriate)  Nutrition assessment completed. Please see RD note for details.    Recommendation/Intervention:   Pt NPO x5 days. As medically able, recommend starting TF.   Novasource Renal @ goal rate 55mL/hr.   - With propofol, recommend increasing TF to 40mL/hr.   - Once propofol discontinued, increase TF to goal of 55mL/hr.   - Hold for residuals >500mL.     RD to monitor.    Goals: Pt to receive nutrition by RD follow up  Nutrition Goal Status: new  Communication of RD Recs: reviewed with RN

## 2018-05-24 NOTE — NURSING
LUTHER Leung made aware RN noted coffee ground emesis while aspirating stomach contents. Patient's H/H currently 6.5/22.7 - 1 Unit of PRBC to be administered to patient per orders. Last PT/INR drawn on 5/19, PT-19.4/2.0 at that time. Patient due to receive sub q heparin at 2200 per orders.     Orders given to send emesis for occult blood study. Sub q heparin d/bridget. Orders received to draw PT/INR. 40mg Protonix IV BID.

## 2018-05-24 NOTE — NURSING
LUTHER Calixto made aware patient's emesis occult blood positive. Current H/H 6.7/24.1 after receiving 1 unit of PRBC. Patient on levophed at 0.01mcg/kg/min. Per PA, do not give second unit of PRBC, s/t pt not requiring an increased amount of levophed. RN to monitor.

## 2018-05-24 NOTE — PROGRESS NOTES
Notified [facility] Dr Mukesh MD of pt's H&H. 7.1/24.3 after one unit RBCs.. VSS. No new orders received at this time. Will continue to monitor.     Received orders for another unit RBCs. Will continue to monitor

## 2018-05-24 NOTE — PROGRESS NOTES
ICU Progress Note  Neurocritical Care    Admit Date: 5/19/2018  LOS: 5    CC: Anoxic brain injury    Code Status: Full Code     SUBJECTIVE:     Interval History/Significant Events:   S/p cardiac arrest  Coma GCS; 6  arf  Atn  Hepatic insuff; poa-improving  Renal usg;wnl      Medications:  Continuous Infusions:   dexmedetomidine (PRECEDEX) infusion Stopped (05/23/18 1230)    norepinephrine bitartrate-D5W Stopped (05/24/18 0515)    propofol 35.039 mcg/kg/min (05/24/18 1004)     Scheduled Meds:   albuterol-ipratropium  3 mL Nebulization Q6H    busPIRone  10 mg Per NG tube TID    ceFEPime (MAXIPIME) IVPB  2 g Intravenous Q8H    chlorhexidine  15 mL Mouth/Throat BID    pantoprazole 40 mg in dextrose 5 % 100 mL infusion (ready to mix system)  40 mg Intravenous BID    senna-docusate 8.6-50 mg  1 tablet Per OG tube Daily    vancomycin (VANCOCIN) IVPB  1,250 mg Intravenous Q24H     PRN Meds:.sodium chloride, acetaminophen, dextrose 50%, fentaNYL, glucagon (human recombinant), insulin aspart U-100, ondansetron, sodium chloride 0.9%    OBJECTIVE:   Vital Signs (Most Recent):   Temp: 99.5 °F (37.5 °C) (05/24/18 1005)  Pulse: 84 (05/24/18 1005)  Resp: (!) 8 (05/24/18 1005)  BP: 129/76 (05/24/18 1005)  SpO2: 100 % (05/24/18 1005)    Vital Signs (24h Range):   Temp:  [99 °F (37.2 °C)-100.2 °F (37.9 °C)] 99.5 °F (37.5 °C)  Pulse:  [] 84  Resp:  [8-31] 8  SpO2:  [92 %-100 %] 100 %  BP: (102-143)/(59-94) 129/76  Arterial Line BP: (103-136)/(56-84) 130/72    ICP/CPP (Last 24h):   CVP:  [27 mmHg] 27 mmHg  CO:  [5.4 L/min-9.2 L/min] 6.7 L/min  CI:  [2.3 L/min/m2-3.9 L/min/m2] 2.9 L/min/m2    I & O (Last 24h):   Intake/Output Summary (Last 24 hours) at 05/24/18 1047  Last data filed at 05/24/18 1005   Gross per 24 hour   Intake          6468.27 ml   Output             8590 ml   Net         -2121.73 ml     Physical Exam:  GA: Alert, comfortable, no acute distress.   HEENT: No scleral icterus or JVD.   Pulmonary: Clear to  auscultation A/P/L. No wheezing, crackles, or rhonchi.  Cardiac: RRR S1 & S2 w/o rubs/murmurs/gallops.   Abdominal: Bowel sounds present x 4. No appreciable hepatosplenomegaly.  Skin: No jaundice, rashes, or visible lesions.  Neuro:    agitated of propofol  Moves all 4 exts      Vent Data:   Vent Mode: A/C  Oxygen Concentration (%):  [40-51] 40  Resp Rate Total:  [20 br/min-34 br/min] 21 br/min  PEEP/CPAP:  [8 cmH20] 8 cmH20  Mean Airway Pressure:  [13 skP64-19 cmH20] 13 cmH20    Lines/Drains/Airway:   Lines 5 days         Airway - Non-Surgical 05/18/18 0900 Endotracheal Tube (Active)   Secured at 25 cm 5/24/2018  8:25 AM   Measured At Lips 5/24/2018  8:25 AM   Secured Location Left 5/24/2018  8:25 AM   Secured by Commercial tube garcia 5/24/2018  8:25 AM   Bite Block left;secure and patent 5/24/2018  8:25 AM   Site Condition Cool;Dry 5/24/2018  8:25 AM   Status Intact;Secured;Patent 5/24/2018  8:25 AM   Site Assessment Clean;Dry;No bleeding;No drainage 5/24/2018  8:25 AM   Cuff Volume 30 mL 5/23/2018 11:35 PM   Cuff Pressure 26 cm H2O 5/24/2018  8:25 AM           Percutaneous Central Line Insertion/Assessment - triple lumen  05/19/18 right internal jugular (Active)   Dressing biopatch in place;dressing dry and intact 5/24/2018  3:00 AM   Securement secured w/ sutures 5/24/2018  3:00 AM   Additional Site Signs no erythema;no warmth;no edema;no pain;no palpable cord;no streak formation;no drainage 5/24/2018  3:00 AM   Distal Patency/Care infusing 5/24/2018  3:00 AM   Medial Patency/Care normal saline locked 5/24/2018  3:00 AM   Proximal Patency/Care infusing 5/24/2018  3:00 AM   Waveform normal 5/24/2018  3:00 AM   Line Interventions line leveled/zeroed 5/24/2018  3:00 AM   Dressing Change Due 05/28/18 5/24/2018  3:00 AM   Daily Line Review Performed 5/24/2018  3:00 AM            Trialysis (Dialysis) Catheter 05/21/18 1430 left femoral (Active)   IV Device Securement sutures 5/24/2018  3:00 AM   Additional Site Signs  no erythema;no warmth;no edema;no pain;no palpable cord;no streak formation;no drainage 5/24/2018  3:00 AM   Patency/Care flushed w/o difficulty;blood return present;normal saline locked 5/21/2018  3:05 PM   Waveform normal 5/21/2018  3:05 PM   Site Assessment Clean;Dry;Intact;No redness;No swelling 5/24/2018  3:00 AM   Status Accessed 5/24/2018  3:00 AM   Flows Reversed 5/24/2018  3:00 AM   Dressing Intervention New dressing 5/24/2018  4:00 AM   Dressing Status Biopatch in place;Clean;Dry;Intact 5/24/2018  4:00 AM   Dressing Change Due 05/31/18 5/24/2018  4:00 AM   Site Condition No complications 5/24/2018  3:00 AM   Dressing Occlusive 5/24/2018  3:00 AM   Daily Line Review Performed 5/24/2018  3:00 AM           Arterial Line 05/19/18 1654 Right Radial (Active)   Site Assessment Clean;Dry;Intact 5/24/2018  3:00 AM   Line Status Pulsatile blood flow 5/24/2018  3:00 AM   Art Line Waveform Appropriate;Square wave test performed 5/24/2018  3:00 AM   Arterial Line Interventions Zeroed and calibrated;Leveled;Connections checked and tightened;Flushed per protocol 5/24/2018  3:00 AM   Color/Movement/Sensation Capillary refill less than 3 sec 5/24/2018  3:00 AM   Dressing Type Transparent 5/24/2018  3:00 AM   Dressing Status Biopatch in place;Clean;Dry;Intact 5/24/2018  3:00 AM   Dressing Intervention New dressing 5/24/2018  3:00 AM   Dressing Change Due 05/30/18 5/24/2018  3:00 AM           NG/OG Tube 05/19/18 orogastric (Active)   Placement Check placement verified by aspirate characteristics;placement verified by distal tube length measurement 5/24/2018  3:00 AM   Tube advanced (cm) 3 5/19/2018  7:05 PM   Tolerance no signs/symptoms of discomfort 5/24/2018  3:00 AM   Securement taped to commercial device 5/24/2018  3:00 AM   Clamp Status/Tolerance clamped 5/24/2018  3:00 AM   Suction Setting/Drainage Method dependent drainage 5/21/2018  3:00 AM   Insertion Site Appearance no redness, warmth, tenderness, skin breakdown,  "drainage 5/24/2018  3:00 AM   Drainage Coffee ground 5/23/2018 10:00 PM   Flush/Irrigation flushed w/;water;resistance met 5/23/2018 10:00 PM   Current Rate (mL/hr) 0 mL/hr 5/23/2018  7:00 PM   Goal Rate (mL/hr) 0 mL/hr 5/23/2018  7:00 PM   Intake (mL) 45 mL 5/23/2018 10:00 PM   Residual Amount (ml) 80 ml 5/23/2018 10:00 PM            Urethral Catheter 05/19/18 Temperature probe (Active)   Site Assessment Clean;Intact 5/24/2018  3:37 AM   Collection Container Urimeter 5/24/2018  3:37 AM   Securement Method secured to top of thigh w/ adhesive device 5/24/2018  3:37 AM   Catheter Care Performed yes 5/24/2018  3:37 AM   Reason for Continuing Urinary Catheterization Critically ill in ICU requiring intensive monitoring 5/24/2018  3:37 AM   CAUTI Prevention Bundle StatLock in place w 1" slack;Intact seal between catheter & drainage tubing;Drainage bag off the floor;Drainage bag not overfilled (<2/3 full);Green sheeting clip in use;No dependent loops or kinks;Drainage bag below bladder 5/24/2018  3:37 AM   Output (mL) 0 mL 5/24/2018 10:05 AM            Trialysis (Dialysis) Catheter 05/21/18 1430 left femoral (Active)   IV Device Securement sutures 5/24/2018  3:00 AM   Additional Site Signs no erythema;no warmth;no edema;no pain;no palpable cord;no streak formation;no drainage 5/24/2018  3:00 AM   Patency/Care flushed w/o difficulty;blood return present;normal saline locked 5/21/2018  3:05 PM   Waveform normal 5/21/2018  3:05 PM   Site Assessment Clean;Dry;Intact;No redness;No swelling 5/24/2018  3:00 AM   Status Accessed 5/24/2018  3:00 AM   Flows Reversed 5/24/2018  3:00 AM   Dressing Intervention New dressing 5/24/2018  4:00 AM   Dressing Status Biopatch in place;Clean;Dry;Intact 5/24/2018  4:00 AM   Dressing Change Due 05/31/18 5/24/2018  4:00 AM   Site Condition No complications 5/24/2018  3:00 AM   Dressing Occlusive 5/24/2018  3:00 AM   Daily Line Review Performed 5/24/2018  3:00 AM     Nutrition/Tube Feeds (if NPO " state why):  t feeds on hold    Labs:  ABG:   Recent Labs  Lab 05/24/18  0339   PH 7.431   PO2 147*   PCO2 43.5   HCO3 28.9*   POCSATURATED 99   BE 5     BMP:  Recent Labs  Lab 05/24/18  0809      K 4.5      CO2 28   BUN 7   CREATININE 1.5*   GLU 79   MG 1.9   PHOS 3.2     LFT: Lab Results   Component Value Date     (H) 05/24/2018     (H) 05/24/2018    ALKPHOS 125 05/24/2018    BILITOT 3.0 (H) 05/24/2018    ALBUMIN 2.6 (L) 05/24/2018    PROT 7.1 05/24/2018     CBC:   Lab Results   Component Value Date    WBC 12.69 05/24/2018    HGB 6.7 (L) 05/24/2018    HCT 24.1 (L) 05/24/2018    MCV 66 (L) 05/24/2018     05/24/2018     Microbiology x 7d:   Microbiology Results (last 7 days)     Procedure Component Value Units Date/Time    Culture, Respiratory with Gram Stain [[id number]] Collected:  05/22/18 1547    Order Status:  Completed Specimen:  Respiratory from Tracheal Aspirate Updated:  05/24/18 0807     Respiratory Culture Normal respiratory jeanna     Gram Stain (Respiratory) <10 epithelial cells per low power field.     Gram Stain (Respiratory) Rare WBC's     Gram Stain (Respiratory) No organisms seen    Narrative:       BAL    Blood culture [[id number]] Collected:  05/19/18 1650    Order Status:  Completed Specimen:  Blood from Peripheral, Antecubital, Right Updated:  05/23/18 2012     Blood Culture, Routine No Growth to date     Blood Culture, Routine No Growth to date     Blood Culture, Routine No Growth to date     Blood Culture, Routine No Growth to date     Blood Culture, Routine No Growth to date    Blood culture [[id number]] Collected:  05/19/18 1630    Order Status:  Completed Specimen:  Blood from Peripheral, Foot, Right Updated:  05/23/18 2012     Blood Culture, Routine No Growth to date     Blood Culture, Routine No Growth to date     Blood Culture, Routine No Growth to date     Blood Culture, Routine No Growth to date     Blood Culture, Routine No Growth to date    Culture,  Respiratory with Gram Stain [[id number]] Collected:  05/19/18 1803    Order Status:  Completed Specimen:  Respiratory from Sputum Updated:  05/21/18 1352     Respiratory Culture No growth     Gram Stain (Respiratory) <10 epithelial cells per low power field.     Gram Stain (Respiratory) Many WBC's     Gram Stain (Respiratory) Rare Gram positive cocci    Urine culture [[id number]] Collected:  05/19/18 1658    Order Status:  Completed Specimen:  Urine from Urine, Catheterized Updated:  05/20/18 2131     Urine Culture, Routine No growth    Narrative:       CHANGE CHEN PRIOR TO CULTURE        Imaging:  cxr unchanged      I personally reviewed the above image.    ASSESSMENT/PLAN:     Active Hospital Problems    Diagnosis    *Anoxic brain injury    Respiratory arrest    Acute ischemic vertebrobasilar artery cerebellar stroke    Cerebral infarction due to embolism of right cerebellar artery    Acute ischemic left MCA stroke    MARY (acute kidney injury)    Acute respiratory failure with hypoxia    Cardiac arrest    Class 3 severe obesity due to excess calories with serious comorbidity and body mass index (BMI) of 40.0 to 44.9 in adult    Seizure    Cardiogenic shock    Pulmonary edema    Essential hypertension    Metabolic acidosis    Acute renal failure with tubular necrosis    Cytotoxic brain edema    Hypothermia    Elevated INR    Acute decompensated heart failure            Plan:  PRBC  Follow BH  wean propofol  Follow exam  rrt  Follow LFT's  Echo      Critical secondary to Patient has a condition that poses threat to life and bodily function: arf/wean propofol/fgollow bp/svo2      Total cc time 37 mins     Critical care was time spent personally by me on the following activities: development of treatment plan with patient or surrogate and bedside caregivers, discussions with consultants, evaluation of patient's response to treatment, examination of patient, ordering and performing treatments and  interventions, ordering and review of laboratory studies, ordering and review of radiographic studies, pulse oximetry, re-evaluation of patient's condition. This critical care time did not overlap with that of any other provider or involve time for any procedures.

## 2018-05-24 NOTE — PROGRESS NOTES
[facility] Serg GARCIA and Dr Mukesh MD notified of pt's H&H 7.6/25.8. Orders received for occult blood of stool with next bowel movement. Will continue to monitor.

## 2018-05-24 NOTE — PLAN OF CARE
Problem: Patient Care Overview  Goal: Plan of Care Review  POC reviewed with pt and pt's family at 0500. Pt unable to verbalized understanding, pt's wife verbalized understanding and is agreeable to POC. Questions and concerns addressed. Emesis sent for occult blood study, occult blood positive. Sub q heparin stopped. Pt received 1 unit PRBC over night, H/H remains low, NCC made aware, orders given not to infuse second unit of PRBC. Levophed stopped during shift, BP remaining stable. Propofol infusing, titrated per order, sedation vacations preformed Q4H. Neuro status remained unchanged throughout shift. Pt remains intubated and on vent, abdominal breathing noted throughout shift, ABG WNL this am. Pt remained on CRRT throughout shift, tolerated treatment well. Pt remains critical. Will continue to monitor. See flowsheets for full assessment and VS info

## 2018-05-24 NOTE — PHYSICIAN QUERY
"PT Name: Los Mendez  MR #: [id number]    Physician Query Form - Hematology Clarification      CDS/: Keara Kulkarni RN< CDS               Contact information: [email or web address]    This form is a permanent document in the medical record.      Query Date: May 24, 2018    By submitting this query, we are merely seeking further clarification of documentation. Please utilize your independent clinical judgment when addressing the question(s) below.    The Medical record contains the following:   Indicators  Supporting Clinical Findings Location in Medical Record    "Anemia" documented     x H & H = H/H: 8.5/30.8->7.3/24.5->6.5/22.7   Labs: 5/19->5/23    BP =                     HR=      "GI bleeding" documented     x Acute bleeding (Non GI site) --Emesis sent for occult blood study, occult blood positive   Nursing POC Note 5/24 @ 634a   x Transfusion(s) PRBC x 2 units (only 1 given)   Orders    Treatment:     x Other:  --Cardiac arrest, liver dysfunction  --Elevated INR    - increased since PEA               - monitor INR, CBC     NCC H&P (Dr Cohen/Vince)     Provider, please specify diagnosis or diagnoses associated with above clinical findings.      [  X ] Acute blood loss anemia    [  ] Precipitous drop in Hematocrit    [  ] Other Hematological Diagnosis (please specify): _________________________________    [  ] Clinically Undetermined       Please document in your progress notes daily for the duration of treatment, until resolved, and include in your discharge summary.                                                                                                      "

## 2018-05-24 NOTE — PLAN OF CARE
05/24/18 1530   Discharge Reassessment   Assessment Type Discharge Planning Reassessment   Provided patient/caregiver education on the expected discharge date and the discharge plan No   Do you have any problems affording any of your prescribed medications? No   Discharge Plan A Rehab   Discharge Plan B Home with family   Patient choice form signed by patient/caregiver N/A   Can the patient answer the patient profile reliably? No, cognitively impaired   How does the patient rate their overall health at the present time? (cdoy)   Describe the patient's ability to walk at the present time. Does not walk or unable to take any steps at all   How often would a person be available to care for the patient? Whenever needed   Number of comorbid conditions (as recorded on the chart) Three   During the past month, has the patient often been bothered by feeling down, depressed or hopeless? (cody)   During the past month, has the patient often been bothered by little interest or pleasure in doing things? (cody)       Patient intubated on vent.    No health insurance listed.  If patient does not have insurance, no LTAC, SNF, or HH benefit.    If patient eligible for Medicaid, patient may have rehab.    Nisha Hansen RN, CCRN-K, [place] [facility]  Neuro-Critical Care   [phone]

## 2018-05-24 NOTE — NURSING
NCC made aware pt's H/H 6.5/22.7, drop from 7.2/24.8. No active signs of bleeding noted. Pt with diminished lung sounds in bilat lower lobes, patient currently abdominal breathing, RN was told in report from day shift RN that patient has been abdominal breathing throughout day shift. Patient's WBC currently 14.93, previously 12.46, Tmax today of 100.2F.     Orders received for 1 Unit PRBC to infuse over 4 hours. Recheck H/H with AM labs, second unit ordered for if AM H/H still low. Stat CXR ordered.    RN to monitor.

## 2018-05-24 NOTE — PROGRESS NOTES
[facility]  Cardiology  Progress Note    Patient Name: Los Mendez  MRN: [id number]  Admission Date: 5/19/2018  Hospital Length of Stay: 5 days  Code Status: Full Code   Attending Physician: Phil Cervantes MD   Primary Care Physician: Provider Notinsystem  Expected Discharge Date:   Principal Problem:Anoxic brain injury    Subjective:     Hospital Course:   05/24/2018 patient off of Levo, BP WNL, propofol continued, CRRT continued, Hb 6.7 after 1u pRBC    Interval History: see hospital course    Review of Systems   Unable to perform ROS: intubated     Objective:     Vital Signs (Most Recent):  Temp: 99.7 °F (37.6 °C) (05/24/18 0905)  Pulse: 86 (05/24/18 0905)  Resp: 15 (05/24/18 0905)  BP: 128/70 (05/24/18 0905)  SpO2: 100 % (05/24/18 0905) Vital Signs (24h Range):  Temp:  [99 °F (37.2 °C)-100.2 °F (37.9 °C)] 99.7 °F (37.6 °C)  Pulse:  [] 86  Resp:  [10-32] 15  SpO2:  [92 %-100 %] 100 %  BP: (102-143)/(59-94) 128/70  Arterial Line BP: (103-136)/(56-84) 132/73     Weight: (!) 144.6 kg (318 lb 12.6 oz)  Body mass index is 48.47 kg/m².     SpO2: 100 %  O2 Device (Oxygen Therapy): ventilator      Intake/Output Summary (Last 24 hours) at 05/24/18 0917  Last data filed at 05/24/18 0905   Gross per 24 hour   Intake          6527.56 ml   Output             8531 ml   Net         -2003.44 ml       Lines/Drains/Airways     Central Venous Catheter Line                 Percutaneous Central Line Insertion/Assessment - triple lumen  05/19/18 right internal jugular 5 days         Trialysis (Dialysis) Catheter 05/21/18 1430 left femoral 2 days          Drain                 NG/OG Tube 05/19/18 orogastric 5 days         Urethral Catheter 05/19/18 Temperature probe 5 days          Airway                 Airway - Non-Surgical 05/18/18 0900 Endotracheal Tube 6 days          Arterial Line                 Arterial Line 05/19/18 1654 Right Radial 4 days          Peripheral Intravenous Line                  Peripheral IV - Single Lumen 05/19/18 Right Hand 5 days                Physical Exam   Constitutional: Vital signs are normal. He appears well-nourished. He appears ill. He is sedated and intubated.   HENT:   Head: Normocephalic and atraumatic.   Cardiovascular: Normal rate, regular rhythm, S1 normal, S2 normal and intact distal pulses.    Pulmonary/Chest: He is intubated.   Coarse intubated breath sounds  Patient appears to be breath stacking   Abdominal: Soft. Bowel sounds are normal. He exhibits distension.   Musculoskeletal: He exhibits edema.   Neurological: He is unresponsive.   Nursing note and vitals reviewed.      Significant Labs:   Recent Results (from the past 24 hour(s))   Magnesium    Collection Time: 05/23/18 12:08 PM   Result Value Ref Range    Magnesium 2.1 1.6 - 2.6 mg/dL   Comprehensive metabolic panel    Collection Time: 05/23/18 12:08 PM   Result Value Ref Range    Sodium 136 136 - 145 mmol/L    Potassium 4.2 3.5 - 5.1 mmol/L    Chloride 98 95 - 110 mmol/L    CO2 28 23 - 29 mmol/L    Glucose 78 70 - 110 mg/dL    BUN, Bld 15 6 - 20 mg/dL    Creatinine 2.5 (H) 0.5 - 1.4 mg/dL    Calcium 8.4 (L) 8.7 - 10.5 mg/dL    Total Protein 6.9 6.0 - 8.4 g/dL    Albumin 2.7 (L) 3.5 - 5.2 g/dL    Total Bilirubin 2.7 (H) 0.1 - 1.0 mg/dL    Alkaline Phosphatase 110 55 - 135 U/L     (H) 10 - 40 U/L     (H) 10 - 44 U/L    Anion Gap 10 8 - 16 mmol/L    eGFR if African American 34.1 (A) >60 mL/min/1.73 m^2    eGFR if non African American 29.5 (A) >60 mL/min/1.73 m^2   Phosphorus    Collection Time: 05/23/18 12:08 PM   Result Value Ref Range    Phosphorus 3.5 2.7 - 4.5 mg/dL   POCT glucose    Collection Time: 05/23/18 12:10 PM   Result Value Ref Range    POCT Glucose 85 70 - 110 mg/dL   Renal function panel    Collection Time: 05/23/18  2:11 PM   Result Value Ref Range    Glucose 77 70 - 110 mg/dL    Sodium 138 136 - 145 mmol/L    Potassium 4.4 3.5 - 5.1 mmol/L    Chloride 100 95 - 110 mmol/L    CO2 26  23 - 29 mmol/L    BUN, Bld 13 6 - 20 mg/dL    Calcium 8.5 (L) 8.7 - 10.5 mg/dL    Creatinine 2.3 (H) 0.5 - 1.4 mg/dL    Albumin 2.7 (L) 3.5 - 5.2 g/dL    Phosphorus 3.5 2.7 - 4.5 mg/dL    eGFR if  37.7 (A) >60 mL/min/1.73 m^2    eGFR if non  32.6 (A) >60 mL/min/1.73 m^2    Anion Gap 12 8 - 16 mmol/L   Magnesium    Collection Time: 05/23/18  2:11 PM   Result Value Ref Range    Magnesium 1.9 1.6 - 2.6 mg/dL   Magnesium    Collection Time: 05/23/18  4:01 PM   Result Value Ref Range    Magnesium 1.9 1.6 - 2.6 mg/dL   Comprehensive metabolic panel    Collection Time: 05/23/18  4:01 PM   Result Value Ref Range    Sodium 138 136 - 145 mmol/L    Potassium 4.4 3.5 - 5.1 mmol/L    Chloride 101 95 - 110 mmol/L    CO2 28 23 - 29 mmol/L    Glucose 78 70 - 110 mg/dL    BUN, Bld 12 6 - 20 mg/dL    Creatinine 2.0 (H) 0.5 - 1.4 mg/dL    Calcium 8.5 (L) 8.7 - 10.5 mg/dL    Total Protein 6.7 6.0 - 8.4 g/dL    Albumin 2.6 (L) 3.5 - 5.2 g/dL    Total Bilirubin 2.8 (H) 0.1 - 1.0 mg/dL    Alkaline Phosphatase 111 55 - 135 U/L     (H) 10 - 40 U/L     (H) 10 - 44 U/L    Anion Gap 9 8 - 16 mmol/L    eGFR if African American 44.6 (A) >60 mL/min/1.73 m^2    eGFR if non  38.6 (A) >60 mL/min/1.73 m^2   Troponin I    Collection Time: 05/23/18  4:01 PM   Result Value Ref Range    Troponin I 0.279 (H) 0.000 - 0.026 ng/mL   Phosphorus    Collection Time: 05/23/18  4:01 PM   Result Value Ref Range    Phosphorus 3.3 2.7 - 4.5 mg/dL   ISTAT PROCEDURE    Collection Time: 05/23/18  4:44 PM   Result Value Ref Range    POC PH 7.357 7.35 - 7.45    POC PCO2 52.8 (H) 35 - 45 mmHg    POC PO2 22 (LL) 40 - 60 mmHg    POC HCO3 29.6 (H) 24 - 28 mmol/L    POC BE 4 -2 to 2 mmol/L    POC SATURATED O2 34 (L) 95 - 100 %    POC TCO2 31 (H) 24 - 29 mmol/L    Rate 20     Sample VENOUS     Site Other     Allens Test N/A     DelSys Adult Vent     Mode PCV     Vt 412     PEEP 8     PiP 25     MAP 16     FiO2 40      Sp02 99     IP 16     IT .90    POCT glucose    Collection Time: 05/23/18  5:53 PM   Result Value Ref Range    POCT Glucose 64 (L) 70 - 110 mg/dL   ISTAT PROCEDURE    Collection Time: 05/23/18  6:14 PM   Result Value Ref Range    POC PH 7.371 7.35 - 7.45    POC PCO2 50.5 (H) 35 - 45 mmHg    POC PO2 35 (LL) 40 - 60 mmHg    POC HCO3 29.2 (H) 24 - 28 mmol/L    POC BE 4 -2 to 2 mmol/L    POC SATURATED O2 64 (L) 95 - 100 %    POC TCO2 31 (H) 24 - 29 mmol/L    Rate 25     Sample VENOUS     Site Other     Allens Test N/A     DelSys Adult Vent     Mode PCV     Vt 475     PEEP 8     PiP 26     MAP 85     FiO2 40     Sp02 100     IP 16     IT .90    POCT glucose    Collection Time: 05/23/18  6:32 PM   Result Value Ref Range    POCT Glucose 108 70 - 110 mg/dL   Magnesium    Collection Time: 05/23/18  7:32 PM   Result Value Ref Range    Magnesium 2.0 1.6 - 2.6 mg/dL   Comprehensive metabolic panel    Collection Time: 05/23/18  7:32 PM   Result Value Ref Range    Sodium 138 136 - 145 mmol/L    Potassium 4.4 3.5 - 5.1 mmol/L    Chloride 100 95 - 110 mmol/L    CO2 29 23 - 29 mmol/L    Glucose 94 70 - 110 mg/dL    BUN, Bld 10 6 - 20 mg/dL    Creatinine 1.8 (H) 0.5 - 1.4 mg/dL    Calcium 8.6 (L) 8.7 - 10.5 mg/dL    Total Protein 7.1 6.0 - 8.4 g/dL    Albumin 2.8 (L) 3.5 - 5.2 g/dL    Total Bilirubin 3.0 (H) 0.1 - 1.0 mg/dL    Alkaline Phosphatase 120 55 - 135 U/L     (H) 10 - 40 U/L     (H) 10 - 44 U/L    Anion Gap 9 8 - 16 mmol/L    eGFR if African American 50.7 (A) >60 mL/min/1.73 m^2    eGFR if non  43.8 (A) >60 mL/min/1.73 m^2   CBC auto differential    Collection Time: 05/23/18  7:32 PM   Result Value Ref Range    WBC 14.93 (H) 3.90 - 12.70 K/uL    RBC 3.63 (L) 4.60 - 6.20 M/uL    Hemoglobin 6.5 (L) 14.0 - 18.0 g/dL    Hematocrit 22.7 (L) 40.0 - 54.0 %    MCV 63 (L) 82 - 98 fL    MCH 17.9 (L) 27.0 - 31.0 pg    MCHC 28.6 (L) 32.0 - 36.0 g/dL    RDW 20.3 (H) 11.5 - 14.5 %    Platelets 198 150 -  350 K/uL    MPV 10.1 9.2 - 12.9 fL    Immature Granulocytes 1.7 (H) 0.0 - 0.5 %    Gran # (ANC) 10.4 (H) 1.8 - 7.7 K/uL    Immature Grans (Abs) 0.26 (H) 0.00 - 0.04 K/uL    Lymph # 1.4 1.0 - 4.8 K/uL    Mono # 2.9 (H) 0.3 - 1.0 K/uL    Eos # 0.1 0.0 - 0.5 K/uL    Baso # 0.05 0.00 - 0.20 K/uL    nRBC 1 (A) 0 /100 WBC    Gran% 69.5 38.0 - 73.0 %    Lymph% 9.0 (L) 18.0 - 48.0 %    Mono% 19.1 (H) 4.0 - 15.0 %    Eosinophil% 0.4 0.0 - 8.0 %    Basophil% 0.3 0.0 - 1.9 %    Aniso Slight     Poik Slight     Poly Occasional     Hypo Moderate     Target Cells Occasional     [place] Cells Occasional     Differential Method Automated    Renal function panel    Collection Time: 05/23/18  9:50 PM   Result Value Ref Range    Glucose 83 70 - 110 mg/dL    Sodium 139 136 - 145 mmol/L    Potassium 4.5 3.5 - 5.1 mmol/L    Chloride 100 95 - 110 mmol/L    CO2 31 (H) 23 - 29 mmol/L    BUN, Bld 9 6 - 20 mg/dL    Calcium 9.0 8.7 - 10.5 mg/dL    Creatinine 1.8 (H) 0.5 - 1.4 mg/dL    Albumin 2.7 (L) 3.5 - 5.2 g/dL    Phosphorus 3.5 2.7 - 4.5 mg/dL    eGFR if  50.7 (A) >60 mL/min/1.73 m^2    eGFR if non  43.8 (A) >60 mL/min/1.73 m^2    Anion Gap 8 8 - 16 mmol/L   Magnesium    Collection Time: 05/23/18  9:50 PM   Result Value Ref Range    Magnesium 1.9 1.6 - 2.6 mg/dL   Protime-INR    Collection Time: 05/23/18  9:50 PM   Result Value Ref Range    Prothrombin Time 12.2 9.0 - 12.5 sec    INR 1.2 0.8 - 1.2   Occult blood, other sources    Collection Time: 05/23/18  9:51 PM   Result Value Ref Range    Occult Blood Positive (A) Negative   Magnesium    Collection Time: 05/24/18 12:14 AM   Result Value Ref Range    Magnesium 1.9 1.6 - 2.6 mg/dL   Comprehensive metabolic panel    Collection Time: 05/24/18 12:14 AM   Result Value Ref Range    Sodium 138 136 - 145 mmol/L    Potassium 4.6 3.5 - 5.1 mmol/L    Chloride 99 95 - 110 mmol/L    CO2 29 23 - 29 mmol/L    Glucose 91 70 - 110 mg/dL    BUN, Bld 8 6 - 20 mg/dL     Creatinine 1.8 (H) 0.5 - 1.4 mg/dL    Calcium 8.8 8.7 - 10.5 mg/dL    Total Protein 7.3 6.0 - 8.4 g/dL    Albumin 2.8 (L) 3.5 - 5.2 g/dL    Total Bilirubin 3.1 (H) 0.1 - 1.0 mg/dL    Alkaline Phosphatase 128 55 - 135 U/L     (H) 10 - 40 U/L     (H) 10 - 44 U/L    Anion Gap 10 8 - 16 mmol/L    eGFR if African American 50.7 (A) >60 mL/min/1.73 m^2    eGFR if non  43.8 (A) >60 mL/min/1.73 m^2   Troponin I    Collection Time: 05/24/18 12:14 AM   Result Value Ref Range    Troponin I 0.267 (H) 0.000 - 0.026 ng/mL   Phosphorus    Collection Time: 05/24/18 12:14 AM   Result Value Ref Range    Phosphorus 3.8 2.7 - 4.5 mg/dL   CBC auto differential    Collection Time: 05/24/18 12:14 AM   Result Value Ref Range    WBC 15.04 (H) 3.90 - 12.70 K/uL    RBC 3.68 (L) 4.60 - 6.20 M/uL    Hemoglobin 6.9 (L) 14.0 - 18.0 g/dL    Hematocrit 24.2 (L) 40.0 - 54.0 %    MCV 66 (L) 82 - 98 fL    MCH 18.8 (L) 27.0 - 31.0 pg    MCHC 28.5 (L) 32.0 - 36.0 g/dL    RDW 22.4 (H) 11.5 - 14.5 %    Platelets 209 150 - 350 K/uL    MPV 10.2 9.2 - 12.9 fL    Immature Granulocytes 1.6 (H) 0.0 - 0.5 %    Gran # (ANC) 10.4 (H) 1.8 - 7.7 K/uL    Immature Grans (Abs) 0.24 (H) 0.00 - 0.04 K/uL    Lymph # 1.3 1.0 - 4.8 K/uL    Mono # 3.0 (H) 0.3 - 1.0 K/uL    Eos # 0.1 0.0 - 0.5 K/uL    Baso # 0.06 0.00 - 0.20 K/uL    nRBC 1 (A) 0 /100 WBC    Gran% 69.3 38.0 - 73.0 %    Lymph% 8.5 (L) 18.0 - 48.0 %    Mono% 19.7 (H) 4.0 - 15.0 %    Eosinophil% 0.5 0.0 - 8.0 %    Basophil% 0.4 0.0 - 1.9 %    Platelet Estimate Appears normal     Aniso Moderate     Poik Moderate     Poly Occasional     Target Cells Moderate     Large/Giant Platelets Present     Differential Method Automated    POCT glucose    Collection Time: 05/24/18 12:16 AM   Result Value Ref Range    POCT Glucose 99 70 - 110 mg/dL   ISTAT PROCEDURE    Collection Time: 05/24/18  3:39 AM   Result Value Ref Range    POC PH 7.431 7.35 - 7.45    POC PCO2 43.5 35 - 45 mmHg    POC PO2 147  (H) 80 - 100 mmHg    POC HCO3 28.9 (H) 24 - 28 mmol/L    POC BE 5 -2 to 2 mmol/L    POC SATURATED O2 99 95 - 100 %    POC TCO2 30 (H) 23 - 27 mmol/L    Rate 20     Sample ARTERIAL     Site Gosia/UAC     Allens Test N/A     DelSys Adult Vent     Mode AC/PRVC     PEEP 8     PiP 16     FiO2 40    Magnesium    Collection Time: 05/24/18  4:08 AM   Result Value Ref Range    Magnesium 1.9 1.6 - 2.6 mg/dL   APTT    Collection Time: 05/24/18  4:08 AM   Result Value Ref Range    aPTT 30.1 21.0 - 32.0 sec   Comprehensive metabolic panel    Collection Time: 05/24/18  4:08 AM   Result Value Ref Range    Sodium 138 136 - 145 mmol/L    Potassium 4.5 3.5 - 5.1 mmol/L    Chloride 100 95 - 110 mmol/L    CO2 26 23 - 29 mmol/L    Glucose 79 70 - 110 mg/dL    BUN, Bld 7 6 - 20 mg/dL    Creatinine 1.7 (H) 0.5 - 1.4 mg/dL    Calcium 8.7 8.7 - 10.5 mg/dL    Total Protein 7.1 6.0 - 8.4 g/dL    Albumin 2.6 (L) 3.5 - 5.2 g/dL    Total Bilirubin 3.0 (H) 0.1 - 1.0 mg/dL    Alkaline Phosphatase 125 55 - 135 U/L     (H) 10 - 40 U/L     (H) 10 - 44 U/L    Anion Gap 12 8 - 16 mmol/L    eGFR if African American 54.3 (A) >60 mL/min/1.73 m^2    eGFR if non  47.0 (A) >60 mL/min/1.73 m^2   Vancomycin, random    Collection Time: 05/24/18  4:08 AM   Result Value Ref Range    Vancomycin, Random 16.5 Not established ug/mL   Renal function panel    Collection Time: 05/24/18  4:08 AM   Result Value Ref Range    Glucose 79 70 - 110 mg/dL    Sodium 138 136 - 145 mmol/L    Potassium 4.5 3.5 - 5.1 mmol/L    Chloride 100 95 - 110 mmol/L    CO2 26 23 - 29 mmol/L    BUN, Bld 7 6 - 20 mg/dL    Calcium 8.7 8.7 - 10.5 mg/dL    Creatinine 1.7 (H) 0.5 - 1.4 mg/dL    Albumin 2.6 (L) 3.5 - 5.2 g/dL    Phosphorus 3.3 2.7 - 4.5 mg/dL    eGFR if  54.3 (A) >60 mL/min/1.73 m^2    eGFR if non  47.0 (A) >60 mL/min/1.73 m^2    Anion Gap 12 8 - 16 mmol/L   Magnesium    Collection Time: 05/24/18  4:08 AM   Result Value  Ref Range    Magnesium 1.9 1.6 - 2.6 mg/dL   Protime-INR    Collection Time: 05/24/18  4:08 AM   Result Value Ref Range    Prothrombin Time 10.7 9.0 - 12.5 sec    INR 1.0 0.8 - 1.2   CBC auto differential    Collection Time: 05/24/18  4:08 AM   Result Value Ref Range    WBC 12.69 3.90 - 12.70 K/uL    RBC 3.64 (L) 4.60 - 6.20 M/uL    Hemoglobin 6.7 (L) 14.0 - 18.0 g/dL    Hematocrit 24.1 (L) 40.0 - 54.0 %    MCV 66 (L) 82 - 98 fL    MCH 18.4 (L) 27.0 - 31.0 pg    MCHC 27.8 (L) 32.0 - 36.0 g/dL    RDW 22.6 (H) 11.5 - 14.5 %    Platelets 182 150 - 350 K/uL    MPV 10.1 9.2 - 12.9 fL    Immature Granulocytes 1.4 (H) 0.0 - 0.5 %    Gran # (ANC) 8.5 (H) 1.8 - 7.7 K/uL    Immature Grans (Abs) 0.18 (H) 0.00 - 0.04 K/uL    Lymph # 1.4 1.0 - 4.8 K/uL    Mono # 2.5 (H) 0.3 - 1.0 K/uL    Eos # 0.1 0.0 - 0.5 K/uL    Baso # 0.04 0.00 - 0.20 K/uL    nRBC 1 (A) 0 /100 WBC    Gran% 67.0 38.0 - 73.0 %    Lymph% 11.0 (L) 18.0 - 48.0 %    Mono% 19.4 (H) 4.0 - 15.0 %    Eosinophil% 0.9 0.0 - 8.0 %    Basophil% 0.3 0.0 - 1.9 %    Platelet Estimate Appears normal     Aniso Slight     Poik Slight     Poly Occasional     Hypo Occasional     Target Cells Occasional     Spherocytes Occasional     Differential Method Automated    POCT glucose    Collection Time: 05/24/18  6:46 AM   Result Value Ref Range    POCT Glucose 91 70 - 110 mg/dL   Magnesium    Collection Time: 05/24/18  8:09 AM   Result Value Ref Range    Magnesium 1.9 1.6 - 2.6 mg/dL   Comprehensive metabolic panel    Collection Time: 05/24/18  8:09 AM   Result Value Ref Range    Sodium 140 136 - 145 mmol/L    Potassium 4.5 3.5 - 5.1 mmol/L    Chloride 101 95 - 110 mmol/L    CO2 28 23 - 29 mmol/L    Glucose 79 70 - 110 mg/dL    BUN, Bld 7 6 - 20 mg/dL    Creatinine 1.5 (H) 0.5 - 1.4 mg/dL    Calcium 8.7 8.7 - 10.5 mg/dL    Total Protein 7.1 6.0 - 8.4 g/dL    Albumin 2.6 (L) 3.5 - 5.2 g/dL    Total Bilirubin 3.0 (H) 0.1 - 1.0 mg/dL    Alkaline Phosphatase 125 55 - 135 U/L      (H) 10 - 40 U/L     (H) 10 - 44 U/L    Anion Gap 11 8 - 16 mmol/L    eGFR if African American >60.0 >60 mL/min/1.73 m^2    eGFR if non  54.7 (A) >60 mL/min/1.73 m^2   Phosphorus    Collection Time: 05/24/18  8:09 AM   Result Value Ref Range    Phosphorus 3.2 2.7 - 4.5 mg/dL         Significant Imaging: Echocardiogram:   2D echo with color flow doppler:   Results for orders placed or performed during the hospital encounter of 05/19/18   2D echo with color flow doppler   Result Value Ref Range    EF 10 (A) 55 - 65    Mitral Valve Regurgitation MODERATE (A)     Mitral Valve Mobility NORMAL     Tricuspid Valve Regurgitation MILD      Assessment and Plan:       Acute decompensated heart failure    Patient with dilated cardiomyopathy EF looks about 10-15% and LVEDD is 7.3 cm, unknown etiology at this time but could be related to heavy alcohol use as he drinks about 1 case of beer and 2/5 of vodka per day. Per the family he has not felt well over the past 2 weeks and had progressive shortness of breath. It is very likely that he was acutely decompensated and had VT or VF as the etiology of his syncope and cardiac arrest.     Recommendations:  - Currently hypervolemic CVP was 14. Anuric. Continue CRRT as CVP remains elevated in the setting of CM of 10-15% with goal CVP of 10.   - would start with hydralazine 10mg tid and isordil 10mg tid for afterload reduction  - would transfuse for goal Hb >8  - if/when patient does recover from a neurological standpoint, patient will require ischemic evaluation and ICD    Cardiology will sign off at this point - please re-consult when patient has made significant neurological recovery/response    Discussed with Dr. Archer            VTE Risk Mitigation         Ordered     Place sequential compression device  Until discontinued      05/19/18 1529     IP VTE LOW RISK PATIENT  Once      05/19/18 1529          Dank Dolan MD  Cardiology  [facility]  [place]

## 2018-05-24 NOTE — PROGRESS NOTES
[facility]  Nephrology  Progress Note    Patient Name: Los Mendez  MRN: [id number]  Admission Date: 5/19/2018  Hospital Length of Stay: 5 days  Attending Provider: Phil Cervantes MD   Primary Care Physician: Provider Notinsystem  Principal Problem:Anoxic brain injury    Subjective:     HPI: Los Mendez is a 47 year old male with past medical history of HTN, heavy alcohol user and COPD. Transferred from [facility] following witnessed cardiac arrest while in the ED waiting room. Patient had presented complaining of upper respiratory track infection and shortness of breath symptoms, while in the ED waiting room he was noted to be choking, became apneic, which led to witnessed seizure like activity followed by cardiac arrest. Per discussion with ED staff, patient required about 22 minutes of ACLS/5 shocks before returning to normal sinus rhythm. During intubation a lozenge was removed from patients airway. Hyperthermia protocol was initiated prior to transferring to [facility]. On arrival to [facility] Arrived with increased serum creatinine from 2.9-->5.1 (unkown baseline), BUN 35--> 52, Trop 2.4-->1.3, Lactic acidosis 6.4-->1.3, chest x ray with increased parenchymal interstitial attenuation and parenchymal opacities suggestive of pulmonary edema. On mechanical ventilation with a FiO2 50%. Currently also anuric at present moment. Per cardiology patient has a dilated cardiomyopathy EF looks about 10-15% and LVEDD is 7.3 cm.    Interval History:   Patient evaluated at bedside with wife present. Has been on SLED, yesterday had clotted and was started back on treatment. Venous pressure has been increased. Continues on mechanical ventilation. Total intake 6.2 L and Output 8.4 L. For Net negative 2.2 L .     Review of patient's allergies indicates:   Allergen Reactions    Penicillins      Tolerated cefepime May 2018     Current Facility-Administered Medications   Medication Frequency    0.9%  NaCl infusion (for  blood administration) Q24H PRN    acetaminophen tablet 325 mg Q6H PRN    albuterol-ipratropium 2.5 mg-0.5 mg/3 mL nebulizer solution 3 mL Q6H    busPIRone tablet 10 mg TID    ceFEPIme injection 2 g Q8H    chlorhexidine 0.12 % solution 15 mL BID    dexmedetomidine (PRECEDEX) 400mcg/100mL 0.9% NaCL infusion Continuous    dextrose 50% injection 12.5 g PRN    fentaNYL injection 25 mcg Q2H PRN    glucagon (human recombinant) injection 1 mg PRN    insulin aspart U-100 pen 1-10 Units Q6H PRN    norepinephrine 8 mg in dextrose 5 % 250 mL infusion Continuous    ondansetron 4 mg/5 mL solution 4 mg Q8H PRN    pantoprazole 40 mg in dextrose 5 % 100 mL infusion (ready to mix system) BID    propofol (DIPRIVAN) 10 mg/mL infusion Continuous    senna-docusate 8.6-50 mg per tablet 1 tablet Daily    sodium chloride 0.9% flush 3 mL PRN    vancomycin (VANCOCIN) 1,250 mg in sodium chloride 0.9% 250 mL IVPB Q24H       Objective:     Vital Signs (Most Recent):  Temp: 99.5 °F (37.5 °C) (05/24/18 1005)  Pulse: 84 (05/24/18 1005)  Resp: (!) 8 (05/24/18 1005)  BP: 129/76 (05/24/18 1005)  SpO2: 100 % (05/24/18 1005)  O2 Device (Oxygen Therapy): ventilator (05/24/18 1005) Vital Signs (24h Range):  Temp:  [99 °F (37.2 °C)-100.2 °F (37.9 °C)] 99.5 °F (37.5 °C)  Pulse:  [] 84  Resp:  [8-31] 8  SpO2:  [92 %-100 %] 100 %  BP: (102-143)/(59-94) 129/76  Arterial Line BP: (103-136)/(56-84) 130/72     Weight: (!) 144.6 kg (318 lb 12.6 oz) (05/22/18 0500)  Body mass index is 48.47 kg/m².  Body surface area is 2.63 meters squared.    I/O last 3 completed shifts:  In: 9344.8 [I.V.:9049.8; Blood:250; NG/GT:45]  Out: 99682 [Urine:47; Other:76209]    Physical Exam   Constitutional: He is intubated.   Obesity    HENT:   Head: Normocephalic and atraumatic.   Right Ear: External ear normal.   Left Ear: External ear normal.   Eyes: Right eye exhibits no discharge. Left eye exhibits no discharge.   Neck: No JVD present.   Cardiovascular:  Normal rate.    Pulmonary/Chest: Effort normal. He is intubated.   Abdominal: Soft.   Musculoskeletal: He exhibits edema.   Neurological: He is unresponsive.   Skin: Skin is warm.       Significant Labs:  ABGs:   Recent Labs  Lab 05/24/18  0339   PH 7.431   PCO2 43.5   HCO3 28.9*   POCSATURATED 99   BE 5     BMP:   Recent Labs  Lab 05/24/18  0809   GLU 79      CO2 28   BUN 7   CREATININE 1.5*   CALCIUM 8.7   MG 1.9     CBC:   Recent Labs  Lab 05/24/18  0408   WBC 12.69   RBC 3.64*   HGB 6.7*   HCT 24.1*      MCV 66*   MCH 18.4*   MCHC 27.8*     CMP:   Recent Labs  Lab 05/24/18  0809   GLU 79   CALCIUM 8.7   ALBUMIN 2.6*   PROT 7.1      K 4.5   CO2 28      BUN 7   CREATININE 1.5*   ALKPHOS 125   *   *   BILITOT 3.0*     All labs within the past 24 hours have been reviewed.     Assessment/Plan:     MARY (acute kidney injury)    Los Mendez is a 47 year old male who is consulted for MARY, which is mostly secondary to iATN from previous cardiac arrest where he presented with hypotension which decrease perfusion to the kidneys (required to be started on pressors) and currently have component of cardiorenal  with dilated cardiomyopathy with EF 10-15%.    Plan:  - Continues to be anuric  - Will switch to SCUF today for volume assistance, -450 cc/hr as tolerated by patient, maintain MAP>65  - Currently of pressors this morning and blood pressures have been stable.    - Last CVP =14, goal of 10.   - On mechanical ventilation, had decrease of oxygen requirement to a FiO2 40 %.   - Renal ultrasound without any sign of obstruction, normal size kidney.   - Urine sediment with abundant hyaline cast, RBCs (non dysmorphic) and few muddy brown, cast confirming ATN, no crystals.   - Avoid nephrotoxic medications.                 - If patient clots again will require cath flow before started again.         Sascha Leone  Nephrology  Fellow  [facility]  [facility]    Pager [phone]    Patient seen and examined with Dr Alcantar;   I have reviewed and agree with assessment and plan

## 2018-05-24 NOTE — PROGRESS NOTES
" [facility]  Adult Nutrition  Progress Note    SUMMARY       Recommendations    Recommendation/Intervention:   Pt NPO x5 days. As medically able, recommend starting TF.   Novasource Renal @ goal rate 55mL/hr.   - With propofol, recommend increasing TF to 40mL/hr.   - Once propofol discontinued, increase TF to goal of 55mL/hr.   - Hold for residuals >500mL.     RD to monitor.    Goals: Pt to receive nutrition by RD follow up  Nutrition Goal Status: new  Communication of RD Recs: reviewed with RN    Reason for Assessment    Reason for Assessment: NPO/clear liquids x 5 days  Diagnosis: other (see comments) (anoxic brain injury)  Relevant Medical History: HTN, COPD, obesity  Interdisciplinary Rounds: attended  General Information Comments: Pt intubated and sedated. NPO x 5 days. SLED yesterday.  Nutrition Discharge Planning: unable to determine at this time    Nutrition Risk Screen    Nutrition Risk Screen: no indicators present    Nutrition/Diet History    Typical Food/Fluid Intake: LEVI intake PTA. [facility]  Food Preferences: LEVI Taoism/cultural food preferences at this time  Do you have any cultural, spiritual, Taoism conflicts, given your current situation?: none  Factors Affecting Nutritional Intake: NPO, on mechanical ventilation    Anthropometrics    Temp: 99.3 °F (37.4 °C)  Height: 5' 8" (172.7 cm)  Height (inches): 68 in  Weight Method: Bed Scale  Weight: (!) 144.6 kg (318 lb 12.6 oz)  Weight (lb): (!) 318.79 lb  Ideal Body Weight (IBW), Male: 154 lb  % Ideal Body Weight, Male (lb): 180.38 lb  BMI (Calculated): 42.3  BMI Grade: greater than 40 - morbid obesity       Lab/Procedures/Meds    Pertinent Labs Reviewed: reviewed  Pertinent Labs Comments: HgbA1c 5.7  Pertinent Medications Reviewed: reviewed  Pertinent Medications Comments: precedex, norepinephrine, propofol    Physical Findings/Assessment    Overall Physical Appearance: obese, edematous, on ventilator " support  Tubes: orogastric tube  Skin: edema    Estimated/Assessed Needs    Weight Used For Calorie Calculations: (!) 144.6 kg (318 lb 12.6 oz)  Energy Calorie Requirements (kcal): 8054-3396  Energy Need Method: Kcal/kg  Protein Requirements: 175g (2.5g/kg)  Weight Used For Protein Calculations: 70 kg (154 lb 5.2 oz)  Fluid Requirements (mL): 1mL/kcal or per MD     RDA Method (mL): 1590         Nutrition Prescription Ordered    Current Diet Order: NPO    Evaluation of Received Nutrient/Fluid Intake    Lipid Calories (kcals): 802 kcals (propofol @ 30.4 mL/hr)  % Intake of Estimated Energy Needs: 0 - 25 %  % Meal Intake: NPO    Nutrition Risk    Level of Risk/Frequency of Follow-up:  (f/u 2x/week)     Assessment and Plan    * Anoxic brain injury    Contributing Nutrition Diagnosis  Inadequate energy intake    Related to (etiology):   NPO, no alternative means of nutrition    Signs and Symptoms (as evidenced by):   Pt receiving <85% EEN and EPN.     Interventions/Recommendations (treatment strategy):  Please see RD recs above.    Nutrition Diagnosis Status:   New               Monitor and Evaluation    Food and Nutrient Intake: enteral nutrition intake  Food and Nutrient Adminstration: enteral and parenteral nutrition administration  Anthropometric Measurements: weight, weight change, body mass index  Biochemical Data, Medical Tests and Procedures: electrolyte and renal panel, glucose/endocrine profile, gastrointestinal profile, inflammatory profile, lipid profile  Nutrition-Focused Physical Findings: overall appearance     Nutrition Follow-Up    RD Follow-up?: Yes

## 2018-05-24 NOTE — SUBJECTIVE & OBJECTIVE
Interval History:   Patient evaluated at bedside with wife present. Has been on SLED, yesterday had clotted and was started back on treatment. Venous pressure has been increased. Continues on mechanical ventilation. Total intake 6.2 L and Output 8.4 L. For Net negative 2.2 L .     Review of patient's allergies indicates:   Allergen Reactions    Penicillins      Tolerated cefepime May 2018     Current Facility-Administered Medications   Medication Frequency    0.9%  NaCl infusion (for blood administration) Q24H PRN    acetaminophen tablet 325 mg Q6H PRN    albuterol-ipratropium 2.5 mg-0.5 mg/3 mL nebulizer solution 3 mL Q6H    busPIRone tablet 10 mg TID    ceFEPIme injection 2 g Q8H    chlorhexidine 0.12 % solution 15 mL BID    dexmedetomidine (PRECEDEX) 400mcg/100mL 0.9% NaCL infusion Continuous    dextrose 50% injection 12.5 g PRN    fentaNYL injection 25 mcg Q2H PRN    glucagon (human recombinant) injection 1 mg PRN    insulin aspart U-100 pen 1-10 Units Q6H PRN    norepinephrine 8 mg in dextrose 5 % 250 mL infusion Continuous    ondansetron 4 mg/5 mL solution 4 mg Q8H PRN    pantoprazole 40 mg in dextrose 5 % 100 mL infusion (ready to mix system) BID    propofol (DIPRIVAN) 10 mg/mL infusion Continuous    senna-docusate 8.6-50 mg per tablet 1 tablet Daily    sodium chloride 0.9% flush 3 mL PRN    vancomycin (VANCOCIN) 1,250 mg in sodium chloride 0.9% 250 mL IVPB Q24H       Objective:     Vital Signs (Most Recent):  Temp: 99.5 °F (37.5 °C) (05/24/18 1005)  Pulse: 84 (05/24/18 1005)  Resp: (!) 8 (05/24/18 1005)  BP: 129/76 (05/24/18 1005)  SpO2: 100 % (05/24/18 1005)  O2 Device (Oxygen Therapy): ventilator (05/24/18 1005) Vital Signs (24h Range):  Temp:  [99 °F (37.2 °C)-100.2 °F (37.9 °C)] 99.5 °F (37.5 °C)  Pulse:  [] 84  Resp:  [8-31] 8  SpO2:  [92 %-100 %] 100 %  BP: (102-143)/(59-94) 129/76  Arterial Line BP: (103-136)/(56-84) 130/72     Weight: (!) 144.6 kg (318 lb 12.6 oz) (05/22/18  0500)  Body mass index is 48.47 kg/m².  Body surface area is 2.63 meters squared.    I/O last 3 completed shifts:  In: 9344.8 [I.V.:9049.8; Blood:250; NG/GT:45]  Out: 17414 [Urine:47; Other:79815]    Physical Exam   Constitutional: He is intubated.   Obesity    HENT:   Head: Normocephalic and atraumatic.   Right Ear: External ear normal.   Left Ear: External ear normal.   Eyes: Right eye exhibits no discharge. Left eye exhibits no discharge.   Neck: No JVD present.   Cardiovascular: Normal rate.    Pulmonary/Chest: Effort normal. He is intubated.   Abdominal: Soft.   Musculoskeletal: He exhibits edema.   Neurological: He is unresponsive.   Skin: Skin is warm.       Significant Labs:  ABGs:   Recent Labs  Lab 05/24/18  0339   PH 7.431   PCO2 43.5   HCO3 28.9*   POCSATURATED 99   BE 5     BMP:   Recent Labs  Lab 05/24/18  0809   GLU 79      CO2 28   BUN 7   CREATININE 1.5*   CALCIUM 8.7   MG 1.9     CBC:   Recent Labs  Lab 05/24/18  0408   WBC 12.69   RBC 3.64*   HGB 6.7*   HCT 24.1*      MCV 66*   MCH 18.4*   MCHC 27.8*     CMP:   Recent Labs  Lab 05/24/18  0809   GLU 79   CALCIUM 8.7   ALBUMIN 2.6*   PROT 7.1      K 4.5   CO2 28      BUN 7   CREATININE 1.5*   ALKPHOS 125   *   *   BILITOT 3.0*     All labs within the past 24 hours have been reviewed.

## 2018-05-25 PROBLEM — R20.9 COLD HANDS: Status: ACTIVE | Noted: 2018-05-25

## 2018-05-25 LAB
ALBUMIN SERPL BCP-MCNC: 2.6 G/DL
ALBUMIN SERPL BCP-MCNC: 2.7 G/DL
ALBUMIN SERPL BCP-MCNC: 2.7 G/DL
ALBUMIN SERPL BCP-MCNC: 2.9 G/DL
ALLENS TEST: ABNORMAL
ALP SERPL-CCNC: 121 U/L
ALT SERPL W/O P-5'-P-CCNC: 204 U/L
ANION GAP SERPL CALC-SCNC: 12 MMOL/L
ANION GAP SERPL CALC-SCNC: 13 MMOL/L
ANISOCYTOSIS BLD QL SMEAR: ABNORMAL
ANISOCYTOSIS BLD QL SMEAR: SLIGHT
APTT BLDCRRT: 24.9 SEC
AST SERPL-CCNC: 146 U/L
BASOPHILS # BLD AUTO: 0.05 K/UL
BASOPHILS # BLD AUTO: 0.05 K/UL
BASOPHILS # BLD AUTO: 0.06 K/UL
BASOPHILS NFR BLD: 0.4 %
BASOPHILS NFR BLD: 0.5 %
BASOPHILS NFR BLD: 0.5 %
BILIRUB SERPL-MCNC: 2.8 MG/DL
BUN SERPL-MCNC: 18 MG/DL
BUN SERPL-MCNC: 18 MG/DL
BUN SERPL-MCNC: 25 MG/DL
BUN SERPL-MCNC: 29 MG/DL
BURR CELLS BLD QL SMEAR: ABNORMAL
CALCIUM SERPL-MCNC: 9.4 MG/DL
CALCIUM SERPL-MCNC: 9.4 MG/DL
CALCIUM SERPL-MCNC: 9.6 MG/DL
CALCIUM SERPL-MCNC: 9.8 MG/DL
CHLORIDE SERPL-SCNC: 102 MMOL/L
CHLORIDE SERPL-SCNC: 103 MMOL/L
CO2 SERPL-SCNC: 22 MMOL/L
CO2 SERPL-SCNC: 25 MMOL/L
CREAT SERPL-MCNC: 3 MG/DL
CREAT SERPL-MCNC: 3 MG/DL
CREAT SERPL-MCNC: 3.7 MG/DL
CREAT SERPL-MCNC: 4.1 MG/DL
DACRYOCYTES BLD QL SMEAR: ABNORMAL
DELSYS: ABNORMAL
DIFFERENTIAL METHOD: ABNORMAL
EOSINOPHIL # BLD AUTO: 0.2 K/UL
EOSINOPHIL # BLD AUTO: 0.3 K/UL
EOSINOPHIL # BLD AUTO: 0.3 K/UL
EOSINOPHIL NFR BLD: 1.4 %
EOSINOPHIL NFR BLD: 2.5 %
EOSINOPHIL NFR BLD: 3 %
ERYTHROCYTE [DISTWIDTH] IN BLOOD BY AUTOMATED COUNT: 25.2 %
ERYTHROCYTE [DISTWIDTH] IN BLOOD BY AUTOMATED COUNT: 25.3 %
ERYTHROCYTE [DISTWIDTH] IN BLOOD BY AUTOMATED COUNT: 26 %
ERYTHROCYTE [SEDIMENTATION RATE] IN BLOOD BY WESTERGREN METHOD: 20 MM/H
EST. GFR  (AFRICAN AMERICAN): 18.7 ML/MIN/1.73 M^2
EST. GFR  (AFRICAN AMERICAN): 21.2 ML/MIN/1.73 M^2
EST. GFR  (AFRICAN AMERICAN): 27.3 ML/MIN/1.73 M^2
EST. GFR  (AFRICAN AMERICAN): 27.3 ML/MIN/1.73 M^2
EST. GFR  (NON AFRICAN AMERICAN): 16.2 ML/MIN/1.73 M^2
EST. GFR  (NON AFRICAN AMERICAN): 18.3 ML/MIN/1.73 M^2
EST. GFR  (NON AFRICAN AMERICAN): 23.6 ML/MIN/1.73 M^2
EST. GFR  (NON AFRICAN AMERICAN): 23.6 ML/MIN/1.73 M^2
FIO2: 40
GIANT PLATELETS BLD QL SMEAR: PRESENT
GLUCOSE SERPL-MCNC: 66 MG/DL
GLUCOSE SERPL-MCNC: 87 MG/DL
GLUCOSE SERPL-MCNC: 87 MG/DL
GLUCOSE SERPL-MCNC: 91 MG/DL
HCO3 UR-SCNC: 23.6 MMOL/L (ref 24–28)
HCT VFR BLD AUTO: 27 %
HCT VFR BLD AUTO: 27.7 %
HCT VFR BLD AUTO: 28.4 %
HGB BLD-MCNC: 7.9 G/DL
HGB BLD-MCNC: 8.3 G/DL
HGB BLD-MCNC: 8.3 G/DL
HYPOCHROMIA BLD QL SMEAR: ABNORMAL
HYPOCHROMIA BLD QL SMEAR: ABNORMAL
IMM GRANULOCYTES # BLD AUTO: 0.09 K/UL
IMM GRANULOCYTES # BLD AUTO: 0.12 K/UL
IMM GRANULOCYTES # BLD AUTO: 0.51 K/UL
IMM GRANULOCYTES NFR BLD AUTO: 0.9 %
IMM GRANULOCYTES NFR BLD AUTO: 1.1 %
IMM GRANULOCYTES NFR BLD AUTO: 4.1 %
INR PPP: 1
LYMPHOCYTES # BLD AUTO: 1.3 K/UL
LYMPHOCYTES # BLD AUTO: 1.3 K/UL
LYMPHOCYTES # BLD AUTO: 1.7 K/UL
LYMPHOCYTES NFR BLD: 10 %
LYMPHOCYTES NFR BLD: 11.7 %
LYMPHOCYTES NFR BLD: 16.4 %
MAGNESIUM SERPL-MCNC: 2.1 MG/DL
MAGNESIUM SERPL-MCNC: 2.2 MG/DL
MAGNESIUM SERPL-MCNC: 2.2 MG/DL
MCH RBC QN AUTO: 19.8 PG
MCH RBC QN AUTO: 20.1 PG
MCH RBC QN AUTO: 20.2 PG
MCHC RBC AUTO-ENTMCNC: 29.2 G/DL
MCHC RBC AUTO-ENTMCNC: 29.3 G/DL
MCHC RBC AUTO-ENTMCNC: 30 G/DL
MCV RBC AUTO: 67 FL
MCV RBC AUTO: 68 FL
MCV RBC AUTO: 69 FL
MIN VOL: 10
MODE: ABNORMAL
MONOCYTES # BLD AUTO: 2.1 K/UL
MONOCYTES # BLD AUTO: 2.4 K/UL
MONOCYTES # BLD AUTO: 2.6 K/UL
MONOCYTES NFR BLD: 20.3 %
MONOCYTES NFR BLD: 21 %
MONOCYTES NFR BLD: 21.1 %
NEUTROPHILS # BLD AUTO: 6.2 K/UL
NEUTROPHILS # BLD AUTO: 7.2 K/UL
NEUTROPHILS # BLD AUTO: 7.9 K/UL
NEUTROPHILS NFR BLD: 58.9 %
NEUTROPHILS NFR BLD: 63 %
NEUTROPHILS NFR BLD: 63.2 %
NRBC BLD-RTO: 1 /100 WBC
PCO2 BLDA: 38.5 MMHG (ref 35–45)
PEEP: 8
PH SMN: 7.39 [PH] (ref 7.35–7.45)
PHOSPHATE SERPL-MCNC: 5 MG/DL
PHOSPHATE SERPL-MCNC: 5.2 MG/DL
PHOSPHATE SERPL-MCNC: 6.1 MG/DL
PHOSPHATE SERPL-MCNC: 7.1 MG/DL
PHOSPHATE SERPL-MCNC: 7.2 MG/DL
PHOSPHATE SERPL-MCNC: 7.2 MG/DL
PIP: 24
PLATELET # BLD AUTO: 158 K/UL
PLATELET # BLD AUTO: 162 K/UL
PLATELET # BLD AUTO: 169 K/UL
PLATELET BLD QL SMEAR: ABNORMAL
PLATELET BLD QL SMEAR: ABNORMAL
PMV BLD AUTO: 10.2 FL
PMV BLD AUTO: 10.3 FL
PMV BLD AUTO: 9.6 FL
PO2 BLDA: 150 MMHG (ref 80–100)
POC BE: -1 MMOL/L
POC SATURATED O2: 99 % (ref 95–100)
POC TCO2: 25 MMOL/L (ref 23–27)
POCT GLUCOSE: 88 MG/DL (ref 70–110)
POIKILOCYTOSIS BLD QL SMEAR: SLIGHT
POLYCHROMASIA BLD QL SMEAR: ABNORMAL
POTASSIUM SERPL-SCNC: 4.5 MMOL/L
POTASSIUM SERPL-SCNC: 4.6 MMOL/L
PROT SERPL-MCNC: 7.5 G/DL
PROTHROMBIN TIME: 10.6 SEC
RBC # BLD AUTO: 3.98 M/UL
RBC # BLD AUTO: 4.11 M/UL
RBC # BLD AUTO: 4.13 M/UL
SAMPLE: ABNORMAL
SCHISTOCYTES BLD QL SMEAR: ABNORMAL
SITE: ABNORMAL
SODIUM SERPL-SCNC: 137 MMOL/L
SODIUM SERPL-SCNC: 137 MMOL/L
SODIUM SERPL-SCNC: 138 MMOL/L
SODIUM SERPL-SCNC: 139 MMOL/L
SP02: 100
SPHEROCYTES BLD QL SMEAR: ABNORMAL
TARGETS BLD QL SMEAR: ABNORMAL
VANCOMYCIN SERPL-MCNC: 27.1 UG/ML
VT: 449
WBC # BLD AUTO: 10.45 K/UL
WBC # BLD AUTO: 11.39 K/UL
WBC # BLD AUTO: 12.55 K/UL

## 2018-05-25 PROCEDURE — 90945 DIALYSIS ONE EVALUATION: CPT

## 2018-05-25 PROCEDURE — 25000003 PHARM REV CODE 250: Performed by: NURSE PRACTITIONER

## 2018-05-25 PROCEDURE — 99291 CRITICAL CARE FIRST HOUR: CPT | Mod: ,,, | Performed by: PSYCHIATRY & NEUROLOGY

## 2018-05-25 PROCEDURE — 25000003 PHARM REV CODE 250: Performed by: PHYSICIAN ASSISTANT

## 2018-05-25 PROCEDURE — 94668 MNPJ CHEST WALL SBSQ: CPT

## 2018-05-25 PROCEDURE — 63600175 PHARM REV CODE 636 W HCPCS: Performed by: STUDENT IN AN ORGANIZED HEALTH CARE EDUCATION/TRAINING PROGRAM

## 2018-05-25 PROCEDURE — 63600175 PHARM REV CODE 636 W HCPCS: Performed by: PHYSICIAN ASSISTANT

## 2018-05-25 PROCEDURE — 84100 ASSAY OF PHOSPHORUS: CPT | Mod: 91

## 2018-05-25 PROCEDURE — 63600175 PHARM REV CODE 636 W HCPCS: Performed by: ANESTHESIOLOGY

## 2018-05-25 PROCEDURE — 27200966 HC CLOSED SUCTION SYSTEM

## 2018-05-25 PROCEDURE — 85610 PROTHROMBIN TIME: CPT

## 2018-05-25 PROCEDURE — 85025 COMPLETE CBC W/AUTO DIFF WBC: CPT | Mod: 91

## 2018-05-25 PROCEDURE — 99900026 HC AIRWAY MAINTENANCE (STAT)

## 2018-05-25 PROCEDURE — 25000003 PHARM REV CODE 250: Performed by: PSYCHIATRY & NEUROLOGY

## 2018-05-25 PROCEDURE — C9113 INJ PANTOPRAZOLE SODIUM, VIA: HCPCS | Performed by: PHYSICIAN ASSISTANT

## 2018-05-25 PROCEDURE — 82803 BLOOD GASES ANY COMBINATION: CPT

## 2018-05-25 PROCEDURE — 99232 SBSQ HOSP IP/OBS MODERATE 35: CPT | Mod: ,,, | Performed by: SURGERY

## 2018-05-25 PROCEDURE — 25000003 PHARM REV CODE 250: Performed by: STUDENT IN AN ORGANIZED HEALTH CARE EDUCATION/TRAINING PROGRAM

## 2018-05-25 PROCEDURE — 94003 VENT MGMT INPAT SUBQ DAY: CPT

## 2018-05-25 PROCEDURE — 93931 UPPER EXTREMITY STUDY: CPT | Performed by: SURGERY

## 2018-05-25 PROCEDURE — 80053 COMPREHEN METABOLIC PANEL: CPT

## 2018-05-25 PROCEDURE — 37799 UNLISTED PX VASCULAR SURGERY: CPT

## 2018-05-25 PROCEDURE — 80100008 HC CRRT DAILY MAINTENANCE

## 2018-05-25 PROCEDURE — 20000000 HC ICU ROOM

## 2018-05-25 PROCEDURE — 83735 ASSAY OF MAGNESIUM: CPT

## 2018-05-25 PROCEDURE — 85730 THROMBOPLASTIN TIME PARTIAL: CPT

## 2018-05-25 PROCEDURE — 27000221 HC OXYGEN, UP TO 24 HOURS

## 2018-05-25 PROCEDURE — 84100 ASSAY OF PHOSPHORUS: CPT

## 2018-05-25 PROCEDURE — 94640 AIRWAY INHALATION TREATMENT: CPT

## 2018-05-25 PROCEDURE — 80069 RENAL FUNCTION PANEL: CPT

## 2018-05-25 PROCEDURE — 25000242 PHARM REV CODE 250 ALT 637 W/ HCPCS: Performed by: STUDENT IN AN ORGANIZED HEALTH CARE EDUCATION/TRAINING PROGRAM

## 2018-05-25 PROCEDURE — 94761 N-INVAS EAR/PLS OXIMETRY MLT: CPT

## 2018-05-25 PROCEDURE — 99900035 HC TECH TIME PER 15 MIN (STAT)

## 2018-05-25 PROCEDURE — 80202 ASSAY OF VANCOMYCIN: CPT

## 2018-05-25 RX ORDER — PHENYLEPHRINE HCL IN 0.9% NACL 1 MG/10 ML
SYRINGE (ML) INTRAVENOUS
Status: DISPENSED
Start: 2018-05-25 | End: 2018-05-25

## 2018-05-25 RX ORDER — FENTANYL CITRATE-0.9 % NACL/PF 10 MCG/ML
PLASTIC BAG, INJECTION (ML) INTRAVENOUS CONTINUOUS
Status: DISCONTINUED | OUTPATIENT
Start: 2018-05-25 | End: 2018-05-28

## 2018-05-25 RX ORDER — METOPROLOL TARTRATE 25 MG/1
12.5 TABLET ORAL 2 TIMES DAILY
Status: DISCONTINUED | OUTPATIENT
Start: 2018-05-25 | End: 2018-05-25

## 2018-05-25 RX ORDER — FENTANYL CITRATE-0.9 % NACL/PF 10 MCG/ML
PLASTIC BAG, INJECTION (ML) INTRAVENOUS CONTINUOUS
Status: DISCONTINUED | OUTPATIENT
Start: 2018-05-25 | End: 2018-05-25

## 2018-05-25 RX ORDER — METOPROLOL TARTRATE 25 MG/1
12.5 TABLET ORAL 2 TIMES DAILY
Status: DISCONTINUED | OUTPATIENT
Start: 2018-05-25 | End: 2018-05-28

## 2018-05-25 RX ADMIN — Medication 12.5 MG: at 09:05

## 2018-05-25 RX ADMIN — IPRATROPIUM BROMIDE AND ALBUTEROL SULFATE 3 ML: .5; 3 SOLUTION RESPIRATORY (INHALATION) at 01:05

## 2018-05-25 RX ADMIN — CEFEPIME HYDROCHLORIDE 2 G: 2 INJECTION, POWDER, FOR SOLUTION INTRAVENOUS at 04:05

## 2018-05-25 RX ADMIN — CHLORHEXIDINE GLUCONATE 15 ML: 1.2 RINSE ORAL at 09:05

## 2018-05-25 RX ADMIN — BUSPIRONE HYDROCHLORIDE 10 MG: 10 TABLET ORAL at 02:05

## 2018-05-25 RX ADMIN — PANTOPRAZOLE SODIUM 40 MG: 40 INJECTION, POWDER, FOR SOLUTION INTRAVENOUS at 09:05

## 2018-05-25 RX ADMIN — STANDARDIZED SENNA CONCENTRATE AND DOCUSATE SODIUM 1 TABLET: 8.6; 5 TABLET, FILM COATED ORAL at 09:05

## 2018-05-25 RX ADMIN — AMIODARONE HYDROCHLORIDE 0.5 MG/MIN: 1.8 INJECTION, SOLUTION INTRAVENOUS at 04:05

## 2018-05-25 RX ADMIN — Medication 12.5 MG: at 12:05

## 2018-05-25 RX ADMIN — Medication 25 MCG/HR: at 12:05

## 2018-05-25 RX ADMIN — IPRATROPIUM BROMIDE AND ALBUTEROL SULFATE 3 ML: .5; 3 SOLUTION RESPIRATORY (INHALATION) at 07:05

## 2018-05-25 RX ADMIN — IPRATROPIUM BROMIDE AND ALBUTEROL SULFATE 3 ML: .5; 3 SOLUTION RESPIRATORY (INHALATION) at 12:05

## 2018-05-25 RX ADMIN — BUSPIRONE HYDROCHLORIDE 10 MG: 10 TABLET ORAL at 09:05

## 2018-05-25 NOTE — PHYSICIAN QUERY
"PT Name: Los Mendez  MR #: [id number]     Physician Query Form - Diagnosis Clarification      CDS/: Keara Kulkarni RN, CDS               Contact information: [email or web address]    **Query withdrawn 5/28 d/t documentation in NCC PN 5/25: "Amiodarone gtt, for v-tach, dcd"   TJD**    This form is a permanent document in the medical record.     Query Date: May 25, 2018    By submitting this query, we are merely seeking further clarification of documentation.  Please utilize your independent clinical judgment when addressing the question(s) below.     The medical record contains the following:      Findings Supporting Clinical Information Location in Medical Record     VT/ VTach   --patient had 13 beat run of VT. Vital signs currently stable.    --Patient experienced 21 beat run of V tach.  Patient converted to sinus rhythm on own.  Electrolytes sent at 2100 and all normal.     --patient placed on amiodarone overnight due to 21 beat run of V Tach    --Notified [facility] of patients 9 beat run of v tach. Per pedro pablo garcia at this time     Nursing Note 5/22 @ 432a    Nursing Note 5/24 @ 1004a      Nursing Note 5/25 @ 626a    Nursing Note 5/25 @ 649a     Please clarify if the __VTach_______ diagnosis has been:    [  ] Ruled In  [  ] Ruled Out  [  ] Clinically insignificant  [  ] Clinically undetermined  [  ] Other/Clarification of findings (please specify)_______________________________    Please document in your progress notes daily for the duration of treatment, until resolved, and include in your discharge summary.                                                                                "

## 2018-05-25 NOTE — PLAN OF CARE
Notified NCC of patients 9 beat run of v tach. Per pedro pablo garcia at this time.  No New orders.

## 2018-05-25 NOTE — SUBJECTIVE & OBJECTIVE
Interval History:   Patient evaluated at bedside, continues on mechanical ventilation, blood pressures stable.     Review of patient's allergies indicates:   Allergen Reactions    Penicillins      Tolerated cefepime May 2018     Current Facility-Administered Medications   Medication Frequency    0.9%  NaCl infusion (CRRT USE ONLY) Continuous    0.9%  NaCl infusion (for blood administration) Q24H PRN    acetaminophen tablet 325 mg Q6H PRN    albuterol-ipratropium 2.5 mg-0.5 mg/3 mL nebulizer solution 3 mL Q6H    amiodarone 360 mg/200 mL (1.8 mg/mL) infusion Continuous    busPIRone tablet 10 mg TID    ceFEPIme injection 2 g Q8H    chlorhexidine 0.12 % solution 15 mL BID    dexmedetomidine (PRECEDEX) 400mcg/100mL 0.9% NaCL infusion Continuous    dextrose 50% injection 12.5 g PRN    fentaNYL injection 25 mcg Q2H PRN    glucagon (human recombinant) injection 1 mg PRN    insulin aspart U-100 pen 1-10 Units Q6H PRN    magnesium sulfate 2g in water 50mL IVPB (premix) PRN    norepinephrine 8 mg in dextrose 5 % 250 mL infusion Continuous    ondansetron 4 mg/5 mL solution 4 mg Q8H PRN    pantoprazole 40 mg in dextrose 5 % 100 mL infusion (ready to mix system) BID    propofol (DIPRIVAN) 10 mg/mL infusion Continuous    senna-docusate 8.6-50 mg per tablet 1 tablet Daily    sodium chloride 0.9% flush 3 mL PRN    sodium phosphate 20.01 mmol in dextrose 5 % 250 mL IVPB PRN    sodium phosphate 30 mmol in dextrose 5 % 250 mL IVPB PRN    sodium phosphate 39.99 mmol in dextrose 5 % 250 mL IVPB PRN    vancomycin (VANCOCIN) 1,250 mg in sodium chloride 0.9% 250 mL IVPB Q24H       Objective:     Vital Signs (Most Recent):  Temp: (!) 94.6 °F (34.8 °C) (05/25/18 0354)  Pulse: 63 (05/25/18 0922)  Resp: 20 (05/25/18 0922)  BP: 110/73 (05/25/18 0922)  SpO2: 98 % (05/25/18 0922)  O2 Device (Oxygen Therapy): ventilator (05/25/18 0922) Vital Signs (24h Range):  Temp:  [94.6 °F (34.8 °C)-99.5 °F (37.5 °C)] 94.6 °F (34.8  °C)  Pulse:  [57-88] 63  Resp:  [5-24] 20  SpO2:  [96 %-100 %] 98 %  BP: ()/(55-85) 110/73  Arterial Line BP: ()/(55-81) 129/78     Weight: (!) 144.6 kg (318 lb 12.6 oz) (05/22/18 0500)  Body mass index is 48.47 kg/m².  Body surface area is 2.63 meters squared.    I/O last 3 completed shifts:  In: 9527 [I.V.:8244.5; Blood:867.5; NG/GT:165; IV Piggyback:250]  Out: 28767 [Urine:5; Other:34887]    Physical Exam   Constitutional: He is intubated.   Obesity    HENT:   Head: Normocephalic and atraumatic.   Right Ear: External ear normal.   Left Ear: External ear normal.   Eyes: Right eye exhibits no discharge. Left eye exhibits no discharge.   Neck: No JVD present.   Cardiovascular: Normal rate.    Pulmonary/Chest: Effort normal. He is intubated.   Abdominal: Soft.   Musculoskeletal: He exhibits edema.   Neurological: He is unresponsive.   Skin: Skin is warm.       Significant Labs:  ABGs:   Recent Labs  Lab 05/25/18  0446   PH 7.394   PCO2 38.5   HCO3 23.6*   POCSATURATED 99   BE -1     BMP:   Recent Labs  Lab 05/25/18  0304   GLU 87  87     102   CO2 22*  22*   BUN 18  18   CREATININE 3.0*  3.0*   CALCIUM 9.4  9.4   MG 2.1     CBC:   Recent Labs  Lab 05/25/18  0304   WBC 11.39   RBC 4.13*   HGB 8.3*   HCT 27.7*      MCV 67*   MCH 20.1*   MCHC 30.0*     CMP:   Recent Labs  Lab 05/25/18  0304   GLU 87  87   CALCIUM 9.4  9.4   ALBUMIN 2.7*  2.7*   PROT 7.5     137   K 4.5  4.5   CO2 22*  22*     102   BUN 18  18   CREATININE 3.0*  3.0*   ALKPHOS 121   *   *   BILITOT 2.8*     All labs within the past 24 hours have been reviewed.

## 2018-05-25 NOTE — PROGRESS NOTES
[facility]  Nephrology  Progress Note    Patient Name: Los Mendez  MRN: [id number]  Admission Date: 5/19/2018  Hospital Length of Stay: 6 days  Attending Provider: Tunde Smalls MD   Primary Care Physician: Provider Notinsystem  Principal Problem:Anoxic brain injury    Subjective:     HPI: Los Mendez is a 47 year old male with past medical history of HTN, heavy alcohol user and COPD. Transferred from [facility] following witnessed cardiac arrest while in the ED waiting room. Patient had presented complaining of upper respiratory track infection and shortness of breath symptoms, while in the ED waiting room he was noted to be choking, became apneic, which led to witnessed seizure like activity followed by cardiac arrest. Per discussion with ED staff, patient required about 22 minutes of ACLS/5 shocks before returning to normal sinus rhythm. During intubation a lozenge was removed from patients airway. Hyperthermia protocol was initiated prior to transferring to [facility]. On arrival to [facility] Arrived with increased serum creatinine from 2.9-->5.1 (unkown baseline), BUN 35--> 52, Trop 2.4-->1.3, Lactic acidosis 6.4-->1.3, chest x ray with increased parenchymal interstitial attenuation and parenchymal opacities suggestive of pulmonary edema. On mechanical ventilation with a FiO2 50%. Currently also anuric at present moment. Per cardiology patient has a dilated cardiomyopathy EF looks about 10-15% and LVEDD is 7.3 cm.    Interval History:   Patient evaluated at bedside, continues on mechanical ventilation, blood pressures stable.     Review of patient's allergies indicates:   Allergen Reactions    Penicillins      Tolerated cefepime May 2018     Current Facility-Administered Medications   Medication Frequency    0.9%  NaCl infusion (CRRT USE ONLY) Continuous    0.9%  NaCl infusion (for blood administration) Q24H PRN    acetaminophen tablet 325 mg Q6H PRN    albuterol-ipratropium 2.5 mg-0.5 mg/3 mL  nebulizer solution 3 mL Q6H    amiodarone 360 mg/200 mL (1.8 mg/mL) infusion Continuous    busPIRone tablet 10 mg TID    ceFEPIme injection 2 g Q8H    chlorhexidine 0.12 % solution 15 mL BID    dexmedetomidine (PRECEDEX) 400mcg/100mL 0.9% NaCL infusion Continuous    dextrose 50% injection 12.5 g PRN    fentaNYL injection 25 mcg Q2H PRN    glucagon (human recombinant) injection 1 mg PRN    insulin aspart U-100 pen 1-10 Units Q6H PRN    magnesium sulfate 2g in water 50mL IVPB (premix) PRN    norepinephrine 8 mg in dextrose 5 % 250 mL infusion Continuous    ondansetron 4 mg/5 mL solution 4 mg Q8H PRN    pantoprazole 40 mg in dextrose 5 % 100 mL infusion (ready to mix system) BID    propofol (DIPRIVAN) 10 mg/mL infusion Continuous    senna-docusate 8.6-50 mg per tablet 1 tablet Daily    sodium chloride 0.9% flush 3 mL PRN    sodium phosphate 20.01 mmol in dextrose 5 % 250 mL IVPB PRN    sodium phosphate 30 mmol in dextrose 5 % 250 mL IVPB PRN    sodium phosphate 39.99 mmol in dextrose 5 % 250 mL IVPB PRN    vancomycin (VANCOCIN) 1,250 mg in sodium chloride 0.9% 250 mL IVPB Q24H       Objective:     Vital Signs (Most Recent):  Temp: (!) 94.6 °F (34.8 °C) (05/25/18 0354)  Pulse: 63 (05/25/18 0922)  Resp: 20 (05/25/18 0922)  BP: 110/73 (05/25/18 0922)  SpO2: 98 % (05/25/18 0922)  O2 Device (Oxygen Therapy): ventilator (05/25/18 0922) Vital Signs (24h Range):  Temp:  [94.6 °F (34.8 °C)-99.5 °F (37.5 °C)] 94.6 °F (34.8 °C)  Pulse:  [57-88] 63  Resp:  [5-24] 20  SpO2:  [96 %-100 %] 98 %  BP: ()/(55-85) 110/73  Arterial Line BP: ()/(55-81) 129/78     Weight: (!) 144.6 kg (318 lb 12.6 oz) (05/22/18 0500)  Body mass index is 48.47 kg/m².  Body surface area is 2.63 meters squared.    I/O last 3 completed shifts:  In: 9527 [I.V.:8244.5; Blood:867.5; NG/GT:165; IV Piggyback:250]  Out: 17837 [Urine:5; Other:01985]    Physical Exam   Constitutional: He is intubated.   Obesity    HENT:   Head:  Normocephalic and atraumatic.   Right Ear: External ear normal.   Left Ear: External ear normal.   Eyes: Right eye exhibits no discharge. Left eye exhibits no discharge.   Neck: No JVD present.   Cardiovascular: Normal rate.    Pulmonary/Chest: Effort normal. He is intubated.   Abdominal: Soft.   Musculoskeletal: He exhibits edema.   Neurological: He is unresponsive.   Skin: Skin is warm.       Significant Labs:  ABGs:   Recent Labs  Lab 05/25/18  0446   PH 7.394   PCO2 38.5   HCO3 23.6*   POCSATURATED 99   BE -1     BMP:   Recent Labs  Lab 05/25/18  0304   GLU 87  87     102   CO2 22*  22*   BUN 18  18   CREATININE 3.0*  3.0*   CALCIUM 9.4  9.4   MG 2.1     CBC:   Recent Labs  Lab 05/25/18 0304   WBC 11.39   RBC 4.13*   HGB 8.3*   HCT 27.7*      MCV 67*   MCH 20.1*   MCHC 30.0*     CMP:   Recent Labs  Lab 05/25/18  0304   GLU 87  87   CALCIUM 9.4  9.4   ALBUMIN 2.7*  2.7*   PROT 7.5     137   K 4.5  4.5   CO2 22*  22*     102   BUN 18  18   CREATININE 3.0*  3.0*   ALKPHOS 121   *   *   BILITOT 2.8*     All labs within the past 24 hours have been reviewed.     Assessment/Plan:     MARY (acute kidney injury)    Los Mendez is a 47 year old male who is consulted for MARY, which is mostly secondary to iATN from previous cardiac arrest where he presented with hypotension which decrease perfusion to the kidneys (required to be started on pressors). Has echo with EF 10-15%.     Plan:  - Continues to be anuric  - Will continue SCUF for volume assistance, decrease  cc/hr as tolerated by patient, maintain MAP>65.  - Blood pressures have been lower side today which require to be started on pressors.   - IVF = intake 50 ml/hr   - Last CVP =14, goal of 10. Patient has been NET negative since admission on [facility].    - Ordered chest x ray for assessment for volume status   - On mechanical ventilation, had decrease of oxygen requirement to a FiO2 40 %.   - Renal ultrasound  without any sign of obstruction, normal size kidney.   - Urine sediment with abundant hyaline cast, RBCs (non dysmorphic) and few muddy brown, no crystals.   - Avoid nephrotoxic medications.             Sascha Leone  Nephrology  Fellow  [facility]    Pager [phone]    Patient seen and examined on CRRTwith Dr Alcantar;  I have reviewed and agree with assessment and plan

## 2018-05-25 NOTE — PROGRESS NOTES
Bladder pressure 21. NCC team notified. Next bladder pressure reading due at 2000. Will continue to monitor.

## 2018-05-25 NOTE — PROGRESS NOTES
Patient experienced 21 beat run of V tach.  Patient converted to sinus rhythm on own.  Notified [facility] of patient's episode.  Electrolytes sent at 2100 and all normal.  No new orders at this time.  Will cont to monitor.

## 2018-05-25 NOTE — CONSULTS
Los Mendez  05/25/2018    Vascular Surgery Consult Note    CC: Cold right hand, s/p right radial artery removal    HPI:  Patient is a 47 y.o. male with a h/o HTN, heavy alcohol user and COPD. Transferred from [facility] following witnessed cardiac arrest while in the ED waiting room. On arrival to [facility] Arrived with increased serum creatinine from 2.9-->5.1 (unkown baseline), BUN 35--> 52, Trop 2.4-->1.3, Lactic acidosis 6.4-->1.3, chest x ray with increased parenchymal interstitial attenuation and parenchymal opacities suggestive of pulmonary edema. On mechanical ventilation with a FiO2 50%. Currently also anuric at present moment. Per cardiology patient has a dilated cardiomyopathy EF looks about 10-15% and LVEDD is 7.3 cm.    Patient had right radial arterial line removed yesterday and primary team noticed right hand was cooler than the right.    We are consulted to evaluate his possible right hand ischemia      History reviewed. No pertinent past medical history.  History reviewed. No pertinent surgical history.  History reviewed. No pertinent family history.  Social History     Social History    Marital status:      Spouse name: N/A    Number of children: N/A    Years of education: N/A     Occupational History    Not on file.     Social History Main Topics    Smoking status: Unknown If Ever Smoked    Smokeless tobacco: Current User     Types: Chew    Alcohol use Not on file    Drug use: Unknown    Sexual activity: Not on file     Other Topics Concern    Not on file     Social History Narrative    No narrative on file     Review of patient's allergies indicates:   Allergen Reactions    Penicillins      Tolerated cefepime May 2018       Current Facility-Administered Medications:     acetaminophen tablet 325 mg, 325 mg, Per NG tube, Q6H PRN, Regino Mayorga MD, 325 mg at 05/23/18 1239    albuterol-ipratropium 2.5 mg-0.5 mg/3 mL nebulizer solution 3 mL, 3 mL, Nebulization, Q6H, Regino Dial  MD Mukesh, 3 mL at 05/25/18 1231    busPIRone tablet 10 mg, 10 mg, Per NG tube, TID, Regino Mayorga MD, 10 mg at 05/25/18 1434    chlorhexidine 0.12 % solution 15 mL, 15 mL, Mouth/Throat, BID, Keith Cohen MD, 15 mL at 05/25/18 0907    dextrose 50% injection 12.5 g, 12.5 g, Intravenous, PRN, Regino Mayorga MD, 12.5 g at 05/23/18 1808    fentaNYL 2500 mcg in 0.9% sodium chloride 250 mL infusion premix (titrating), , Intravenous, Continuous, Rena Gillette NP    fentaNYL injection 25 mcg, 25 mcg, Intravenous, Q2H PRN, Serg Myles NP    glucagon (human recombinant) injection 1 mg, 1 mg, Intramuscular, PRN, Regino Mayorga MD    insulin aspart U-100 pen 1-10 Units, 1-10 Units, Subcutaneous, Q6H PRN, Regino Mayorga MD    metoprolol tartrate (LOPRESSOR) split tablet 12.5 mg, 12.5 mg, Per NG tube, BID, Rena Gillette NP, 12.5 mg at 05/25/18 1229    norepinephrine 8 mg in dextrose 5 % 250 mL infusion, 0.02 mcg/kg/min, Intravenous, Continuous, Xochitl Ortega PA-C, Stopped at 05/24/18 0515    ondansetron 4 mg/5 mL solution 4 mg, 4 mg, Per NG tube, Q8H PRN, Keith Cohen MD    pantoprazole 40 mg in dextrose 5 % 100 mL infusion (ready to mix system), 40 mg, Intravenous, BID, Xochitl Ortega PA-C, 40 mg at 05/25/18 0907    phenylephrine HCl in 0.9% NaCl 1 mg/10 mL (100 mcg/mL) syringe, , , ,     propofol (DIPRIVAN) 10 mg/mL infusion, 5 mcg/kg/min, Intravenous, Continuous, Francia Taylor MD, Stopped at 05/25/18 1400    senna-docusate 8.6-50 mg per tablet 1 tablet, 1 tablet, Per OG tube, Daily, Serg Myles NP, 1 tablet at 05/25/18 0900    sodium chloride 0.9% flush 3 mL, 3 mL, Intravenous, PRN, Keith Cohen MD    REVIEW OF SYSTEMS:  Unable to obtain    PHYSICAL EXAM:      Pulse: 76  Temp: 98.8 °F (37.1 °C)      General appearance:  Intubated, sedated   Neurological:  Intubated, sedated, ROJAS            Musculoskeletal: Mild cyanosis of right 3rd distal phalanx.   Normal muscle strength/tone.                 Neck: Supple, no significant adenopathy; thyroid is not enlarged                Chest:  Decreased lung sounds bilaterally             Cardiac: Normal rate and regular rhythm, S1 and S2 normal; PMI non-displaced          Abdomen: Soft, nontender, nondistended, no masses or organomegaly      No rebound tenderness noted; bowel sounds normal      Extremities:   2+ left radial pulse      Right radial signal water hammer, Biphasic right ulnar signal      Mild cyanosis of right 3rd distal phalanx      2+ femoral pulses bilaterally          LAB RESULTS:  Lab Results   Component Value Date    K 4.5 05/25/2018    K 4.5 05/25/2018    K 4.4 05/24/2018    CREATININE 3.0 (H) 05/25/2018    CREATININE 3.0 (H) 05/25/2018    CREATININE 2.5 (H) 05/24/2018     Lab Results   Component Value Date    WBC 11.39 05/25/2018    WBC 10.45 05/25/2018    WBC 12.08 05/24/2018    HCT 27.7 (L) 05/25/2018    HCT 27.0 (L) 05/25/2018    HCT 25.8 (L) 05/24/2018     05/25/2018     05/25/2018     05/24/2018     Lab Results   Component Value Date    HGBA1C 5.7 (H) 05/22/2018       IMAGING:  Vascular arterial ultrasound pending    IMP/PLAN:  47 y.o. male with  HTN, heavy alcohol user and COPD s/p cardiac arrest and now severe dilated cardiomyopathy with EF of 10% and ATN resulting in anuria. Now on CVVHD. S/p right radial arterial line removal yesterday and cold right hand.     Will f/u on arterial ultrasound.   Good cap refill right hand, biphasic signal right ulnar, not on pressors  Would keep hand warm and nitropaste if tolerated.   con't monitoring.   Recommend ASA and statin when tolerated.     Kyle Kauffman MD  Vascular/Endovascular Surgery Fellow    Vascular Attending  Agree with above    Khalif Avila MD FACS

## 2018-05-25 NOTE — ASSESSMENT & PLAN NOTE
Los Mendez is a 47 year old male who is consulted for MARY, which is mostly secondary to iATN from previous cardiac arrest where he presented with hypotension which decrease perfusion to the kidneys (required to be started on pressors). Has echo with EF 10-15%.     Plan:  - Continues to be anuric  - Will continue SCUF for volume assistance, decrease  cc/hr as tolerated by patient, maintain MAP>65.  - Blood pressures have been lower side today which require to be started on pressors.   - IVF = intake 50 ml/hr   - Last CVP =14, goal of 10. Patient has been NET negative since admission on [facility].    - Ordered chest x ray for assessment for volume status   - On mechanical ventilation, had decrease of oxygen requirement to a FiO2 40 %.   - Renal ultrasound without any sign of obstruction, normal size kidney.   - Urine sediment with abundant hyaline cast, RBCs (non dysmorphic) and few muddy brown, no crystals.   - Avoid nephrotoxic medications.

## 2018-05-25 NOTE — PHYSICIAN QUERY
PT Name: Los Mendez  MR #: [id number]  Physician Query Form - CKD Clarification     CDS/: Keara Kulkarni RN, CDS               Contact information: [email or web address]  This form is a permanent document in the medical record.     Query Date: May 25, 2018    By submitting this query, we are merely seeking further clarification of documentation. Please utilize your independent clinical judgment when addressing the question(s) below.    The Medical record contains the following:     Indicators   Supporting Clinical Findings   Location in Medical Record   x CKD or Chronic Kidney (Renal) Failure / Disease --5. Aute on CRF on CRRT. NCC Note 5/22-5/23 (Dr Cervantes)     x BUN/Creatinine                          GFR Scr: 2.8->5.4->3.9->3.0  BUN: 31->53->27->18  GFR: 29.7->13.4->19.9->27.3   Labs: 5/19->5/21->5/23->5/25    Dehydration      Nausea / Vomiting     x Dialysis / CRRT --Has been on SLED overnight    --Will continue with SLED today for clearance and volume assistance    --Will switch to SCUF today for volume assistance   Nephro Note 5/22    Nephro Note 5/23      Nephro Note 5/24    Medication      Treatment      Other Chronic Conditions     x Other --Urine sediment with abundant hyaline nay, RBCs (non dysmorphic) and few muddy brown, cast confirming ATN    --Atn      -Renal usg;wnl    --- Renal ultrasound without any sign of obstruction, normal size kidney.     --Left: Resistive index measures 0.70.     Right: Resistive index measures 0.71    Nephro Note 5/20        NCC NOte 5/24 (Dr Smalls)    Nephro Note 5/23      Renal US 5/21       Provider, please further specify the stage of CKD.    [  ] CRF/CKD ruled out    [  ] Chronic Kidney Disease (CKD) (please specify stage* below)       [employer] Definitions  Stage Description  eGFR (mL/min)   [  ]      I Slight kidney damage with normal or increased filtration 90+   [  ]     II Mildly reduced kidney function 60-89   [  ]     III  Moderately reduced kidney function 30-59   [  ]     IV Severely reduced kidney function 15-29   [  ]     V Kidney failure, requiring transplant or dialysis <15     [  ] CKD on Chronic Hemodialysis (please specify stage*): __________    [  ] Other (please specify): ________________________  [x  ] Clinically Undetermined    Please document in your progress notes daily for the duration of treatment until resolved and include in your discharge summary.

## 2018-05-25 NOTE — PLAN OF CARE
Problem: Patient Care Overview  Goal: Plan of Care Review  Outcome: Ongoing (interventions implemented as appropriate)  POC reviewed with pt at 0500. Pt unable to verbalize understanding. Questions and concerns of wife addressed.patient placed on amiodarone overnight due to 21 beat run of V Tach. Attempted to wean patient off of propofol and onto precedex but unsuccessful due to bradycardia.  Patient not tolerating lowering of sedation at this time.  difficulty keeping SVV below 10 most of night.  Pt progressing toward goals. Will continue to monitor. See flowsheets for full assessment and VS info

## 2018-05-25 NOTE — PLAN OF CARE
Problem: Patient Care Overview  Goal: Individualization & Mutuality  Admit Date: 5/19/18    Admit Dx:Anoxic Injury    History reviewed. No pertinent past medical history.    History reviewed. No pertinent surgical history.    Individualization:   1. Pt prefers socks on feet    Restraints: Bilateral soft wrist restraints cont due to pulling lines.  5/25         Outcome: Ongoing (interventions implemented as appropriate)  POC reviewed with pt and family at 1400. Pt's wife verbalized understanding. Questions and concerns addressed. Sedation adjusted. Fentanyl gtt initiated. Will trend bladder pressures q4h. Pt progressing toward goals. Will continue to monitor. See flowsheets for full assessment and VS info.

## 2018-05-25 NOTE — PHYSICIAN QUERY
PT Name: Los Mendez  MR #: [id number]     Physician Query Form - Medication-Correlation for Diagnosis      CDS/: Keara Kulkarni RN, CDS               Contact information: [email or web address]    This form is a permanent document in the medical record.     Query Date: May 25, 2018      By submitting this query, we are merely seeking further clarification of documentation.  Please utilize your independent clinical judgment when addressing the question(s) below.    The medical record contains the following:  The patient has an order for the following medication(s):    Cefepime 1g IV q8h (5/19->5/21)  Cefepime 2g IV QD (5/22->5/23)  Cefepime 2g IV q12h (5/23->5/24)  Cefepime 2g IV q8H (started 5/24)    Vanco 1750mg IV QD (5/19->5/20)  Vanco 1250mg IV QD (started 5/24)       Please provider the corresponding diagnosis or diagnoses that support(s) the use of the medication(s)   Physician Use Only    Pneumonia

## 2018-05-26 LAB
ALBUMIN SERPL BCP-MCNC: 2.5 G/DL
ALBUMIN SERPL BCP-MCNC: 2.6 G/DL
ALP SERPL-CCNC: 109 U/L
ALT SERPL W/O P-5'-P-CCNC: 147 U/L
ANION GAP SERPL CALC-SCNC: 14 MMOL/L
ANION GAP SERPL CALC-SCNC: 14 MMOL/L
ANION GAP SERPL CALC-SCNC: 15 MMOL/L
ANION GAP SERPL CALC-SCNC: 16 MMOL/L
ANISOCYTOSIS BLD QL SMEAR: ABNORMAL
APTT BLDCRRT: 24.4 SEC
AST SERPL-CCNC: 101 U/L
BASOPHILS # BLD AUTO: 0.02 K/UL
BASOPHILS # BLD AUTO: 0.03 K/UL
BASOPHILS # BLD AUTO: 0.03 K/UL
BASOPHILS # BLD AUTO: ABNORMAL K/UL
BASOPHILS NFR BLD: 0.3 %
BASOPHILS NFR BLD: 1 %
BILIRUB SERPL-MCNC: 1.9 MG/DL
BUN SERPL-MCNC: 31 MG/DL
BUN SERPL-MCNC: 31 MG/DL
BUN SERPL-MCNC: 35 MG/DL
BUN SERPL-MCNC: 38 MG/DL
CALCIUM SERPL-MCNC: 9.4 MG/DL
CALCIUM SERPL-MCNC: 9.8 MG/DL
CHLORIDE SERPL-SCNC: 104 MMOL/L
CHLORIDE SERPL-SCNC: 106 MMOL/L
CO2 SERPL-SCNC: 18 MMOL/L
CO2 SERPL-SCNC: 19 MMOL/L
CO2 SERPL-SCNC: 21 MMOL/L
CO2 SERPL-SCNC: 21 MMOL/L
CREAT SERPL-MCNC: 4.5 MG/DL
CREAT SERPL-MCNC: 4.5 MG/DL
CREAT SERPL-MCNC: 5 MG/DL
CREAT SERPL-MCNC: 5.4 MG/DL
DIFFERENTIAL METHOD: ABNORMAL
EOSINOPHIL # BLD AUTO: 0.3 K/UL
EOSINOPHIL # BLD AUTO: ABNORMAL K/UL
EOSINOPHIL NFR BLD: 3.7 %
EOSINOPHIL NFR BLD: 3.8 %
EOSINOPHIL NFR BLD: 4 %
EOSINOPHIL NFR BLD: 4 %
ERYTHROCYTE [DISTWIDTH] IN BLOOD BY AUTOMATED COUNT: 26.1 %
ERYTHROCYTE [DISTWIDTH] IN BLOOD BY AUTOMATED COUNT: 26.5 %
ERYTHROCYTE [DISTWIDTH] IN BLOOD BY AUTOMATED COUNT: 26.7 %
ERYTHROCYTE [DISTWIDTH] IN BLOOD BY AUTOMATED COUNT: 26.8 %
EST. GFR  (AFRICAN AMERICAN): 13.4 ML/MIN/1.73 M^2
EST. GFR  (AFRICAN AMERICAN): 14.7 ML/MIN/1.73 M^2
EST. GFR  (AFRICAN AMERICAN): 16.7 ML/MIN/1.73 M^2
EST. GFR  (AFRICAN AMERICAN): 16.7 ML/MIN/1.73 M^2
EST. GFR  (NON AFRICAN AMERICAN): 11.6 ML/MIN/1.73 M^2
EST. GFR  (NON AFRICAN AMERICAN): 12.7 ML/MIN/1.73 M^2
EST. GFR  (NON AFRICAN AMERICAN): 14.5 ML/MIN/1.73 M^2
EST. GFR  (NON AFRICAN AMERICAN): 14.5 ML/MIN/1.73 M^2
GLUCOSE SERPL-MCNC: 65 MG/DL
GLUCOSE SERPL-MCNC: 65 MG/DL
GLUCOSE SERPL-MCNC: 73 MG/DL
GLUCOSE SERPL-MCNC: 94 MG/DL
HCT VFR BLD AUTO: 24.1 %
HCT VFR BLD AUTO: 24.4 %
HCT VFR BLD AUTO: 25.2 %
HCT VFR BLD AUTO: 27.4 %
HGB BLD-MCNC: 7.1 G/DL
HGB BLD-MCNC: 7.2 G/DL
HGB BLD-MCNC: 7.5 G/DL
HGB BLD-MCNC: 8.1 G/DL
HYPOCHROMIA BLD QL SMEAR: ABNORMAL
IMM GRANULOCYTES # BLD AUTO: 0.16 K/UL
IMM GRANULOCYTES # BLD AUTO: 0.19 K/UL
IMM GRANULOCYTES # BLD AUTO: 0.2 K/UL
IMM GRANULOCYTES # BLD AUTO: ABNORMAL K/UL
IMM GRANULOCYTES NFR BLD AUTO: 1.8 %
IMM GRANULOCYTES NFR BLD AUTO: 2.3 %
IMM GRANULOCYTES NFR BLD AUTO: 2.5 %
IMM GRANULOCYTES NFR BLD AUTO: ABNORMAL %
INR PPP: 1
LYMPHOCYTES # BLD AUTO: 0.9 K/UL
LYMPHOCYTES # BLD AUTO: 1 K/UL
LYMPHOCYTES # BLD AUTO: 1.4 K/UL
LYMPHOCYTES # BLD AUTO: ABNORMAL K/UL
LYMPHOCYTES NFR BLD: 11.6 %
LYMPHOCYTES NFR BLD: 12.2 %
LYMPHOCYTES NFR BLD: 15.5 %
LYMPHOCYTES NFR BLD: 6 %
MAGNESIUM SERPL-MCNC: 2 MG/DL
MAGNESIUM SERPL-MCNC: 2.2 MG/DL
MAGNESIUM SERPL-MCNC: 2.4 MG/DL
MCH RBC QN AUTO: 19.9 PG
MCH RBC QN AUTO: 20 PG
MCH RBC QN AUTO: 20.3 PG
MCH RBC QN AUTO: 20.4 PG
MCHC RBC AUTO-ENTMCNC: 29.5 G/DL
MCHC RBC AUTO-ENTMCNC: 29.5 G/DL
MCHC RBC AUTO-ENTMCNC: 29.6 G/DL
MCHC RBC AUTO-ENTMCNC: 29.8 G/DL
MCV RBC AUTO: 67 FL
MCV RBC AUTO: 68 FL
MCV RBC AUTO: 68 FL
MCV RBC AUTO: 69 FL
METAMYELOCYTES NFR BLD MANUAL: 4 %
MONOCYTES # BLD AUTO: 1.2 K/UL
MONOCYTES # BLD AUTO: 1.7 K/UL
MONOCYTES # BLD AUTO: 1.8 K/UL
MONOCYTES # BLD AUTO: ABNORMAL K/UL
MONOCYTES NFR BLD: 15.3 %
MONOCYTES NFR BLD: 19.8 %
MONOCYTES NFR BLD: 20.2 %
MONOCYTES NFR BLD: 25 %
MYELOCYTES NFR BLD MANUAL: 1 %
NEUTROPHILS # BLD AUTO: 5.1 K/UL
NEUTROPHILS # BLD AUTO: 5.2 K/UL
NEUTROPHILS # BLD AUTO: 5.5 K/UL
NEUTROPHILS NFR BLD: 58 %
NEUTROPHILS NFR BLD: 58.5 %
NEUTROPHILS NFR BLD: 62.2 %
NEUTROPHILS NFR BLD: 65.7 %
NEUTS BAND NFR BLD MANUAL: 1 %
NRBC BLD-RTO: 0 /100 WBC
NRBC BLD-RTO: 0 /100 WBC
NRBC BLD-RTO: 1 /100 WBC
NRBC BLD-RTO: 1 /100 WBC
PHOSPHATE SERPL-MCNC: 6.8 MG/DL
PHOSPHATE SERPL-MCNC: 6.9 MG/DL
PHOSPHATE SERPL-MCNC: 7.1 MG/DL
PHOSPHATE SERPL-MCNC: 7.3 MG/DL
PHOSPHATE SERPL-MCNC: 8.6 MG/DL
PLATELET # BLD AUTO: 136 K/UL
PLATELET # BLD AUTO: 136 K/UL
PLATELET # BLD AUTO: 144 K/UL
PLATELET # BLD AUTO: 170 K/UL
PLATELET BLD QL SMEAR: ABNORMAL
PMV BLD AUTO: 10.5 FL
PMV BLD AUTO: 10.5 FL
PMV BLD AUTO: 10.7 FL
PMV BLD AUTO: ABNORMAL FL
POCT GLUCOSE: 58 MG/DL (ref 70–110)
POCT GLUCOSE: 64 MG/DL (ref 70–110)
POCT GLUCOSE: 65 MG/DL (ref 70–110)
POCT GLUCOSE: 68 MG/DL (ref 70–110)
POCT GLUCOSE: 71 MG/DL (ref 70–110)
POCT GLUCOSE: 71 MG/DL (ref 70–110)
POCT GLUCOSE: 81 MG/DL (ref 70–110)
POCT GLUCOSE: 81 MG/DL (ref 70–110)
POCT GLUCOSE: 85 MG/DL (ref 70–110)
POCT GLUCOSE: 90 MG/DL (ref 70–110)
POIKILOCYTOSIS BLD QL SMEAR: SLIGHT
POLYCHROMASIA BLD QL SMEAR: ABNORMAL
POTASSIUM SERPL-SCNC: 4.3 MMOL/L
POTASSIUM SERPL-SCNC: 4.3 MMOL/L
POTASSIUM SERPL-SCNC: 4.5 MMOL/L
POTASSIUM SERPL-SCNC: 4.6 MMOL/L
PROT SERPL-MCNC: 7.3 G/DL
PROTHROMBIN TIME: 10.5 SEC
RBC # BLD AUTO: 3.55 M/UL
RBC # BLD AUTO: 3.62 M/UL
RBC # BLD AUTO: 3.7 M/UL
RBC # BLD AUTO: 3.98 M/UL
SODIUM SERPL-SCNC: 138 MMOL/L
SODIUM SERPL-SCNC: 139 MMOL/L
SODIUM SERPL-SCNC: 139 MMOL/L
SODIUM SERPL-SCNC: 140 MMOL/L
SPHEROCYTES BLD QL SMEAR: ABNORMAL
VANCOMYCIN SERPL-MCNC: 26.1 UG/ML
WBC # BLD AUTO: 11.07 K/UL
WBC # BLD AUTO: 7.72 K/UL
WBC # BLD AUTO: 8.8 K/UL
WBC # BLD AUTO: 8.93 K/UL

## 2018-05-26 PROCEDURE — 94640 AIRWAY INHALATION TREATMENT: CPT

## 2018-05-26 PROCEDURE — 27000221 HC OXYGEN, UP TO 24 HOURS

## 2018-05-26 PROCEDURE — 99291 CRITICAL CARE FIRST HOUR: CPT | Mod: ,,, | Performed by: PSYCHIATRY & NEUROLOGY

## 2018-05-26 PROCEDURE — 80069 RENAL FUNCTION PANEL: CPT | Mod: 91

## 2018-05-26 PROCEDURE — 25000003 PHARM REV CODE 250: Performed by: STUDENT IN AN ORGANIZED HEALTH CARE EDUCATION/TRAINING PROGRAM

## 2018-05-26 PROCEDURE — 25000003 PHARM REV CODE 250: Performed by: NURSE PRACTITIONER

## 2018-05-26 PROCEDURE — 99900026 HC AIRWAY MAINTENANCE (STAT)

## 2018-05-26 PROCEDURE — 90945 DIALYSIS ONE EVALUATION: CPT | Mod: ,,, | Performed by: INTERNAL MEDICINE

## 2018-05-26 PROCEDURE — 80202 ASSAY OF VANCOMYCIN: CPT

## 2018-05-26 PROCEDURE — 84100 ASSAY OF PHOSPHORUS: CPT

## 2018-05-26 PROCEDURE — 90945 DIALYSIS ONE EVALUATION: CPT

## 2018-05-26 PROCEDURE — 80053 COMPREHEN METABOLIC PANEL: CPT

## 2018-05-26 PROCEDURE — 99900035 HC TECH TIME PER 15 MIN (STAT)

## 2018-05-26 PROCEDURE — 27200966 HC CLOSED SUCTION SYSTEM

## 2018-05-26 PROCEDURE — 85007 BL SMEAR W/DIFF WBC COUNT: CPT

## 2018-05-26 PROCEDURE — 85025 COMPLETE CBC W/AUTO DIFF WBC: CPT

## 2018-05-26 PROCEDURE — 63600175 PHARM REV CODE 636 W HCPCS: Performed by: PHYSICIAN ASSISTANT

## 2018-05-26 PROCEDURE — 20000000 HC ICU ROOM

## 2018-05-26 PROCEDURE — 94003 VENT MGMT INPAT SUBQ DAY: CPT

## 2018-05-26 PROCEDURE — 25000242 PHARM REV CODE 250 ALT 637 W/ HCPCS: Performed by: STUDENT IN AN ORGANIZED HEALTH CARE EDUCATION/TRAINING PROGRAM

## 2018-05-26 PROCEDURE — 85730 THROMBOPLASTIN TIME PARTIAL: CPT

## 2018-05-26 PROCEDURE — 94761 N-INVAS EAR/PLS OXIMETRY MLT: CPT

## 2018-05-26 PROCEDURE — 63600175 PHARM REV CODE 636 W HCPCS: Performed by: ANESTHESIOLOGY

## 2018-05-26 PROCEDURE — 25000003 PHARM REV CODE 250: Performed by: PSYCHIATRY & NEUROLOGY

## 2018-05-26 PROCEDURE — 85027 COMPLETE CBC AUTOMATED: CPT

## 2018-05-26 PROCEDURE — C9113 INJ PANTOPRAZOLE SODIUM, VIA: HCPCS | Performed by: PHYSICIAN ASSISTANT

## 2018-05-26 PROCEDURE — 99232 SBSQ HOSP IP/OBS MODERATE 35: CPT | Mod: ,,, | Performed by: SURGERY

## 2018-05-26 PROCEDURE — 83735 ASSAY OF MAGNESIUM: CPT

## 2018-05-26 PROCEDURE — 94668 MNPJ CHEST WALL SBSQ: CPT

## 2018-05-26 PROCEDURE — 80100008 HC CRRT DAILY MAINTENANCE

## 2018-05-26 PROCEDURE — 84100 ASSAY OF PHOSPHORUS: CPT | Mod: 91

## 2018-05-26 PROCEDURE — 85610 PROTHROMBIN TIME: CPT

## 2018-05-26 RX ORDER — GLUCAGON 1 MG
1 KIT INJECTION
Status: DISCONTINUED | OUTPATIENT
Start: 2018-05-26 | End: 2018-05-26

## 2018-05-26 RX ORDER — INSULIN ASPART 100 [IU]/ML
0-5 INJECTION, SOLUTION INTRAVENOUS; SUBCUTANEOUS
Status: DISCONTINUED | OUTPATIENT
Start: 2018-05-26 | End: 2018-05-26

## 2018-05-26 RX ORDER — IBUPROFEN 200 MG
16 TABLET ORAL
Status: DISCONTINUED | OUTPATIENT
Start: 2018-05-26 | End: 2018-06-06

## 2018-05-26 RX ORDER — POLYETHYLENE GLYCOL 3350 17 G/17G
17 POWDER, FOR SOLUTION ORAL DAILY
Status: DISCONTINUED | OUTPATIENT
Start: 2018-05-26 | End: 2018-05-30

## 2018-05-26 RX ORDER — IBUPROFEN 200 MG
24 TABLET ORAL
Status: DISCONTINUED | OUTPATIENT
Start: 2018-05-26 | End: 2018-06-06

## 2018-05-26 RX ADMIN — CHLORHEXIDINE GLUCONATE 15 ML: 1.2 RINSE ORAL at 08:05

## 2018-05-26 RX ADMIN — BUSPIRONE HYDROCHLORIDE 10 MG: 10 TABLET ORAL at 03:05

## 2018-05-26 RX ADMIN — POLYETHYLENE GLYCOL 3350 17 G: 17 POWDER, FOR SOLUTION ORAL at 11:05

## 2018-05-26 RX ADMIN — PROPOFOL 30 MCG/KG/MIN: 10 INJECTION, EMULSION INTRAVENOUS at 12:05

## 2018-05-26 RX ADMIN — DEXTROSE MONOHYDRATE 12.5 G: 25 INJECTION, SOLUTION INTRAVENOUS at 12:05

## 2018-05-26 RX ADMIN — Medication 12.5 MG: at 08:05

## 2018-05-26 RX ADMIN — Medication 12.5 MG: at 09:05

## 2018-05-26 RX ADMIN — PROPOFOL 20 MCG/KG/MIN: 10 INJECTION, EMULSION INTRAVENOUS at 07:05

## 2018-05-26 RX ADMIN — PANTOPRAZOLE SODIUM 40 MG: 40 INJECTION, POWDER, FOR SOLUTION INTRAVENOUS at 09:05

## 2018-05-26 RX ADMIN — DEXTROSE MONOHYDRATE 12.5 G: 25 INJECTION, SOLUTION INTRAVENOUS at 07:05

## 2018-05-26 RX ADMIN — IPRATROPIUM BROMIDE AND ALBUTEROL SULFATE 3 ML: .5; 3 SOLUTION RESPIRATORY (INHALATION) at 08:05

## 2018-05-26 RX ADMIN — BUSPIRONE HYDROCHLORIDE 10 MG: 10 TABLET ORAL at 09:05

## 2018-05-26 RX ADMIN — PANTOPRAZOLE SODIUM 40 MG: 40 INJECTION, POWDER, FOR SOLUTION INTRAVENOUS at 08:05

## 2018-05-26 RX ADMIN — IPRATROPIUM BROMIDE AND ALBUTEROL SULFATE 3 ML: .5; 3 SOLUTION RESPIRATORY (INHALATION) at 01:05

## 2018-05-26 RX ADMIN — Medication 150 MCG/HR: at 12:05

## 2018-05-26 RX ADMIN — CHLORHEXIDINE GLUCONATE 15 ML: 1.2 RINSE ORAL at 09:05

## 2018-05-26 RX ADMIN — PROPOFOL 30 MCG/KG/MIN: 10 INJECTION, EMULSION INTRAVENOUS at 05:05

## 2018-05-26 RX ADMIN — STANDARDIZED SENNA CONCENTRATE AND DOCUSATE SODIUM 1 TABLET: 8.6; 5 TABLET, FILM COATED ORAL at 08:05

## 2018-05-26 RX ADMIN — DEXTROSE MONOHYDRATE 12.5 G: 25 INJECTION, SOLUTION INTRAVENOUS at 04:05

## 2018-05-26 RX ADMIN — IPRATROPIUM BROMIDE AND ALBUTEROL SULFATE 3 ML: .5; 3 SOLUTION RESPIRATORY (INHALATION) at 07:05

## 2018-05-26 RX ADMIN — BUSPIRONE HYDROCHLORIDE 10 MG: 10 TABLET ORAL at 08:05

## 2018-05-26 NOTE — CONSULTS
For full RD note see 5/25/18    Problem: Patient Care Overview  Goal: Plan of Care Review  Outcome: Ongoing (interventions implemented as appropriate)  Nutrition assessment completed. Please see RD note for details.     Recommendation/Intervention:   Pt NPO x5 days. As medically able, recommend starting TF.   Novasource Renal @ goal rate 55mL/hr.   - With propofol, recommend increasing TF to 40mL/hr.   - Once propofol discontinued, increase TF to goal of 55mL/hr.   - Hold for residuals >500mL.      RD to monitor.     Goals: Pt to receive nutrition by RD follow up  Nutrition Goal Status: new  Communication of RD Recs: reviewed with RN

## 2018-05-26 NOTE — ASSESSMENT & PLAN NOTE
- Cardiac arrest x 3 at OSH; longest code was 22 minutes  - intubated  - sedated on propofol gtt and fentanyl gtt  - normothermic now  - MRI 5/21 Several scattered small bilateral cerebellar infarcts. And there is punctate focus of diffusion restriction in the left caudate which may represent embolic infarcts versus sequela of hypoxic ischemic injury   - spot EEG 5/21 no epileptiform activity. Severe slowing suggestive of severe cerebral dysfunction  - follow neuro exam  - family updated wife and mother at bedside

## 2018-05-26 NOTE — PROGRESS NOTES
Notified Dialysis RN of CRRT clotting off. Patient rinsed back, tolerated well. Dialysis RN to come restart CRRT. Will continue to monitor.

## 2018-05-26 NOTE — ASSESSMENT & PLAN NOTE
- intubated on arrival  Vent Mode: A/C  Oxygen Concentration (%):  [40-41] 41  Resp Rate Total:  [20 br/min-28 br/min] 20 br/min  Vt Set:  [450 mL] 450 mL  PEEP/CPAP:  [5 cmH20-8 cmH20] 5 cmH20  Mean Airway Pressure:  [10 cmH20-15 cmH20] 10 cmH20  duonebs q4h prn  Monitor daily cxr/ abg

## 2018-05-26 NOTE — ASSESSMENT & PLAN NOTE
- secondary to anoxic brain injury, 22 minutes PEA-->vfib  - MRI brain 5/21 for anoxic assessment.  see anoxic brain injury

## 2018-05-26 NOTE — ASSESSMENT & PLAN NOTE
BUN/Cr cont to trend up25/3.7  Oliguric-->anuric   Monitor close  HD line and getting CRRT today   Nephrology following for RRT

## 2018-05-26 NOTE — PLAN OF CARE
Problem: Patient Care Overview  Goal: Plan of Care Review  Outcome: Ongoing (interventions implemented as appropriate)  POC reviewed with pt and family at 1400. Pt's spouse verbalized understanding. Questions and concerns addressed. No acute events today. Pt progressing toward goals. CRRT continued. TF initiated. Will continue to monitor. See flowsheets for full assessment and VS info.

## 2018-05-26 NOTE — PROGRESS NOTES
[facility]  Neurocritical Care  Progress Note    Admit Date: 5/19/2018  Service Date: 05/25/2018  Length of Stay: 6    Subjective:     Chief Complaint: Anoxic brain injury    History of Present Illness: 46 y/o man w/ hx of HTN, COPD? Transferred from [facility] following witnessed cardiac arrest while in the ED waiting room. Patient had presented complaining of URI/SOB symptoms, while in the ED waiting room he was noted to be choking, became apneic, which led to witnessed seizure like activity followed by cardiac arrest. Per discussion with ED staff, patient required about 22 minutes of ACLS/5 shocks before returning to normal sinus rhythm. During intubation a lozenge was removed from patients airway. Hyperthermia protocol was initiated prior to [place] [facility] [place]. Per ED records, patient was acidotic with ph of 7.1 this morning. At the time of arrival to NICU, patient was on paralytics, however pupillary reflex was present. Will continue hypothermia protocol for additional 24 h.    Hospital Course: 5/19: arrived intubated, sedated, artic sun blanket  5/20: pressor requirment going up, DO NOT DIURESE, continue TTM, nimbex off, LFT improving, trop improving, family updated.  5/21: reach normothermia, MRI today, remove EEG, place HD line, consult nephrology, LFTs trending down, family updated  5/22: Anoxic brain injury. Has been responding. Last night placed on propofol  MRI only with L head of caudate infarc and some cerebellar small infarctions.  5/23: Anoxic brain injury.  On CRRT. BNP 1046. Got back on Propofol and pressors. Still agitated. He will be started on Buspar.  Trop I normalizing.  5/25 CRRT today. Amiodarone dcd and Metoprolol started. Monitor CBC q8h if H/H continues to drop will consult GI. Vascular surgery consulted concerning  Possible arterial occlusion. Right hand cold.. Arterial line dcd. US RUE pending. Abdominal distention, KUB showed gas pattern. Bladder  pressures q4h. Initial 21.    Interval History:CRRT today. Amiodarone dcd and Metoprolol started. Monitor CBC q8h if H/H continues to drop will consult GI. Vascular surgery consulted concerning  Possible arterial occlusion. Right hand cold.. Arterial line dcd. US RUE pending. Abdominal distention, KUB showed gas pattern. Bladder pressures q4h. Initial 21.      Review of Systems: Unable to obtain a complete ROS due to level of consciousness.     Vitals:   Temp: 97.8 °F (36.6 °C)  Pulse: 63  Rhythm: normal sinus rhythm  BP: 123/81  MAP (mmHg): 98  CVP (mean): 15 mmHg  CI (L/min/m2): 2.8 L/min/m2  SVI: 39  SVV: 19 %  Resp: 12  SpO2: 99 %  Oxygen Concentration (%): 41  O2 Device (Oxygen Therapy): ventilator  Vent Mode: A/C  Set Rate: 20 bmp  Vt Set: 450 mL  PEEP/CPAP: 5 cmH20  Peak Airway Pressure: 24 cmH2O  Mean Airway Pressure: 10 cmH20  Plateau Pressure: 0 cmH20    Temp  Min: 94.6 °F (34.8 °C)  Max: 98.8 °F (37.1 °C)  Pulse  Min: 57  Max: 76  BP  Min: 94/63  Max: 167/103  MAP (mmHg)  Min: 72  Max: 128  CVP (mean)  Min: 14 mmHg  Max: 29 mmHg  CI (L/min/m2)  Min: 1.6 L/min/m2  Max: 3.4 L/min/m2  SVI  Min: 24  Max: 40  SVV  Min: 5 %  Max: 19 %  Resp  Min: 0  Max: 25  SpO2  Min: 94 %  Max: 100 %  Oxygen Concentration (%)  Min: 40  Max: 41    05/24 0701 - 05/25 0700  In: 7126.9 [I.V.:6139.4]  Out: 9524 [Urine:5]   Unmeasured Output  Stool Occurrence: 0     Examination:   Constitutional: Obese. Well-nourished and -developed. No apparent distress.   Eyes: Conjunctiva clear, anicteric. Lids no lesions.  Head/Ears/Nose/Mouth/Throat/Neck: Moist mucous membranes. External ears, nose atraumatic.   Cardiovascular: Regular rhythm. No murmurs. No leg edema.  Respiratory: Mech. Ventilation. bilat coarse breath sounds.  Gastrointestinal: No hernia. Soft, nondistended, nontender. + bowel sounds.    Neurologic:  -GCS E2VTM5  -Intubated sedated on propofol and fentanyl. Opens eyes to painful stimuli  PERRL 3+. + cough, gag,  corneals  Withdraws to noxious stimuli on all 4 extremities  Unable to test orientation, language, memory, judgment, insight, fund of knowledge, hearing, shoulder shrug, tongue protrusion, coordination, gait due to level of consciousness.    Medications:   Continuous  fentanyl Last Rate: 15 mL/hr at 05/25/18 2105   norepinephrine bitartrate-D5W Last Rate: Stopped (05/24/18 0515)   propofol Last Rate: 30 mcg/kg/min (05/25/18 2105)   Scheduled  albuterol-ipratropium 3 mL Q6H   busPIRone 10 mg TID   chlorhexidine 15 mL BID   metoprolol tartrate 12.5 mg BID   pantoprazole 40 mg in dextrose 5 % 100 mL infusion (ready to mix system) 40 mg BID   senna-docusate 8.6-50 mg 1 tablet Daily   PRN  acetaminophen 325 mg Q6H PRN   dextrose 50% 12.5 g PRN   fentaNYL 25 mcg Q2H PRN   glucagon (human recombinant) 1 mg PRN   insulin aspart U-100 1-10 Units Q6H PRN   ondansetron 4 mg Q8H PRN   sodium chloride 0.9% 3 mL PRN      Today I independently reviewed pertinent medications, lines/drains/airways, imaging, lab results, microbiology results, notably:   Assessment/Plan:     Neuro   * Anoxic brain injury    - Cardiac arrest x 3 at OSH; longest code was 22 minutes  - intubated  - sedated on propofol gtt and fentanyl gtt  - normothermic now  - MRI 5/21 Several scattered small bilateral cerebellar infarcts. And there is punctate focus of diffusion restriction in the left caudate which may represent embolic infarcts versus sequela of hypoxic ischemic injury   - spot EEG 5/21 no epileptiform activity. Severe slowing suggestive of severe cerebral dysfunction  - follow neuro exam  - family updated wife and mother at bedside          Cytotoxic brain edema    - secondary to anoxic brain injury, 22 minutes PEA-->vfib  - MRI brain 5/21 for anoxic assessment.  see anoxic brain injury           Seizure    - witnessed seizure in ED waiting room, possibly secondary to hypoperfusion/hypoxia  - removedEEG 5/21  - no seizure        Pulmonary   Acute  respiratory failure with hypoxia    - intubated on arrival  Vent Mode: A/C  Oxygen Concentration (%):  [40-41] 41  Resp Rate Total:  [20 br/min-28 br/min] 20 br/min  Vt Set:  [450 mL] 450 mL  PEEP/CPAP:  [5 cmH20-8 cmH20] 5 cmH20  Mean Airway Pressure:  [10 cmH20-15 cmH20] 10 cmH20  duonebs q4h prn  Monitor daily cxr/ abg        Cardiac/Vascular   Cardiogenic shock    - possible undiagnosed cardiomyopathy based on CXR w/ evidence of pulmonary edema and multiple cardiac arrests over last 24 h  Appreciate cards recs though will not diurese with lasix  Levophed gtt to Keep MAP>65  Amiodarone gtt, for v-tach, dcd  Metoprolol 12.5 bid started    Echo: 1 - Eccentric hypertrophy.     2 - Severely depressed left ventricular systolic function (EF 10-15%).     3 - Biatrial enlargement.     4 - Impaired LV relaxation, normal LAP (grade 1 diastolic dysfunction).     5 - Right ventricular enlargement with moderately depressed systolic function.     6 - The estimated PA systolic pressure is 37 mmHg.     7 - Mild mitral regurgitation.     8 - Mild tricuspid regurgitation.           Renal/   Acute renal failure with tubular necrosis    BUN/Cr cont to trend up25/3.7  Oliguric-->anuric   Monitor close  HD line and getting CRRT today   Nephrology following for RRT        Other   Class 3 severe obesity due to excess calories with serious comorbidity and body mass index (BMI) of 40.0 to 44.9 in adult    - nutrition consult  Body mass index is 48.47 kg/m².              Prophylaxis:  Venous Thromboembolism: mechanical  Stress Ulcer: PPI  Ventilator Pneumonia: yes     Activity Orders          None        Full Code     Critical care time 35 minutes    Rena Gillette NP  Neurocritical Care  [facility]

## 2018-05-26 NOTE — ASSESSMENT & PLAN NOTE
47 y.o. male with  HTN, heavy alcohol user and COPD s/p cardiac arrest and now severe dilated cardiomyopathy with EF of 10% and ATN resulting in anuria. Now on CVVHD. S/p right radial arterial line removal yesterday and cold right hand.     Patient with equal signal exam in his forearms bilateral: biphasic radial and ulnar signals  Keep hands warm, if possible avoid arterial lines  No evidence of thrombosis of radial/ulnar arteries; poor flow likely secondary to heart failure  If BP can tolerate nitro paste to fingers or add calcium channel blocker  Please call with further questions or concerns.

## 2018-05-26 NOTE — PROGRESS NOTES
Notified NCC LUTHER Jones of patient Dialysis being stopped again due to air in line.  Patient restarted at 1 am.  Dialysis RN requesting cath flow before patient restarted then paging RN to restart.  Will communicate to day RN also.

## 2018-05-26 NOTE — ASSESSMENT & PLAN NOTE
- witnessed seizure in ED waiting room, possibly secondary to hypoperfusion/hypoxia  - removedEEG 5/21  - no seizure

## 2018-05-26 NOTE — PROGRESS NOTES
Notified LUTHER Jones of patients 6 beat run of V tach.  No new orders at this time.  Midnight labs reviewed.   Will follow up with 3 am labs.

## 2018-05-26 NOTE — PROGRESS NOTES
[facility]  Nephrology  Progress Note    Patient Name: Los Mendez  MRN: [id number]  Admission Date: 5/19/2018  Hospital Length of Stay: 7 days  Attending Provider: Zaki Hooper MD   Primary Care Physician: Provider Notinsystem  Principal Problem:Anoxic brain injury    Subjective:     HPI: Los Mendez is a 47 year old male with past medical history of HTN, heavy alcohol user and COPD. Transferred from [facility] following witnessed cardiac arrest while in the ED waiting room. Patient had presented complaining of upper respiratory track infection and shortness of breath symptoms, while in the ED waiting room he was noted to be choking, became apneic, which led to witnessed seizure like activity followed by cardiac arrest. Per discussion with ED staff, patient required about 22 minutes of ACLS/5 shocks before returning to normal sinus rhythm. During intubation a lozenge was removed from patients airway. Hyperthermia protocol was initiated prior to transferring to [place]. On arrival to [place] Arrived with increased serum creatinine from 2.9-->5.1 (unkown baseline), BUN 35--> 52, Trop 2.4-->1.3, Lactic acidosis 6.4-->1.3, chest x ray with increased parenchymal interstitial attenuation and parenchymal opacities suggestive of pulmonary edema. On mechanical ventilation with a FiO2 50%. Currently also anuric at present moment. Per cardiology patient has a dilated cardiomyopathy EF looks about 10-15% and LVEDD is 7.3 cm.        Review of patient's allergies indicates:   Allergen Reactions    Penicillins      Tolerated cefepime May 2018     Current Facility-Administered Medications   Medication Frequency    acetaminophen tablet 325 mg Q6H PRN    albuterol-ipratropium 2.5 mg-0.5 mg/3 mL nebulizer solution 3 mL Q6H    busPIRone tablet 10 mg TID    chlorhexidine 0.12 % solution 15 mL BID    dextrose 50% injection 12.5 g PRN    dextrose 50% injection 25 g PRN    fentaNYL 2500 mcg in 0.9% sodium  chloride 250 mL infusion premix (titrating) Continuous    fentaNYL injection 25 mcg Q2H PRN    glucagon (human recombinant) injection 1 mg PRN    glucose chewable tablet 16 g PRN    glucose chewable tablet 24 g PRN    insulin aspart U-100 pen 1-10 Units Q6H PRN    metoprolol tartrate (LOPRESSOR) split tablet 12.5 mg BID    norepinephrine 8 mg in dextrose 5 % 250 mL infusion Continuous    ondansetron 4 mg/5 mL solution 4 mg Q8H PRN    pantoprazole 40 mg in dextrose 5 % 100 mL infusion (ready to mix system) BID    polyethylene glycol packet 17 g Daily    propofol (DIPRIVAN) 10 mg/mL infusion Continuous    senna-docusate 8.6-50 mg per tablet 1 tablet Daily    sodium chloride 0.9% flush 3 mL PRN       Objective:     Vital Signs (Most Recent):  Temp: 97.6 °F (36.4 °C) (05/26/18 0705)  Pulse: 63 (05/26/18 1000)  Resp: (!) 21 (05/26/18 1000)  BP: 106/71 (05/26/18 1000)  SpO2: 99 % (05/26/18 1000)  O2 Device (Oxygen Therapy): ventilator (05/26/18 0912) Vital Signs (24h Range):  Temp:  [97.6 °F (36.4 °C)-98 °F (36.7 °C)] 97.6 °F (36.4 °C)  Pulse:  [58-76] 63  Resp:  [0-25] 21  SpO2:  [94 %-100 %] 99 %  BP: (100-167)/() 106/71  Arterial Line BP: (126-140)/(79-87) 140/87     Weight: (!) 144.6 kg (318 lb 12.6 oz) (05/22/18 0500)  Body mass index is 48.47 kg/m².  Body surface area is 2.63 meters squared.    I/O last 3 completed shifts:  In: 7827 [I.V.:7547; NG/GT:280]  Out: 85589 [Urine:10; Other:24978]    Physical Exam   HENT:   Head: Atraumatic.   Neck: No JVD present.   Cardiovascular: Normal rate and regular rhythm.  Exam reveals no friction rub.    Pulmonary/Chest: He has rales.   Abdominal: Soft. He exhibits no distension.   Musculoskeletal: He exhibits edema.   Skin: Skin is warm.           Assessment/Plan:     MARY (acute kidney injury)    Los Mendez is a 47 year old male who is consulted for MARY, which is mostly secondary to iATN from previous cardiac arrest where he presented with hypotension which  decrease perfusion to the kidneys (required to be started on pressors). Has echo with EF 10-15%.     Plan:  - continue SCUF today and give a break overnight, he's net negative and pressures are borderline              Thank you for your consult. I will follow-up with patient. Please contact us if you have any additional questions.    Jaime Banuelos MD  Nephrology  [facility]

## 2018-05-26 NOTE — ASSESSMENT & PLAN NOTE
- possible undiagnosed cardiomyopathy based on CXR w/ evidence of pulmonary edema and multiple cardiac arrests over last 24 h  Appreciate cards recs though will not diurese with lasix  Levophed gtt to Keep MAP>65  Amiodarone gtt, for v-tach, dcd  Metoprolol 12.5 bid started    Echo: 1 - Eccentric hypertrophy.     2 - Severely depressed left ventricular systolic function (EF 10-15%).     3 - Biatrial enlargement.     4 - Impaired LV relaxation, normal LAP (grade 1 diastolic dysfunction).     5 - Right ventricular enlargement with moderately depressed systolic function.     6 - The estimated PA systolic pressure is 37 mmHg.     7 - Mild mitral regurgitation.     8 - Mild tricuspid regurgitation.

## 2018-05-26 NOTE — PLAN OF CARE
Problem: Patient Care Overview  Goal: Plan of Care Review  Outcome: Ongoing (interventions implemented as appropriate)  POC reviewed with pt at 0500. Pt unable to verbalize understanding. Questions and concerns of wife addressed. Patient remains on fentanyl and propofol for comfort.  CRRT restarted overnight.  Trending CBC, no signs of bleeding, no BM overnight.  Bowel sounds present, with no residuals at this time.  Blood sugar dropped below 70 over night 1/2 amp of dextrose given, BS stabilized at this time. Pt progressing toward goals. Will continue to monitor. See flowsheets for full assessment and VS info.

## 2018-05-26 NOTE — PROGRESS NOTES
ICU Progress Note  Neurocritical Care    Admit Date: 5/19/2018  LOS: 7    CC: Anoxic brain injury    Code Status: Full Code     SUBJECTIVE:     Interval History/Significant Events:   No acute events overnight        Medications:  Continuous Infusions:   fentanyl 15 mL/hr at 05/26/18 1100    norepinephrine bitartrate-D5W Stopped (05/24/18 0515)    propofol 20 mcg/kg/min (05/26/18 1100)     Scheduled Meds:   albuterol-ipratropium  3 mL Nebulization Q6H    busPIRone  10 mg Per NG tube TID    chlorhexidine  15 mL Mouth/Throat BID    metoprolol tartrate  12.5 mg Per NG tube BID    pantoprazole 40 mg in dextrose 5 % 100 mL infusion (ready to mix system)  40 mg Intravenous BID    polyethylene glycol  17 g Per NG tube Daily    senna-docusate 8.6-50 mg  1 tablet Per OG tube Daily     PRN Meds:.acetaminophen, dextrose 50%, dextrose 50%, fentaNYL, glucagon (human recombinant), glucose, glucose, insulin aspart U-100, ondansetron, sodium chloride 0.9%    OBJECTIVE:   Vital Signs (Most Recent):   Temp: 97.8 °F (36.6 °C) (05/26/18 1105)  Pulse: 84 (05/26/18 1142)  Resp: 20 (05/26/18 1142)  BP: 122/77 (05/26/18 1142)  SpO2: (!) 94 % (05/26/18 1142)    Vital Signs (24h Range):   Temp:  [97.6 °F (36.4 °C)-98 °F (36.7 °C)] 97.8 °F (36.6 °C)  Pulse:  [58-84] 84  Resp:  [0-25] 20  SpO2:  [94 %-100 %] 94 %  BP: (100-167)/() 122/77  Arterial Line BP: (140)/(87) 140/87    ICP/CPP (Last 24h):   CO:  [6.5 L/min] 6.5 L/min  CI:  [2.8 L/min/m2] 2.8 L/min/m2    I & O (Last 24h):   Intake/Output Summary (Last 24 hours) at 05/26/18 1302  Last data filed at 05/26/18 1220   Gross per 24 hour   Intake          3574.06 ml   Output             4265 ml   Net          -690.94 ml     Physical Exam:  Intubated and sedated   No jaundice  Lungs with coarse breathing sounds bilateral   Normal S1/S2, no murmurs.  Abdomen is distended and slightly tense       Neuro:  Intubated and sedated  Open eyes to voice  Track with eyes  Doesn't follow  commands  PERRL, EOMI with VOR  Itnact corneal reflexes bilateral  Good cough reflex  Moves all extremities spontaneously         Vent Data:   Vent Mode: A/C  Oxygen Concentration (%):  [40-42] 41  Resp Rate Total:  [20 br/min-28 br/min] 20 br/min  Vt Set:  [450 mL] 450 mL  PEEP/CPAP:  [5 cmH20-8 cmH20] 5 cmH20  Mean Airway Pressure:  [10 ryO02-90 cmH20] 14 cmH20    Lines/Drains/Airway:        Airway - Non-Surgical 05/18/18 0900 Endotracheal Tube (Active)   Secured at 26 cm 5/26/2018 11:42 AM   Measured At Lips 5/26/2018 11:42 AM   Secured Location Center 5/26/2018 11:42 AM   Secured by Commercial tube garcia 5/26/2018 11:42 AM   Bite Block center;secure and patent 5/26/2018 11:42 AM   Site Condition Cool;Dry 5/26/2018 11:42 AM   Status Intact;Secured;Patent 5/26/2018 11:42 AM   Site Assessment Clean;Dry;No bleeding;No drainage 5/26/2018 11:42 AM   Cuff Volume 35 mL 5/26/2018  8:22 AM   Cuff Pressure 28 cm H2O 5/24/2018  8:08 PM           Percutaneous Central Line Insertion/Assessment - triple lumen  05/19/18 right internal jugular (Active)   Dressing biopatch in place;dressing dry and intact 5/26/2018 11:05 AM   Securement catheter securement device utilized 5/26/2018 11:05 AM   Additional Site Signs no drainage;no streak formation;no palpable cord;no pain;no edema;no warmth;no erythema 5/26/2018 11:05 AM   Distal Patency/Care flushed w/o difficulty;infusing 5/26/2018 11:05 AM   Medial Patency/Care flushed w/o difficulty;infusing 5/26/2018 11:05 AM   Proximal Patency/Care flushed w/o difficulty;infusing 5/26/2018 11:05 AM   Waveform normal 5/26/2018 11:05 AM   Line Interventions line leveled/zeroed 5/26/2018 11:05 AM   Dressing Change Due 05/31/18 5/26/2018 11:05 AM   Daily Line Review Performed 5/26/2018 11:05 AM            Trialysis (Dialysis) Catheter 05/21/18 1430 left femoral (Active)   IV Device Securement sutures 5/26/2018 11:05 AM   Additional Site Signs no streak formation;no drainage;no palpable cord;no  pain;no edema;no warmth;no erythema 5/26/2018 11:05 AM   Patency/Care infusing 5/26/2018 11:05 AM   Waveform normal 5/26/2018  3:05 AM   Site Assessment Dry;Clean;Intact;No swelling;No redness 5/26/2018 11:05 AM   Status Accessed 5/26/2018 11:05 AM   Flows Good 5/26/2018 11:05 AM   Dressing Intervention Dressing reinforced 5/24/2018  3:05 PM   Dressing Status Biopatch in place;Clean;Dry;Intact 5/26/2018 11:05 AM   Dressing Change Due 05/31/18 5/26/2018  3:05 AM   Site Condition No complications 5/26/2018 11:05 AM   Dressing Occlusive 5/26/2018 11:05 AM   Daily Line Review Performed 5/26/2018 11:05 AM             NG/OG Tube 05/19/18 orogastric (Active)   Placement Check placement verified by x-ray;placement verified by aspirate characteristics 5/26/2018 11:05 AM   Tube advanced (cm) 3 5/19/2018  7:05 PM   Tolerance no signs/symptoms of discomfort 5/26/2018 11:05 AM   Securement taped to commercial device 5/26/2018 11:05 AM   Clamp Status/Tolerance clamped;no abdominal distention;no abdominal discomfort;no emesis;no nausea;no residual;no restlessness 5/26/2018 11:05 AM   Suction Setting/Drainage Method dependent drainage 5/21/2018  3:00 AM   Insertion Site Appearance no redness, warmth, tenderness, skin breakdown, drainage 5/26/2018  3:05 AM   Drainage Coffee ground 5/23/2018 10:00 PM   Flush/Irrigation flushed w/;water;no resistance met 5/26/2018 11:05 AM   Current Rate (mL/hr) 0 mL/hr 5/26/2018 11:05 AM   Goal Rate (mL/hr) 0 mL/hr 5/26/2018 11:05 AM   Intake (mL) 10 mL 5/26/2018 11:05 AM   Residual Amount (ml) 0 ml 5/26/2018 11:05 AM            Urethral Catheter 05/19/18 Temperature probe (Active)   Site Assessment Clean;Intact 5/26/2018 11:05 AM   Collection Container Urimeter 5/26/2018 11:05 AM   Securement Method secured to top of thigh w/ adhesive device 5/26/2018 11:05 AM   Catheter Care Performed yes 5/26/2018 11:05 AM   Reason for Continuing Urinary Catheterization Critically ill in ICU requiring intensive  "monitoring 5/26/2018 11:05 AM   CAUTI Prevention Bundle StatLock in place w 1" slack;Intact seal between catheter & drainage tubing;Drainage bag off the floor;Green sheeting clip in use;No dependent loops or kinks;Drainage bag below bladder;Drainage bag not overfilled (<2/3 full) 5/26/2018  7:05 AM   Output (mL) 10 mL 5/25/2018  4:00 PM            Trialysis (Dialysis) Catheter 05/21/18 1430 left femoral (Active)   IV Device Securement sutures 5/26/2018 11:05 AM   Additional Site Signs no streak formation;no drainage;no palpable cord;no pain;no edema;no warmth;no erythema 5/26/2018 11:05 AM   Patency/Care infusing 5/26/2018 11:05 AM   Waveform normal 5/26/2018  3:05 AM   Site Assessment Dry;Clean;Intact;No swelling;No redness 5/26/2018 11:05 AM   Status Accessed 5/26/2018 11:05 AM   Flows Good 5/26/2018 11:05 AM   Dressing Intervention Dressing reinforced 5/24/2018  3:05 PM   Dressing Status Biopatch in place;Clean;Dry;Intact 5/26/2018 11:05 AM   Dressing Change Due 05/31/18 5/26/2018  3:05 AM   Site Condition No complications 5/26/2018 11:05 AM   Dressing Occlusive 5/26/2018 11:05 AM   Daily Line Review Performed 5/26/2018 11:05 AM         Labs:  ABG: No results for input(s): PH, PO2, PCO2, HCO3, POCSATURATED, BE in the last 24 hours.  BMP:  Recent Labs  Lab 05/26/18  0347 05/26/18  0906     139  --    K 4.3  4.3  --      104  --    CO2 21*  21*  --    BUN 31*  31*  --    CREATININE 4.5*  4.5*  --    GLU 65*  65*  --    MG 2.0  --    PHOS 6.8* 6.9*     LFT: Lab Results   Component Value Date     (H) 05/26/2018     (H) 05/26/2018    ALKPHOS 109 05/26/2018    BILITOT 1.9 (H) 05/26/2018    ALBUMIN 2.5 (L) 05/26/2018    ALBUMIN 2.5 (L) 05/26/2018    PROT 7.3 05/26/2018     CBC:   Lab Results   Component Value Date    WBC 7.72 05/26/2018    HGB 7.2 (L) 05/26/2018    HCT 24.4 (L) 05/26/2018    MCV 67 (L) 05/26/2018     (L) 05/26/2018     Microbiology x 7d:   Microbiology Results " (last 7 days)     Procedure Component Value Units Date/Time    Blood culture [[id number]] Collected:  05/19/18 1630    Order Status:  Completed Specimen:  Blood from Peripheral, Foot, Right Updated:  05/24/18 2012     Blood Culture, Routine No growth after 5 days.    Blood culture [[id number]] Collected:  05/19/18 1650    Order Status:  Completed Specimen:  Blood from Peripheral, Antecubital, Right Updated:  05/24/18 2012     Blood Culture, Routine No growth after 5 days.    Culture, Respiratory with Gram Stain [[id number]] Collected:  05/22/18 1547    Order Status:  Completed Specimen:  Respiratory from Tracheal Aspirate Updated:  05/24/18 0807     Respiratory Culture Normal respiratory jeanna     Gram Stain (Respiratory) <10 epithelial cells per low power field.     Gram Stain (Respiratory) Rare WBC's     Gram Stain (Respiratory) No organisms seen    Narrative:       BAL    Culture, Respiratory with Gram Stain [[id number]] Collected:  05/19/18 1803    Order Status:  Completed Specimen:  Respiratory from Sputum Updated:  05/21/18 1352     Respiratory Culture No growth     Gram Stain (Respiratory) <10 epithelial cells per low power field.     Gram Stain (Respiratory) Many WBC's     Gram Stain (Respiratory) Rare Gram positive cocci    Urine culture [[id number]] Collected:  05/19/18 1658    Order Status:  Completed Specimen:  Urine from Urine, Catheterized Updated:  05/20/18 2131     Urine Culture, Routine No growth    Narrative:       CHANGE CHEN PRIOR TO CULTURE            ASSESSMENT/PLAN:     Patient Active Problem List   Diagnosis    Anoxic brain injury    Acute respiratory failure with hypoxia    Cardiac arrest    Class 3 severe obesity due to excess calories with serious comorbidity and body mass index (BMI) of 40.0 to 44.9 in adult    Seizure    Cardiogenic shock    Pulmonary edema    Essential hypertension    Metabolic acidosis    Acute renal failure with tubular necrosis    Cytotoxic brain edema     Hypothermia    Elevated INR    Acute decompensated heart failure    Respiratory arrest    Acute ischemic vertebrobasilar artery cerebellar stroke    Cerebral infarction due to embolism of right cerebellar artery    Acute ischemic left MCA stroke    MARY (acute kidney injury)    Cold hands       A/P:     - Minimize sedation for RASS of 0  - Wean vent as tolerated  - Daily ABG and CXR  - CRRT plans per nephrology  - No more signs of active GI bleeding, will resume TF   - Add miralax to bowel regimen   -Trend H/H q12h for another 24h  - SCDs  - If H/H remain stable we will start SQH in am   - Continue PPI BID  - Discussed with wife at bedside.     Uninterrupted Critical Care/Counseling Time (not including procedures): 35 minutes

## 2018-05-26 NOTE — SUBJECTIVE & OBJECTIVE
Review of patient's allergies indicates:   Allergen Reactions    Penicillins      Tolerated cefepime May 2018     Current Facility-Administered Medications   Medication Frequency    acetaminophen tablet 325 mg Q6H PRN    albuterol-ipratropium 2.5 mg-0.5 mg/3 mL nebulizer solution 3 mL Q6H    busPIRone tablet 10 mg TID    chlorhexidine 0.12 % solution 15 mL BID    dextrose 50% injection 12.5 g PRN    dextrose 50% injection 25 g PRN    fentaNYL 2500 mcg in 0.9% sodium chloride 250 mL infusion premix (titrating) Continuous    fentaNYL injection 25 mcg Q2H PRN    glucagon (human recombinant) injection 1 mg PRN    glucose chewable tablet 16 g PRN    glucose chewable tablet 24 g PRN    insulin aspart U-100 pen 1-10 Units Q6H PRN    metoprolol tartrate (LOPRESSOR) split tablet 12.5 mg BID    norepinephrine 8 mg in dextrose 5 % 250 mL infusion Continuous    ondansetron 4 mg/5 mL solution 4 mg Q8H PRN    pantoprazole 40 mg in dextrose 5 % 100 mL infusion (ready to mix system) BID    polyethylene glycol packet 17 g Daily    propofol (DIPRIVAN) 10 mg/mL infusion Continuous    senna-docusate 8.6-50 mg per tablet 1 tablet Daily    sodium chloride 0.9% flush 3 mL PRN       Objective:     Vital Signs (Most Recent):  Temp: 97.6 °F (36.4 °C) (05/26/18 0705)  Pulse: 63 (05/26/18 1000)  Resp: (!) 21 (05/26/18 1000)  BP: 106/71 (05/26/18 1000)  SpO2: 99 % (05/26/18 1000)  O2 Device (Oxygen Therapy): ventilator (05/26/18 0912) Vital Signs (24h Range):  Temp:  [97.6 °F (36.4 °C)-98 °F (36.7 °C)] 97.6 °F (36.4 °C)  Pulse:  [58-76] 63  Resp:  [0-25] 21  SpO2:  [94 %-100 %] 99 %  BP: (100-167)/() 106/71  Arterial Line BP: (126-140)/(79-87) 140/87     Weight: (!) 144.6 kg (318 lb 12.6 oz) (05/22/18 0500)  Body mass index is 48.47 kg/m².  Body surface area is 2.63 meters squared.    I/O last 3 completed shifts:  In: 7827 [I.V.:7547; NG/GT:280]  Out: 58034 [Urine:10; Other:78296]    Physical Exam   HENT:   Head:  Atraumatic.   Neck: No JVD present.   Cardiovascular: Normal rate and regular rhythm.  Exam reveals no friction rub.    Pulmonary/Chest: He has rales.   Abdominal: Soft. He exhibits no distension.   Musculoskeletal: He exhibits edema.   Skin: Skin is warm.

## 2018-05-26 NOTE — ASSESSMENT & PLAN NOTE
Los Mendez is a 47 year old male who is consulted for MARY, which is mostly secondary to iATN from previous cardiac arrest where he presented with hypotension which decrease perfusion to the kidneys (required to be started on pressors). Has echo with EF 10-15%.     Plan:  - continue SCUF today and give a break overnight, he's net negative and pressures are borderline

## 2018-05-26 NOTE — SUBJECTIVE & OBJECTIVE
Medications:  Continuous Infusions:   fentanyl 15 mL/hr at 05/26/18 0900    norepinephrine bitartrate-D5W Stopped (05/24/18 0515)    propofol 20 mcg/kg/min (05/26/18 0900)     Scheduled Meds:   albuterol-ipratropium  3 mL Nebulization Q6H    busPIRone  10 mg Per NG tube TID    chlorhexidine  15 mL Mouth/Throat BID    metoprolol tartrate  12.5 mg Per NG tube BID    pantoprazole 40 mg in dextrose 5 % 100 mL infusion (ready to mix system)  40 mg Intravenous BID    senna-docusate 8.6-50 mg  1 tablet Per OG tube Daily     PRN Meds:acetaminophen, dextrose 50%, fentaNYL, glucagon (human recombinant), insulin aspart U-100, ondansetron, sodium chloride 0.9%     Objective:     Vital Signs (Most Recent):  Temp: 97.6 °F (36.4 °C) (05/26/18 0705)  Pulse: 61 (05/26/18 0912)  Resp: (!) 21 (05/26/18 0912)  BP: 105/67 (05/26/18 0912)  SpO2: 100 % (05/26/18 0912) Vital Signs (24h Range):  Temp:  [97.6 °F (36.4 °C)-98.8 °F (37.1 °C)] 97.6 °F (36.4 °C)  Pulse:  [58-76] 61  Resp:  [0-25] 21  SpO2:  [94 %-100 %] 100 %  BP: (100-167)/() 105/67  Arterial Line BP: ()/(59-87) 140/87       Date 05/26/18 0700 - 05/27/18 0659   Shift 0815-8332 4409-6579 2919-4698 24 Hour Total   I  N  T  A  K  E   I.V.  (mL/kg) 425.5  (2.9)   425.5  (2.9)    NG/   100    Shift Total  (mL/kg) 525.5  (3.6)   525.5  (3.6)   O  U  T  P  U  T   Shift Total  (mL/kg)       Weight (kg) 144.6 144.6 144.6 144.6       Physical Exam   Constitutional: He appears well-developed and well-nourished. He is intubated.   Neck: Normal range of motion.   Cardiovascular: Normal rate.    Bilateral biphasic radial and ulnar signals     Pulmonary/Chest: He is intubated.   Abdominal: Soft.   Musculoskeletal:   Bilateral third digit with distal duskiness  Good capillary refill bilaterally   Neurological:   On propofol, does not follow commands   Skin: Skin is warm and dry.       Significant Labs:  CBC:   Recent Labs  Lab 05/26/18  0511   WBC 8.80   RBC 3.70*    HGB 7.5*   HCT 25.2*   *   MCV 68*   MCH 20.3*   MCHC 29.8*     CMP:   Recent Labs  Lab 18   GLU 65*  65*   CALCIUM 9.8  9.8   ALBUMIN 2.5*  2.5*   PROT 7.3     139   K 4.3  4.3   CO2 21*  21*     104   BUN 31*  31*   CREATININE 4.5*  4.5*   ALKPHOS 109   *   *   BILITOT 1.9*     Coagulation:   Recent Labs  Lab 18   LABPROT 10.5   INR 1.0   APTT 24.4       Significant Diagnostics:  PAT NAME: EFREN RAHMAN  [facility] REC#: [id number]  :      1970  SEX:      M  EXAM D[place]: 2018 14:38  REF PHYS: REFERRING, UNKNOWN      Indication:  -Cold Arm.  Results:                    Right                                                                                     Left  _______________________________________________________________________________  Artery            PSV               [cm/s]                              Stenosis          Doppler           Not               Examined                    prox.             mid.              distal                              Waveform  Subclavian                          64                                                                                                                                                Axillary                            105                                                                                                                                               Brachial          96                72                90                                                                                                                              Radial            48                67                39                                                                                                                              Ulnar                               61                                                                                                                                                   Rt Finger PPG's:  Digital flow study of the right upper extremity digits was performed.  PPG waveforms of all five digits are severely dampened.    Report Summary:  Impression:   Right Arm: Color Flow Duplex imaging reveals patent flow throughout the Rt Upper Extremity with no evidence of a hemodynamically significant stenosis.    Sonographer: Kong Borrego RVT    Electronically Signed by:  John Arellano MD [[id number]]                        On: 05/25/2018 15:42

## 2018-05-26 NOTE — PROGRESS NOTES
[facility]  Vascular Surgery  Progress Note    Patient Name: Los Mendez  MRN: [id number]  Admission Date: 5/19/2018  Primary Care Provider: Andres Suhystem    Subjective:     Interval History: no acute changes overnight, nursing concerns regarding cool left hand this AM.    Post-Op Info:  * No surgery found *           Medications:  Continuous Infusions:   fentanyl 15 mL/hr at 05/26/18 0900    norepinephrine bitartrate-D5W Stopped (05/24/18 0515)    propofol 20 mcg/kg/min (05/26/18 0900)     Scheduled Meds:   albuterol-ipratropium  3 mL Nebulization Q6H    busPIRone  10 mg Per NG tube TID    chlorhexidine  15 mL Mouth/Throat BID    metoprolol tartrate  12.5 mg Per NG tube BID    pantoprazole 40 mg in dextrose 5 % 100 mL infusion (ready to mix system)  40 mg Intravenous BID    senna-docusate 8.6-50 mg  1 tablet Per OG tube Daily     PRN Meds:acetaminophen, dextrose 50%, fentaNYL, glucagon (human recombinant), insulin aspart U-100, ondansetron, sodium chloride 0.9%     Objective:     Vital Signs (Most Recent):  Temp: 97.6 °F (36.4 °C) (05/26/18 0705)  Pulse: 61 (05/26/18 0912)  Resp: (!) 21 (05/26/18 0912)  BP: 105/67 (05/26/18 0912)  SpO2: 100 % (05/26/18 0912) Vital Signs (24h Range):  Temp:  [97.6 °F (36.4 °C)-98.8 °F (37.1 °C)] 97.6 °F (36.4 °C)  Pulse:  [58-76] 61  Resp:  [0-25] 21  SpO2:  [94 %-100 %] 100 %  BP: (100-167)/() 105/67  Arterial Line BP: ()/(59-87) 140/87       Date 05/26/18 0700 - 05/27/18 0659   Shift 7881-3380 4305-8498 4507-6504 24 Hour Total   I  N  T  A  K  E   I.V.  (mL/kg) 425.5  (2.9)   425.5  (2.9)    NG/   100    Shift Total  (mL/kg) 525.5  (3.6)   525.5  (3.6)   O  U  T  P  U  T   Shift Total  (mL/kg)       Weight (kg) 144.6 144.6 144.6 144.6       Physical Exam   Constitutional: He appears well-developed and well-nourished. He is intubated.   Neck: Normal range of motion.   Cardiovascular: Normal rate.    Bilateral biphasic radial  and ulnar signals     Pulmonary/Chest: He is intubated.   Abdominal: Soft.   Musculoskeletal:   Bilateral third digit with distal duskiness  Good capillary refill bilaterally   Neurological:   On propofol, does not follow commands   Skin: Skin is warm and dry.       Significant Labs:  CBC:   Recent Labs  Lab 18  0511   WBC 8.80   RBC 3.70*   HGB 7.5*   HCT 25.2*   *   MCV 68*   MCH 20.3*   MCHC 29.8*     CMP:   Recent Labs  Lab 18   GLU 65*  65*   CALCIUM 9.8  9.8   ALBUMIN 2.5*  2.5*   PROT 7.3     139   K 4.3  4.3   CO2 21*  21*     104   BUN 31*  31*   CREATININE 4.5*  4.5*   ALKPHOS 109   *   *   BILITOT 1.9*     Coagulation:   Recent Labs  Lab 18   LABPROT 10.5   INR 1.0   APTT 24.4       Significant Diagnostics:  PAT NAME: EFREN RAHMAN  MED REC#: [id number]  :      1970  SEX:      M  EXAM DUNCAN: 2018 14:38  REF PHYS: REFERRING, UNKNOWN      Indication:  -Cold Arm.  Results:                    Right                                                                                     Left  _______________________________________________________________________________  Artery            PSV               [cm/s]                              Stenosis          Doppler           Not               Examined                    prox.             mid.              distal                              Waveform  Subclavian                          64                                                                                                                                                Axillary                            105                                                                                                                                               Brachial          96                72                90                                                                                                                               Radial            48                67                39                                                                                                                              Ulnar                               61                                                                                                                                                  Rt Finger PPG's:  Digital flow study of the right upper extremity digits was performed.  PPG waveforms of all five digits are severely dampened.    Report Summary:  Impression:   Right Arm: Color Flow Duplex imaging reveals patent flow throughout the Rt Upper Extremity with no evidence of a hemodynamically significant stenosis.    Sonographer: Kong Borrego, RVT    Electronically Signed by:  John Arellano MD [[id number]]                        On: 05/25/2018 15:42    Assessment/Plan:     Cold hands    47 y.o. male with  HTN, heavy alcohol user and COPD s/p cardiac arrest and now severe dilated cardiomyopathy with EF of 10% and ATN resulting in anuria. Now on CVVHD. S/p right radial arterial line removal yesterday and cold right hand.     Patient with equal signal exam in his forearms bilateral: biphasic radial and ulnar signals  Keep hands warm, if possible avoid arterial lines  No evidence of thrombosis of radial/ulnar arteries; poor flow likely secondary to heart failure  If BP can tolerate nitro paste to fingers or add calcium channel blocker  Please call with further questions or concerns.            Maribel Lora MD  Vascular Surgery  [facility]    Vascular Attending  Agree with above  Nitropaste to R hand  No norvasc as his BP may not tolerate it    Khalif Avila MD FACS

## 2018-05-27 LAB
ALBUMIN SERPL BCP-MCNC: 2.4 G/DL
ALBUMIN SERPL BCP-MCNC: 2.4 G/DL
ALBUMIN SERPL BCP-MCNC: 2.5 G/DL
ALLENS TEST: ABNORMAL
ALP SERPL-CCNC: 118 U/L
ALT SERPL W/O P-5'-P-CCNC: 127 U/L
ANION GAP SERPL CALC-SCNC: 12 MMOL/L
ANION GAP SERPL CALC-SCNC: 13 MMOL/L
ANION GAP SERPL CALC-SCNC: 13 MMOL/L
ANION GAP SERPL CALC-SCNC: 14 MMOL/L
ANION GAP SERPL CALC-SCNC: 14 MMOL/L
ANISOCYTOSIS BLD QL SMEAR: ABNORMAL
APTT BLDCRRT: 25.1 SEC
AST SERPL-CCNC: 96 U/L
BASOPHILS # BLD AUTO: 0.01 K/UL
BASOPHILS NFR BLD: 0.1 %
BILIRUB SERPL-MCNC: 2.1 MG/DL
BUN SERPL-MCNC: 26 MG/DL
BUN SERPL-MCNC: 31 MG/DL
BUN SERPL-MCNC: 31 MG/DL
BUN SERPL-MCNC: 33 MG/DL
BUN SERPL-MCNC: 33 MG/DL
CALCIUM SERPL-MCNC: 9.2 MG/DL
CALCIUM SERPL-MCNC: 9.2 MG/DL
CALCIUM SERPL-MCNC: 9.4 MG/DL
CHLORIDE SERPL-SCNC: 102 MMOL/L
CHLORIDE SERPL-SCNC: 103 MMOL/L
CHLORIDE SERPL-SCNC: 103 MMOL/L
CHLORIDE SERPL-SCNC: 105 MMOL/L
CHLORIDE SERPL-SCNC: 105 MMOL/L
CO2 SERPL-SCNC: 20 MMOL/L
CO2 SERPL-SCNC: 20 MMOL/L
CO2 SERPL-SCNC: 21 MMOL/L
CO2 SERPL-SCNC: 21 MMOL/L
CO2 SERPL-SCNC: 22 MMOL/L
CREAT SERPL-MCNC: 4.3 MG/DL
CREAT SERPL-MCNC: 4.9 MG/DL
CREAT SERPL-MCNC: 4.9 MG/DL
CREAT SERPL-MCNC: 5 MG/DL
CREAT SERPL-MCNC: 5 MG/DL
DELSYS: ABNORMAL
DIFFERENTIAL METHOD: ABNORMAL
EOSINOPHIL # BLD AUTO: 0.2 K/UL
EOSINOPHIL NFR BLD: 2.1 %
ERYTHROCYTE [DISTWIDTH] IN BLOOD BY AUTOMATED COUNT: 27 %
ERYTHROCYTE [SEDIMENTATION RATE] IN BLOOD BY WESTERGREN METHOD: 14 MM/H
ERYTHROCYTE [SEDIMENTATION RATE] IN BLOOD BY WESTERGREN METHOD: 14 MM/H
ERYTHROCYTE [SEDIMENTATION RATE] IN BLOOD BY WESTERGREN METHOD: 20 MM/H
EST. GFR  (AFRICAN AMERICAN): 14.7 ML/MIN/1.73 M^2
EST. GFR  (AFRICAN AMERICAN): 14.7 ML/MIN/1.73 M^2
EST. GFR  (AFRICAN AMERICAN): 15.1 ML/MIN/1.73 M^2
EST. GFR  (AFRICAN AMERICAN): 15.1 ML/MIN/1.73 M^2
EST. GFR  (AFRICAN AMERICAN): 17.7 ML/MIN/1.73 M^2
EST. GFR  (NON AFRICAN AMERICAN): 12.7 ML/MIN/1.73 M^2
EST. GFR  (NON AFRICAN AMERICAN): 12.7 ML/MIN/1.73 M^2
EST. GFR  (NON AFRICAN AMERICAN): 13.1 ML/MIN/1.73 M^2
EST. GFR  (NON AFRICAN AMERICAN): 13.1 ML/MIN/1.73 M^2
EST. GFR  (NON AFRICAN AMERICAN): 15.3 ML/MIN/1.73 M^2
FIO2: 40
FLOW: 45
GLUCOSE SERPL-MCNC: 110 MG/DL
GLUCOSE SERPL-MCNC: 110 MG/DL
GLUCOSE SERPL-MCNC: 116 MG/DL
GLUCOSE SERPL-MCNC: 78 MG/DL
GLUCOSE SERPL-MCNC: 78 MG/DL
HCO3 UR-SCNC: 21.6 MMOL/L (ref 24–28)
HCO3 UR-SCNC: 22.2 MMOL/L (ref 24–28)
HCO3 UR-SCNC: 24.3 MMOL/L (ref 24–28)
HCT VFR BLD AUTO: 24.2 %
HGB BLD-MCNC: 7.1 G/DL
HYPOCHROMIA BLD QL SMEAR: ABNORMAL
IMM GRANULOCYTES # BLD AUTO: 0.33 K/UL
IMM GRANULOCYTES NFR BLD AUTO: 3.1 %
INR PPP: 1
LYMPHOCYTES # BLD AUTO: 0.7 K/UL
LYMPHOCYTES NFR BLD: 6.7 %
MAGNESIUM SERPL-MCNC: 2 MG/DL
MAGNESIUM SERPL-MCNC: 2.3 MG/DL
MAGNESIUM SERPL-MCNC: 2.6 MG/DL
MAGNESIUM SERPL-MCNC: 2.6 MG/DL
MCH RBC QN AUTO: 20.2 PG
MCHC RBC AUTO-ENTMCNC: 29.3 G/DL
MCV RBC AUTO: 69 FL
MODE: ABNORMAL
MONOCYTES # BLD AUTO: 1.8 K/UL
MONOCYTES NFR BLD: 17.3 %
NEUTROPHILS # BLD AUTO: 7.4 K/UL
NEUTROPHILS NFR BLD: 70.7 %
NRBC BLD-RTO: 0 /100 WBC
PCO2 BLDA: 45 MMHG (ref 35–45)
PCO2 BLDA: 60.4 MMHG (ref 35–45)
PCO2 BLDA: 62.5 MMHG (ref 35–45)
PEEP: 8
PH SMN: 7.16 [PH] (ref 7.35–7.45)
PH SMN: 7.2 [PH] (ref 7.35–7.45)
PH SMN: 7.3 [PH] (ref 7.35–7.45)
PHOSPHATE SERPL-MCNC: 5.4 MG/DL
PHOSPHATE SERPL-MCNC: 6.1 MG/DL
PHOSPHATE SERPL-MCNC: 6.3 MG/DL
PHOSPHATE SERPL-MCNC: 6.5 MG/DL
PHOSPHATE SERPL-MCNC: 7.7 MG/DL
PHOSPHATE SERPL-MCNC: 7.7 MG/DL
PHOSPHATE SERPL-MCNC: 9.1 MG/DL
PLATELET # BLD AUTO: 152 K/UL
PLATELET BLD QL SMEAR: ABNORMAL
PMV BLD AUTO: 9.9 FL
PO2 BLDA: 49 MMHG (ref 40–60)
PO2 BLDA: 93 MMHG (ref 80–100)
PO2 BLDA: 99 MMHG (ref 80–100)
POC BE: -4 MMOL/L
POC BE: -4 MMOL/L
POC BE: -7 MMOL/L
POC SATURATED O2: 72 % (ref 95–100)
POC SATURATED O2: 95 % (ref 95–100)
POC SATURATED O2: 97 % (ref 95–100)
POC TCO2: 23 MMOL/L (ref 24–29)
POC TCO2: 24 MMOL/L (ref 23–27)
POC TCO2: 26 MMOL/L (ref 23–27)
POCT GLUCOSE: 107 MG/DL (ref 70–110)
POCT GLUCOSE: 117 MG/DL (ref 70–110)
POCT GLUCOSE: 119 MG/DL (ref 70–110)
POCT GLUCOSE: 90 MG/DL (ref 70–110)
POCT GLUCOSE: 90 MG/DL (ref 70–110)
POIKILOCYTOSIS BLD QL SMEAR: SLIGHT
POLYCHROMASIA BLD QL SMEAR: ABNORMAL
POTASSIUM SERPL-SCNC: 4.4 MMOL/L
POTASSIUM SERPL-SCNC: 4.4 MMOL/L
POTASSIUM SERPL-SCNC: 4.5 MMOL/L
POTASSIUM SERPL-SCNC: 5.1 MMOL/L
POTASSIUM SERPL-SCNC: 5.1 MMOL/L
PROT SERPL-MCNC: 7.6 G/DL
PROTHROMBIN TIME: 10.6 SEC
PS: 15
RBC # BLD AUTO: 3.51 M/UL
SAMPLE: ABNORMAL
SITE: ABNORMAL
SODIUM SERPL-SCNC: 136 MMOL/L
SODIUM SERPL-SCNC: 138 MMOL/L
SP02: 100
TARGETS BLD QL SMEAR: ABNORMAL
VANCOMYCIN SERPL-MCNC: 22.4 UG/ML
VT: 450
WBC # BLD AUTO: 10.53 K/UL

## 2018-05-27 PROCEDURE — 63600175 PHARM REV CODE 636 W HCPCS: Performed by: PHYSICIAN ASSISTANT

## 2018-05-27 PROCEDURE — 84100 ASSAY OF PHOSPHORUS: CPT | Mod: 91

## 2018-05-27 PROCEDURE — 99291 CRITICAL CARE FIRST HOUR: CPT | Mod: ,,, | Performed by: PSYCHIATRY & NEUROLOGY

## 2018-05-27 PROCEDURE — 63600175 PHARM REV CODE 636 W HCPCS: Performed by: STUDENT IN AN ORGANIZED HEALTH CARE EDUCATION/TRAINING PROGRAM

## 2018-05-27 PROCEDURE — 80100008 HC CRRT DAILY MAINTENANCE

## 2018-05-27 PROCEDURE — 90945 DIALYSIS ONE EVALUATION: CPT

## 2018-05-27 PROCEDURE — 83735 ASSAY OF MAGNESIUM: CPT | Mod: 91

## 2018-05-27 PROCEDURE — 85025 COMPLETE CBC W/AUTO DIFF WBC: CPT

## 2018-05-27 PROCEDURE — 80202 ASSAY OF VANCOMYCIN: CPT

## 2018-05-27 PROCEDURE — 94761 N-INVAS EAR/PLS OXIMETRY MLT: CPT

## 2018-05-27 PROCEDURE — 36600 WITHDRAWAL OF ARTERIAL BLOOD: CPT

## 2018-05-27 PROCEDURE — 82803 BLOOD GASES ANY COMBINATION: CPT

## 2018-05-27 PROCEDURE — 63600175 PHARM REV CODE 636 W HCPCS: Performed by: ANESTHESIOLOGY

## 2018-05-27 PROCEDURE — C9113 INJ PANTOPRAZOLE SODIUM, VIA: HCPCS | Performed by: PHYSICIAN ASSISTANT

## 2018-05-27 PROCEDURE — 94640 AIRWAY INHALATION TREATMENT: CPT

## 2018-05-27 PROCEDURE — 99233 SBSQ HOSP IP/OBS HIGH 50: CPT | Mod: ,,, | Performed by: INTERNAL MEDICINE

## 2018-05-27 PROCEDURE — 27000221 HC OXYGEN, UP TO 24 HOURS

## 2018-05-27 PROCEDURE — 80069 RENAL FUNCTION PANEL: CPT | Mod: 91

## 2018-05-27 PROCEDURE — 94668 MNPJ CHEST WALL SBSQ: CPT

## 2018-05-27 PROCEDURE — 25000003 PHARM REV CODE 250: Performed by: STUDENT IN AN ORGANIZED HEALTH CARE EDUCATION/TRAINING PROGRAM

## 2018-05-27 PROCEDURE — 94003 VENT MGMT INPAT SUBQ DAY: CPT

## 2018-05-27 PROCEDURE — 85610 PROTHROMBIN TIME: CPT

## 2018-05-27 PROCEDURE — 25000003 PHARM REV CODE 250: Performed by: NURSE PRACTITIONER

## 2018-05-27 PROCEDURE — 99900035 HC TECH TIME PER 15 MIN (STAT)

## 2018-05-27 PROCEDURE — 25000003 PHARM REV CODE 250: Performed by: PHYSICIAN ASSISTANT

## 2018-05-27 PROCEDURE — 80069 RENAL FUNCTION PANEL: CPT

## 2018-05-27 PROCEDURE — 83735 ASSAY OF MAGNESIUM: CPT

## 2018-05-27 PROCEDURE — 84100 ASSAY OF PHOSPHORUS: CPT

## 2018-05-27 PROCEDURE — 99900026 HC AIRWAY MAINTENANCE (STAT)

## 2018-05-27 PROCEDURE — 85730 THROMBOPLASTIN TIME PARTIAL: CPT

## 2018-05-27 PROCEDURE — 80053 COMPREHEN METABOLIC PANEL: CPT

## 2018-05-27 PROCEDURE — 25000003 PHARM REV CODE 250: Performed by: PSYCHIATRY & NEUROLOGY

## 2018-05-27 PROCEDURE — 20000000 HC ICU ROOM

## 2018-05-27 PROCEDURE — 25000242 PHARM REV CODE 250 ALT 637 W/ HCPCS: Performed by: STUDENT IN AN ORGANIZED HEALTH CARE EDUCATION/TRAINING PROGRAM

## 2018-05-27 RX ORDER — MAGNESIUM SULFATE HEPTAHYDRATE 40 MG/ML
2 INJECTION, SOLUTION INTRAVENOUS
Status: DISCONTINUED | OUTPATIENT
Start: 2018-05-27 | End: 2018-05-28

## 2018-05-27 RX ORDER — SYRING-NEEDL,DISP,INSUL,0.3 ML 29 G X1/2"
296 SYRINGE, EMPTY DISPOSABLE MISCELLANEOUS ONCE
Status: COMPLETED | OUTPATIENT
Start: 2018-05-27 | End: 2018-05-27

## 2018-05-27 RX ORDER — HYDROCODONE BITARTRATE AND ACETAMINOPHEN 500; 5 MG/1; MG/1
TABLET ORAL CONTINUOUS
Status: DISCONTINUED | OUTPATIENT
Start: 2018-05-27 | End: 2018-05-28

## 2018-05-27 RX ORDER — HEPARIN SODIUM 5000 [USP'U]/ML
5000 INJECTION, SOLUTION INTRAVENOUS; SUBCUTANEOUS EVERY 8 HOURS
Status: DISCONTINUED | OUTPATIENT
Start: 2018-05-27 | End: 2018-06-07

## 2018-05-27 RX ADMIN — PROPOFOL 30 MCG/KG/MIN: 10 INJECTION, EMULSION INTRAVENOUS at 07:05

## 2018-05-27 RX ADMIN — Medication 2500 MCG: at 08:05

## 2018-05-27 RX ADMIN — BUSPIRONE HYDROCHLORIDE 10 MG: 10 TABLET ORAL at 03:05

## 2018-05-27 RX ADMIN — HEPARIN SODIUM 5000 UNITS: 5000 INJECTION, SOLUTION INTRAVENOUS; SUBCUTANEOUS at 09:05

## 2018-05-27 RX ADMIN — IPRATROPIUM BROMIDE AND ALBUTEROL SULFATE 3 ML: .5; 3 SOLUTION RESPIRATORY (INHALATION) at 07:05

## 2018-05-27 RX ADMIN — PROPOFOL 30 MCG/KG/MIN: 10 INJECTION, EMULSION INTRAVENOUS at 08:05

## 2018-05-27 RX ADMIN — BUSPIRONE HYDROCHLORIDE 10 MG: 10 TABLET ORAL at 09:05

## 2018-05-27 RX ADMIN — CHLORHEXIDINE GLUCONATE 15 ML: 1.2 RINSE ORAL at 08:05

## 2018-05-27 RX ADMIN — IPRATROPIUM BROMIDE AND ALBUTEROL SULFATE 3 ML: .5; 3 SOLUTION RESPIRATORY (INHALATION) at 01:05

## 2018-05-27 RX ADMIN — PROPOFOL 29.97 MCG/KG/MIN: 10 INJECTION, EMULSION INTRAVENOUS at 04:05

## 2018-05-27 RX ADMIN — Medication 12.5 MG: at 09:05

## 2018-05-27 RX ADMIN — BUSPIRONE HYDROCHLORIDE 10 MG: 10 TABLET ORAL at 08:05

## 2018-05-27 RX ADMIN — IPRATROPIUM BROMIDE AND ALBUTEROL SULFATE 3 ML: .5; 3 SOLUTION RESPIRATORY (INHALATION) at 12:05

## 2018-05-27 RX ADMIN — ALTEPLASE 2 MG: 2.2 INJECTION, POWDER, LYOPHILIZED, FOR SOLUTION INTRAVENOUS at 11:05

## 2018-05-27 RX ADMIN — PANTOPRAZOLE SODIUM 40 MG: 40 INJECTION, POWDER, FOR SOLUTION INTRAVENOUS at 09:05

## 2018-05-27 RX ADMIN — PROPOFOL 30 MCG/KG/MIN: 10 INJECTION, EMULSION INTRAVENOUS at 12:05

## 2018-05-27 RX ADMIN — Medication 12.5 MG: at 08:05

## 2018-05-27 RX ADMIN — PROPOFOL 30 MCG/KG/MIN: 10 INJECTION, EMULSION INTRAVENOUS at 11:05

## 2018-05-27 RX ADMIN — HEPARIN SODIUM 5000 UNITS: 5000 INJECTION, SOLUTION INTRAVENOUS; SUBCUTANEOUS at 01:05

## 2018-05-27 RX ADMIN — POLYETHYLENE GLYCOL 3350 17 G: 17 POWDER, FOR SOLUTION ORAL at 08:05

## 2018-05-27 RX ADMIN — CHLORHEXIDINE GLUCONATE 15 ML: 1.2 RINSE ORAL at 09:05

## 2018-05-27 RX ADMIN — STANDARDIZED SENNA CONCENTRATE AND DOCUSATE SODIUM 1 TABLET: 8.6; 5 TABLET, FILM COATED ORAL at 08:05

## 2018-05-27 RX ADMIN — MAGESIUM CITRATE 296 ML: 1.75 LIQUID ORAL at 01:05

## 2018-05-27 NOTE — PROGRESS NOTES
[facility]  Nephrology  Progress Note    Patient Name: Los Mendez  MRN: [id number]  Admission Date: 5/19/2018  Hospital Length of Stay: 8 days  Attending Provider: Zaki Hooper MD   Primary Care Physician: Provider Notinsystem  Principal Problem:Anoxic brain injury    Subjective:     HPI: Los Mendez is a 47 year old male with past medical history of HTN, heavy alcohol user and COPD. Transferred from [facility] following witnessed cardiac arrest while in the ED waiting room. Patient had presented complaining of upper respiratory track infection and shortness of breath symptoms, while in the ED waiting room he was noted to be choking, became apneic, which led to witnessed seizure like activity followed by cardiac arrest. Per discussion with ED staff, patient required about 22 minutes of ACLS/5 shocks before returning to normal sinus rhythm. During intubation a lozenge was removed from patients airway. Hyperthermia protocol was initiated prior to [place] [facility]. [place] [facility] [place]rived with increased serum creatinine from 2.9-->5.1 (unkown baseline), BUN 35--> 52, Trop 2.4-->1.3, Lactic acidosis 6.4-->1.3, chest x ray with increased parenchymal interstitial attenuation and parenchymal opacities suggestive of pulmonary edema. On mechanical ventilation with a FiO2 50%. Currently also anuric at present moment. Per cardiology patient has a dilated cardiomyopathy EF looks about 10-15% and LVEDD is 7.3 cm.    Interval History: remaining essentially anuric 3-5ccs per hour of UOP    Review of patient's allergies indicates:   Allergen Reactions    Penicillins      Tolerated cefepime May 2018     Current Facility-Administered Medications   Medication Frequency    acetaminophen tablet 325 mg Q6H PRN    albuterol-ipratropium 2.5 mg-0.5 mg/3 mL nebulizer solution 3 mL Q6H    busPIRone tablet 10 mg TID    chlorhexidine 0.12 % solution 15 mL BID    dextrose 50% injection 12.5 g PRN     dextrose 50% injection 25 g PRN    fentaNYL 2500 mcg in 0.9% sodium chloride 250 mL infusion premix (titrating) Continuous    fentaNYL injection 25 mcg Q2H PRN    glucagon (human recombinant) injection 1 mg PRN    glucose chewable tablet 16 g PRN    glucose chewable tablet 24 g PRN    heparin (porcine) injection 5,000 Units Q8H    insulin aspart U-100 pen 1-10 Units Q6H PRN    magnesium citrate solution 296 mL Once    metoprolol tartrate (LOPRESSOR) split tablet 12.5 mg BID    norepinephrine 8 mg in dextrose 5 % 250 mL infusion Continuous    ondansetron 4 mg/5 mL solution 4 mg Q8H PRN    [START ON 5/28/2018] pantoprazole 40 mg in dextrose 5 % 100 mL infusion (ready to mix system) Daily    polyethylene glycol packet 17 g Daily    propofol (DIPRIVAN) 10 mg/mL infusion Continuous    senna-docusate 8.6-50 mg per tablet 1 tablet Daily    sodium chloride 0.9% flush 3 mL PRN       Objective:     Vital Signs (Most Recent):  Temp: 99.5 °F (37.5 °C) (05/27/18 1257)  Pulse: 99 (05/27/18 1257)  Resp: (!) 21 (05/27/18 1257)  BP: 119/63 (05/27/18 1257)  SpO2: 100 % (05/27/18 1257)  O2 Device (Oxygen Therapy): ventilator (05/27/18 1257) Vital Signs (24h Range):  Temp:  [96.4 °F (35.8 °C)-99.7 °F (37.6 °C)] 99.5 °F (37.5 °C)  Pulse:  [] 99  Resp:  [6-27] 21  SpO2:  [95 %-100 %] 100 %  BP: (101-140)/(57-84) 119/63     Weight: (!) 144.6 kg (318 lb 12.6 oz) (05/22/18 0500)  Body mass index is 48.47 kg/m².  Body surface area is 2.63 meters squared.    I/O last 3 completed shifts:  In: 5765.9 [I.V.:5135.9; NG/GT:630]  Out: 6922 [Other:6922]    Physical Exam   HENT:   Head: Atraumatic.   Neck: No JVD present.   Cardiovascular: Normal rate and regular rhythm.  Exam reveals no friction rub.    Pulmonary/Chest: He has rales.   Abdominal: Soft. He exhibits no distension.   Musculoskeletal: He exhibits edema.   Skin: Skin is warm.         Assessment/Plan:     MARY (acute kidney injury)    Los Mendez is a 47 year old  male who is consulted for MARY, which is mostly secondary to iATN from previous cardiac arrest where he presented with hypotension which decrease perfusion to the kidneys (required to be started on pressors). Has echo with EF 10-15%.     Remaining essentially anuric (3-5ccs per hour)    Plan:  -tentatively for SLED overnight              Thank you for your consult. I will follow-up with patient. Please contact us if you have any additional questions.    Jaime Banuelos MD  Nephrology  [facility]

## 2018-05-27 NOTE — PROGRESS NOTES
ICU Progress Note  Neurocritical Care    Admit Date: 5/19/2018  LOS: 8    CC: Anoxic brain injury    Code Status: Full Code     SUBJECTIVE:     Interval History/Significant Events:   No acute events overnight        Medications:  Continuous Infusions:   fentanyl 100 mcg/hr (05/27/18 1005)    norepinephrine bitartrate-D5W Stopped (05/24/18 0515)    propofol 29.968 mcg/kg/min (05/27/18 1135)     Scheduled Meds:   albuterol-ipratropium  3 mL Nebulization Q6H    busPIRone  10 mg Per NG tube TID    chlorhexidine  15 mL Mouth/Throat BID    heparin (porcine)  5,000 Units Subcutaneous Q8H    magnesium citrate  296 mL Per NG tube Once    metoprolol tartrate  12.5 mg Per NG tube BID    [START ON 5/28/2018] pantoprazole 40 mg in dextrose 5 % 100 mL infusion (ready to mix system)  40 mg Intravenous Daily    polyethylene glycol  17 g Per NG tube Daily    senna-docusate 8.6-50 mg  1 tablet Per OG tube Daily     PRN Meds:.acetaminophen, dextrose 50%, dextrose 50%, fentaNYL, glucagon (human recombinant), glucose, glucose, insulin aspart U-100, ondansetron, sodium chloride 0.9%    OBJECTIVE:   Vital Signs (Most Recent):   Temp: 99.5 °F (37.5 °C) (05/27/18 1257)  Pulse: 99 (05/27/18 1257)  Resp: (!) 21 (05/27/18 1257)  BP: 119/63 (05/27/18 1257)  SpO2: 100 % (05/27/18 1257)    Vital Signs (24h Range):   Temp:  [96.4 °F (35.8 °C)-99.7 °F (37.6 °C)] 99.5 °F (37.5 °C)  Pulse:  [] 99  Resp:  [6-27] 21  SpO2:  [95 %-100 %] 100 %  BP: (101-140)/(57-84) 119/63    ICP/CPP (Last 24h):        I & O (Last 24h):     Intake/Output Summary (Last 24 hours) at 05/27/18 1304  Last data filed at 05/27/18 1205   Gross per 24 hour   Intake          4232.25 ml   Output             5698 ml   Net         -1465.75 ml     Physical Exam:  Intubated and sedated   No jaundice  Lungs with coarse breathing sounds bilateral   Normal S1/S2, no murmurs.  Abdomen is distended and slightly tense       Neuro:  Intubated and sedated  Open eyes to  voice  Track with eyes  Doesn't follow commands  PERRL, EOMI with VOR  Itnact corneal reflexes bilateral  Good cough reflex  Moves all extremities spontaneously         Vent Data:   Vent Mode: SIMV  Oxygen Concentration (%):  [] 40  Resp Rate Total:  [14 br/min-23 br/min] 20 br/min  Vt Set:  [450 mL] 450 mL  PEEP/CPAP:  [5 cmH20-8 cmH20] 5 cmH20  Pressure Support:  [15 cmH20] 15 cmH20  Mean Airway Pressure:  [11 cmH20-15 cmH20] 13 cmH20    Lines/Drains/Airway:        Airway - Non-Surgical 05/18/18 0900 Endotracheal Tube (Active)   Secured at 26 cm 5/26/2018 11:42 AM   Measured At Lips 5/26/2018 11:42 AM   Secured Location Center 5/26/2018 11:42 AM   Secured by Commercial tube garcia 5/26/2018 11:42 AM   Bite Block center;secure and patent 5/26/2018 11:42 AM   Site Condition Cool;Dry 5/26/2018 11:42 AM   Status Intact;Secured;Patent 5/26/2018 11:42 AM   Site Assessment Clean;Dry;No bleeding;No drainage 5/26/2018 11:42 AM   Cuff Volume 35 mL 5/26/2018  8:22 AM   Cuff Pressure 28 cm H2O 5/24/2018  8:08 PM           Percutaneous Central Line Insertion/Assessment - triple lumen  05/19/18 right internal jugular (Active)   Dressing biopatch in place;dressing dry and intact 5/26/2018 11:05 AM   Securement catheter securement device utilized 5/26/2018 11:05 AM   Additional Site Signs no drainage;no streak formation;no palpable cord;no pain;no edema;no warmth;no erythema 5/26/2018 11:05 AM   Distal Patency/Care flushed w/o difficulty;infusing 5/26/2018 11:05 AM   Medial Patency/Care flushed w/o difficulty;infusing 5/26/2018 11:05 AM   Proximal Patency/Care flushed w/o difficulty;infusing 5/26/2018 11:05 AM   Waveform normal 5/26/2018 11:05 AM   Line Interventions line leveled/zeroed 5/26/2018 11:05 AM   Dressing Change Due 05/31/18 5/26/2018 11:05 AM   Daily Line Review Performed 5/26/2018 11:05 AM            Trialysis (Dialysis) Catheter 05/21/18 1430 left femoral (Active)   IV Device Securement sutures 5/26/2018 11:05  AM   Additional Site Signs no streak formation;no drainage;no palpable cord;no pain;no edema;no warmth;no erythema 5/26/2018 11:05 AM   Patency/Care infusing 5/26/2018 11:05 AM   Waveform normal 5/26/2018  3:05 AM   Site Assessment Dry;Clean;Intact;No swelling;No redness 5/26/2018 11:05 AM   Status Accessed 5/26/2018 11:05 AM   Flows Good 5/26/2018 11:05 AM   Dressing Intervention Dressing reinforced 5/24/2018  3:05 PM   Dressing Status Biopatch in place;Clean;Dry;Intact 5/26/2018 11:05 AM   Dressing Change Due 05/31/18 5/26/2018  3:05 AM   Site Condition No complications 5/26/2018 11:05 AM   Dressing Occlusive 5/26/2018 11:05 AM   Daily Line Review Performed 5/26/2018 11:05 AM             NG/OG Tube 05/19/18 orogastric (Active)   Placement Check placement verified by x-ray;placement verified by aspirate characteristics 5/26/2018 11:05 AM   Tube advanced (cm) 3 5/19/2018  7:05 PM   Tolerance no signs/symptoms of discomfort 5/26/2018 11:05 AM   Securement taped to commercial device 5/26/2018 11:05 AM   Clamp Status/Tolerance clamped;no abdominal distention;no abdominal discomfort;no emesis;no nausea;no residual;no restlessness 5/26/2018 11:05 AM   Suction Setting/Drainage Method dependent drainage 5/21/2018  3:00 AM   Insertion Site Appearance no redness, warmth, tenderness, skin breakdown, drainage 5/26/2018  3:05 AM   Drainage Coffee ground 5/23/2018 10:00 PM   Flush/Irrigation flushed w/;water;no resistance met 5/26/2018 11:05 AM   Current Rate (mL/hr) 0 mL/hr 5/26/2018 11:05 AM   Goal Rate (mL/hr) 0 mL/hr 5/26/2018 11:05 AM   Intake (mL) 10 mL 5/26/2018 11:05 AM   Residual Amount (ml) 0 ml 5/26/2018 11:05 AM            Urethral Catheter 05/19/18 Temperature probe (Active)   Site Assessment Clean;Intact 5/26/2018 11:05 AM   Collection Container Urimeter 5/26/2018 11:05 AM   Securement Method secured to top of thigh w/ adhesive device 5/26/2018 11:05 AM   Catheter Care Performed yes 5/26/2018 11:05 AM   Reason for  "Continuing Urinary Catheterization Critically ill in ICU requiring intensive monitoring 5/26/2018 11:05 AM   CAUTI Prevention Bundle StatLock in place w 1" slack;Intact seal between catheter & drainage tubing;Drainage bag off the floor;Green sheeting clip in use;No dependent loops or kinks;Drainage bag below bladder;Drainage bag not overfilled (<2/3 full) 5/26/2018  7:05 AM   Output (mL) 10 mL 5/25/2018  4:00 PM            Trialysis (Dialysis) Catheter 05/21/18 1430 left femoral (Active)   IV Device Securement sutures 5/26/2018 11:05 AM   Additional Site Signs no streak formation;no drainage;no palpable cord;no pain;no edema;no warmth;no erythema 5/26/2018 11:05 AM   Patency/Care infusing 5/26/2018 11:05 AM   Waveform normal 5/26/2018  3:05 AM   Site Assessment Dry;Clean;Intact;No swelling;No redness 5/26/2018 11:05 AM   Status Accessed 5/26/2018 11:05 AM   Flows Good 5/26/2018 11:05 AM   Dressing Intervention Dressing reinforced 5/24/2018  3:05 PM   Dressing Status Biopatch in place;Clean;Dry;Intact 5/26/2018 11:05 AM   Dressing Change Due 05/31/18 5/26/2018  3:05 AM   Site Condition No complications 5/26/2018 11:05 AM   Dressing Occlusive 5/26/2018 11:05 AM   Daily Line Review Performed 5/26/2018 11:05 AM         Labs:  ABG:     Recent Labs  Lab 05/27/18  0558   PH 7.302*   PO2 99   PCO2 45.0   HCO3 22.2*   POCSATURATED 97   BE -4     BMP:    Recent Labs  Lab 05/27/18  0300 05/27/18  0804     138  --    K 5.1  5.1  --      105  --    CO2 20*  20*  --    BUN 33*  33*  --    CREATININE 4.9*  4.9*  --    GLU 78  78  --    MG 2.0  --    PHOS 7.7*  7.7* 5.4*     LFT:   Lab Results   Component Value Date    AST 96 (H) 05/27/2018     (H) 05/27/2018    ALKPHOS 118 05/27/2018    BILITOT 2.1 (H) 05/27/2018    ALBUMIN 2.5 (L) 05/27/2018    ALBUMIN 2.5 (L) 05/27/2018    PROT 7.6 05/27/2018     CBC:   Lab Results   Component Value Date    WBC 10.53 05/27/2018    HGB 7.1 (L) 05/27/2018    HCT 24.2 (L) " 05/27/2018    MCV 69 (L) 05/27/2018     05/27/2018     Microbiology x 7d:   Microbiology Results (last 7 days)     Procedure Component Value Units Date/Time    Blood culture [[id number]] Collected:  05/19/18 1630    Order Status:  Completed Specimen:  Blood from Peripheral, Foot, Right Updated:  05/24/18 2012     Blood Culture, Routine No growth after 5 days.    Blood culture [[id number]] Collected:  05/19/18 1650    Order Status:  Completed Specimen:  Blood from Peripheral, Antecubital, Right Updated:  05/24/18 2012     Blood Culture, Routine No growth after 5 days.    Culture, Respiratory with Gram Stain [[id number]] Collected:  05/22/18 1547    Order Status:  Completed Specimen:  Respiratory from Tracheal Aspirate Updated:  05/24/18 0807     Respiratory Culture Normal respiratory jeanna     Gram Stain (Respiratory) <10 epithelial cells per low power field.     Gram Stain (Respiratory) Rare WBC's     Gram Stain (Respiratory) No organisms seen    Narrative:       BAL    Culture, Respiratory with Gram Stain [[id number]] Collected:  05/19/18 1803    Order Status:  Completed Specimen:  Respiratory from Sputum Updated:  05/21/18 1352     Respiratory Culture No growth     Gram Stain (Respiratory) <10 epithelial cells per low power field.     Gram Stain (Respiratory) Many WBC's     Gram Stain (Respiratory) Rare Gram positive cocci    Urine culture [[id number]] Collected:  05/19/18 1658    Order Status:  Completed Specimen:  Urine from Urine, Catheterized Updated:  05/20/18 2131     Urine Culture, Routine No growth    Narrative:       CHANGE CHEN PRIOR TO CULTURE            ASSESSMENT/PLAN:     Patient Active Problem List   Diagnosis    Anoxic brain injury    Acute respiratory failure with hypoxia    Cardiac arrest    Class 3 severe obesity due to excess calories with serious comorbidity and body mass index (BMI) of 40.0 to 44.9 in adult    Seizure    Cardiogenic shock    Pulmonary edema    Essential  hypertension    Metabolic acidosis    Acute renal failure with tubular necrosis    Cytotoxic brain edema    Hypothermia    Elevated INR    Acute decompensated heart failure    Respiratory arrest    Acute ischemic vertebrobasilar artery cerebellar stroke    Cerebral infarction due to embolism of right cerebellar artery    Acute ischemic left MCA stroke    MARY (acute kidney injury)    Cold hands       A/P:     - Minimize sedation for RASS of 0  - Wean vent as tolerated  - Daily ABG and CXR  - MAP>65 and SBP<180  - CRRT plans per nephrology  - TF at goal  - Repeat KUB and if no obstruction give mg citrate   - SCDs  - Start SQH    - Continue PPI BID  - Discussed with wife at bedside.     Uninterrupted Critical Care/Counseling Time (not including procedures): 35 minutes

## 2018-05-27 NOTE — ASSESSMENT & PLAN NOTE
Los Mendez is a 47 year old male who is consulted for MARY, which is mostly secondary to iATN from previous cardiac arrest where he presented with hypotension which decrease perfusion to the kidneys (required to be started on pressors). Has echo with EF 10-15%.     Remaining essentially anuric (3-5ccs per hour)    Plan:  -tentatively for SLED overnight

## 2018-05-27 NOTE — PROGRESS NOTES
Notified NCC team of abnormal lab value of phos of 9.1. No new orders at this time.  Will continue to monitor.

## 2018-05-27 NOTE — SUBJECTIVE & OBJECTIVE
Interval History: remaining essentially anuric 3-5ccs per hour of UOP    Review of patient's allergies indicates:   Allergen Reactions    Penicillins      Tolerated cefepime May 2018     Current Facility-Administered Medications   Medication Frequency    acetaminophen tablet 325 mg Q6H PRN    albuterol-ipratropium 2.5 mg-0.5 mg/3 mL nebulizer solution 3 mL Q6H    busPIRone tablet 10 mg TID    chlorhexidine 0.12 % solution 15 mL BID    dextrose 50% injection 12.5 g PRN    dextrose 50% injection 25 g PRN    fentaNYL 2500 mcg in 0.9% sodium chloride 250 mL infusion premix (titrating) Continuous    fentaNYL injection 25 mcg Q2H PRN    glucagon (human recombinant) injection 1 mg PRN    glucose chewable tablet 16 g PRN    glucose chewable tablet 24 g PRN    heparin (porcine) injection 5,000 Units Q8H    insulin aspart U-100 pen 1-10 Units Q6H PRN    magnesium citrate solution 296 mL Once    metoprolol tartrate (LOPRESSOR) split tablet 12.5 mg BID    norepinephrine 8 mg in dextrose 5 % 250 mL infusion Continuous    ondansetron 4 mg/5 mL solution 4 mg Q8H PRN    [START ON 5/28/2018] pantoprazole 40 mg in dextrose 5 % 100 mL infusion (ready to mix system) Daily    polyethylene glycol packet 17 g Daily    propofol (DIPRIVAN) 10 mg/mL infusion Continuous    senna-docusate 8.6-50 mg per tablet 1 tablet Daily    sodium chloride 0.9% flush 3 mL PRN       Objective:     Vital Signs (Most Recent):  Temp: 99.5 °F (37.5 °C) (05/27/18 1257)  Pulse: 99 (05/27/18 1257)  Resp: (!) 21 (05/27/18 1257)  BP: 119/63 (05/27/18 1257)  SpO2: 100 % (05/27/18 1257)  O2 Device (Oxygen Therapy): ventilator (05/27/18 1257) Vital Signs (24h Range):  Temp:  [96.4 °F (35.8 °C)-99.7 °F (37.6 °C)] 99.5 °F (37.5 °C)  Pulse:  [] 99  Resp:  [6-27] 21  SpO2:  [95 %-100 %] 100 %  BP: (101-140)/(57-84) 119/63     Weight: (!) 144.6 kg (318 lb 12.6 oz) (05/22/18 0500)  Body mass index is 48.47 kg/m².  Body surface area is 2.63 meters  squared.    I/O last 3 completed shifts:  In: 5765.9 [I.V.:5135.9; NG/GT:630]  Out: 6922 [Other:6922]    Physical Exam   HENT:   Head: Atraumatic.   Neck: No JVD present.   Cardiovascular: Normal rate and regular rhythm.  Exam reveals no friction rub.    Pulmonary/Chest: He has rales.   Abdominal: Soft. He exhibits no distension.   Musculoskeletal: He exhibits edema.   Skin: Skin is warm.

## 2018-05-27 NOTE — PROGRESS NOTES
Pt CRRT (SCUF) clotted of at 7pm. Kept pt on hold per nephrology note to give a break overnight due to I & O  net negative and borderline pressures. Dr. Banuelos made aware of Phosphporus trending up. Telephone order to start 10 hr CRRT and switch to SLED as it will help lower down Phos instead of doing HD. Report given to bedside RN.

## 2018-05-27 NOTE — PLAN OF CARE
Problem: Patient Care Overview  Goal: Plan of Care Review  POC reviewed with pt at 0500. Pt verbalized understanding. Questions and concerns addressed. No acute events overnight.  Pt on propofol, fentanyl.  Pt moves all extremities spontaneously.  Pt on tube feeds. Pt progressing toward goals. Will continue to monitor. See flowsheets for full assessment and VS info

## 2018-05-27 NOTE — PLAN OF CARE
Problem: Patient Care Overview  Goal: Plan of Care Review  POC reviewed with pt and family at 1400. Pt unable to verbalize understanding due to underlying condition.  Spouse verbalized understanding. Questions and concerns addressed. Continue to trial wean sedation off for possible extubation.  Nephrology continues to follow for MARY,  CRRT needed to continue. Pt progressing toward goals. Will continue to monitor. See flowsheets for full assessment and VS info.

## 2018-05-28 PROBLEM — D62 ACUTE BLOOD LOSS ANEMIA: Status: ACTIVE | Noted: 2018-05-28

## 2018-05-28 LAB
ABO + RH BLD: NORMAL
ALBUMIN SERPL BCP-MCNC: 2.4 G/DL
ALBUMIN SERPL BCP-MCNC: 2.6 G/DL
ALBUMIN SERPL BCP-MCNC: 2.6 G/DL
ALLENS TEST: ABNORMAL
ALLENS TEST: ABNORMAL
ALP SERPL-CCNC: 164 U/L
ALT SERPL W/O P-5'-P-CCNC: 94 U/L
ANION GAP SERPL CALC-SCNC: 12 MMOL/L
ANION GAP SERPL CALC-SCNC: 13 MMOL/L
ANION GAP SERPL CALC-SCNC: 13 MMOL/L
ANISOCYTOSIS BLD QL SMEAR: ABNORMAL
APTT BLDCRRT: 27.2 SEC
AST SERPL-CCNC: 82 U/L
BASOPHILS # BLD AUTO: 0.03 K/UL
BASOPHILS # BLD AUTO: 0.03 K/UL
BASOPHILS # BLD AUTO: 0.04 K/UL
BASOPHILS NFR BLD: 0.2 %
BASOPHILS NFR BLD: 0.2 %
BASOPHILS NFR BLD: 0.3 %
BILIRUB SERPL-MCNC: 1.6 MG/DL
BLD GP AB SCN CELLS X3 SERPL QL: NORMAL
BLD PROD TYP BPU: NORMAL
BLD PROD TYP BPU: NORMAL
BLOOD UNIT EXPIRATION DATE: NORMAL
BLOOD UNIT EXPIRATION DATE: NORMAL
BLOOD UNIT TYPE CODE: 5100
BLOOD UNIT TYPE CODE: 5100
BLOOD UNIT TYPE: NORMAL
BLOOD UNIT TYPE: NORMAL
BUN SERPL-MCNC: 33 MG/DL
BUN SERPL-MCNC: 36 MG/DL
BUN SERPL-MCNC: 36 MG/DL
BUN SERPL-MCNC: 40 MG/DL
BUN SERPL-MCNC: 40 MG/DL
CALCIUM SERPL-MCNC: 9.2 MG/DL
CALCIUM SERPL-MCNC: 9.6 MG/DL
CALCIUM SERPL-MCNC: 9.6 MG/DL
CALCIUM SERPL-MCNC: 9.7 MG/DL
CALCIUM SERPL-MCNC: 9.7 MG/DL
CHLORIDE SERPL-SCNC: 103 MMOL/L
CO2 SERPL-SCNC: 21 MMOL/L
CO2 SERPL-SCNC: 21 MMOL/L
CO2 SERPL-SCNC: 22 MMOL/L
CO2 SERPL-SCNC: 24 MMOL/L
CO2 SERPL-SCNC: 24 MMOL/L
CODING SYSTEM: NORMAL
CODING SYSTEM: NORMAL
CREAT SERPL-MCNC: 5.2 MG/DL
CREAT SERPL-MCNC: 5.4 MG/DL
CREAT SERPL-MCNC: 5.4 MG/DL
CREAT SERPL-MCNC: 6.2 MG/DL
CREAT SERPL-MCNC: 6.2 MG/DL
DELSYS: ABNORMAL
DELSYS: ABNORMAL
DIFFERENTIAL METHOD: ABNORMAL
DISPENSE STATUS: NORMAL
DISPENSE STATUS: NORMAL
EOSINOPHIL # BLD AUTO: 0.1 K/UL
EOSINOPHIL # BLD AUTO: 0.3 K/UL
EOSINOPHIL # BLD AUTO: 0.3 K/UL
EOSINOPHIL NFR BLD: 0.9 %
EOSINOPHIL NFR BLD: 2.2 %
EOSINOPHIL NFR BLD: 2.4 %
ERYTHROCYTE [DISTWIDTH] IN BLOOD BY AUTOMATED COUNT: 26.7 %
ERYTHROCYTE [DISTWIDTH] IN BLOOD BY AUTOMATED COUNT: 27.9 %
ERYTHROCYTE [DISTWIDTH] IN BLOOD BY AUTOMATED COUNT: 28.5 %
ERYTHROCYTE [SEDIMENTATION RATE] IN BLOOD BY WESTERGREN METHOD: 20 MM/H
EST. GFR  (AFRICAN AMERICAN): 11.4 ML/MIN/1.73 M^2
EST. GFR  (AFRICAN AMERICAN): 11.4 ML/MIN/1.73 M^2
EST. GFR  (AFRICAN AMERICAN): 13.4 ML/MIN/1.73 M^2
EST. GFR  (AFRICAN AMERICAN): 13.4 ML/MIN/1.73 M^2
EST. GFR  (AFRICAN AMERICAN): 14.1 ML/MIN/1.73 M^2
EST. GFR  (NON AFRICAN AMERICAN): 11.6 ML/MIN/1.73 M^2
EST. GFR  (NON AFRICAN AMERICAN): 11.6 ML/MIN/1.73 M^2
EST. GFR  (NON AFRICAN AMERICAN): 12.2 ML/MIN/1.73 M^2
EST. GFR  (NON AFRICAN AMERICAN): 9.8 ML/MIN/1.73 M^2
EST. GFR  (NON AFRICAN AMERICAN): 9.8 ML/MIN/1.73 M^2
FIO2: 40
FIO2: 45
FLOW: 10
FLOW: 45
GLUCOSE SERPL-MCNC: 103 MG/DL
GLUCOSE SERPL-MCNC: 103 MG/DL
GLUCOSE SERPL-MCNC: 110 MG/DL
GLUCOSE SERPL-MCNC: 110 MG/DL
GLUCOSE SERPL-MCNC: 113 MG/DL
HCO3 UR-SCNC: 23.1 MMOL/L (ref 24–28)
HCO3 UR-SCNC: 23.7 MMOL/L (ref 24–28)
HCT VFR BLD AUTO: 23 %
HCT VFR BLD AUTO: 24.3 %
HCT VFR BLD AUTO: 26.6 %
HGB BLD-MCNC: 6.8 G/DL
HGB BLD-MCNC: 7.3 G/DL
HGB BLD-MCNC: 8 G/DL
HYPOCHROMIA BLD QL SMEAR: ABNORMAL
IMM GRANULOCYTES # BLD AUTO: 0.26 K/UL
IMM GRANULOCYTES # BLD AUTO: 0.29 K/UL
IMM GRANULOCYTES # BLD AUTO: 0.36 K/UL
IMM GRANULOCYTES NFR BLD AUTO: 1.7 %
IMM GRANULOCYTES NFR BLD AUTO: 1.9 %
IMM GRANULOCYTES NFR BLD AUTO: 2.5 %
INR PPP: 1
LYMPHOCYTES # BLD AUTO: 0.8 K/UL
LYMPHOCYTES # BLD AUTO: 0.9 K/UL
LYMPHOCYTES # BLD AUTO: 0.9 K/UL
LYMPHOCYTES NFR BLD: 5 %
LYMPHOCYTES NFR BLD: 5.9 %
LYMPHOCYTES NFR BLD: 6.2 %
MAGNESIUM SERPL-MCNC: 2.7 MG/DL
MAGNESIUM SERPL-MCNC: 2.7 MG/DL
MAGNESIUM SERPL-MCNC: 2.8 MG/DL
MCH RBC QN AUTO: 20.4 PG
MCH RBC QN AUTO: 21 PG
MCH RBC QN AUTO: 21.5 PG
MCHC RBC AUTO-ENTMCNC: 29.6 G/DL
MCHC RBC AUTO-ENTMCNC: 30 G/DL
MCHC RBC AUTO-ENTMCNC: 30.1 G/DL
MCV RBC AUTO: 69 FL
MCV RBC AUTO: 70 FL
MCV RBC AUTO: 72 FL
MODE: ABNORMAL
MODE: ABNORMAL
MONOCYTES # BLD AUTO: 0.9 K/UL
MONOCYTES # BLD AUTO: 2.1 K/UL
MONOCYTES # BLD AUTO: 2.4 K/UL
MONOCYTES NFR BLD: 13.8 %
MONOCYTES NFR BLD: 17 %
MONOCYTES NFR BLD: 5.7 %
NEUTROPHILS # BLD AUTO: 10.3 K/UL
NEUTROPHILS # BLD AUTO: 11.6 K/UL
NEUTROPHILS # BLD AUTO: 13.6 K/UL
NEUTROPHILS NFR BLD: 71.7 %
NEUTROPHILS NFR BLD: 75.9 %
NEUTROPHILS NFR BLD: 86.5 %
NRBC BLD-RTO: 0 /100 WBC
OVALOCYTES BLD QL SMEAR: ABNORMAL
PCO2 BLDA: 42 MMHG (ref 35–45)
PCO2 BLDA: 45.7 MMHG (ref 35–45)
PEEP: 5
PH SMN: 7.32 [PH] (ref 7.35–7.45)
PH SMN: 7.35 [PH] (ref 7.35–7.45)
PHOSPHATE SERPL-MCNC: 5.9 MG/DL
PHOSPHATE SERPL-MCNC: 5.9 MG/DL
PHOSPHATE SERPL-MCNC: 6 MG/DL
PHOSPHATE SERPL-MCNC: 6 MG/DL
PHOSPHATE SERPL-MCNC: 6.2 MG/DL
PHOSPHATE SERPL-MCNC: 6.7 MG/DL
PHOSPHATE SERPL-MCNC: 6.7 MG/DL
PLATELET # BLD AUTO: 173 K/UL
PLATELET # BLD AUTO: 178 K/UL
PLATELET # BLD AUTO: 182 K/UL
PMV BLD AUTO: 10.1 FL
PMV BLD AUTO: 10.1 FL
PMV BLD AUTO: 9.8 FL
PO2 BLDA: 100 MMHG (ref 80–100)
PO2 BLDA: 103 MMHG (ref 80–100)
POC BE: -2 MMOL/L
POC BE: -2 MMOL/L
POC SATURATED O2: 97 % (ref 95–100)
POC SATURATED O2: 98 % (ref 95–100)
POC TCO2: 24 MMOL/L (ref 23–27)
POC TCO2: 25 MMOL/L (ref 23–27)
POCT GLUCOSE: 104 MG/DL (ref 70–110)
POCT GLUCOSE: 105 MG/DL (ref 70–110)
POCT GLUCOSE: 105 MG/DL (ref 70–110)
POCT GLUCOSE: 106 MG/DL (ref 70–110)
POCT GLUCOSE: 109 MG/DL (ref 70–110)
POCT GLUCOSE: <20 MG/DL (ref 70–110)
POIKILOCYTOSIS BLD QL SMEAR: SLIGHT
POLYCHROMASIA BLD QL SMEAR: ABNORMAL
POTASSIUM SERPL-SCNC: 4.2 MMOL/L
POTASSIUM SERPL-SCNC: 4.5 MMOL/L
POTASSIUM SERPL-SCNC: 4.5 MMOL/L
POTASSIUM SERPL-SCNC: 4.8 MMOL/L
POTASSIUM SERPL-SCNC: 4.8 MMOL/L
PROT SERPL-MCNC: 7.2 G/DL
PROTHROMBIN TIME: 10.3 SEC
PS: 15
RBC # BLD AUTO: 3.33 M/UL
RBC # BLD AUTO: 3.48 M/UL
RBC # BLD AUTO: 3.72 M/UL
SAMPLE: ABNORMAL
SAMPLE: ABNORMAL
SITE: ABNORMAL
SITE: ABNORMAL
SODIUM SERPL-SCNC: 137 MMOL/L
SODIUM SERPL-SCNC: 139 MMOL/L
SODIUM SERPL-SCNC: 139 MMOL/L
SP02: 100
SP02: 97
SPHEROCYTES BLD QL SMEAR: ABNORMAL
TARGETS BLD QL SMEAR: ABNORMAL
TRANS ERYTHROCYTES VOL PATIENT: NORMAL ML
TRANS ERYTHROCYTES VOL PATIENT: NORMAL ML
TROPONIN I SERPL DL<=0.01 NG/ML-MCNC: 0.07 NG/ML
VANCOMYCIN SERPL-MCNC: 21.5 UG/ML
VT: 450
WBC # BLD AUTO: 14.32 K/UL
WBC # BLD AUTO: 15.31 K/UL
WBC # BLD AUTO: 15.69 K/UL

## 2018-05-28 PROCEDURE — 25000003 PHARM REV CODE 250: Performed by: NURSE PRACTITIONER

## 2018-05-28 PROCEDURE — 80202 ASSAY OF VANCOMYCIN: CPT

## 2018-05-28 PROCEDURE — 90945 DIALYSIS ONE EVALUATION: CPT | Mod: ,,, | Performed by: INTERNAL MEDICINE

## 2018-05-28 PROCEDURE — 85025 COMPLETE CBC W/AUTO DIFF WBC: CPT | Mod: 91

## 2018-05-28 PROCEDURE — 85610 PROTHROMBIN TIME: CPT

## 2018-05-28 PROCEDURE — 84484 ASSAY OF TROPONIN QUANT: CPT

## 2018-05-28 PROCEDURE — 25000003 PHARM REV CODE 250: Performed by: INTERNAL MEDICINE

## 2018-05-28 PROCEDURE — 63600175 PHARM REV CODE 636 W HCPCS: Mod: JG | Performed by: NURSE PRACTITIONER

## 2018-05-28 PROCEDURE — C9113 INJ PANTOPRAZOLE SODIUM, VIA: HCPCS | Performed by: STUDENT IN AN ORGANIZED HEALTH CARE EDUCATION/TRAINING PROGRAM

## 2018-05-28 PROCEDURE — 36600 WITHDRAWAL OF ARTERIAL BLOOD: CPT

## 2018-05-28 PROCEDURE — 63600175 PHARM REV CODE 636 W HCPCS: Performed by: ANESTHESIOLOGY

## 2018-05-28 PROCEDURE — P9021 RED BLOOD CELLS UNIT: HCPCS

## 2018-05-28 PROCEDURE — 63600175 PHARM REV CODE 636 W HCPCS: Performed by: PSYCHIATRY & NEUROLOGY

## 2018-05-28 PROCEDURE — 63600175 PHARM REV CODE 636 W HCPCS: Performed by: STUDENT IN AN ORGANIZED HEALTH CARE EDUCATION/TRAINING PROGRAM

## 2018-05-28 PROCEDURE — 37799 UNLISTED PX VASCULAR SURGERY: CPT

## 2018-05-28 PROCEDURE — 36800 INSERTION OF CANNULA: CPT | Mod: ,,, | Performed by: PSYCHIATRY & NEUROLOGY

## 2018-05-28 PROCEDURE — 63600175 PHARM REV CODE 636 W HCPCS

## 2018-05-28 PROCEDURE — 36556 INSERT NON-TUNNEL CV CATH: CPT

## 2018-05-28 PROCEDURE — 86920 COMPATIBILITY TEST SPIN: CPT

## 2018-05-28 PROCEDURE — 02HV33Z INSERTION OF INFUSION DEVICE INTO SUPERIOR VENA CAVA, PERCUTANEOUS APPROACH: ICD-10-PCS | Performed by: PSYCHIATRY & NEUROLOGY

## 2018-05-28 PROCEDURE — 27100171 HC OXYGEN HIGH FLOW UP TO 24 HOURS

## 2018-05-28 PROCEDURE — 25000003 PHARM REV CODE 250

## 2018-05-28 PROCEDURE — 83735 ASSAY OF MAGNESIUM: CPT | Mod: 91

## 2018-05-28 PROCEDURE — 43752 NASAL/OROGASTRIC W/TUBE PLMT: CPT

## 2018-05-28 PROCEDURE — 93010 ELECTROCARDIOGRAM REPORT: CPT | Mod: ,,, | Performed by: INTERNAL MEDICINE

## 2018-05-28 PROCEDURE — 85730 THROMBOPLASTIN TIME PARTIAL: CPT

## 2018-05-28 PROCEDURE — 25000242 PHARM REV CODE 250 ALT 637 W/ HCPCS: Performed by: STUDENT IN AN ORGANIZED HEALTH CARE EDUCATION/TRAINING PROGRAM

## 2018-05-28 PROCEDURE — 36620 INSERTION CATHETER ARTERY: CPT

## 2018-05-28 PROCEDURE — 94761 N-INVAS EAR/PLS OXIMETRY MLT: CPT

## 2018-05-28 PROCEDURE — 84100 ASSAY OF PHOSPHORUS: CPT

## 2018-05-28 PROCEDURE — 86901 BLOOD TYPING SEROLOGIC RH(D): CPT

## 2018-05-28 PROCEDURE — 63600175 PHARM REV CODE 636 W HCPCS: Performed by: NURSE PRACTITIONER

## 2018-05-28 PROCEDURE — 99291 CRITICAL CARE FIRST HOUR: CPT | Mod: 25,,, | Performed by: NURSE PRACTITIONER

## 2018-05-28 PROCEDURE — 94640 AIRWAY INHALATION TREATMENT: CPT

## 2018-05-28 PROCEDURE — 25000242 PHARM REV CODE 250 ALT 637 W/ HCPCS

## 2018-05-28 PROCEDURE — 80069 RENAL FUNCTION PANEL: CPT | Mod: 91

## 2018-05-28 PROCEDURE — 80053 COMPREHEN METABOLIC PANEL: CPT

## 2018-05-28 PROCEDURE — 80100008 HC CRRT DAILY MAINTENANCE

## 2018-05-28 PROCEDURE — 20000000 HC ICU ROOM

## 2018-05-28 PROCEDURE — 99900026 HC AIRWAY MAINTENANCE (STAT)

## 2018-05-28 PROCEDURE — 25000003 PHARM REV CODE 250: Performed by: STUDENT IN AN ORGANIZED HEALTH CARE EDUCATION/TRAINING PROGRAM

## 2018-05-28 PROCEDURE — 25000003 PHARM REV CODE 250: Performed by: PSYCHIATRY & NEUROLOGY

## 2018-05-28 PROCEDURE — 99900035 HC TECH TIME PER 15 MIN (STAT)

## 2018-05-28 PROCEDURE — 82803 BLOOD GASES ANY COMBINATION: CPT

## 2018-05-28 PROCEDURE — 94003 VENT MGMT INPAT SUBQ DAY: CPT

## 2018-05-28 PROCEDURE — 90945 DIALYSIS ONE EVALUATION: CPT

## 2018-05-28 PROCEDURE — 27000221 HC OXYGEN, UP TO 24 HOURS

## 2018-05-28 RX ORDER — METOPROLOL TARTRATE 1 MG/ML
5 INJECTION, SOLUTION INTRAVENOUS ONCE
Status: COMPLETED | OUTPATIENT
Start: 2018-05-28 | End: 2018-05-28

## 2018-05-28 RX ORDER — MAGNESIUM SULFATE HEPTAHYDRATE 40 MG/ML
2 INJECTION, SOLUTION INTRAVENOUS
Status: ACTIVE | OUTPATIENT
Start: 2018-05-28 | End: 2018-05-29

## 2018-05-28 RX ORDER — NAPROXEN SODIUM 220 MG/1
81 TABLET, FILM COATED ORAL DAILY
Status: DISCONTINUED | OUTPATIENT
Start: 2018-05-28 | End: 2018-06-06

## 2018-05-28 RX ORDER — HYDROCODONE BITARTRATE AND ACETAMINOPHEN 500; 5 MG/1; MG/1
TABLET ORAL
Status: DISCONTINUED | OUTPATIENT
Start: 2018-05-28 | End: 2018-06-01

## 2018-05-28 RX ORDER — SYRING-NEEDL,DISP,INSUL,0.3 ML 29 G X1/2"
296 SYRINGE, EMPTY DISPOSABLE MISCELLANEOUS ONCE
Status: COMPLETED | OUTPATIENT
Start: 2018-05-28 | End: 2018-05-28

## 2018-05-28 RX ORDER — DIAZEPAM 5 MG/1
5 TABLET ORAL EVERY 6 HOURS PRN
Status: DISCONTINUED | OUTPATIENT
Start: 2018-05-28 | End: 2018-05-28

## 2018-05-28 RX ORDER — DIAZEPAM 5 MG/1
5 TABLET ORAL EVERY 6 HOURS PRN
Status: DISCONTINUED | OUTPATIENT
Start: 2018-05-28 | End: 2018-05-29

## 2018-05-28 RX ORDER — FENTANYL CITRATE 50 UG/ML
50 INJECTION, SOLUTION INTRAMUSCULAR; INTRAVENOUS ONCE
Status: COMPLETED | OUTPATIENT
Start: 2018-05-28 | End: 2018-05-28

## 2018-05-28 RX ORDER — PANTOPRAZOLE SODIUM 40 MG/1
40 FOR SUSPENSION ORAL DAILY
Status: DISCONTINUED | OUTPATIENT
Start: 2018-05-29 | End: 2018-05-31

## 2018-05-28 RX ORDER — FENTANYL CITRATE 50 UG/ML
25 INJECTION, SOLUTION INTRAMUSCULAR; INTRAVENOUS ONCE
Status: COMPLETED | OUTPATIENT
Start: 2018-05-28 | End: 2018-05-28

## 2018-05-28 RX ORDER — METOPROLOL TARTRATE 1 MG/ML
INJECTION, SOLUTION INTRAVENOUS
Status: DISPENSED
Start: 2018-05-28 | End: 2018-05-29

## 2018-05-28 RX ORDER — METOPROLOL TARTRATE 1 MG/ML
INJECTION, SOLUTION INTRAVENOUS
Status: COMPLETED
Start: 2018-05-28 | End: 2018-05-28

## 2018-05-28 RX ORDER — DEXMEDETOMIDINE HYDROCHLORIDE 4 UG/ML
0.2 INJECTION, SOLUTION INTRAVENOUS CONTINUOUS
Status: DISCONTINUED | OUTPATIENT
Start: 2018-05-28 | End: 2018-06-01

## 2018-05-28 RX ORDER — METOPROLOL TARTRATE 25 MG/1
25 TABLET, FILM COATED ORAL 3 TIMES DAILY
Status: DISCONTINUED | OUTPATIENT
Start: 2018-05-28 | End: 2018-05-29

## 2018-05-28 RX ORDER — FENTANYL CITRATE 50 UG/ML
INJECTION, SOLUTION INTRAMUSCULAR; INTRAVENOUS
Status: COMPLETED
Start: 2018-05-28 | End: 2018-05-28

## 2018-05-28 RX ORDER — HYDROCODONE BITARTRATE AND ACETAMINOPHEN 500; 5 MG/1; MG/1
TABLET ORAL CONTINUOUS
Status: ACTIVE | OUTPATIENT
Start: 2018-05-28 | End: 2018-05-29

## 2018-05-28 RX ADMIN — FENTANYL CITRATE 25 MCG: 50 INJECTION, SOLUTION INTRAMUSCULAR; INTRAVENOUS at 08:05

## 2018-05-28 RX ADMIN — Medication 12.5 MG: at 08:05

## 2018-05-28 RX ADMIN — FENTANYL CITRATE 50 MCG: 50 INJECTION, SOLUTION INTRAMUSCULAR; INTRAVENOUS at 05:05

## 2018-05-28 RX ADMIN — IPRATROPIUM BROMIDE AND ALBUTEROL SULFATE 3 ML: .5; 3 SOLUTION RESPIRATORY (INHALATION) at 07:05

## 2018-05-28 RX ADMIN — METOPROLOL TARTRATE 5 MG: 5 INJECTION INTRAVENOUS at 08:05

## 2018-05-28 RX ADMIN — METOPROLOL TARTRATE 5 MG: 5 INJECTION INTRAVENOUS at 07:05

## 2018-05-28 RX ADMIN — SODIUM CHLORIDE: 0.9 INJECTION, SOLUTION INTRAVENOUS at 02:05

## 2018-05-28 RX ADMIN — PROPOFOL 30 MCG/KG/MIN: 10 INJECTION, EMULSION INTRAVENOUS at 04:05

## 2018-05-28 RX ADMIN — IPRATROPIUM BROMIDE AND ALBUTEROL SULFATE 3 ML: .5; 3 SOLUTION RESPIRATORY (INHALATION) at 12:05

## 2018-05-28 RX ADMIN — DIAZEPAM 5 MG: 5 TABLET ORAL at 11:05

## 2018-05-28 RX ADMIN — HEPARIN SODIUM 5000 UNITS: 5000 INJECTION, SOLUTION INTRAVENOUS; SUBCUTANEOUS at 05:05

## 2018-05-28 RX ADMIN — ALTEPLASE 1 MG: 2.2 INJECTION, POWDER, LYOPHILIZED, FOR SOLUTION INTRAVENOUS at 05:05

## 2018-05-28 RX ADMIN — BUSPIRONE HYDROCHLORIDE 10 MG: 10 TABLET ORAL at 08:05

## 2018-05-28 RX ADMIN — POLYETHYLENE GLYCOL 3350 17 G: 17 POWDER, FOR SOLUTION ORAL at 08:05

## 2018-05-28 RX ADMIN — FENTANYL CITRATE 50 MCG: 50 INJECTION INTRAMUSCULAR; INTRAVENOUS at 05:05

## 2018-05-28 RX ADMIN — SODIUM CHLORIDE: 0.9 INJECTION, SOLUTION INTRAVENOUS at 03:05

## 2018-05-28 RX ADMIN — MAGESIUM CITRATE 296 ML: 1.75 LIQUID ORAL at 11:05

## 2018-05-28 RX ADMIN — FENTANYL CITRATE 25 MCG: 50 INJECTION, SOLUTION INTRAMUSCULAR; INTRAVENOUS at 06:05

## 2018-05-28 RX ADMIN — CHLORHEXIDINE GLUCONATE 15 ML: 1.2 RINSE ORAL at 09:05

## 2018-05-28 RX ADMIN — Medication 50 MCG/HR: at 04:05

## 2018-05-28 RX ADMIN — METOPROLOL TARTRATE 5 MG: 1 INJECTION, SOLUTION INTRAVENOUS at 07:05

## 2018-05-28 RX ADMIN — CHLORHEXIDINE GLUCONATE 15 ML: 1.2 RINSE ORAL at 08:05

## 2018-05-28 RX ADMIN — METOPROLOL TARTRATE 5 MG: 5 INJECTION INTRAVENOUS at 06:05

## 2018-05-28 RX ADMIN — RACEPINEPHRINE HYDROCHLORIDE 0.5 ML: 11.25 SOLUTION RESPIRATORY (INHALATION) at 04:05

## 2018-05-28 RX ADMIN — STANDARDIZED SENNA CONCENTRATE AND DOCUSATE SODIUM 1 TABLET: 8.6; 5 TABLET, FILM COATED ORAL at 08:05

## 2018-05-28 RX ADMIN — DEXMEDETOMIDINE HYDROCHLORIDE 0.2 MCG/KG/HR: 100 INJECTION, SOLUTION, CONCENTRATE INTRAVENOUS at 10:05

## 2018-05-28 RX ADMIN — DEXTROSE 40 MG: 50 INJECTION, SOLUTION INTRAVENOUS at 09:05

## 2018-05-28 RX ADMIN — ASPIRIN 81 MG CHEWABLE TABLET 81 MG: 81 TABLET CHEWABLE at 11:05

## 2018-05-28 RX ADMIN — HEPARIN SODIUM 5000 UNITS: 5000 INJECTION, SOLUTION INTRAVENOUS; SUBCUTANEOUS at 09:05

## 2018-05-28 RX ADMIN — ACETAMINOPHEN 325 MG: 325 TABLET, FILM COATED ORAL at 08:05

## 2018-05-28 RX ADMIN — PROPOFOL 30 MCG/KG/MIN: 10 INJECTION, EMULSION INTRAVENOUS at 08:05

## 2018-05-28 RX ADMIN — HEPARIN SODIUM 5000 UNITS: 5000 INJECTION, SOLUTION INTRAVENOUS; SUBCUTANEOUS at 02:05

## 2018-05-28 RX ADMIN — BUSPIRONE HYDROCHLORIDE 10 MG: 10 TABLET ORAL at 10:05

## 2018-05-28 RX ADMIN — METOPROLOL TARTRATE 25 MG: 25 TABLET ORAL at 10:05

## 2018-05-28 NOTE — PROGRESS NOTES
[facility]  Nephrology  Progress Note    Patient Name: Los Mendez  MRN: [id number]  Admission Date: 5/19/2018  Hospital Length of Stay: 9 days  Attending Provider: Zaki Hooper MD   Primary Care Physician: Provider Notinsystem  Principal Problem:Anoxic brain injury    Subjective:     HPI: Los Mendez is a 47 year old male with past medical history of HTN, heavy alcohol user and COPD. Transferred from [facility] following witnessed cardiac arrest while in the ED waiting room. Patient had presented complaining of upper respiratory track infection and shortness of breath symptoms, while in the ED waiting room he was noted to be choking, became apneic, which led to witnessed seizure like activity followed by cardiac arrest. Per discussion with ED staff, patient required about 22 minutes of ACLS/5 shocks before returning to normal sinus rhythm. During intubation a lozenge was removed from patients airway. Hyperthermia protocol was initiated prior to transferring to [facility]. On arrival to [facility] Arrived with increased serum creatinine from 2.9-->5.1 (unkown baseline), BUN 35--> 52, Trop 2.4-->1.3, Lactic acidosis 6.4-->1.3, chest x ray with increased parenchymal interstitial attenuation and parenchymal opacities suggestive of pulmonary edema. On mechanical ventilation with a FiO2 50%. Currently also anuric at present moment. Per cardiology patient has a dilated cardiomyopathy EF looks about 10-15% and LVEDD is 7.3 cm.    Interval History:   Patient evaluated at bedside, continues on mechanical ventilation, blood pressures stable, intake yesterday 2.7 L and output 1.6 L. For NET positive 1 L. Last CVP today 18. We have been negative 2 L since admission. Clotted this morning again had tPA yesterday with no improvement.      Review of patient's allergies indicates:   Allergen Reactions    Penicillins      Tolerated cefepime May 2018     Current Facility-Administered Medications   Medication Frequency     0.9%  NaCl infusion (CRRT USE ONLY) Continuous    0.9%  NaCl infusion (for blood administration) Q24H PRN    acetaminophen tablet 325 mg Q6H PRN    albuterol-ipratropium 2.5 mg-0.5 mg/3 mL nebulizer solution 3 mL Q6H    aspirin chewable tablet 81 mg Daily    busPIRone tablet 10 mg TID    chlorhexidine 0.12 % solution 15 mL BID    dextrose 50% injection 12.5 g PRN    dextrose 50% injection 25 g PRN    fentaNYL injection 25 mcg Q2H PRN    glucagon (human recombinant) injection 1 mg PRN    glucose chewable tablet 16 g PRN    glucose chewable tablet 24 g PRN    heparin (porcine) injection 5,000 Units Q8H    insulin aspart U-100 pen 1-10 Units Q6H PRN    magnesium citrate solution 296 mL Once    magnesium sulfate 2g in water 50mL IVPB (premix) PRN    metoprolol tartrate (LOPRESSOR) split tablet 12.5 mg BID    ondansetron 4 mg/5 mL solution 4 mg Q8H PRN    [START ON 5/29/2018] pantoprazole suspension 40 mg Daily    polyethylene glycol packet 17 g Daily    propofol (DIPRIVAN) 10 mg/mL infusion Continuous    senna-docusate 8.6-50 mg per tablet 1 tablet Daily    sodium chloride 0.9% flush 3 mL PRN    sodium phosphate 20.01 mmol in dextrose 5 % 250 mL IVPB PRN    sodium phosphate 30 mmol in dextrose 5 % 250 mL IVPB PRN    sodium phosphate 39.99 mmol in dextrose 5 % 250 mL IVPB PRN       Objective:     Vital Signs (Most Recent):  Temp: 99.5 °F (37.5 °C) (05/28/18 1100)  Pulse: 103 (05/28/18 1100)  Resp: 20 (05/28/18 1100)  BP: (!) 162/90 (05/28/18 1100)  SpO2: 100 % (05/28/18 1100)  O2 Device (Oxygen Therapy): ventilator (05/28/18 1100) Vital Signs (24h Range):  Temp:  [96.4 °F (35.8 °C)-100.2 °F (37.9 °C)] 99.5 °F (37.5 °C)  Pulse:  [] 103  Resp:  [15-28] 20  SpO2:  [99 %-100 %] 100 %  BP: ()/(53-95) 162/90     Weight: (!) 144.6 kg (318 lb 12.6 oz) (05/22/18 0500)  Body mass index is 48.47 kg/m².  Body surface area is 2.63 meters squared.    I/O last 3 completed shifts:  In: 4598  [I.V.:2868; NG/GT:1730]  Out: 3503 [Urine:55; Other:3448]    Physical Exam   HENT:   Head: Atraumatic.   Neck: No JVD present.   Rt IJ cath    Cardiovascular: Normal rate and regular rhythm.  Exam reveals no friction rub.    Pulmonary/Chest: He has rales.   Abdominal: Soft. He exhibits no distension.   Musculoskeletal: He exhibits edema.   Skin: Skin is warm.       Significant Labs:  ABGs:   Recent Labs  Lab 05/28/18  0427   PH 7.349*   PCO2 42.0   HCO3 23.1*   POCSATURATED 98   BE -2     BMP:   Recent Labs  Lab 05/28/18  0316   *  113*  113*     103  103   CO2 22*  22*  22*   BUN 33*  33*  33*   CREATININE 5.2*  5.2*  5.2*   CALCIUM 9.2  9.2  9.2   MG 2.7*  2.7*     CBC:   Recent Labs  Lab 05/28/18  1020   WBC 15.31*   RBC 3.48*   HGB 7.3*   HCT 24.3*      MCV 70*   MCH 21.0*   MCHC 30.0*     CMP:   Recent Labs  Lab 05/28/18  0316   *  113*  113*   CALCIUM 9.2  9.2  9.2   ALBUMIN 2.4*  2.4*  2.4*   PROT 7.2     137  137   K 4.2  4.2  4.2   CO2 22*  22*  22*     103  103   BUN 33*  33*  33*   CREATININE 5.2*  5.2*  5.2*   ALKPHOS 164*   ALT 94*   AST 82*   BILITOT 1.6*     All labs within the past 24 hours have been reviewed.         Assessment/Plan:     MARY (acute kidney injury)    Los Mendez is a 47 year old male who is consulted for MARY, which is mostly secondary to iATN from previous cardiac arrest where he presented with hypotension which decrease perfusion to the kidneys (required to be started on pressors). Has echo with EF 10-15%.     Remaining essentially anuric (3-5ccs per hour)    Plan:  -Patient clotted, had been presenting with increased venous pressure, Cath flow was done yesterday without any success. Will need placement of new cath.  - Once cath place will place patient back on SLED for clearance and volume assistance.   - Blood pressures have been stable  - Still anuric.               Sascha Ortiz Magraner  Nephrology   Fellow  [facility]    Pager [phone]    Patient seen and examined with Dr Alcantar;   I have reviewed and agree with assessment and plan

## 2018-05-28 NOTE — PROCEDURES
"Los Mendez is a 47 y.o. male patient.    Temp: 99.3 °F (37.4 °C) (05/28/18 1500)  Pulse: 109 (05/28/18 1635)  Resp: (!) 29 (05/28/18 1635)  BP: (!) 159/101 (05/28/18 1635)  SpO2: 100 % (05/28/18 1635)  Weight: (!) 144.6 kg (318 lb 12.6 oz) (05/22/18 0500)  Height: 5' 8" (172.7 cm) (05/19/18 1815)       Central Line  Date/Time: 5/28/2018 5:17 PM  Location procedure was performed: [facility] NEURO CRITICAL CARE  Performed by: ZAKI HOOPER.  Consent Done: Yes  Time out: Immediately prior to procedure a "time out" was called to verify the correct patient, procedure, equipment, support staff and site/side marked as required.  Indications: hemodialysis and med administration  Anesthesia: local infiltration    Anesthesia:  Local Anesthetic: lidocaine 1% with epinephrine  Preparation: skin prepped with ChloraPrep  Skin prep agent dried: skin prep agent completely dried prior to procedure  Sterile barriers: all five maximum sterile barriers used - cap, mask, sterile gown, sterile gloves, and large sterile sheet  Hand hygiene: hand hygiene performed prior to central venous catheter insertion  Location details: right femoral  Site selection rationale: trialysis   Catheter type: triple lumen  Catheter size: 9 Fr  Ultrasound guidance: no  Manometry: Yes  Number of attempts: 1  Complications: none  Estimated blood loss (mL): 5  Specimens: No  Implants: No  Post-procedure: line sutured,  chlorhexidine patch,  sterile dressing applied and blood return through all ports  Complications: No          Zaki Hooper  5/28/2018  "

## 2018-05-28 NOTE — PROGRESS NOTES
Hgb 6.8 on AM labs. Notified PAPO Carter NP with NCC team. Type and Screen ordered and collected. 1 unit PRBCs ordered and released. Will continue to monitor.

## 2018-05-28 NOTE — PROGRESS NOTES
Manometry for Central Line Procedure     Manometry Performed: yes     Manometry performed by: Dr. Hooper

## 2018-05-28 NOTE — ASSESSMENT & PLAN NOTE
Los Mendez is a 47 year old male who is consulted for MARY, which is mostly secondary to iATN from previous cardiac arrest where he presented with hypotension which decrease perfusion to the kidneys (required to be started on pressors). Has echo with EF 10-15%.     Remaining essentially anuric (3-5ccs per hour)    Plan:  -Patient clotted, had been presenting with increased venous pressure, Cath flow was done yesterday without any success. Will need placement of new cath.  - Once cath place will place patient back on SLED for clearance and volume assistance.   - Blood pressures have been stable  - Still anuric.

## 2018-05-28 NOTE — PLAN OF CARE
05/28/18 1257   Discharge Reassessment   Assessment Type Discharge Planning Reassessment   Provided patient/caregiver education on the expected discharge date and the discharge plan No   Do you have any problems affording any of your prescribed medications? No   Discharge Plan A New Nursing Home placement - halfway care facility  (patient with pending medicaid)   Discharge Plan B Rehab   Patient choice form signed by patient/caregiver N/A   Can the patient answer the patient profile reliably? No, cognitively impaired   How does the patient rate their overall health at the present time? (cody)   Describe the patient's ability to walk at the present time. Does not walk or unable to take any steps at all   How often would a person be available to care for the patient? Whenever needed   Number of comorbid conditions (as recorded on the chart) Three   During the past month, has the patient often been bothered by feeling down, depressed or hopeless? (cody)   During the past month, has the patient often been bothered by little interest or pleasure in doing things? (cody)       Patient intubated on vent.  Not medically stable for discharge.     Nisha Hansen RN, CCRN-K, [facility]  Neuro-Critical Care   [phone]

## 2018-05-28 NOTE — SUBJECTIVE & OBJECTIVE
Interval History:   Patient evaluated at bedside, continues on mechanical ventilation, blood pressures stable, intake yesterday 2.7 L and output 1.6 L. For NET positive 1 L. Last CVP today 18. We have been negative 2 L since admission. Clotted this morning again had tPA yesterday with no improvement.      Review of patient's allergies indicates:   Allergen Reactions    Penicillins      Tolerated cefepime May 2018     Current Facility-Administered Medications   Medication Frequency    0.9%  NaCl infusion (CRRT USE ONLY) Continuous    0.9%  NaCl infusion (for blood administration) Q24H PRN    acetaminophen tablet 325 mg Q6H PRN    albuterol-ipratropium 2.5 mg-0.5 mg/3 mL nebulizer solution 3 mL Q6H    aspirin chewable tablet 81 mg Daily    busPIRone tablet 10 mg TID    chlorhexidine 0.12 % solution 15 mL BID    dextrose 50% injection 12.5 g PRN    dextrose 50% injection 25 g PRN    fentaNYL injection 25 mcg Q2H PRN    glucagon (human recombinant) injection 1 mg PRN    glucose chewable tablet 16 g PRN    glucose chewable tablet 24 g PRN    heparin (porcine) injection 5,000 Units Q8H    insulin aspart U-100 pen 1-10 Units Q6H PRN    magnesium citrate solution 296 mL Once    magnesium sulfate 2g in water 50mL IVPB (premix) PRN    metoprolol tartrate (LOPRESSOR) split tablet 12.5 mg BID    ondansetron 4 mg/5 mL solution 4 mg Q8H PRN    [START ON 5/29/2018] pantoprazole suspension 40 mg Daily    polyethylene glycol packet 17 g Daily    propofol (DIPRIVAN) 10 mg/mL infusion Continuous    senna-docusate 8.6-50 mg per tablet 1 tablet Daily    sodium chloride 0.9% flush 3 mL PRN    sodium phosphate 20.01 mmol in dextrose 5 % 250 mL IVPB PRN    sodium phosphate 30 mmol in dextrose 5 % 250 mL IVPB PRN    sodium phosphate 39.99 mmol in dextrose 5 % 250 mL IVPB PRN       Objective:     Vital Signs (Most Recent):  Temp: 99.5 °F (37.5 °C) (05/28/18 1100)  Pulse: 103 (05/28/18 1100)  Resp: 20 (05/28/18  1100)  BP: (!) 162/90 (05/28/18 1100)  SpO2: 100 % (05/28/18 1100)  O2 Device (Oxygen Therapy): ventilator (05/28/18 1100) Vital Signs (24h Range):  Temp:  [96.4 °F (35.8 °C)-100.2 °F (37.9 °C)] 99.5 °F (37.5 °C)  Pulse:  [] 103  Resp:  [15-28] 20  SpO2:  [99 %-100 %] 100 %  BP: ()/(53-95) 162/90     Weight: (!) 144.6 kg (318 lb 12.6 oz) (05/22/18 0500)  Body mass index is 48.47 kg/m².  Body surface area is 2.63 meters squared.    I/O last 3 completed shifts:  In: 4598 [I.V.:2868; NG/GT:1730]  Out: 3503 [Urine:55; Other:3448]    Physical Exam   HENT:   Head: Atraumatic.   Neck: No JVD present.   Rt IJ cath    Cardiovascular: Normal rate and regular rhythm.  Exam reveals no friction rub.    Pulmonary/Chest: He has rales.   Abdominal: Soft. He exhibits no distension.   Musculoskeletal: He exhibits edema.   Skin: Skin is warm.       Significant Labs:  ABGs:   Recent Labs  Lab 05/28/18  0427   PH 7.349*   PCO2 42.0   HCO3 23.1*   POCSATURATED 98   BE -2     BMP:   Recent Labs  Lab 05/28/18  0316   *  113*  113*     103  103   CO2 22*  22*  22*   BUN 33*  33*  33*   CREATININE 5.2*  5.2*  5.2*   CALCIUM 9.2  9.2  9.2   MG 2.7*  2.7*     CBC:   Recent Labs  Lab 05/28/18  1020   WBC 15.31*   RBC 3.48*   HGB 7.3*   HCT 24.3*      MCV 70*   MCH 21.0*   MCHC 30.0*     CMP:   Recent Labs  Lab 05/28/18  0316   *  113*  113*   CALCIUM 9.2  9.2  9.2   ALBUMIN 2.4*  2.4*  2.4*   PROT 7.2     137  137   K 4.2  4.2  4.2   CO2 22*  22*  22*     103  103   BUN 33*  33*  33*   CREATININE 5.2*  5.2*  5.2*   ALKPHOS 164*   ALT 94*   AST 82*   BILITOT 1.6*     All labs within the past 24 hours have been reviewed.

## 2018-05-28 NOTE — PLAN OF CARE
Problem: Patient Care Overview  Goal: Plan of Care Review  POC reviewed with pt at 0500. Pt verbalized understanding. Questions and concerns addressed. No acute events overnight.  Pt moves all extremities spontaneously.  Pt on tube feeds (novasource).  Pt on propofol, fentanyl.  Pt progressing toward goals. Will continue to monitor. See flowsheets for full assessment and VS info

## 2018-05-28 NOTE — PHYSICIAN QUERY
PT Name: Los Mendez  MR #: [id number]     Physician Query Form - Documentation Clarification      CDS/: Keara Kulkarni RN, CDS               Contact information: [email or web address]    This form is a permanent document in the medical record.     Query Date: May 28, 2018    By submitting this query, we are merely seeking further clarification of documentation. Please utilize your independent clinical judgment when addressing the question(s) below.    The Medical record reflects the following:    Supporting Clinical Findings Location in Medical Record     --Phosphate levels increasing    --Notified NCC team of abnormal lab value of phos of 9.1. No new orders at this time.  Will continue to monitor.      --Dr. Banuelos made aware of Phosphporus trending up. Telephone order to start 10 hr CRRT and switch to SLED as it will help lower down Phos      Nephro C/S 5/21    Nursing Note 5/27 @1258p      Dialysis Nursing Note 5/27 @125a     Phos: 6.7->7.2->3.5->9.1-->5.9       Labs: 5/19->5/21->5/23->5/26->5/28                                                                            Doctor, Please specify diagnosis or diagnoses associated with above clinical findings.    Provider Use Only      [ x ] Hyperphosphatemia    [  ] Other : _______________                                                                                                               [  ] Clinically undetermined

## 2018-05-29 PROBLEM — D62 ACUTE BLOOD LOSS ANEMIA: Status: ACTIVE | Noted: 2018-05-29

## 2018-05-29 LAB
ALBUMIN SERPL BCP-MCNC: 2.4 G/DL
ALBUMIN SERPL BCP-MCNC: 2.5 G/DL
ALLENS TEST: ABNORMAL
ALLENS TEST: ABNORMAL
ALP SERPL-CCNC: 167 U/L
ALT SERPL W/O P-5'-P-CCNC: 77 U/L
ANION GAP SERPL CALC-SCNC: 12 MMOL/L
ANION GAP SERPL CALC-SCNC: 8 MMOL/L
ANION GAP SERPL CALC-SCNC: 8 MMOL/L
ANION GAP SERPL CALC-SCNC: 9 MMOL/L
ANION GAP SERPL CALC-SCNC: 9 MMOL/L
ANISOCYTOSIS BLD QL SMEAR: SLIGHT
AST SERPL-CCNC: 70 U/L
BASO STIPL BLD QL SMEAR: ABNORMAL
BASOPHILS # BLD AUTO: 0.04 K/UL
BASOPHILS NFR BLD: 0.2 %
BILIRUB SERPL-MCNC: 1.7 MG/DL
BLD PROD TYP BPU: NORMAL
BLOOD UNIT EXPIRATION DATE: NORMAL
BLOOD UNIT TYPE CODE: 5100
BLOOD UNIT TYPE: NORMAL
BUN SERPL-MCNC: 17 MG/DL
BUN SERPL-MCNC: 17 MG/DL
BUN SERPL-MCNC: 22 MG/DL
CA-I BLDV-SCNC: 1.13 MMOL/L
CA-I BLDV-SCNC: 1.28 MMOL/L
CA-I BLDV-SCNC: 1.32 MMOL/L
CALCIUM SERPL-MCNC: 10.4 MG/DL
CALCIUM SERPL-MCNC: 10.4 MG/DL
CALCIUM SERPL-MCNC: 9.6 MG/DL
CALCIUM SERPL-MCNC: 9.9 MG/DL
CALCIUM SERPL-MCNC: 9.9 MG/DL
CHLORIDE SERPL-SCNC: 102 MMOL/L
CHLORIDE SERPL-SCNC: 104 MMOL/L
CHLORIDE SERPL-SCNC: 104 MMOL/L
CHLORIDE SERPL-SCNC: 105 MMOL/L
CHLORIDE SERPL-SCNC: 105 MMOL/L
CO2 SERPL-SCNC: 24 MMOL/L
CO2 SERPL-SCNC: 28 MMOL/L
CO2 SERPL-SCNC: 28 MMOL/L
CO2 SERPL-SCNC: 29 MMOL/L
CO2 SERPL-SCNC: 29 MMOL/L
CODING SYSTEM: NORMAL
CREAT SERPL-MCNC: 3.2 MG/DL
CREAT SERPL-MCNC: 3.2 MG/DL
CREAT SERPL-MCNC: 3.8 MG/DL
CREAT SERPL-MCNC: 3.8 MG/DL
CREAT SERPL-MCNC: 4 MG/DL
DELSYS: ABNORMAL
DELSYS: ABNORMAL
DIFFERENTIAL METHOD: ABNORMAL
DISPENSE STATUS: NORMAL
EOSINOPHIL # BLD AUTO: 0.2 K/UL
EOSINOPHIL NFR BLD: 1.1 %
ERYTHROCYTE [DISTWIDTH] IN BLOOD BY AUTOMATED COUNT: 28.2 %
EST. GFR  (AFRICAN AMERICAN): 19.3 ML/MIN/1.73 M^2
EST. GFR  (AFRICAN AMERICAN): 20.5 ML/MIN/1.73 M^2
EST. GFR  (AFRICAN AMERICAN): 20.5 ML/MIN/1.73 M^2
EST. GFR  (AFRICAN AMERICAN): 25.3 ML/MIN/1.73 M^2
EST. GFR  (AFRICAN AMERICAN): 25.3 ML/MIN/1.73 M^2
EST. GFR  (NON AFRICAN AMERICAN): 16.7 ML/MIN/1.73 M^2
EST. GFR  (NON AFRICAN AMERICAN): 17.8 ML/MIN/1.73 M^2
EST. GFR  (NON AFRICAN AMERICAN): 17.8 ML/MIN/1.73 M^2
EST. GFR  (NON AFRICAN AMERICAN): 21.9 ML/MIN/1.73 M^2
EST. GFR  (NON AFRICAN AMERICAN): 21.9 ML/MIN/1.73 M^2
FLOW: 5
FLOW: 5
GIANT PLATELETS BLD QL SMEAR: PRESENT
GLUCOSE SERPL-MCNC: 79 MG/DL
GLUCOSE SERPL-MCNC: 79 MG/DL
GLUCOSE SERPL-MCNC: 88 MG/DL
GLUCOSE SERPL-MCNC: 88 MG/DL
GLUCOSE SERPL-MCNC: 96 MG/DL
HCO3 UR-SCNC: 29.3 MMOL/L (ref 24–28)
HCO3 UR-SCNC: 29.4 MMOL/L (ref 24–28)
HCT VFR BLD AUTO: 25.8 %
HGB BLD-MCNC: 7.8 G/DL
HYPOCHROMIA BLD QL SMEAR: ABNORMAL
IMM GRANULOCYTES # BLD AUTO: 0.28 K/UL
IMM GRANULOCYTES NFR BLD AUTO: 1.6 %
INR PPP: 1
LYMPHOCYTES # BLD AUTO: 1 K/UL
LYMPHOCYTES NFR BLD: 6.1 %
MAGNESIUM SERPL-MCNC: 2.7 MG/DL
MAGNESIUM SERPL-MCNC: 2.7 MG/DL
MAGNESIUM SERPL-MCNC: 2.8 MG/DL
MAGNESIUM SERPL-MCNC: 2.8 MG/DL
MAGNESIUM SERPL-MCNC: 2.9 MG/DL
MAGNESIUM SERPL-MCNC: 2.9 MG/DL
MCH RBC QN AUTO: 21.7 PG
MCHC RBC AUTO-ENTMCNC: 30.2 G/DL
MCV RBC AUTO: 72 FL
MODE: ABNORMAL
MODE: ABNORMAL
MONOCYTES # BLD AUTO: 1.9 K/UL
MONOCYTES NFR BLD: 11.2 %
NEUTROPHILS # BLD AUTO: 13.5 K/UL
NEUTROPHILS NFR BLD: 79.8 %
NRBC BLD-RTO: 0 /100 WBC
PCO2 BLDA: 56.3 MMHG (ref 35–45)
PCO2 BLDA: 61.8 MMHG (ref 35–45)
PH SMN: 7.28 [PH] (ref 7.35–7.45)
PH SMN: 7.33 [PH] (ref 7.35–7.45)
PHOSPHATE SERPL-MCNC: 4.3 MG/DL
PHOSPHATE SERPL-MCNC: 4.7 MG/DL
PHOSPHATE SERPL-MCNC: 5.2 MG/DL
PHOSPHATE SERPL-MCNC: 5.4 MG/DL
PHOSPHATE SERPL-MCNC: 5.4 MG/DL
PLATELET # BLD AUTO: 179 K/UL
PLATELET BLD QL SMEAR: ABNORMAL
PMV BLD AUTO: 10.1 FL
PO2 BLDA: 145 MMHG (ref 80–100)
PO2 BLDA: 33 MMHG (ref 40–60)
POC BE: 3 MMOL/L
POC BE: 3 MMOL/L
POC SATURATED O2: 54 % (ref 95–100)
POC SATURATED O2: 99 % (ref 95–100)
POC TCO2: 31 MMOL/L (ref 23–27)
POC TCO2: 31 MMOL/L (ref 24–29)
POCT GLUCOSE: 100 MG/DL (ref 70–110)
POCT GLUCOSE: 100 MG/DL (ref 70–110)
POCT GLUCOSE: 113 MG/DL (ref 70–110)
POCT GLUCOSE: 71 MG/DL (ref 70–110)
POCT GLUCOSE: 74 MG/DL (ref 70–110)
POCT GLUCOSE: 80 MG/DL (ref 70–110)
POLYCHROMASIA BLD QL SMEAR: ABNORMAL
POTASSIUM SERPL-SCNC: 4.6 MMOL/L
POTASSIUM SERPL-SCNC: 4.8 MMOL/L
POTASSIUM SERPL-SCNC: 4.8 MMOL/L
POTASSIUM SERPL-SCNC: 4.9 MMOL/L
POTASSIUM SERPL-SCNC: 4.9 MMOL/L
PROT SERPL-MCNC: 7.6 G/DL
PROTHROMBIN TIME: 10.9 SEC
RBC # BLD AUTO: 3.6 M/UL
SAMPLE: ABNORMAL
SAMPLE: ABNORMAL
SITE: ABNORMAL
SITE: ABNORMAL
SODIUM SERPL-SCNC: 138 MMOL/L
SODIUM SERPL-SCNC: 141 MMOL/L
SODIUM SERPL-SCNC: 141 MMOL/L
SODIUM SERPL-SCNC: 142 MMOL/L
SODIUM SERPL-SCNC: 142 MMOL/L
SP02: 100
SP02: 99
TRANS ERYTHROCYTES VOL PATIENT: NORMAL ML
VANCOMYCIN SERPL-MCNC: 14.9 UG/ML
WBC # BLD AUTO: 16.99 K/UL

## 2018-05-29 PROCEDURE — 25000003 PHARM REV CODE 250: Performed by: PSYCHIATRY & NEUROLOGY

## 2018-05-29 PROCEDURE — 94668 MNPJ CHEST WALL SBSQ: CPT

## 2018-05-29 PROCEDURE — 80069 RENAL FUNCTION PANEL: CPT | Mod: 91

## 2018-05-29 PROCEDURE — 99900035 HC TECH TIME PER 15 MIN (STAT)

## 2018-05-29 PROCEDURE — 63600175 PHARM REV CODE 636 W HCPCS: Performed by: STUDENT IN AN ORGANIZED HEALTH CARE EDUCATION/TRAINING PROGRAM

## 2018-05-29 PROCEDURE — 82803 BLOOD GASES ANY COMBINATION: CPT

## 2018-05-29 PROCEDURE — 94640 AIRWAY INHALATION TREATMENT: CPT

## 2018-05-29 PROCEDURE — 99233 SBSQ HOSP IP/OBS HIGH 50: CPT | Mod: ,,, | Performed by: INTERNAL MEDICINE

## 2018-05-29 PROCEDURE — 94761 N-INVAS EAR/PLS OXIMETRY MLT: CPT

## 2018-05-29 PROCEDURE — 63600175 PHARM REV CODE 636 W HCPCS: Performed by: HOSPITALIST

## 2018-05-29 PROCEDURE — 90945 DIALYSIS ONE EVALUATION: CPT

## 2018-05-29 PROCEDURE — P9021 RED BLOOD CELLS UNIT: HCPCS

## 2018-05-29 PROCEDURE — 82550 ASSAY OF CK (CPK): CPT

## 2018-05-29 PROCEDURE — 36600 WITHDRAWAL OF ARTERIAL BLOOD: CPT

## 2018-05-29 PROCEDURE — 27000221 HC OXYGEN, UP TO 24 HOURS

## 2018-05-29 PROCEDURE — 25000003 PHARM REV CODE 250: Performed by: STUDENT IN AN ORGANIZED HEALTH CARE EDUCATION/TRAINING PROGRAM

## 2018-05-29 PROCEDURE — 25000003 PHARM REV CODE 250: Performed by: NURSE PRACTITIONER

## 2018-05-29 PROCEDURE — 87040 BLOOD CULTURE FOR BACTERIA: CPT

## 2018-05-29 PROCEDURE — 84100 ASSAY OF PHOSPHORUS: CPT | Mod: 91

## 2018-05-29 PROCEDURE — 85025 COMPLETE CBC W/AUTO DIFF WBC: CPT

## 2018-05-29 PROCEDURE — 97803 MED NUTRITION INDIV SUBSEQ: CPT

## 2018-05-29 PROCEDURE — 25000003 PHARM REV CODE 250: Performed by: HOSPITALIST

## 2018-05-29 PROCEDURE — 85610 PROTHROMBIN TIME: CPT

## 2018-05-29 PROCEDURE — 80202 ASSAY OF VANCOMYCIN: CPT

## 2018-05-29 PROCEDURE — 80053 COMPREHEN METABOLIC PANEL: CPT

## 2018-05-29 PROCEDURE — 82330 ASSAY OF CALCIUM: CPT | Mod: 91

## 2018-05-29 PROCEDURE — 93010 ELECTROCARDIOGRAM REPORT: CPT | Mod: ,,, | Performed by: INTERNAL MEDICINE

## 2018-05-29 PROCEDURE — 63600175 PHARM REV CODE 636 W HCPCS: Performed by: INTERNAL MEDICINE

## 2018-05-29 PROCEDURE — 83735 ASSAY OF MAGNESIUM: CPT | Mod: 91

## 2018-05-29 PROCEDURE — 20000000 HC ICU ROOM

## 2018-05-29 PROCEDURE — 84100 ASSAY OF PHOSPHORUS: CPT

## 2018-05-29 PROCEDURE — 80100008 HC CRRT DAILY MAINTENANCE

## 2018-05-29 PROCEDURE — 99291 CRITICAL CARE FIRST HOUR: CPT | Mod: ,,, | Performed by: PSYCHIATRY & NEUROLOGY

## 2018-05-29 PROCEDURE — 25000242 PHARM REV CODE 250 ALT 637 W/ HCPCS: Performed by: PSYCHIATRY & NEUROLOGY

## 2018-05-29 RX ORDER — IPRATROPIUM BROMIDE AND ALBUTEROL SULFATE 2.5; .5 MG/3ML; MG/3ML
3 SOLUTION RESPIRATORY (INHALATION) EVERY 6 HOURS PRN
Status: DISCONTINUED | OUTPATIENT
Start: 2018-05-29 | End: 2018-05-31

## 2018-05-29 RX ORDER — METOPROLOL TARTRATE 50 MG/1
50 TABLET ORAL 3 TIMES DAILY
Status: DISCONTINUED | OUTPATIENT
Start: 2018-05-29 | End: 2018-06-06

## 2018-05-29 RX ORDER — FUROSEMIDE 10 MG/ML
20 INJECTION INTRAMUSCULAR; INTRAVENOUS ONCE
Status: DISCONTINUED | OUTPATIENT
Start: 2018-05-29 | End: 2018-05-29

## 2018-05-29 RX ORDER — FUROSEMIDE 10 MG/ML
80 INJECTION INTRAMUSCULAR; INTRAVENOUS ONCE
Status: COMPLETED | OUTPATIENT
Start: 2018-05-29 | End: 2018-05-29

## 2018-05-29 RX ORDER — LEVALBUTEROL INHALATION SOLUTION 0.63 MG/3ML
0.63 SOLUTION RESPIRATORY (INHALATION) EVERY 6 HOURS
Status: DISCONTINUED | OUTPATIENT
Start: 2018-05-29 | End: 2018-05-29

## 2018-05-29 RX ADMIN — CHLORHEXIDINE GLUCONATE 15 ML: 1.2 RINSE ORAL at 09:05

## 2018-05-29 RX ADMIN — DEXMEDETOMIDINE HYDROCHLORIDE 0.6 MCG/KG/HR: 100 INJECTION, SOLUTION, CONCENTRATE INTRAVENOUS at 03:05

## 2018-05-29 RX ADMIN — ACETAMINOPHEN 325 MG: 325 TABLET, FILM COATED ORAL at 03:05

## 2018-05-29 RX ADMIN — STANDARDIZED SENNA CONCENTRATE AND DOCUSATE SODIUM 1 TABLET: 8.6; 5 TABLET, FILM COATED ORAL at 09:05

## 2018-05-29 RX ADMIN — HEPARIN SODIUM 5000 UNITS: 5000 INJECTION, SOLUTION INTRAVENOUS; SUBCUTANEOUS at 09:05

## 2018-05-29 RX ADMIN — AMIODARONE HYDROCHLORIDE 150 MG: 1.5 INJECTION, SOLUTION INTRAVENOUS at 01:05

## 2018-05-29 RX ADMIN — AMIODARONE HYDROCHLORIDE 1 MG/MIN: 1.8 INJECTION, SOLUTION INTRAVENOUS at 01:05

## 2018-05-29 RX ADMIN — FUROSEMIDE 80 MG: 10 INJECTION, SOLUTION INTRAMUSCULAR; INTRAVENOUS at 10:05

## 2018-05-29 RX ADMIN — CALCIUM GLUCONATE 3000 MG: 94 INJECTION, SOLUTION INTRAVENOUS at 08:05

## 2018-05-29 RX ADMIN — HEPARIN SODIUM 5000 UNITS: 5000 INJECTION, SOLUTION INTRAVENOUS; SUBCUTANEOUS at 06:05

## 2018-05-29 RX ADMIN — BUSPIRONE HYDROCHLORIDE 10 MG: 10 TABLET ORAL at 03:05

## 2018-05-29 RX ADMIN — BUSPIRONE HYDROCHLORIDE 10 MG: 10 TABLET ORAL at 09:05

## 2018-05-29 RX ADMIN — LEVALBUTEROL HYDROCHLORIDE 0.63 MG: 0.63 SOLUTION RESPIRATORY (INHALATION) at 12:05

## 2018-05-29 RX ADMIN — DEXMEDETOMIDINE HYDROCHLORIDE 0.6 MCG/KG/HR: 100 INJECTION, SOLUTION, CONCENTRATE INTRAVENOUS at 06:05

## 2018-05-29 RX ADMIN — METOPROLOL TARTRATE 50 MG: 50 TABLET, FILM COATED ORAL at 03:05

## 2018-05-29 RX ADMIN — POLYETHYLENE GLYCOL 3350 17 G: 17 POWDER, FOR SOLUTION ORAL at 09:05

## 2018-05-29 RX ADMIN — DEXMEDETOMIDINE HYDROCHLORIDE 0.6 MCG/KG/HR: 100 INJECTION, SOLUTION, CONCENTRATE INTRAVENOUS at 11:05

## 2018-05-29 RX ADMIN — AMIODARONE HYDROCHLORIDE 0.5 MG/MIN: 1.8 INJECTION, SOLUTION INTRAVENOUS at 06:05

## 2018-05-29 RX ADMIN — METOPROLOL TARTRATE 50 MG: 50 TABLET, FILM COATED ORAL at 09:05

## 2018-05-29 RX ADMIN — PANTOPRAZOLE SODIUM 40 MG: 40 GRANULE, DELAYED RELEASE ORAL at 09:05

## 2018-05-29 RX ADMIN — ASPIRIN 81 MG CHEWABLE TABLET 81 MG: 81 TABLET CHEWABLE at 09:05

## 2018-05-29 RX ADMIN — HEPARIN SODIUM 5000 UNITS: 5000 INJECTION, SOLUTION INTRAVENOUS; SUBCUTANEOUS at 03:05

## 2018-05-29 RX ADMIN — METOPROLOL TARTRATE 25 MG: 25 TABLET ORAL at 09:05

## 2018-05-29 RX ADMIN — LEVALBUTEROL HYDROCHLORIDE 0.63 MG: 0.63 SOLUTION RESPIRATORY (INHALATION) at 08:05

## 2018-05-29 RX ADMIN — DEXTROSE MONOHYDRATE, SODIUM CITRATE, AND CITRIC ACID MONOHYDRATE: 2.45; 2.2; .8 INJECTION, SOLUTION INTRAVENOUS at 01:05

## 2018-05-29 RX ADMIN — CALCIUM GLUCONATE 3000 MG: 94 INJECTION, SOLUTION INTRAVENOUS at 01:05

## 2018-05-29 RX ADMIN — Medication 4 MG: at 10:05

## 2018-05-29 RX ADMIN — DEXMEDETOMIDINE HYDROCHLORIDE 0.4 MCG/KG/HR: 100 INJECTION, SOLUTION, CONCENTRATE INTRAVENOUS at 01:05

## 2018-05-29 NOTE — NURSING
Patient HR sustaining 150's, SVT per EKG. Per NCC, do not give pt's PO medications per NGT s/t unable to verify NGT placement. Per NCC, start patient on Precedex and titrate for RASS goals per order. Patient visibly agitated at this time. RN to start precedex, will call NCC if heart rate continues to be elevated.

## 2018-05-29 NOTE — ASSESSMENT & PLAN NOTE
- Cardiac arrest x 3 at OSH; longest code was 22 minutes  - extubated 5/28  -  propofol gtt and fentanyl gtt dcd5/28  - pt got tachypneic and tachycardic off sedation,  fentanyl  And metoprolol IV given with no change, then  - started precedex gtt  - buspar 10mg tid  - normothermic now  - MRI 5/21 Several scattered small bilateral cerebellar infarcts. And there is punctate focus of diffusion restriction in the left caudate which may represent embolic infarcts versus sequela of hypoxic ischemic injury   - spot EEG 5/21 no epileptiform activity. Severe slowing suggestive of severe cerebral dysfunction  - follow neuro exam  - family updated wife and mother at bedside

## 2018-05-29 NOTE — ASSESSMENT & PLAN NOTE
- intubated on arrival  -Vent SIMV 20/450/40%/5/15  - Weaned to CPAP, tolerated well  - 5/28 extubated to ventimask O2Sats 97% no s/s stridor or difficulty breathing  duonebs q6h prn  Monitor daily cxr/ abg

## 2018-05-29 NOTE — PROGRESS NOTES
[facility]  Nephrology  Progress Note    Patient Name: Los Mendez  MRN: [id number]  Admission Date: 5/19/2018  Hospital Length of Stay: 10 days  Attending Provider: Zaki Hooper MD   Primary Care Physician: Provider Notinsystem  Principal Problem:Anoxic brain injury    Subjective:     HPI: Los Mendez is a 47 year old male with past medical history of HTN, heavy alcohol user and COPD. Transferred from [facility] following witnessed cardiac arrest while in the ED waiting room. Patient had presented complaining of upper respiratory track infection and shortness of breath symptoms, while in the ED waiting room he was noted to be choking, became apneic, which led to witnessed seizure like activity followed by cardiac arrest. Per discussion with ED staff, patient required about 22 minutes of ACLS/5 shocks before returning to normal sinus rhythm. During intubation a lozenge was removed from patients airway. Hyperthermia protocol was initiated prior to transferring to [facility]. On arrival to [facility] Arrived with increased serum creatinine from 2.9-->5.1 (unkown baseline), BUN 35--> 52, Trop 2.4-->1.3, Lactic acidosis 6.4-->1.3, chest x ray with increased parenchymal interstitial attenuation and parenchymal opacities suggestive of pulmonary edema. On mechanical ventilation with a FiO2 50%. Currently also anuric at present moment. Per cardiology patient has a dilated cardiomyopathy EF looks about 10-15% and LVEDD is 7.3 cm.    Interval History:   Patient evaluated at bedside, had clotting issues yesterday due to Rt IJ malfunction, which required placement of Rt femoral trialysis. Total intake yesterday 3.9 L and output 3.9 L. Net negative 3.2 L on hospitalization.     Review of patient's allergies indicates:   Allergen Reactions    Penicillins      Tolerated cefepime May 2018     Current Facility-Administered Medications   Medication Frequency    0.9%  NaCl infusion (CRRT USE ONLY) Continuous    0.9%   NaCl infusion (for blood administration) Q24H PRN    0.9%  NaCl infusion (for blood administration) Q24H PRN    0.9%  NaCl infusion (for blood administration) Q24H PRN    acetaminophen tablet 325 mg Q6H PRN    albuterol-ipratropium 2.5 mg-0.5 mg/3 mL nebulizer solution 3 mL Q6H PRN    aspirin chewable tablet 81 mg Daily    busPIRone tablet 10 mg TID    calcium gluconate 3,000 mg in dextrose 5 % 100 mL IVPB Continuous    chlorhexidine 0.12 % solution 15 mL BID    dexmedetomidine (PRECEDEX) 400mcg/100mL 0.9% NaCL infusion Continuous    dextrose 50% injection 12.5 g PRN    dextrose 50% injection 25 g PRN    DEXTROSE-SOD CITRATE-CITRIC AC 2.45-2.2 GRAM- 730 MG/100 ML MISC SOLN Continuous    fentaNYL injection 25 mcg Q2H PRN    glucagon (human recombinant) injection 1 mg PRN    glucose chewable tablet 16 g PRN    glucose chewable tablet 24 g PRN    heparin (porcine) injection 5,000 Units Q8H    insulin aspart U-100 pen 1-10 Units Q6H PRN    metoprolol tartrate (LOPRESSOR) tablet 50 mg TID    ondansetron 4 mg/5 mL solution 4 mg Q8H PRN    pantoprazole suspension 40 mg Daily    polyethylene glycol packet 17 g Daily    senna-docusate 8.6-50 mg per tablet 1 tablet Daily    sodium chloride 0.9% flush 3 mL PRN       Objective:     Vital Signs (Most Recent):  Temp: 98.8 °F (37.1 °C) (05/29/18 1100)  Pulse: 79 (05/29/18 1300)  Resp: 16 (05/29/18 1300)  BP: 117/61 (05/29/18 1300)  SpO2: 97 % (05/29/18 1300)  O2 Device (Oxygen Therapy): nasal cannula w/ humidification (05/29/18 1100) Vital Signs (24h Range):  Temp:  [97.3 °F (36.3 °C)-99.3 °F (37.4 °C)] 98.8 °F (37.1 °C)  Pulse:  [] 79  Resp:  [15-31] 16  SpO2:  [92 %-100 %] 97 %  BP: (104-159)/() 117/61     Weight: (!) 144.6 kg (318 lb 12.6 oz) (05/29/18 0900)  Body mass index is 48.47 kg/m².  Body surface area is 2.63 meters squared.    I/O last 3 completed shifts:  In: 5096.6 [I.V.:2977.9; Blood:468.8; NG/GT:1450; IV Piggyback:200]  Out: 3935  [Urine:70; Other:3865]    Physical Exam   Constitutional: Nasal cannula in place.   HENT:   Head: Normocephalic and atraumatic.   Right Ear: External ear normal.   Left Ear: External ear normal.   Eyes: Right eye exhibits no discharge. Left eye exhibits no discharge.   Neck: No JVD present.   Cardiovascular: Normal rate and regular rhythm.  Exam reveals no friction rub.    Rt femoral trialysis cath    Pulmonary/Chest: He has rales.   Abdominal: Soft. He exhibits no distension.   Neurological:   Responding to stimulus and voice    Skin: Skin is warm.       Significant Labs:  ABGs:   Recent Labs  Lab 05/29/18  1026   PH 7.325*   PCO2 56.3*   HCO3 29.4*   POCSATURATED 99   BE 3     BMP:   Recent Labs  Lab 05/29/18  0310   GLU 96  96  96     102  102   CO2 24  24  24   BUN 22*  22*  22*   CREATININE 4.0*  4.0*  4.0*   CALCIUM 9.6  9.6  9.6   MG 2.8*  2.8*     CBC:   Recent Labs  Lab 05/29/18  0310   WBC 16.99*   RBC 3.60*   HGB 7.8*   HCT 25.8*      MCV 72*   MCH 21.7*   MCHC 30.2*     CMP:   Recent Labs  Lab 05/29/18  0310   GLU 96  96  96   CALCIUM 9.6  9.6  9.6   ALBUMIN 2.5*  2.5*  2.5*   PROT 7.6     138  138   K 4.6  4.6  4.6   CO2 24  24  24     102  102   BUN 22*  22*  22*   CREATININE 4.0*  4.0*  4.0*   ALKPHOS 167*   ALT 77*   AST 70*   BILITOT 1.7*     All labs within the past 24 hours have been reviewed.       Assessment/Plan:     MARY (acute kidney injury)    Los Mendez is a 47 year old male who is consulted for MARY, which is mostly secondary to iATN from previous cardiac arrest where he presented with hypotension which decrease perfusion to the kidneys (required to be started on pressors). Has echo with EF 10-15%.     Remaining essentially anuric (3-5ccs per hour)    Plan:  - Will hold SLED today and reassess tomorrow.   - Currently NET negative 3 L from hospitalization.   - Blood pressures have been stable.  - Still anuric. Continue to monitor  intake and output.   - Extubated yesterday, currently on nasal canula.               Sascha Leone  Nephrology  Fellow  [facility]    Pager [phone]    Patient seen and examined with Dr Alcantar;   I have reviewed and agree with assessment and plan

## 2018-05-29 NOTE — NURSING
NCC made aware that precedex was started around 2200 and is currently infusing at 0.8mcg/kg/hr, patient is less agitated at this point. Metoprolol 25mg per NGT given around 2215. Patient's HR currently 146. Orders given to call NCC around 2300 if heart rate still sustaining 140's. RN to monitor.

## 2018-05-29 NOTE — SUBJECTIVE & OBJECTIVE
Interval History:   Patient evaluated at bedside, had clotting issues yesterday due to Rt IJ malfunction, which required placement of Rt femoral trialysis. Total intake yesterday 3.9 L and output 3.9 L. Net negative 3.2 L on hospitalization.     Review of patient's allergies indicates:   Allergen Reactions    Penicillins      Tolerated cefepime May 2018     Current Facility-Administered Medications   Medication Frequency    0.9%  NaCl infusion (CRRT USE ONLY) Continuous    0.9%  NaCl infusion (for blood administration) Q24H PRN    0.9%  NaCl infusion (for blood administration) Q24H PRN    0.9%  NaCl infusion (for blood administration) Q24H PRN    acetaminophen tablet 325 mg Q6H PRN    albuterol-ipratropium 2.5 mg-0.5 mg/3 mL nebulizer solution 3 mL Q6H PRN    aspirin chewable tablet 81 mg Daily    busPIRone tablet 10 mg TID    calcium gluconate 3,000 mg in dextrose 5 % 100 mL IVPB Continuous    chlorhexidine 0.12 % solution 15 mL BID    dexmedetomidine (PRECEDEX) 400mcg/100mL 0.9% NaCL infusion Continuous    dextrose 50% injection 12.5 g PRN    dextrose 50% injection 25 g PRN    DEXTROSE-SOD CITRATE-CITRIC AC 2.45-2.2 GRAM- 730 MG/100 ML MISC SOLN Continuous    fentaNYL injection 25 mcg Q2H PRN    glucagon (human recombinant) injection 1 mg PRN    glucose chewable tablet 16 g PRN    glucose chewable tablet 24 g PRN    heparin (porcine) injection 5,000 Units Q8H    insulin aspart U-100 pen 1-10 Units Q6H PRN    metoprolol tartrate (LOPRESSOR) tablet 50 mg TID    ondansetron 4 mg/5 mL solution 4 mg Q8H PRN    pantoprazole suspension 40 mg Daily    polyethylene glycol packet 17 g Daily    senna-docusate 8.6-50 mg per tablet 1 tablet Daily    sodium chloride 0.9% flush 3 mL PRN       Objective:     Vital Signs (Most Recent):  Temp: 98.8 °F (37.1 °C) (05/29/18 1100)  Pulse: 79 (05/29/18 1300)  Resp: 16 (05/29/18 1300)  BP: 117/61 (05/29/18 1300)  SpO2: 97 % (05/29/18 1300)  O2 Device (Oxygen  Therapy): nasal cannula w/ humidification (05/29/18 1100) Vital Signs (24h Range):  Temp:  [97.3 °F (36.3 °C)-99.3 °F (37.4 °C)] 98.8 °F (37.1 °C)  Pulse:  [] 79  Resp:  [15-31] 16  SpO2:  [92 %-100 %] 97 %  BP: (104-159)/() 117/61     Weight: (!) 144.6 kg (318 lb 12.6 oz) (05/29/18 0900)  Body mass index is 48.47 kg/m².  Body surface area is 2.63 meters squared.    I/O last 3 completed shifts:  In: 5096.6 [I.V.:2977.9; Blood:468.8; NG/GT:1450; IV Piggyback:200]  Out: 3935 [Urine:70; Other:3865]    Physical Exam   Constitutional: Nasal cannula in place.   HENT:   Head: Normocephalic and atraumatic.   Right Ear: External ear normal.   Left Ear: External ear normal.   Eyes: Right eye exhibits no discharge. Left eye exhibits no discharge.   Neck: No JVD present.   Cardiovascular: Normal rate and regular rhythm.  Exam reveals no friction rub.    Rt femoral trialysis cath    Pulmonary/Chest: He has rales.   Abdominal: Soft. He exhibits no distension.   Neurological:   Responding to stimulus and voice    Skin: Skin is warm.       Significant Labs:  ABGs:   Recent Labs  Lab 05/29/18  1026   PH 7.325*   PCO2 56.3*   HCO3 29.4*   POCSATURATED 99   BE 3     BMP:   Recent Labs  Lab 05/29/18  0310   GLU 96  96  96     102  102   CO2 24  24  24   BUN 22*  22*  22*   CREATININE 4.0*  4.0*  4.0*   CALCIUM 9.6  9.6  9.6   MG 2.8*  2.8*     CBC:   Recent Labs  Lab 05/29/18 0310   WBC 16.99*   RBC 3.60*   HGB 7.8*   HCT 25.8*      MCV 72*   MCH 21.7*   MCHC 30.2*     CMP:   Recent Labs  Lab 05/29/18  0310   GLU 96  96  96   CALCIUM 9.6  9.6  9.6   ALBUMIN 2.5*  2.5*  2.5*   PROT 7.6     138  138   K 4.6  4.6  4.6   CO2 24  24  24     102  102   BUN 22*  22*  22*   CREATININE 4.0*  4.0*  4.0*   ALKPHOS 167*   ALT 77*   AST 70*   BILITOT 1.7*     All labs within the past 24 hours have been reviewed.

## 2018-05-29 NOTE — PROGRESS NOTES
Received call from dialysis RN that pt will be taken off of CRRT this afternoon per nephrology and to rinse pt back.  Arterial and venous sides rinsed back successfully and pt removed from CRRT.

## 2018-05-29 NOTE — PROGRESS NOTES
[facility]  Neurocritical Care  Progress Note    Admit Date: 5/19/2018  Service Date: 05/28/2018  Length of Stay: 9    Subjective:     Chief Complaint: Anoxic brain injury    History of Present Illness: 46 y/o man w/ hx of HTN, COPD? Transferred from [facility] following witnessed cardiac arrest while in the ED waiting room. Patient had presented complaining of URI/SOB symptoms, while in the ED waiting room he was noted to be choking, became apneic, which led to witnessed seizure like activity followed by cardiac arrest. Per discussion with ED staff, patient required about 22 minutes of ACLS/5 shocks before returning to normal sinus rhythm. During intubation a lozenge was removed from patients airway. Hyperthermia protocol was initiated prior to transferring to [facility] for [place]CC care. Per ED records, patient was acidotic with ph of 7.1 this morning. At the time of arrival to NICU, patient was on paralytics, however pupillary reflex was present. Will continue hypothermia protocol for additional 24 h.    Hospital Course: 5/19: arrived intubated, sedated, artic sun blanket  5/20: pressor requirment going up, DO NOT DIURESE, continue TTM, nimbex off, LFT improving, trop improving, family updated.  5/21: reach normothermia, MRI today, remove EEG, place HD line, consult nephrology, LFTs trending down, family updated  5/22: Anoxic brain injury. Has been responding. Last night placed on propofol  MRI only with L head of caudate infarc and some cerebellar small infarctions.  5/23: Anoxic brain injury.  On CRRT. BNP 1046. Got back on Propofol and pressors. Still agitated. He will be started on Buspar.  Trop I normalizing.  5/25 CRRT today. Amiodarone dcd and Metoprolol started. Monitor CBC q8h if H/H continues to drop will consult GI. Vascular surgery consulted concerning  Possible arterial occlusion. Right hand cold.. Arterial line dcd. US RUE pending. Abdominal distention, KUB showed gas pattern. Bladder  pressures q4h. Initial 21.  5/28 H/H 6.8/23, 1 unit PRBC transfused.Keep Hgb greater than or equal to 8. CRRT stopped line clotted. Changed trialysis catheter changed. Wean sedation today. Once off sedation begin weaning ventilator settings to CPAP.  Weaned vent to CPAP and able to Extubated in afternoon without s/s stridor or difficulty breathing. HR elevated 140s - 160s fentanyl x2 prn for pain/agitation. Metoprolol 5mg IV x3 given remains ST 140s.  And restless inbed. Will start precedex.    Interval History:H/H 6.8/23, 1 unit PRBC transfused.Keep Hgb greater than or equal to 8. CRRT stopped line clotted. Changed trialysis catheter changed. Wean sedation today. Once off sedation begin weaning ventilator settings to CPAP.  Weaned vent to CPAP and able to Extubated in afternoon without s/s stridor or difficulty breathing. HR elevated 140s - 160s fentanyl x2 prn for pain/agitation. Metoprolol 5mg IV x3 given remains ST 140s.  And restless inbed. Will start precedex.      Review of Systems:  Unable to obtain a complete ROS due to level of consciousness and intubated.     Vitals:   Temp: 98.1 °F (36.7 °C)  Pulse: (!) 153  Rhythm: sinus tachycardia  BP: (!) 147/107  MAP (mmHg): 110  CVP (mean): 14 mmHg  Resp: (!) 24  SpO2: 97 %  Oxygen Concentration (%): 10  O2 Device (Oxygen Therapy): venti mask  Vent Mode: Spont  Pressure Support: 10 cmH20  PEEP/CPAP: 5 cmH20  Peak Airway Pressure: 16 cmH2O  Mean Airway Pressure: 8.1 cmH20  Plateau Pressure: 0 cmH20    Temp  Min: 98.1 °F (36.7 °C)  Max: 99.7 °F (37.6 °C)  Pulse  Min: 92  Max: 153  BP  Min: 112/53  Max: 162/90  MAP (mmHg)  Min: 76  Max: 119  CVP (mean)  Min: 7 mmHg  Max: 20 mmHg  Resp  Min: 17  Max: 29  SpO2  Min: 94 %  Max: 100 %  Oxygen Concentration (%)  Min: 10  Max: 99    05/27 0701 - 05/28 0700  In: 2756.4 [I.V.:1396.4]  Out: 1680 [Urine:55]   Unmeasured Output  Stool Occurrence: 1     Examination:   Constitutional: obese. Sedate on propfol and fentanyl.  Well-nourished and -developed. No apparent distress.   Eyes: Conjunctiva clear, anicteric. Lids no lesions.  Head/Ears/Nose/Mouth/Throat/Neck: Moist mucous membranes. External ears, nose atraumatic.   Cardiovascular: Regular rhythm. No murmurs. No leg edema.  Respiratory: Mech ventilation. bilat breath sounds coarse  Gastrointestinal: No hernia. Soft, nondistended, nontender. + bowel sounds.    Neurologic:  -GCS E2VTM1  -Intubated sedate on propofol and fentanyl not following commands  -Cranial nerves PERRL 3+. + cough, gag, corneals  -Motor spontaneous movements all extremities  -Sensation withdraws extremities to noxious stimuli  Unable to test orientation, language, memory, judgment, insight, fund of knowledge, shoulder shrug, tongue protrusion, coordination, gait due to level of consciousness.    Medications:   Continuous  sodium chloride 0.9% Last Rate: 200 mL/hr at 05/28/18 2200   dexmedetomidine (PRECEDEX) infusion Last Rate: 0.8 mcg/kg/hr (05/28/18 2220)   Scheduled  albuterol-ipratropium 3 mL Q6H   aspirin 81 mg Daily   busPIRone 10 mg TID   chlorhexidine 15 mL BID   heparin (porcine) 5,000 Units Q8H   metoprolol     metoprolol     metoprolol tartrate 25 mg TID   [START ON 5/29/2018] pantoprazole 40 mg Daily   polyethylene glycol 17 g Daily   senna-docusate 8.6-50 mg 1 tablet Daily   PRN  sodium chloride  Q24H PRN   sodium chloride  Q24H PRN   sodium chloride  Q24H PRN   acetaminophen 325 mg Q6H PRN   dextrose 50% 12.5 g PRN   dextrose 50% 25 g PRN   fentaNYL 25 mcg Q2H PRN   glucagon (human recombinant) 1 mg PRN   glucose 16 g PRN   glucose 24 g PRN   insulin aspart U-100 1-10 Units Q6H PRN   magnesium sulfate IVPB 2 g PRN   ondansetron 4 mg Q8H PRN   sodium chloride 0.9% 3 mL PRN   sodium phosphate IVPB 20.01 mmol PRN   sodium phosphate IVPB 30 mmol PRN   sodium phosphate IVPB 39.99 mmol PRN      Today I independently reviewed pertinent medications, lines/drains/airways, imaging, lab results, microbiology  results, notably:   Assessment/Plan:     Neuro   * Anoxic brain injury    - Cardiac arrest x 3 at OSH; longest code was 22 minutes  - extubated 5/28  -  propofol gtt and fentanyl gtt dcd5/28  - pt got tachypneic and tachycardic off sedation,  fentanyl  And metoprolol IV given with no change, then  - started precedex gtt  - buspar 10mg tid  - normothermic now  - MRI 5/21 Several scattered small bilateral cerebellar infarcts. And there is punctate focus of diffusion restriction in the left caudate which may represent embolic infarcts versus sequela of hypoxic ischemic injury   - spot EEG 5/21 no epileptiform activity. Severe slowing suggestive of severe cerebral dysfunction  - follow neuro exam  - family updated wife and mother at bedside          Cytotoxic brain edema    - secondary to anoxic brain injury, 22 minutes PEA-->vfib  - MRI brain 5/21 for anoxic assessment.  see anoxic brain injury           Pulmonary   Acute respiratory failure with hypoxia    - intubated on arrival  -Vent SIMV 20/450/40%/5/15  - Weaned to CPAP, tolerated well  - 5/28 extubated to ventimask O2Sats 97% no s/s stridor or difficulty breathing  duonebs q6h prn  Monitor daily cxr/ abg        Cardiac/Vascular   Cardiogenic shock    - possible undiagnosed cardiomyopathy based on CXR w/ evidence of pulmonary edema and multiple cardiac arrests over last 24 h  Appreciate cards recs though will not diurese with lasix  Levophed gtt to Keep MAP>65  Increased Metoprolol 25 tid     Echo: 1 - Eccentric hypertrophy.     2 - Severely depressed left ventricular systolic function (EF 10-15%).     3 - Biatrial enlargement.     4 - Impaired LV relaxation, normal LAP (grade 1 diastolic dysfunction).     5 - Right ventricular enlargement with moderately depressed systolic function.     6 - The estimated PA systolic pressure is 37 mmHg.     7 - Mild mitral regurgitation.     8 - Mild tricuspid regurgitation.           Cardiac arrest    - 3 arrests prior to  arrival  - trop trended down  - heart failure  Echocardiogram:1 - Eccentric hypertrophy.     2 - Severely depressed left ventricular systolic function (EF 10-15%).     3 - Biatrial enlargement.     4 - Impaired LV relaxation, normal LAP (grade 1 diastolic dysfunction).     5 - Right ventricular enlargement with moderately depressed systolic function.     6 - The estimated PA systolic pressure is 37 mmHg.     7 - Mild mitral regurgitation.     8 - Mild tricuspid regurgitation.           Renal/   Acute renal failure with tubular necrosis    BUN/Cr cont to trend up 40/6.2  Oliguric-->anuric   Monitor close  Dialysis catheter clotting. CRRT stopped over night  5/28 Replaced trialysis catheter getting CRRT today   Nephrology following for RRT        Oncology   Acute blood loss anemia    Monitor daily CBC  Difficulty overnight with CRRT Machine and dialysis line clotting  H/H trended down 6.8/23, transfused with 2 units total PRBCs  Recheck H/H 8/26  Goal Hgb 8,  transfuse for <8        Other   Class 3 severe obesity due to excess calories with serious comorbidity and body mass index (BMI) of 40.0 to 44.9 in adult    - nutrition consult  Body mass index is 48.47 kg/m².              Prophylaxis:  Venous Thromboembolism: mechanical chemical  Stress Ulcer: PPI  Ventilator Pneumonia: yes     Activity Orders          None        Full Code     Critical care time 35 minutes    Rena Gillette NP  Neurocritical Care  [facility]

## 2018-05-29 NOTE — ASSESSMENT & PLAN NOTE
BUN/Cr cont to trend up 40/6.2  Oliguric-->anuric   Monitor close  Dialysis catheter clotting. CRRT stopped over night  5/28 Replaced trialysis catheter getting CRRT today   Nephrology following for RRT

## 2018-05-29 NOTE — PLAN OF CARE
Problem: Patient Care Overview  Goal: Plan of Care Review  Outcome: Ongoing (interventions implemented as appropriate)  POC reviewed with pt and pt's wife, Darian, at 1000. Pt's wife verbalized understanding. Questions and concerns addressed. No acute events today. Pt had episode of emesis this morning, tube feeds held until further notice per Dr. Hooper.  CRRT stopped this afternoon, see progress notes.  Tylenol x 1.  Pt progressing toward goals. Will continue to monitor. See flowsheets for full assessment and VS info.

## 2018-05-29 NOTE — NURSING
2305- Patient's HR currently 140. Precedex remains infusing at 0.8mcg/kg/hr. Pt calm and follows commands, expressive aphasia noted. Metoprolol 25mg per NGT given around 2215. Per Dr. Parks, monitor HR for another hour, RN to call MD if HR remains elevated.    2315- Dr. Clarke at bedside. Orders given for diazepam 5mg per NGT. RN to administer medication per MD's orders.

## 2018-05-29 NOTE — PROGRESS NOTES
" [facility]  Adult Nutrition  Progress Note    SUMMARY       Recommendations  Recommendation/Intervention:   1.As medically able, restart TF regimen of Novasource Renal @ goal rate of 50 mL/hr. -Recommend initiating TF at 10mL hr and advancing by 10mL q4h as tolerated or until goal.   -Hold for residuals >500mL.   -This regimen provides 2,400 kcals, 218g protein and 860mL of free H2O.    2.If diet advanced recommend renal diet with texture per SLP.   RD to monitor.     Goals: Pt to receive nutrition by RD follow up  Nutrition Goal Status: progressing towards goal  Communication of RD Recs: reviewed with RN    Reason for Assessment  Reason for Assessment: RD follow-up  Diagnosis: other (see comments) (anoxic brain injury)  Relevant Medical History: HTN, COPD, obesity  Interdisciplinary Rounds: attended  General Information Comments: Pt extubated and breathing with nasal cannula. TF held s/p extubation.  Nutrition Discharge Planning: unable to determine at this time    Nutrition Risk Screen  Nutrition Risk Screen: tube feeding or parenteral nutrition    Nutrition/Diet History  Typical Food/Fluid Intake: LEVI intake PTA. Pt TF held for extubation.  Food Preferences: LEVI Pentec[employer]al/cultural food preferences at this time  Do you have any cultural, spiritual, Pentec[employer]al conflicts, given your current situation?: none  Factors Affecting Nutritional Intake: NPO    Anthropometrics  Temp: 97.3 °F (36.3 °C)  Height: 5' 8" (172.7 cm)  Height (inches): 68 in  Weight Method: Bed Scale  Weight: (!) 144.6 kg (318 lb 12.6 oz)  Weight (lb): (!) 318.79 lb  Ideal Body Weight (IBW), Male: 154 lb  % Ideal Body Weight, Male (lb): 207.01 lb  BMI (Calculated): 48.6  BMI Grade: greater than 40 - morbid obesity       Lab/Procedures/Meds  Pertinent Labs Reviewed: reviewed  Pertinent Labs Comments: BUN 22, Cr 4.0, GFR 19.3, Phos 4.7, Alb 2.5, Tbili 1.7, AST 70, ALT 77, POCT Glu 113.  Pertinent Medications Reviewed: " reviewed  Pertinent Medications Comments: metoprolol, precedex, IVF, amiodarone    Physical Findings/Assessment  Overall Physical Appearance: obese, on oxygen therapy, lethargic  Tubes: nasogastric tube  Skin: edema    Estimated/Assessed Needs  Weight Used For Calorie Calculations: (!) 144.6 kg (318 lb 12.6 oz)  Energy Calorie Requirements (kcal): 2,370 kcal/day  Energy Need Method: Harper-St Jeor (1.25 x PAL- 500kcal)    Protein Requirements: 116-145 g/day (0.8-1.0 g/kg)  Weight Used For Protein Calculations: (!) 144.6 kg (318 lb 12.6 oz)    Fluid Requirements (mL): 1mL/kcal or per MD  RDA Method (mL): 2370     Nutrition Prescription Ordered  Current Diet Order: NPO  Nutrition Order Comments: Held for extubation  Current Nutrition Support Formula Ordered: Novasource Renal  Current Nutrition Support Rate Ordered: 10 (ml) (goal of 40)  Current Nutrition Support Frequency Ordered: mL/hr    Evaluation of Received Nutrient/Fluid Intake  Enteral Calories (kcal): 480  Enteral Protein (gm): 22  % Kcal Needs: 30  % Protein Needs: 12.6  I/O: -I/O, LBM 5/29  % Intake of Estimated Energy Needs: 0 - 25 %  % Meal Intake: NPO    Nutrition Risk  Level of Risk/Frequency of Follow-up:  (f/u 2x/week)     Assessment and Plan        * Anoxic brain injury     Contributing Nutrition Diagnosis  Inadequate energy intake     Related to (etiology):   NPO, no alternative means of nutrition     Signs and Symptoms (as evidenced by):   Pt receiving <85% EEN and EPN.      Interventions/Recommendations (treatment strategy):  Please see RD recs above.     Nutrition Diagnosis Status:   Progressing towards goal        Monitor and Evaluation  Food and Nutrient Intake: enteral nutrition intake, food and beverage intake, energy intake  Food and Nutrient Adminstration: enteral and parenteral nutrition administration, diet order  Anthropometric Measurements: weight, weight change, body mass index  Biochemical Data, Medical Tests and Procedures:  electrolyte and renal panel, gastrointestinal profile, glucose/endocrine profile  Nutrition-Focused Physical Findings: overall appearance, skin     Nutrition Follow-Up  RD Follow-up?: Yes    I certify that I directed the dietetic intern in service delivery and guided them using my skilled judgment. As the cosigning dietitian, I have reviewed the dietetic interns documentation and am responsible for the treatment, assessment, and plan.    Note Completed by: Lauren Strickland

## 2018-05-29 NOTE — PLAN OF CARE
Problem: Patient Care Overview  Goal: Plan of Care Review  Outcome: Ongoing (interventions implemented as appropriate)  POC reviewed with pt and pt's wife at 1400. Pt's wife verbalized understanding. Questions and concerns addressed. Pt extubated this afternoon to venti mask.  Trialysis catheter placed by  MD, dialysis RN notified.  Pending NG tube placement.  Pt progressing toward goals. Will continue to monitor. See flowsheets for full assessment and VS info.

## 2018-05-29 NOTE — ASSESSMENT & PLAN NOTE
Monitor daily CBC  Difficulty overnight with CRRT Machine and dialysis line clotting  H/H trended down 6.8/23, transfused with 2 units total PRBCs  Recheck H/H 8/26  Goal Hgb 8,  transfuse for <8

## 2018-05-29 NOTE — ASSESSMENT & PLAN NOTE
Los Mendez is a 47 year old male who is consulted for MARY, which is mostly secondary to iATN from previous cardiac arrest where he presented with hypotension which decrease perfusion to the kidneys (required to be started on pressors). Has echo with EF 10-15%.     Remaining essentially anuric (3-5ccs per hour)    Plan:  - Will hold SLED today and reassess tomorrow.   - Currently NET negative 3 L from hospitalization.   - Blood pressures have been stable.  - Still anuric. Continue to monitor intake and output.   - Extubated yesterday, currently on nasal canula.

## 2018-05-29 NOTE — PROGRESS NOTES
Pt's HR 160s with SVT rhythm, all other VS unchanged and stable.  Rena Gillette NP notified, orders for metoprolol 5 mg IV now and stat EKG.    1845--NP notified pt's HR still 140s-150s with SVT rhythm, Dr. Hooper at bedside to assess.  Awaiting additional orders at this time.     1925--NP notified pt's HR still 140's-150's in SVT, orders for additional metoprolol 5 mg IV now.  Will continue to monitor closely.

## 2018-05-29 NOTE — ASSESSMENT & PLAN NOTE
- possible undiagnosed cardiomyopathy based on CXR w/ evidence of pulmonary edema and multiple cardiac arrests over last 24 h  Appreciate cards recs though will not diurese with lasix  Levophed gtt to Keep MAP>65  Increased Metoprolol 25 tid     Echo: 1 - Eccentric hypertrophy.     2 - Severely depressed left ventricular systolic function (EF 10-15%).     3 - Biatrial enlargement.     4 - Impaired LV relaxation, normal LAP (grade 1 diastolic dysfunction).     5 - Right ventricular enlargement with moderately depressed systolic function.     6 - The estimated PA systolic pressure is 37 mmHg.     7 - Mild mitral regurgitation.     8 - Mild tricuspid regurgitation.

## 2018-05-29 NOTE — PROGRESS NOTES
ICU Progress Note  Neurocritical Care    Admit Date: 5/19/2018  LOS: 10    CC: Anoxic brain injury    Code Status: Full Code     SUBJECTIVE:     Interval History/Significant Events:   Had SVT overnight.        Medications:  Continuous Infusions:   sodium chloride 0.9% 200 mL/hr at 05/29/18 1100    calcium gluconate IVPB 3,000 mg (05/29/18 0851)    dexmedetomidine (PRECEDEX) infusion 0.6 mcg/kg/hr (05/29/18 1111)    dextrose-sod citrate-citric ac 200 mL/hr at 05/29/18 0151     Scheduled Meds:   aspirin  81 mg Per NG tube Daily    busPIRone  10 mg Per NG tube TID    chlorhexidine  15 mL Mouth/Throat BID    heparin (porcine)  5,000 Units Subcutaneous Q8H    metoprolol tartrate  50 mg Per NG tube TID    pantoprazole  40 mg Per NG tube Daily    polyethylene glycol  17 g Per NG tube Daily    senna-docusate 8.6-50 mg  1 tablet Per OG tube Daily     PRN Meds:.sodium chloride, sodium chloride, sodium chloride, acetaminophen, albuterol-ipratropium, dextrose 50%, dextrose 50%, fentaNYL, glucagon (human recombinant), glucose, glucose, insulin aspart U-100, ondansetron, sodium chloride 0.9%    OBJECTIVE:   Vital Signs (Most Recent):   Temp: 98.8 °F (37.1 °C) (05/29/18 1100)  Pulse: 79 (05/29/18 1100)  Resp: 16 (05/29/18 1100)  BP: 111/60 (05/29/18 1100)  SpO2: 97 % (05/29/18 1100)    Vital Signs (24h Range):   Temp:  [97.3 °F (36.3 °C)-99.3 °F (37.4 °C)] 98.8 °F (37.1 °C)  Pulse:  [] 79  Resp:  [15-31] 16  SpO2:  [92 %-100 %] 97 %  BP: (104-159)/() 111/60    ICP/CPP (Last 24h):        I & O (Last 24h):     Intake/Output Summary (Last 24 hours) at 05/29/18 1125  Last data filed at 05/29/18 1100   Gross per 24 hour   Intake          4518.64 ml   Output             6309 ml   Net         -1790.36 ml     Physical Exam:  Alert  NAD  No jaundice  Lungs with coarse breathing sounds bilateral   Normal S1/S2, no murmurs.  Abdomen is distended and slightly tense       Neuro:  Alert  Follows commands  Say yes and no    PERRL, EOMI   Good cough   Moves all extremities spontaneously         Vent Data:   Vent Mode: Spont  Oxygen Concentration (%):  [10-45] 45  Resp Rate Total:  [15 br/min-26 br/min] 17 br/min  PEEP/CPAP:  [5 cmH20] 5 cmH20  Pressure Support:  [10 cmH20] 10 cmH20  Mean Airway Pressure:  [8.1 cmH20-9.3 cmH20] 8.1 cmH20    Lines/Drains/Airway:        Airway - Non-Surgical 05/18/18 0900 Endotracheal Tube (Active)   Secured at 26 cm 5/26/2018 11:42 AM   Measured At Lips 5/26/2018 11:42 AM   Secured Location Center 5/26/2018 11:42 AM   Secured by Commercial tube garcia 5/26/2018 11:42 AM   Bite Block center;secure and patent 5/26/2018 11:42 AM   Site Condition Cool;Dry 5/26/2018 11:42 AM   Status Intact;Secured;Patent 5/26/2018 11:42 AM   Site Assessment Clean;Dry;No bleeding;No drainage 5/26/2018 11:42 AM   Cuff Volume 35 mL 5/26/2018  8:22 AM   Cuff Pressure 28 cm H2O 5/24/2018  8:08 PM           Percutaneous Central Line Insertion/Assessment - triple lumen  05/19/18 right internal jugular (Active)   Dressing biopatch in place;dressing dry and intact 5/26/2018 11:05 AM   Securement catheter securement device utilized 5/26/2018 11:05 AM   Additional Site Signs no drainage;no streak formation;no palpable cord;no pain;no edema;no warmth;no erythema 5/26/2018 11:05 AM   Distal Patency/Care flushed w/o difficulty;infusing 5/26/2018 11:05 AM   Medial Patency/Care flushed w/o difficulty;infusing 5/26/2018 11:05 AM   Proximal Patency/Care flushed w/o difficulty;infusing 5/26/2018 11:05 AM   Waveform normal 5/26/2018 11:05 AM   Line Interventions line leveled/zeroed 5/26/2018 11:05 AM   Dressing Change Due 05/31/18 5/26/2018 11:05 AM   Daily Line Review Performed 5/26/2018 11:05 AM            Trialysis (Dialysis) Catheter 05/21/18 1430 left femoral (Active)   IV Device Securement sutures 5/26/2018 11:05 AM   Additional Site Signs no streak formation;no drainage;no palpable cord;no pain;no edema;no warmth;no erythema 5/26/2018  11:05 AM   Patency/Care infusing 5/26/2018 11:05 AM   Waveform normal 5/26/2018  3:05 AM   Site Assessment Dry;Clean;Intact;No swelling;No redness 5/26/2018 11:05 AM   Status Accessed 5/26/2018 11:05 AM   Flows Good 5/26/2018 11:05 AM   Dressing Intervention Dressing reinforced 5/24/2018  3:05 PM   Dressing Status Biopatch in place;Clean;Dry;Intact 5/26/2018 11:05 AM   Dressing Change Due 05/31/18 5/26/2018  3:05 AM   Site Condition No complications 5/26/2018 11:05 AM   Dressing Occlusive 5/26/2018 11:05 AM   Daily Line Review Performed 5/26/2018 11:05 AM             NG/OG Tube 05/19/18 orogastric (Active)   Placement Check placement verified by x-ray;placement verified by aspirate characteristics 5/26/2018 11:05 AM   Tube advanced (cm) 3 5/19/2018  7:05 PM   Tolerance no signs/symptoms of discomfort 5/26/2018 11:05 AM   Securement taped to commercial device 5/26/2018 11:05 AM   Clamp Status/Tolerance clamped;no abdominal distention;no abdominal discomfort;no emesis;no nausea;no residual;no restlessness 5/26/2018 11:05 AM   Suction Setting/Drainage Method dependent drainage 5/21/2018  3:00 AM   Insertion Site Appearance no redness, warmth, tenderness, skin breakdown, drainage 5/26/2018  3:05 AM   Drainage Coffee ground 5/23/2018 10:00 PM   Flush/Irrigation flushed w/;water;no resistance met 5/26/2018 11:05 AM   Current Rate (mL/hr) 0 mL/hr 5/26/2018 11:05 AM   Goal Rate (mL/hr) 0 mL/hr 5/26/2018 11:05 AM   Intake (mL) 10 mL 5/26/2018 11:05 AM   Residual Amount (ml) 0 ml 5/26/2018 11:05 AM            Urethral Catheter 05/19/18 Temperature probe (Active)   Site Assessment Clean;Intact 5/26/2018 11:05 AM   Collection Container Urimeter 5/26/2018 11:05 AM   Securement Method secured to top of thigh w/ adhesive device 5/26/2018 11:05 AM   Catheter Care Performed yes 5/26/2018 11:05 AM   Reason for Continuing Urinary Catheterization Critically ill in ICU requiring intensive monitoring 5/26/2018 11:05 AM   CAUTI Prevention  "Bundle StatLock in place w 1" slack;Intact seal between catheter & drainage tubing;Drainage bag off the floor;Green sheeting clip in use;No dependent loops or kinks;Drainage bag below bladder;Drainage bag not overfilled (<2/3 full) 5/26/2018  7:05 AM   Output (mL) 10 mL 5/25/2018  4:00 PM            Trialysis (Dialysis) Catheter 05/21/18 1430 left femoral (Active)   IV Device Securement sutures 5/26/2018 11:05 AM   Additional Site Signs no streak formation;no drainage;no palpable cord;no pain;no edema;no warmth;no erythema 5/26/2018 11:05 AM   Patency/Care infusing 5/26/2018 11:05 AM   Waveform normal 5/26/2018  3:05 AM   Site Assessment Dry;Clean;Intact;No swelling;No redness 5/26/2018 11:05 AM   Status Accessed 5/26/2018 11:05 AM   Flows Good 5/26/2018 11:05 AM   Dressing Intervention Dressing reinforced 5/24/2018  3:05 PM   Dressing Status Biopatch in place;Clean;Dry;Intact 5/26/2018 11:05 AM   Dressing Change Due 05/31/18 5/26/2018  3:05 AM   Site Condition No complications 5/26/2018 11:05 AM   Dressing Occlusive 5/26/2018 11:05 AM   Daily Line Review Performed 5/26/2018 11:05 AM         Labs:  ABG:     Recent Labs  Lab 05/29/18  1026   PH 7.325*   PO2 145*   PCO2 56.3*   HCO3 29.4*   POCSATURATED 99   BE 3     BMP:    Recent Labs  Lab 05/29/18  0310 05/29/18  0803     138  138  --    K 4.6  4.6  4.6  --      102  102  --    CO2 24  24  24  --    BUN 22*  22*  22*  --    CREATININE 4.0*  4.0*  4.0*  --    GLU 96  96  96  --    MG 2.8*  2.8*  --    PHOS 4.7*  4.7* 4.3     LFT:   Lab Results   Component Value Date    AST 70 (H) 05/29/2018    ALT 77 (H) 05/29/2018    ALKPHOS 167 (H) 05/29/2018    BILITOT 1.7 (H) 05/29/2018    ALBUMIN 2.5 (L) 05/29/2018    ALBUMIN 2.5 (L) 05/29/2018    ALBUMIN 2.5 (L) 05/29/2018    PROT 7.6 05/29/2018     CBC:   Lab Results   Component Value Date    WBC 16.99 (H) 05/29/2018    HGB 7.8 (L) 05/29/2018    HCT 25.8 (L) 05/29/2018    MCV 72 (L) 05/29/2018    "  05/29/2018     Microbiology x 7d:   Microbiology Results (last 7 days)     Procedure Component Value Units Date/Time    Blood culture [[id number]] Collected:  05/19/18 1630    Order Status:  Completed Specimen:  Blood from Peripheral, Foot, Right Updated:  05/24/18 2012     Blood Culture, Routine No growth after 5 days.    Blood culture [[id number]] Collected:  05/19/18 1650    Order Status:  Completed Specimen:  Blood from Peripheral, Antecubital, Right Updated:  05/24/18 2012     Blood Culture, Routine No growth after 5 days.    Culture, Respiratory with Gram Stain [[id number]] Collected:  05/22/18 1547    Order Status:  Completed Specimen:  Respiratory from Tracheal Aspirate Updated:  05/24/18 0807     Respiratory Culture Normal respiratory jeanna     Gram Stain (Respiratory) <10 epithelial cells per low power field.     Gram Stain (Respiratory) Rare WBC's     Gram Stain (Respiratory) No organisms seen    Narrative:       BAL            ASSESSMENT/PLAN:     Patient Active Problem List   Diagnosis    Anoxic brain injury    Acute respiratory failure with hypoxia    Cardiac arrest    Class 3 severe obesity due to excess calories with serious comorbidity and body mass index (BMI) of 40.0 to 44.9 in adult    Seizure    Cardiogenic shock    Pulmonary edema    Essential hypertension    Metabolic acidosis    Acute renal failure with tubular necrosis    Cytotoxic brain edema    Hypothermia    Elevated INR    Acute decompensated heart failure    Respiratory arrest    Acute ischemic vertebrobasilar artery cerebellar stroke    Cerebral infarction due to embolism of right cerebellar artery    Acute ischemic left MCA stroke    MARY (acute kidney injury)    Cold hands    Acute blood loss anemia       A/P:     - Neurological exam is improving   - Wean precerdex to RASS of 0  - Successfully extubated yesterday, currently on 5 L NC  - Get an ABG   - MAP>65 and SBP<180  - Increase metoprolol for better rate  control   - CRRT plans per nephrology  - Luiza mascorro   - Repeat KUB and if no obstruction give mg citrate   - SCDs  -  SQH    - Discussed with wife at bedside.     Uninterrupted Critical Care/Counseling Time (not including procedures): 35 minutes

## 2018-05-29 NOTE — ASSESSMENT & PLAN NOTE
- 3 arrests prior to arrival  - trop trended down  - heart failure  Echocardiogram:1 - Eccentric hypertrophy.     2 - Severely depressed left ventricular systolic function (EF 10-15%).     3 - Biatrial enlargement.     4 - Impaired LV relaxation, normal LAP (grade 1 diastolic dysfunction).     5 - Right ventricular enlargement with moderately depressed systolic function.     6 - The estimated PA systolic pressure is 37 mmHg.     7 - Mild mitral regurgitation.     8 - Mild tricuspid regurgitation.

## 2018-05-29 NOTE — PLAN OF CARE
Problem: Patient Care Overview  Goal: Plan of Care Review  POC reviewed with pt and pt's wife at 0500. Pt unable to verbalized understanding, pt's wife verbalized understanding and is agreeable to POC. Questions and concerns addressed. Pt started on Amiodarone gtt overnight s/t -150's SVT, pt's heart rate since 0245 80's SR. Pt started back on CRRT overnight. 1 unit PRBC given for hemoglobin less than 8. Pt progressing toward goals. Will continue to monitor. See flowsheets for full assessment and VS info

## 2018-05-29 NOTE — PHYSICIAN QUERY
"PT Name: Los Mendez  MR #: [id number]    Physician Query Form - Pneumonia Clarification     CDS/: Keara Kulkarni RN, CDS               Contact information: [email or web address]  This form is a permanent document in the medical record.    Query Date:  May 29, 2018    By submitting this query, we are merely seeking further clarification of documentation. Please utilize your independent clinical judgment when addressing the question(s) below.    The Medical record contains the following:   Indicators   Supporting Clinical Findings Location in Medical Record   x "Pneumonia" documented --Please provider the corresponding diagnosis or diagnoses that support(s) the use of the medication(s):        -Pneumonia    Query 5/25   x Chest X-Ray: --Prominence of the pulmonary vasculature with scattered patchy opacities throughout the lungs bilaterally which may be seen with pulmonary edema or multifocal pneumonia.    CXR 5/19    PaO2    PaCO2     O2 sat     x Cultures  --Respiratory Culture No growth      Culture 5/19   x Treatment  Cefepime 1g IV q8h (5/19->5/21)  Cefepime 2g IV QD (5/22->5/23)  Cefepime 2g IV q12h (5/23->5/24)  Cefepime 2g IV q8H (started 5/24)     Vanco 1750mg IV QD (5/19->5/20)  Vanco 1250mg IV QD (started 5/24)   MAR          MAR      Supplemental O2     x Other --here post respiratory arrest, cardiac arrest, anoxic brain injury  --Patient had presented complaining of URI/SOB symptoms, while in the ED waiting room he was noted to be choking  --During intubation a lozenge was removed from patients airway.    NCC H&P (Dr Lechuga/Vince)             Provider, please specify type of pneumonia.    [  ] Bacterial Pneumonia (Specify organism if known): ______________________    [  ] Bacterial, Gram Negative organism Pneumonia    [x  ] Aspiration Pneumonia    [  ] Other type of pneumonia (please specify): ______________________________________    [  ] Clinically undetermined    Please document in " your progress notes daily for the duration of treatment, until resolved, and include in your discharge summary.    .

## 2018-05-30 PROBLEM — I63.441 CEREBRAL INFARCTION DUE TO EMBOLISM OF RIGHT CEREBELLAR ARTERY: Status: RESOLVED | Noted: 2018-05-21 | Resolved: 2018-05-30

## 2018-05-30 PROBLEM — I63.29 ACUTE ISCHEMIC VERTEBROBASILAR ARTERY CEREBELLAR STROKE: Status: RESOLVED | Noted: 2018-05-21 | Resolved: 2018-05-30

## 2018-05-30 PROBLEM — R56.9 SEIZURE: Status: RESOLVED | Noted: 2018-05-19 | Resolved: 2018-05-30

## 2018-05-30 PROBLEM — E87.20 METABOLIC ACIDOSIS: Status: RESOLVED | Noted: 2018-05-19 | Resolved: 2018-05-30

## 2018-05-30 PROBLEM — T68.XXXA HYPOTHERMIA: Status: RESOLVED | Noted: 2018-05-19 | Resolved: 2018-05-30

## 2018-05-30 PROBLEM — R57.0 CARDIOGENIC SHOCK: Status: RESOLVED | Noted: 2018-05-19 | Resolved: 2018-05-30

## 2018-05-30 PROBLEM — I63.512 ACUTE ISCHEMIC LEFT MCA STROKE: Status: RESOLVED | Noted: 2018-05-21 | Resolved: 2018-05-30

## 2018-05-30 PROBLEM — G93.6 CYTOTOXIC BRAIN EDEMA: Status: RESOLVED | Noted: 2018-05-19 | Resolved: 2018-05-30

## 2018-05-30 PROBLEM — R79.1 ELEVATED INR: Status: RESOLVED | Noted: 2018-05-19 | Resolved: 2018-05-30

## 2018-05-30 LAB
25(OH)D3+25(OH)D2 SERPL-MCNC: 11 NG/ML
ALBUMIN SERPL BCP-MCNC: 2.5 G/DL
ALLENS TEST: ABNORMAL
ALLENS TEST: ABNORMAL
ALP SERPL-CCNC: 170 U/L
ALT SERPL W/O P-5'-P-CCNC: 65 U/L
ANION GAP SERPL CALC-SCNC: 11 MMOL/L
ANION GAP SERPL CALC-SCNC: 13 MMOL/L
ANISOCYTOSIS BLD QL SMEAR: SLIGHT
AST SERPL-CCNC: 56 U/L
BASOPHILS # BLD AUTO: 0.06 K/UL
BASOPHILS NFR BLD: 0.4 %
BILIRUB SERPL-MCNC: 1.5 MG/DL
BUN SERPL-MCNC: 26 MG/DL
BUN SERPL-MCNC: 31 MG/DL
BUN SERPL-MCNC: 42 MG/DL
CA-I BLDV-SCNC: 1.15 MMOL/L
CA-I BLDV-SCNC: 1.21 MMOL/L
CA-I BLDV-SCNC: 1.26 MMOL/L
CALCIUM SERPL-MCNC: 10 MG/DL
CALCIUM SERPL-MCNC: 10.1 MG/DL
CALCIUM SERPL-MCNC: 9.8 MG/DL
CHLORIDE SERPL-SCNC: 102 MMOL/L
CHLORIDE SERPL-SCNC: 104 MMOL/L
CHLORIDE SERPL-SCNC: 107 MMOL/L
CK SERPL-CCNC: 347 U/L
CO2 SERPL-SCNC: 25 MMOL/L
CO2 SERPL-SCNC: 27 MMOL/L
CO2 SERPL-SCNC: 28 MMOL/L
CREAT SERPL-MCNC: 3.9 MG/DL
CREAT SERPL-MCNC: 4.7 MG/DL
CREAT SERPL-MCNC: 5.8 MG/DL
DELSYS: ABNORMAL
DELSYS: ABNORMAL
DIFFERENTIAL METHOD: ABNORMAL
EOSINOPHIL # BLD AUTO: 0.3 K/UL
EOSINOPHIL NFR BLD: 1.9 %
ERYTHROCYTE [DISTWIDTH] IN BLOOD BY AUTOMATED COUNT: 29.2 %
EST. GFR  (AFRICAN AMERICAN): 12.3 ML/MIN/1.73 M^2
EST. GFR  (AFRICAN AMERICAN): 15.9 ML/MIN/1.73 M^2
EST. GFR  (AFRICAN AMERICAN): 19.9 ML/MIN/1.73 M^2
EST. GFR  (NON AFRICAN AMERICAN): 10.7 ML/MIN/1.73 M^2
EST. GFR  (NON AFRICAN AMERICAN): 13.7 ML/MIN/1.73 M^2
EST. GFR  (NON AFRICAN AMERICAN): 17.2 ML/MIN/1.73 M^2
FLOW: 7
GLUCOSE SERPL-MCNC: 76 MG/DL
GLUCOSE SERPL-MCNC: 77 MG/DL
GLUCOSE SERPL-MCNC: 97 MG/DL
HCO3 UR-SCNC: 28.1 MMOL/L (ref 24–28)
HCO3 UR-SCNC: 29.4 MMOL/L (ref 24–28)
HCT VFR BLD AUTO: 28.3 %
HGB BLD-MCNC: 8.4 G/DL
HYPOCHROMIA BLD QL SMEAR: ABNORMAL
IMM GRANULOCYTES # BLD AUTO: 0.17 K/UL
IMM GRANULOCYTES NFR BLD AUTO: 1 %
INR PPP: 1.1
LYMPHOCYTES # BLD AUTO: 1.6 K/UL
LYMPHOCYTES NFR BLD: 9.9 %
MAGNESIUM SERPL-MCNC: 2.8 MG/DL
MAGNESIUM SERPL-MCNC: 3.1 MG/DL
MAGNESIUM SERPL-MCNC: 3.1 MG/DL
MAGNESIUM SERPL-MCNC: 3.4 MG/DL
MCH RBC QN AUTO: 22.3 PG
MCHC RBC AUTO-ENTMCNC: 29.7 G/DL
MCV RBC AUTO: 75 FL
MODE: ABNORMAL
MODE: ABNORMAL
MONOCYTES # BLD AUTO: 2.1 K/UL
MONOCYTES NFR BLD: 12.5 %
NEUTROPHILS # BLD AUTO: 12.3 K/UL
NEUTROPHILS NFR BLD: 74.3 %
NRBC BLD-RTO: 0 /100 WBC
OVALOCYTES BLD QL SMEAR: ABNORMAL
PCO2 BLDA: 48.5 MMHG (ref 35–45)
PCO2 BLDA: 52.9 MMHG (ref 35–45)
PH SMN: 7.35 [PH] (ref 7.35–7.45)
PH SMN: 7.37 [PH] (ref 7.35–7.45)
PHOSPHATE SERPL-MCNC: 4 MG/DL
PHOSPHATE SERPL-MCNC: 5.1 MG/DL
PHOSPHATE SERPL-MCNC: 5.4 MG/DL
PHOSPHATE SERPL-MCNC: 6.3 MG/DL
PHOSPHATE SERPL-MCNC: 6.3 MG/DL
PHOSPHATE SERPL-MCNC: 6.8 MG/DL
PHOSPHATE SERPL-MCNC: 7.1 MG/DL
PLATELET # BLD AUTO: 205 K/UL
PMV BLD AUTO: 9.5 FL
PO2 BLDA: 103 MMHG (ref 80–100)
PO2 BLDA: 55 MMHG (ref 80–100)
POC BE: 3 MMOL/L
POC BE: 4 MMOL/L
POC SATURATED O2: 87 % (ref 95–100)
POC SATURATED O2: 97 % (ref 95–100)
POC TCO2: 30 MMOL/L (ref 23–27)
POC TCO2: 31 MMOL/L (ref 23–27)
POCT GLUCOSE: 101 MG/DL (ref 70–110)
POCT GLUCOSE: 76 MG/DL (ref 70–110)
POCT GLUCOSE: 77 MG/DL (ref 70–110)
POCT GLUCOSE: 80 MG/DL (ref 70–110)
POCT GLUCOSE: 91 MG/DL (ref 70–110)
POIKILOCYTOSIS BLD QL SMEAR: SLIGHT
POLYCHROMASIA BLD QL SMEAR: ABNORMAL
POTASSIUM SERPL-SCNC: 4.4 MMOL/L
POTASSIUM SERPL-SCNC: 4.4 MMOL/L
POTASSIUM SERPL-SCNC: 4.7 MMOL/L
PROT SERPL-MCNC: 7.3 G/DL
PROTHROMBIN TIME: 11.3 SEC
PTH-INTACT SERPL-MCNC: 33 PG/ML
RBC # BLD AUTO: 3.77 M/UL
SAMPLE: ABNORMAL
SAMPLE: ABNORMAL
SITE: ABNORMAL
SITE: ABNORMAL
SODIUM SERPL-SCNC: 143 MMOL/L
SODIUM SERPL-SCNC: 143 MMOL/L
SODIUM SERPL-SCNC: 144 MMOL/L
SP02: 98
SPHEROCYTES BLD QL SMEAR: ABNORMAL
TARGETS BLD QL SMEAR: ABNORMAL
URATE SERPL-MCNC: 4.8 MG/DL
WBC # BLD AUTO: 16.61 K/UL

## 2018-05-30 PROCEDURE — 84550 ASSAY OF BLOOD/URIC ACID: CPT

## 2018-05-30 PROCEDURE — 25000003 PHARM REV CODE 250: Performed by: STUDENT IN AN ORGANIZED HEALTH CARE EDUCATION/TRAINING PROGRAM

## 2018-05-30 PROCEDURE — 92610 EVALUATE SWALLOWING FUNCTION: CPT

## 2018-05-30 PROCEDURE — 20000000 HC ICU ROOM

## 2018-05-30 PROCEDURE — 36600 WITHDRAWAL OF ARTERIAL BLOOD: CPT

## 2018-05-30 PROCEDURE — 80053 COMPREHEN METABOLIC PANEL: CPT

## 2018-05-30 PROCEDURE — 82330 ASSAY OF CALCIUM: CPT | Mod: 91

## 2018-05-30 PROCEDURE — 63600175 PHARM REV CODE 636 W HCPCS: Performed by: INTERNAL MEDICINE

## 2018-05-30 PROCEDURE — G8997 SWALLOW GOAL STATUS: HCPCS | Mod: CK

## 2018-05-30 PROCEDURE — 99232 SBSQ HOSP IP/OBS MODERATE 35: CPT | Mod: ,,, | Performed by: INTERNAL MEDICINE

## 2018-05-30 PROCEDURE — 82306 VITAMIN D 25 HYDROXY: CPT

## 2018-05-30 PROCEDURE — 25000003 PHARM REV CODE 250: Performed by: INTERNAL MEDICINE

## 2018-05-30 PROCEDURE — 80069 RENAL FUNCTION PANEL: CPT | Mod: 91

## 2018-05-30 PROCEDURE — 84100 ASSAY OF PHOSPHORUS: CPT

## 2018-05-30 PROCEDURE — 83970 ASSAY OF PARATHORMONE: CPT

## 2018-05-30 PROCEDURE — 27000221 HC OXYGEN, UP TO 24 HOURS

## 2018-05-30 PROCEDURE — A4216 STERILE WATER/SALINE, 10 ML: HCPCS | Performed by: PSYCHIATRY & NEUROLOGY

## 2018-05-30 PROCEDURE — 90945 DIALYSIS ONE EVALUATION: CPT

## 2018-05-30 PROCEDURE — 99900035 HC TECH TIME PER 15 MIN (STAT)

## 2018-05-30 PROCEDURE — G8996 SWALLOW CURRENT STATUS: HCPCS | Mod: CL

## 2018-05-30 PROCEDURE — 83735 ASSAY OF MAGNESIUM: CPT | Mod: 91

## 2018-05-30 PROCEDURE — 85610 PROTHROMBIN TIME: CPT

## 2018-05-30 PROCEDURE — 99291 CRITICAL CARE FIRST HOUR: CPT | Mod: ,,, | Performed by: PSYCHIATRY & NEUROLOGY

## 2018-05-30 PROCEDURE — 25000003 PHARM REV CODE 250: Performed by: HOSPITALIST

## 2018-05-30 PROCEDURE — 85025 COMPLETE CBC W/AUTO DIFF WBC: CPT

## 2018-05-30 PROCEDURE — 25000003 PHARM REV CODE 250: Performed by: NURSE PRACTITIONER

## 2018-05-30 PROCEDURE — 82803 BLOOD GASES ANY COMBINATION: CPT

## 2018-05-30 PROCEDURE — 25000003 PHARM REV CODE 250: Performed by: PSYCHIATRY & NEUROLOGY

## 2018-05-30 PROCEDURE — 63600175 PHARM REV CODE 636 W HCPCS: Performed by: STUDENT IN AN ORGANIZED HEALTH CARE EDUCATION/TRAINING PROGRAM

## 2018-05-30 PROCEDURE — 94761 N-INVAS EAR/PLS OXIMETRY MLT: CPT

## 2018-05-30 PROCEDURE — 94668 MNPJ CHEST WALL SBSQ: CPT

## 2018-05-30 RX ORDER — HYDROCODONE BITARTRATE AND ACETAMINOPHEN 500; 5 MG/1; MG/1
TABLET ORAL CONTINUOUS
Status: ACTIVE | OUTPATIENT
Start: 2018-05-30 | End: 2018-05-31

## 2018-05-30 RX ORDER — MAGNESIUM SULFATE HEPTAHYDRATE 40 MG/ML
2 INJECTION, SOLUTION INTRAVENOUS
Status: ACTIVE | OUTPATIENT
Start: 2018-05-30 | End: 2018-05-31

## 2018-05-30 RX ADMIN — METOPROLOL TARTRATE 50 MG: 50 TABLET, FILM COATED ORAL at 09:05

## 2018-05-30 RX ADMIN — DEXMEDETOMIDINE HYDROCHLORIDE 0.6 MCG/KG/HR: 100 INJECTION, SOLUTION, CONCENTRATE INTRAVENOUS at 01:05

## 2018-05-30 RX ADMIN — DEXMEDETOMIDINE HYDROCHLORIDE 0.6 MCG/KG/HR: 100 INJECTION, SOLUTION, CONCENTRATE INTRAVENOUS at 06:05

## 2018-05-30 RX ADMIN — SODIUM CHLORIDE, PRESERVATIVE FREE 3 ML: 5 INJECTION INTRAVENOUS at 05:05

## 2018-05-30 RX ADMIN — CALCIUM GLUCONATE 3000 MG: 98 INJECTION, SOLUTION INTRAVENOUS at 04:05

## 2018-05-30 RX ADMIN — METOPROLOL TARTRATE 50 MG: 50 TABLET, FILM COATED ORAL at 08:05

## 2018-05-30 RX ADMIN — CHLORHEXIDINE GLUCONATE 15 ML: 1.2 RINSE ORAL at 08:05

## 2018-05-30 RX ADMIN — ASPIRIN 81 MG CHEWABLE TABLET 81 MG: 81 TABLET CHEWABLE at 08:05

## 2018-05-30 RX ADMIN — HEPARIN SODIUM 5000 UNITS: 5000 INJECTION, SOLUTION INTRAVENOUS; SUBCUTANEOUS at 02:05

## 2018-05-30 RX ADMIN — HEPARIN SODIUM 5000 UNITS: 5000 INJECTION, SOLUTION INTRAVENOUS; SUBCUTANEOUS at 05:05

## 2018-05-30 RX ADMIN — SODIUM CHLORIDE, PRESERVATIVE FREE 3 ML: 5 INJECTION INTRAVENOUS at 09:05

## 2018-05-30 RX ADMIN — BUSPIRONE HYDROCHLORIDE 10 MG: 10 TABLET ORAL at 09:05

## 2018-05-30 RX ADMIN — PANTOPRAZOLE SODIUM 40 MG: 40 GRANULE, DELAYED RELEASE ORAL at 08:05

## 2018-05-30 RX ADMIN — DEXTROSE MONOHYDRATE, SODIUM CITRATE, AND CITRIC ACID MONOHYDRATE: 2.45; 2.2; .8 INJECTION, SOLUTION INTRAVENOUS at 10:05

## 2018-05-30 RX ADMIN — HEPARIN SODIUM 5000 UNITS: 5000 INJECTION, SOLUTION INTRAVENOUS; SUBCUTANEOUS at 09:05

## 2018-05-30 RX ADMIN — METOPROLOL TARTRATE 50 MG: 50 TABLET, FILM COATED ORAL at 02:05

## 2018-05-30 RX ADMIN — DEXTROSE MONOHYDRATE, SODIUM CITRATE, AND CITRIC ACID MONOHYDRATE: 2.45; 2.2; .8 INJECTION, SOLUTION INTRAVENOUS at 04:05

## 2018-05-30 RX ADMIN — BUSPIRONE HYDROCHLORIDE 10 MG: 10 TABLET ORAL at 08:05

## 2018-05-30 RX ADMIN — CHLORHEXIDINE GLUCONATE 15 ML: 1.2 RINSE ORAL at 09:05

## 2018-05-30 RX ADMIN — BUSPIRONE HYDROCHLORIDE 10 MG: 10 TABLET ORAL at 02:05

## 2018-05-30 NOTE — SUBJECTIVE & OBJECTIVE
Interval History:  No acute events, leukocytosis/pan cultured; tolerating extubation, remains encephalopathic but following requests    Review of Systems   Respiratory: Positive for shortness of breath.    Neurological: Positive for speech difficulty and weakness.   All other systems reviewed and are negative.    Objective:     Vitals:  Temp: 98.8 °F (37.1 °C)  Pulse: 80  Rhythm: normal sinus rhythm  BP: 139/77  MAP (mmHg): 101  CVP (mean): 12 mmHg  Resp: 18  SpO2: 97 %  O2 Device (Oxygen Therapy): High Flow nasal Cannula    Temp  Min: 98.8 °F (37.1 °C)  Max: 99.6 °F (37.6 °C)  Pulse  Min: 79  Max: 87  BP  Min: 102/58  Max: 147/85  MAP (mmHg)  Min: 76  Max: 108  CVP (mean)  Min: 8 mmHg  Max: 18 mmHg  Resp  Min: 13  Max: 23  SpO2  Min: 94 %  Max: 98 %    05/29 0701 - 05/30 0700  In: 2571.4 [I.V.:1996.4]  Out: 4504 [Drains:1400]   Unmeasured Output  Stool Occurrence: 1       Physical Exam   Constitutional: He appears lethargic. No distress.   Pulmonary/Chest:   tachypnic   Neurological: He appears lethargic. GCS eye subscore is 4. GCS verbal subscore is 4. GCS motor subscore is 6.   Obese male, resting in bed, following requests  Oriented to self, situation  Speaking but difficult to understand  Moving all extremities against gravity   Nursing note and vitals reviewed.      Medications:  Continuous  sodium chloride 0.9%    calcium gluconate IVPB Last Rate: 3,000 mg (05/29/18 0851)   dexmedetomidine (PRECEDEX) infusion Last Rate: Stopped (05/30/18 0930)   dextrose-sod citrate-citric ac Last Rate: 200 mL/hr at 05/29/18 0151   Scheduled  aspirin 81 mg Daily   busPIRone 10 mg TID   chlorhexidine 15 mL BID   heparin (porcine) 5,000 Units Q8H   metoprolol tartrate 50 mg TID   pantoprazole 40 mg Daily   PRN  sodium chloride  Q24H PRN   sodium chloride  Q24H PRN   sodium chloride  Q24H PRN   acetaminophen 325 mg Q6H PRN   albuterol-ipratropium 3 mL Q6H PRN   dextrose 50% 12.5 g PRN   dextrose 50% 25 g PRN   fentaNYL 25 mcg  Q2H PRN   glucagon (human recombinant) 1 mg PRN   glucose 16 g PRN   glucose 24 g PRN   insulin aspart U-100 1-10 Units Q6H PRN   magnesium sulfate IVPB 2 g PRN   ondansetron 4 mg Q8H PRN   sodium chloride 0.9% 3 mL PRN   sodium phosphate IVPB 20.01 mmol PRN   sodium phosphate IVPB 30 mmol PRN   sodium phosphate IVPB 39.99 mmol PRN     Today I personally reviewed pertinent medications, lines/drains/airways, imaging, cardiology, lab results, microbiology results, notably:    Diet  Diet NPO  Diet NPO

## 2018-05-30 NOTE — PROGRESS NOTES
[facility]  Neurocritical Care  Progress Note    Admit Date: 5/19/2018  Service Date: 05/30/2018  Length of Stay: 11    Subjective:     Chief Complaint: Anoxic brain injury    History of Present Illness: 48 y/o man w/ hx of HTN, COPD? Transferred from [facility] following witnessed cardiac arrest while in the ED waiting room. Patient had presented complaining of URI/SOB symptoms, while in the ED waiting room he was noted to be choking, became apneic, which led to witnessed seizure like activity followed by cardiac arrest. Per discussion with ED staff, patient required about 22 minutes of ACLS/5 shocks before returning to normal sinus rhythm. During intubation a lozenge was removed from patients airway. Hyperthermia protocol was initiated prior to transferring to [facility] for NCC care. Per ED records, patient was acidotic with ph of 7.1 this morning. At the time of arrival to NICU, patient was on paralytics, however pupillary reflex was present. Will continue hypothermia protocol for additional 24 h.    Hospital Course: 5/19: arrived intubated, sedated, artic sun blanket  5/20: pressor requirment going up, DO NOT DIURESE, continue TTM, nimbex off, LFT improving, trop improving, family updated.  5/21: reach normothermia, MRI today, remove EEG, place HD line, consult nephrology, LFTs trending down, family updated  5/22: Anoxic brain injury. Has been responding. Last night placed on propofol  MRI only with L head of caudate infarc and some cerebellar small infarctions.  5/23: Anoxic brain injury.  On CRRT. BNP 1046. Got back on Propofol and pressors. Still agitated. He will be started on Buspar.  Trop I normalizing.  5/25 CRRT today. Amiodarone dcd and Metoprolol started. Monitor CBC q8h if H/H continues to drop will consult GI. Vascular surgery consulted concerning  Possible arterial occlusion. Right hand cold.. Arterial line dcd. US RUE pending. Abdominal distention, KUB showed gas pattern. Bladder  pressures q4h. Initial 21.  5/28 H/H 6.8/23, 1 unit PRBC transfused.Keep Hgb greater than or equal to 8. CRRT stopped line clotted. Changed trialysis catheter changed. Wean sedation today. Once off sedation begin weaning ventilator settings to CPAP.  Weaned vent to CPAP and able to Extubated in afternoon without s/s stridor or difficulty breathing. HR elevated 140s - 160s fentanyl x2 prn for pain/agitation. Metoprolol 5mg IV x3 given remains ST 140s.  And restless inbed. Will start precedex.  5/30: Leukocytosis, pan cx; kelley discontinued; remove IJ, speech eval for diet      Interval History:  No acute events, leukocytosis/pan cultured; tolerating extubation, remains encephalopathic but following requests    Review of Systems   Respiratory: Positive for shortness of breath.    Neurological: Positive for speech difficulty and weakness.   All other systems reviewed and are negative.    Objective:     Vitals:  Temp: 98.8 °F (37.1 °C)  Pulse: 80  Rhythm: normal sinus rhythm  BP: 139/77  MAP (mmHg): 101  CVP (mean): 12 mmHg  Resp: 18  SpO2: 97 %  O2 Device (Oxygen Therapy): High Flow nasal Cannula    Temp  Min: 98.8 °F (37.1 °C)  Max: 99.6 °F (37.6 °C)  Pulse  Min: 79  Max: 87  BP  Min: 102/58  Max: 147/85  MAP (mmHg)  Min: 76  Max: 108  CVP (mean)  Min: 8 mmHg  Max: 18 mmHg  Resp  Min: 13  Max: 23  SpO2  Min: 94 %  Max: 98 %    05/29 0701 - 05/30 0700  In: 2571.4 [I.V.:1996.4]  Out: 4504 [Drains:1400]   Unmeasured Output  Stool Occurrence: 1       Physical Exam   Constitutional: He appears lethargic. No distress.   Pulmonary/Chest:   tachypnic   Neurological: He appears lethargic. GCS eye subscore is 4. GCS verbal subscore is 4. GCS motor subscore is 6.   Obese male, resting in bed, following requests  Oriented to self, situation  Speaking but difficult to understand  Moving all extremities against gravity   Nursing note and vitals reviewed.      Medications:  Continuous  sodium chloride 0.9%    calcium gluconate IVPB  Last Rate: 3,000 mg (05/29/18 0851)   dexmedetomidine (PRECEDEX) infusion Last Rate: Stopped (05/30/18 0930)   dextrose-sod citrate-citric ac Last Rate: 200 mL/hr at 05/29/18 0151   Scheduled  aspirin 81 mg Daily   busPIRone 10 mg TID   chlorhexidine 15 mL BID   heparin (porcine) 5,000 Units Q8H   metoprolol tartrate 50 mg TID   pantoprazole 40 mg Daily   PRN  sodium chloride  Q24H PRN   sodium chloride  Q24H PRN   sodium chloride  Q24H PRN   acetaminophen 325 mg Q6H PRN   albuterol-ipratropium 3 mL Q6H PRN   dextrose 50% 12.5 g PRN   dextrose 50% 25 g PRN   fentaNYL 25 mcg Q2H PRN   glucagon (human recombinant) 1 mg PRN   glucose 16 g PRN   glucose 24 g PRN   insulin aspart U-100 1-10 Units Q6H PRN   magnesium sulfate IVPB 2 g PRN   ondansetron 4 mg Q8H PRN   sodium chloride 0.9% 3 mL PRN   sodium phosphate IVPB 20.01 mmol PRN   sodium phosphate IVPB 30 mmol PRN   sodium phosphate IVPB 39.99 mmol PRN     Today I personally reviewed pertinent medications, lines/drains/airways, imaging, cardiology, lab results, microbiology results, notably:    Diet  Diet NPO  Diet NPO          Assessment/Plan:     Neuro   * Anoxic brain injury    - Cardiac arrest x 3 at OSH; longest code was 22 minutes  - extubated 5/28  - MRI w/o evidence of anoxic brain injury  - continue to monitor neurological status          Cytotoxic brain edema-resolved as of 5/30/2018    - secondary to anoxic brain injury, 22 minutes PEA-->vfib  - MRI brain 5/21 for anoxic assessment.  see anoxic brain injury           Seizure-resolved as of 5/30/2018    - witnessed seizure in ED waiting room, possibly secondary to hypoperfusion/hypoxia  - Likely convulsive syncope vs hypoxia, EEG w/o epileptiform discharges        Pulmonary   Pulmonary edema    - no hx of HF, patient with URI symptoms for last month per discussion with transporting paramedic            Acute respiratory failure with hypoxia    - intubated on arrival  - Weaned to CPAP, tolerated well  - 5/28  extubated to ventimask O2Sats 97% no s/s stridor or difficulty breathing  - goal O2 sat 90-92%  duonebs q6h prn  Monitor daily cxr/ abg        Cardiac/Vascular   Acute decompensated heart failure    - ECHO w/ severe HFrEF  - continue metoprol 50 mg TID; currently rate controlled/hemodynamically stable        Essential hypertension    - holding home medications        Cardiac arrest    - 3 arrests prior to arrival  - trop trended down  - heart failure            Cardiogenic shock-resolved as of 5/30/2018    - possible undiagnosed cardiomyopathy based on CXR w/ evidence of pulmonary edema and multiple cardiac arrests over last 24 h  Appreciate cards recs though will not diurese with lasix  Levophed gtt to Keep MAP>65  Increased Metoprolol 25 tid     Echo: 1 - Eccentric hypertrophy.     2 - Severely depressed left ventricular systolic function (EF 10-15%).     3 - Biatrial enlargement.     4 - Impaired LV relaxation, normal LAP (grade 1 diastolic dysfunction).     5 - Right ventricular enlargement with moderately depressed systolic function.     6 - The estimated PA systolic pressure is 37 mmHg.     7 - Mild mitral regurgitation.     8 - Mild tricuspid regurgitation.           Renal/   Acute renal failure with tubular necrosis    BUN/Cr cont to trend up 40/6.2  Oliguric-->anuric   Monitor close  Nephrology following w/ CRRT  May need HD longterm; will need permanent access if so         Metabolic acidosis-resolved as of 5/30/2018    Monitor  Improved after bicarb  Daily ABG        Other   Respiratory arrest    Arrest at OSH  Here for MARIA ELENA  TTM protocol  See ARF        Class 3 severe obesity due to excess calories with serious comorbidity and body mass index (BMI) of 40.0 to 44.9 in adult    - nutrition consult  Body mass index is 48.47 kg/m².              Prophylaxis:  Venous Thromboembolism: chemical  Stress Ulcer: PPI  Ventilator Pneumonia: yes     Activity Orders          None        Full Code    Mike Matthews  MD Vince  Neurocritical Care  [facility]

## 2018-05-30 NOTE — ASSESSMENT & PLAN NOTE
- no hx of HF, patient with URI symptoms for last month per discussion with transporting paramedic

## 2018-05-30 NOTE — ASSESSMENT & PLAN NOTE
- Cardiac arrest x 3 at OSH; longest code was 22 minutes  - extubated 5/28  - MRI w/o evidence of anoxic brain injury  - continue to monitor neurological status

## 2018-05-30 NOTE — PLAN OF CARE
Problem: Patient Care Overview  Goal: Plan of Care Review  POC reviewed with pt and spouse at 0500. Spouse verbalized understanding. Questions and concerns addressed. No acute events overnight. Pt progressing toward goals. Will continue to monitor. See flowsheets for full assessment and VS info

## 2018-05-30 NOTE — PLAN OF CARE
Problem: SLP Goal  Goal: SLP Goal  Short Term Goals expected to be met by 6/6:  1. Pt will participate in BSS to determine least restrictive diet.  2. Pt will tolerate full liquids po diet with no overt s/s of aspiration.  3. Pt will tolerate advanced diet trials with no overt s/s of aspiration.    Outcome: Ongoing (interventions implemented as appropriate)  Pt seen for swallow eval this PM. SLP will continue to follow.    BRIAN Garcia., CF-SLP  Speech-Language Pathologist

## 2018-05-30 NOTE — ASSESSMENT & PLAN NOTE
Los Mendez is a 47 year old male who is consulted for MARY, which is mostly secondary to iATN from previous cardiac arrest where he presented with hypotension which decrease perfusion to the kidneys (required to be started on pressors). Has echo with EF 10-15%.     Plan:  - Will start back on SLED for volume removal and clearance,  cc/hr as tolerated by patient, maintain MAP>65  - CVP=12 today  - Chest x ray with suggestion of pulmonary edema   - Currently with hypercalcemia but patient received calcium gluconate yesterday Calcium bath 2.25  - Still with increase levels of phosphate, patient still has no feeding at current moment so binders can't be started yet. Last   - Blood pressures have been stable.  - Still anuric. Continue to monitor intake and output.

## 2018-05-30 NOTE — PROGRESS NOTES
[facility]  Nephrology  Progress Note    Patient Name: Los Mendez  MRN: [id number]  Admission Date: 5/19/2018  Hospital Length of Stay: 11 days  Attending Provider: Zaki Hooper MD   Primary Care Physician: Provider Not[employer]  Principal Problem:Anoxic brain injury    Subjective:     HPI: Los Mendez is a 47 year old male with past medical history of HTN, heavy alcohol user and COPD. Transferred from [facility] following witnessed cardiac arrest while in the ED waiting room. Patient had presented complaining of upper respiratory track infection and shortness of breath symptoms, while in the ED waiting room he was noted to be choking, became apneic, which led to witnessed seizure like activity followed by cardiac arrest. Per discussion with ED staff, patient required about 22 minutes of ACLS/5 shocks before returning to normal sinus rhythm. During intubation a lozenge was removed from patients airway. Hyperthermia protocol was initiated prior to transferring to [facility]. On arrival to [facility] Arrived with increased serum creatinine from 2.9-->5.1 (unkown baseline), BUN 35--> 52, Trop 2.4-->1.3, Lactic acidosis 6.4-->1.3, chest x ray with increased parenchymal interstitial attenuation and parenchymal opacities suggestive of pulmonary edema. On mechanical ventilation with a FiO2 50%. Currently also anuric at present moment. Per cardiology patient has a dilated cardiomyopathy EF looks about 10-15% and LVEDD is 7.3 cm.    Interval History:   Patient evaluated at bedside, no significant event overnight. Blood pressures have been stable. Dialysis was hold yesterday. Continue to be anuric.     Review of patient's allergies indicates:   Allergen Reactions    Penicillins      Tolerated cefepime May 2018     Current Facility-Administered Medications   Medication Frequency    0.9%  NaCl infusion (CRRT USE ONLY) Continuous    0.9%  NaCl infusion (for blood administration) Q24H PRN    0.9%  NaCl infusion  (for blood administration) Q24H PRN    0.9%  NaCl infusion (for blood administration) Q24H PRN    acetaminophen tablet 325 mg Q6H PRN    albuterol-ipratropium 2.5 mg-0.5 mg/3 mL nebulizer solution 3 mL Q6H PRN    aspirin chewable tablet 81 mg Daily    busPIRone tablet 10 mg TID    calcium gluconate 3,000 mg in dextrose 5 % 100 mL IVPB Continuous    chlorhexidine 0.12 % solution 15 mL BID    dexmedetomidine (PRECEDEX) 400mcg/100mL 0.9% NaCL infusion Continuous    dextrose 50% injection 12.5 g PRN    dextrose 50% injection 25 g PRN    DEXTROSE-SOD CITRATE-CITRIC AC 2.45-2.2 GRAM- 730 MG/100 ML MISC SOLN Continuous    fentaNYL injection 25 mcg Q2H PRN    glucagon (human recombinant) injection 1 mg PRN    glucose chewable tablet 16 g PRN    glucose chewable tablet 24 g PRN    heparin (porcine) injection 5,000 Units Q8H    insulin aspart U-100 pen 1-10 Units Q6H PRN    magnesium sulfate 2g in water 50mL IVPB (premix) PRN    metoprolol tartrate (LOPRESSOR) tablet 50 mg TID    ondansetron 4 mg/5 mL solution 4 mg Q8H PRN    pantoprazole suspension 40 mg Daily    sodium chloride 0.9% flush 3 mL PRN    sodium phosphate 20.01 mmol in dextrose 5 % 250 mL IVPB PRN    sodium phosphate 30 mmol in dextrose 5 % 250 mL IVPB PRN    sodium phosphate 39.99 mmol in dextrose 5 % 250 mL IVPB PRN       Objective:     Vital Signs (Most Recent):  Temp: 98.9 °F (37.2 °C) (05/30/18 1505)  Pulse: 84 (05/30/18 1705)  Resp: 17 (05/30/18 1705)  BP: 136/71 (05/30/18 1705)  SpO2: (!) 92 % (05/30/18 1705)  O2 Device (Oxygen Therapy): High Flow nasal Cannula (05/30/18 0830) Vital Signs (24h Range):  Temp:  [98.8 °F (37.1 °C)-99.6 °F (37.6 °C)] 98.9 °F (37.2 °C)  Pulse:  [80-87] 84  Resp:  [15-23] 17  SpO2:  [92 %-98 %] 92 %  BP: (102-147)/(58-85) 136/71     Weight: (!) 144.6 kg (318 lb 12.6 oz) (05/30/18 0300)  Body mass index is 48.47 kg/m².  Body surface area is 2.63 meters squared.    I/O last 3 completed shifts:  In: 4972.6  [I.V.:4137.6; Blood:275; NG/GT:260; IV Piggyback:300]  Out: 8384 [Urine:15; Drains:1400; Other:6969]    Physical Exam   Constitutional: Nasal cannula in place.   HENT:   Head: Normocephalic and atraumatic.   Right Ear: External ear normal.   Left Ear: External ear normal.   Eyes: Right eye exhibits no discharge. Left eye exhibits no discharge.   Neck: No JVD present.   Cardiovascular: Normal rate and regular rhythm.  Exam reveals no friction rub.    Rt femoral trialysis cath    Pulmonary/Chest: He has rales.   Abdominal: Soft. He exhibits no distension.   Neurological:   Responding to stimulus and voice    Skin: Skin is warm.       Significant Labs:  ABGs:   Recent Labs  Lab 05/30/18  0539   PH 7.353   PCO2 52.9*   HCO3 29.4*   POCSATURATED 97   BE 4     BMP:   Recent Labs  Lab 05/30/18  1407   GLU 77      CO2 28   BUN 42*   CREATININE 5.8*   CALCIUM 9.8   MG 3.4*     CBC:   Recent Labs  Lab 05/30/18  0414   WBC 16.61*   RBC 3.77*   HGB 8.4*   HCT 28.3*      MCV 75*   MCH 22.3*   MCHC 29.7*     CMP:   Recent Labs  Lab 05/30/18  0414 05/30/18  1407   GLU 76  76  76 77   CALCIUM 10.0  10.0  10.0 9.8   ALBUMIN 2.5*  2.5*  2.5* 2.5*   PROT 7.3  --      144  144 143   K 4.7  4.7  4.7 4.4   CO2 27  27  27 28     104  104 102   BUN 31*  31*  31* 42*   CREATININE 4.7*  4.7*  4.7* 5.8*   ALKPHOS 170*  --    ALT 65*  --    AST 56*  --    BILITOT 1.5*  --      All labs within the past 24 hours have been reviewed.       Assessment/Plan:     MARY (acute kidney injury)    Los Mendez is a 47 year old male who is consulted for MARY, which is mostly secondary to iATN from previous cardiac arrest where he presented with hypotension which decrease perfusion to the kidneys (required to be started on pressors). Has echo with EF 10-15%.     Plan:  - Will start back on SLED for volume removal and clearance,  cc/hr as tolerated by patient, maintain MAP>65  - CVP=12 today  - Chest x ray with  suggestion of pulmonary edema   - Currently with hypercalcemia but patient received calcium gluconate yesterday Calcium bath 2.25  - Still with increase levels of phosphate, patient still has no feeding at current moment so binders can't be started yet. Last   - Blood pressures have been stable.  - Still anuric. Continue to monitor intake and output.               Sascha Leone  Nephrology  Fellow  [facility]    Pager [phone]

## 2018-05-30 NOTE — ASSESSMENT & PLAN NOTE
- witnessed seizure in ED waiting room, possibly secondary to hypoperfusion/hypoxia  - Likely convulsive syncope vs hypoxia, EEG w/o epileptiform discharges

## 2018-05-30 NOTE — SUBJECTIVE & OBJECTIVE
Interval History:   Patient evaluated at bedside, no significant event overnight. Blood pressures have been stable. Dialysis was hold yesterday. Continue to be anuric.     Review of patient's allergies indicates:   Allergen Reactions    Penicillins      Tolerated cefepime May 2018     Current Facility-Administered Medications   Medication Frequency    0.9%  NaCl infusion (CRRT USE ONLY) Continuous    0.9%  NaCl infusion (for blood administration) Q24H PRN    0.9%  NaCl infusion (for blood administration) Q24H PRN    0.9%  NaCl infusion (for blood administration) Q24H PRN    acetaminophen tablet 325 mg Q6H PRN    albuterol-ipratropium 2.5 mg-0.5 mg/3 mL nebulizer solution 3 mL Q6H PRN    aspirin chewable tablet 81 mg Daily    busPIRone tablet 10 mg TID    calcium gluconate 3,000 mg in dextrose 5 % 100 mL IVPB Continuous    chlorhexidine 0.12 % solution 15 mL BID    dexmedetomidine (PRECEDEX) 400mcg/100mL 0.9% NaCL infusion Continuous    dextrose 50% injection 12.5 g PRN    dextrose 50% injection 25 g PRN    DEXTROSE-SOD CITRATE-CITRIC AC 2.45-2.2 GRAM- 730 MG/100 ML MISC SOLN Continuous    fentaNYL injection 25 mcg Q2H PRN    glucagon (human recombinant) injection 1 mg PRN    glucose chewable tablet 16 g PRN    glucose chewable tablet 24 g PRN    heparin (porcine) injection 5,000 Units Q8H    insulin aspart U-100 pen 1-10 Units Q6H PRN    magnesium sulfate 2g in water 50mL IVPB (premix) PRN    metoprolol tartrate (LOPRESSOR) tablet 50 mg TID    ondansetron 4 mg/5 mL solution 4 mg Q8H PRN    pantoprazole suspension 40 mg Daily    sodium chloride 0.9% flush 3 mL PRN    sodium phosphate 20.01 mmol in dextrose 5 % 250 mL IVPB PRN    sodium phosphate 30 mmol in dextrose 5 % 250 mL IVPB PRN    sodium phosphate 39.99 mmol in dextrose 5 % 250 mL IVPB PRN       Objective:     Vital Signs (Most Recent):  Temp: 98.9 °F (37.2 °C) (05/30/18 1505)  Pulse: 84 (05/30/18 1705)  Resp: 17 (05/30/18  1705)  BP: 136/71 (05/30/18 1705)  SpO2: (!) 92 % (05/30/18 1705)  O2 Device (Oxygen Therapy): High Flow nasal Cannula (05/30/18 0830) Vital Signs (24h Range):  Temp:  [98.8 °F (37.1 °C)-99.6 °F (37.6 °C)] 98.9 °F (37.2 °C)  Pulse:  [80-87] 84  Resp:  [15-23] 17  SpO2:  [92 %-98 %] 92 %  BP: (102-147)/(58-85) 136/71     Weight: (!) 144.6 kg (318 lb 12.6 oz) (05/30/18 0300)  Body mass index is 48.47 kg/m².  Body surface area is 2.63 meters squared.    I/O last 3 completed shifts:  In: 4972.6 [I.V.:4137.6; Blood:275; NG/GT:260; IV Piggyback:300]  Out: 8384 [Urine:15; Drains:1400; Other:6969]    Physical Exam   Constitutional: Nasal cannula in place.   HENT:   Head: Normocephalic and atraumatic.   Right Ear: External ear normal.   Left Ear: External ear normal.   Eyes: Right eye exhibits no discharge. Left eye exhibits no discharge.   Neck: No JVD present.   Cardiovascular: Normal rate and regular rhythm.  Exam reveals no friction rub.    Rt femoral trialysis cath    Pulmonary/Chest: He has rales.   Abdominal: Soft. He exhibits no distension.   Neurological:   Responding to stimulus and voice    Skin: Skin is warm.       Significant Labs:  ABGs:   Recent Labs  Lab 05/30/18  0539   PH 7.353   PCO2 52.9*   HCO3 29.4*   POCSATURATED 97   BE 4     BMP:   Recent Labs  Lab 05/30/18  1407   GLU 77      CO2 28   BUN 42*   CREATININE 5.8*   CALCIUM 9.8   MG 3.4*     CBC:   Recent Labs  Lab 05/30/18  0414   WBC 16.61*   RBC 3.77*   HGB 8.4*   HCT 28.3*      MCV 75*   MCH 22.3*   MCHC 29.7*     CMP:   Recent Labs  Lab 05/30/18 0414 05/30/18  1407   GLU 76  76  76 77   CALCIUM 10.0  10.0  10.0 9.8   ALBUMIN 2.5*  2.5*  2.5* 2.5*   PROT 7.3  --      144  144 143   K 4.7  4.7  4.7 4.4   CO2 27  27  27 28     104  104 102   BUN 31*  31*  31* 42*   CREATININE 4.7*  4.7*  4.7* 5.8*   ALKPHOS 170*  --    ALT 65*  --    AST 56*  --    BILITOT 1.5*  --      All labs within the past 24 hours have  been reviewed.

## 2018-05-30 NOTE — PROGRESS NOTES
Spoke to [facility] team about pt temp >99. Pt not able to receive tylenol at this time and pts LFT's are elevated. BC to be drawn and to call team if pt temp >100.4

## 2018-05-30 NOTE — ASSESSMENT & PLAN NOTE
- ECHO w/ severe HFrEF  - continue metoprol 50 mg TID; currently rate controlled/hemodynamically stable

## 2018-05-30 NOTE — ASSESSMENT & PLAN NOTE
BUN/Cr cont to trend up 40/6.2  Oliguric-->anuric   Monitor close  Nephrology following w/ CRRT  May need HD longterm; will need permanent access if so

## 2018-05-30 NOTE — PT/OT/SLP EVAL
Speech Language Pathology Evaluation  Bedside Swallow    Patient Name:  Los Mendez   MRN:  [id number]  Admitting Diagnosis: Anoxic brain injury    Recommendations:                 General Recommendations:  Dysphagia therapy  Diet recommendations:  NPO, Full liquids   Aspiration Precautions: 1 bite/sip at a time, Alternating bites/sips, Assistance with meals, Eliminate distractions, Feed only when awake/alert, Frequent oral care, HOB to 90 degrees, Meds whole 1 at a time, Monitor for s/s of aspiration, Remain upright 30 minutes post meal and Small bites/sips   General Precautions: Standard, fall, aspiration  Communication strategies:  yes/no questions only    History:     History reviewed. No pertinent past medical history.    History reviewed. No pertinent surgical history.  History of Present Illness: 46 y/o man w/ hx of HTN, COPD? Transferred from [facility] following witnessed cardiac arrest while in the ED waiting room. Patient had presented complaining of URI/SOB symptoms, while in the ED waiting room he was noted to be choking, became apneic, which led to witnessed seizure like activity followed by cardiac arrest. Per discussion with ED staff, patient required about 22 minutes of ACLS/5 shocks before returning to normal sinus rhythm. During intubation a lozenge was removed from patients airway. Hyperthermia protocol was initiated prior to transferring to [facility] care. Per ED records, patient was acidotic with ph of 7.1 this morning. At the time of arrival to NICU, patient was on paralytics, however pupillary reflex was present. Will continue hypothermia protocol for additional 24 h      Social History: Patient lives with spouse .    Prior Intubation HX:  Pt intubated on 5/19 and extubated on 5/28    Modified Barium Swallow: None on file    Chest X-Rays: Radiographic findings suggestive of pulmonary edema, possibly related to volume overload or heart failure.    Prior diet: Per pt's spouse, regular/thin  "liquids po diet.      Subjective     Pt found in bed awake with pt's spouse at bedside. Pt was alert initially but observed with dcr'd KLEVER and appeared to become lethargic intermittently during PO trials. Pt required max cuing from SLP and redirection to attend to given tasks.    Patient goals: "I want water!" per pt     Pain/Comfort:  · Pain Rating 1: 0/10  · Pain Rating Post-Intervention 1: 0/10    Objective:     Oral Musculature Evaluation  · Oral Musculature:  (unable to assess 2/2 pt's limited participation)  · Dentition: scattered dentition  · Mucosal Quality: dry, sticky  · Volitional Swallow:  (unable to assess 2/2 pt's limited participation)  · Voice Prior to PO Intake:  (limited verbal output, mumbled speech-- however pt with raspy vocal quality)    Bedside Swallow Eval:   Consistencies Assessed:  · Thin liquids -via cup sips x8  · Puree -(apple sauce) x5  · Soft solids -(softened deyanira cracker) x2     Oral Phase:   · Anterior loss-- with thin liquids x2  · Prolonged mastication-- for mechanical soft textures  · Oral residue-- for mechanical soft textures  · Slow oral transit time-- for puree x2 and all trials of mechanical soft textures    Pharyngeal Phase:   · delayed swallow initation-- for mechanical soft textures  · no overt clinical signs/symptoms of aspiration    Compensatory Strategies  · None    Treatment: SLP consulted for clinical swallow eval. SLP confirmed with RN prior to entry. Pt tolerated thin liquids, puree and mechanical soft textures with no overt s/s of aspiration. However, pt observed to fatigue as PO trials advanced and required mod-max cuing from SLP to initiate AP transfer and trigger swallow of materials on oral cavity, which impacts pt's safety during the swallow. SLP educated pt on role of SLP, BSS, diet recs/swallow precautions and POC. SLP explained to pt and pt's wife reasoning for diet recs/swallow precautions to ensure pt safety and maximum  participation in PO intake " during meals. Pt and pt's spouse acknowledged and confirmed understanding. Pt will require frequent reinforcements 2/2 pt's dcr'd KLEVER. SLP updated pt's whiteboard.          Assessment:     Los Mendez is a 47 y.o. male admitted with Anoxic brain injury. He tolerates thin liquids, puree and mechanical soft textures with no overt s/s of aspiraiton. However, pt observed to fatigue as PO trials advanced and required cuing from SLP to initiate AP transfer and trigger swallow, which impacts pt's safety during the swallow. SLP will continue to follow. SLP notified MD team and ESTHER Jones of results/recs.    Goals:    SLP Goals        Problem: SLP Goal    Goal Priority Disciplines Outcome   SLP Goal     SLP Ongoing (interventions implemented as appropriate)   Description:  Short Term Goals expected to be met by 6/6:  1. Pt will participate in BSS to determine least restrictive diet.  2. Pt will tolerate full liquids po diet with no overt s/s of aspiration.  3. Pt will tolerate advanced diet trials with no overt s/s of aspiration.                      Plan:     · Patient to be seen:  4 x/week   · Plan of Care expires:     · Plan of Care reviewed with:  patient, spouse (ESTHER Jones and MD team)   · SLP Follow-Up:  Yes       Discharge recommendations:   (TBD pending pt's progress)     Time Tracking:     SLP Treatment Date:   05/30/18  Speech Start Time:  1620  Speech Stop Time:  1645     Speech Total Time (min):  25 min    Billable Minutes: Eval Swallow and Oral Function 25      JOI Garcia, CF-SLP  Speech-Language Pathologist   5/30/2018

## 2018-05-31 LAB
ALBUMIN SERPL BCP-MCNC: 2.5 G/DL
ALBUMIN SERPL BCP-MCNC: 2.6 G/DL
ALLENS TEST: ABNORMAL
ALP SERPL-CCNC: 162 U/L
ALT SERPL W/O P-5'-P-CCNC: 57 U/L
ANION GAP SERPL CALC-SCNC: 10 MMOL/L
ANION GAP SERPL CALC-SCNC: 11 MMOL/L
ANION GAP SERPL CALC-SCNC: 13 MMOL/L
AST SERPL-CCNC: 53 U/L
BASOPHILS # BLD AUTO: 0.07 K/UL
BASOPHILS NFR BLD: 0.5 %
BILIRUB SERPL-MCNC: 1.2 MG/DL
BUN SERPL-MCNC: 24 MG/DL
BUN SERPL-MCNC: 24 MG/DL
BUN SERPL-MCNC: 30 MG/DL
BUN SERPL-MCNC: 33 MG/DL
BUN SERPL-MCNC: 38 MG/DL
CA-I BLDV-SCNC: 1.11 MMOL/L
CA-I BLDV-SCNC: 1.35 MMOL/L
CALCIUM SERPL-MCNC: 10.2 MG/DL
CALCIUM SERPL-MCNC: 10.6 MG/DL
CALCIUM SERPL-MCNC: 10.8 MG/DL
CALCIUM SERPL-MCNC: 11 MG/DL
CALCIUM SERPL-MCNC: 11 MG/DL
CHLORIDE SERPL-SCNC: 105 MMOL/L
CHLORIDE SERPL-SCNC: 106 MMOL/L
CHLORIDE SERPL-SCNC: 106 MMOL/L
CO2 SERPL-SCNC: 27 MMOL/L
CO2 SERPL-SCNC: 28 MMOL/L
CO2 SERPL-SCNC: 29 MMOL/L
CREAT SERPL-MCNC: 3.9 MG/DL
CREAT SERPL-MCNC: 3.9 MG/DL
CREAT SERPL-MCNC: 4.6 MG/DL
CREAT SERPL-MCNC: 4.8 MG/DL
CREAT SERPL-MCNC: 5.6 MG/DL
DELSYS: ABNORMAL
DIFFERENTIAL METHOD: ABNORMAL
EOSINOPHIL # BLD AUTO: 0.3 K/UL
EOSINOPHIL NFR BLD: 1.9 %
ERYTHROCYTE [DISTWIDTH] IN BLOOD BY AUTOMATED COUNT: 28.6 %
EST. GFR  (AFRICAN AMERICAN): 12.9 ML/MIN/1.73 M^2
EST. GFR  (AFRICAN AMERICAN): 15.5 ML/MIN/1.73 M^2
EST. GFR  (AFRICAN AMERICAN): 16.3 ML/MIN/1.73 M^2
EST. GFR  (AFRICAN AMERICAN): 19.9 ML/MIN/1.73 M^2
EST. GFR  (AFRICAN AMERICAN): 19.9 ML/MIN/1.73 M^2
EST. GFR  (NON AFRICAN AMERICAN): 11.1 ML/MIN/1.73 M^2
EST. GFR  (NON AFRICAN AMERICAN): 13.4 ML/MIN/1.73 M^2
EST. GFR  (NON AFRICAN AMERICAN): 14.1 ML/MIN/1.73 M^2
EST. GFR  (NON AFRICAN AMERICAN): 17.2 ML/MIN/1.73 M^2
EST. GFR  (NON AFRICAN AMERICAN): 17.2 ML/MIN/1.73 M^2
FLOW: 5
GLUCOSE SERPL-MCNC: 108 MG/DL
GLUCOSE SERPL-MCNC: 108 MG/DL
GLUCOSE SERPL-MCNC: 76 MG/DL
GLUCOSE SERPL-MCNC: 84 MG/DL
GLUCOSE SERPL-MCNC: 87 MG/DL
HCO3 UR-SCNC: 30.4 MMOL/L (ref 24–28)
HCT VFR BLD AUTO: 27.5 %
HGB BLD-MCNC: 8.1 G/DL
IMM GRANULOCYTES # BLD AUTO: 0.08 K/UL
IMM GRANULOCYTES NFR BLD AUTO: 0.5 %
INR PPP: 1.1
LACTATE SERPL-SCNC: 0.8 MMOL/L
LYMPHOCYTES # BLD AUTO: 1.8 K/UL
LYMPHOCYTES NFR BLD: 11.8 %
MAGNESIUM SERPL-MCNC: 2.9 MG/DL
MAGNESIUM SERPL-MCNC: 3 MG/DL
MAGNESIUM SERPL-MCNC: 3 MG/DL
MCH RBC QN AUTO: 22.2 PG
MCHC RBC AUTO-ENTMCNC: 29.5 G/DL
MCV RBC AUTO: 75 FL
MODE: ABNORMAL
MONOCYTES # BLD AUTO: 2 K/UL
MONOCYTES NFR BLD: 12.8 %
NEUTROPHILS # BLD AUTO: 11.1 K/UL
NEUTROPHILS NFR BLD: 72.5 %
NRBC BLD-RTO: 0 /100 WBC
PCO2 BLDA: 48.4 MMHG (ref 35–45)
PH SMN: 7.41 [PH] (ref 7.35–7.45)
PHOSPHATE SERPL-MCNC: 4.2 MG/DL
PHOSPHATE SERPL-MCNC: 4.3 MG/DL
PHOSPHATE SERPL-MCNC: 4.3 MG/DL
PHOSPHATE SERPL-MCNC: 4.5 MG/DL
PHOSPHATE SERPL-MCNC: 4.6 MG/DL
PHOSPHATE SERPL-MCNC: 4.6 MG/DL
PHOSPHATE SERPL-MCNC: 4.7 MG/DL
PLATELET # BLD AUTO: 199 K/UL
PMV BLD AUTO: 10 FL
PO2 BLDA: 106 MMHG (ref 80–100)
POC BE: 6 MMOL/L
POC SATURATED O2: 98 % (ref 95–100)
POC TCO2: 32 MMOL/L (ref 23–27)
POCT GLUCOSE: 122 MG/DL (ref 70–110)
POCT GLUCOSE: 78 MG/DL (ref 70–110)
POCT GLUCOSE: 82 MG/DL (ref 70–110)
POCT GLUCOSE: 85 MG/DL (ref 70–110)
POCT GLUCOSE: 91 MG/DL (ref 70–110)
POTASSIUM SERPL-SCNC: 3.9 MMOL/L
POTASSIUM SERPL-SCNC: 4.2 MMOL/L
POTASSIUM SERPL-SCNC: 4.3 MMOL/L
POTASSIUM SERPL-SCNC: 4.3 MMOL/L
POTASSIUM SERPL-SCNC: 4.4 MMOL/L
PROT SERPL-MCNC: 7.1 G/DL
PROTHROMBIN TIME: 11.4 SEC
RBC # BLD AUTO: 3.65 M/UL
SAMPLE: ABNORMAL
SITE: ABNORMAL
SODIUM SERPL-SCNC: 144 MMOL/L
SODIUM SERPL-SCNC: 145 MMOL/L
SODIUM SERPL-SCNC: 146 MMOL/L
VANCOMYCIN SERPL-MCNC: 11.1 UG/ML
WBC # BLD AUTO: 15.31 K/UL

## 2018-05-31 PROCEDURE — 97163 PT EVAL HIGH COMPLEX 45 MIN: CPT

## 2018-05-31 PROCEDURE — 80053 COMPREHEN METABOLIC PANEL: CPT

## 2018-05-31 PROCEDURE — 25000003 PHARM REV CODE 250: Performed by: INTERNAL MEDICINE

## 2018-05-31 PROCEDURE — 97803 MED NUTRITION INDIV SUBSEQ: CPT

## 2018-05-31 PROCEDURE — 82803 BLOOD GASES ANY COMBINATION: CPT

## 2018-05-31 PROCEDURE — 20000000 HC ICU ROOM

## 2018-05-31 PROCEDURE — 63600175 PHARM REV CODE 636 W HCPCS: Performed by: NURSE PRACTITIONER

## 2018-05-31 PROCEDURE — 25000242 PHARM REV CODE 250 ALT 637 W/ HCPCS: Performed by: PSYCHIATRY & NEUROLOGY

## 2018-05-31 PROCEDURE — 36600 WITHDRAWAL OF ARTERIAL BLOOD: CPT

## 2018-05-31 PROCEDURE — 83605 ASSAY OF LACTIC ACID: CPT

## 2018-05-31 PROCEDURE — 85025 COMPLETE CBC W/AUTO DIFF WBC: CPT

## 2018-05-31 PROCEDURE — 25000003 PHARM REV CODE 250: Performed by: HOSPITALIST

## 2018-05-31 PROCEDURE — 99233 SBSQ HOSP IP/OBS HIGH 50: CPT | Mod: ,,, | Performed by: PSYCHIATRY & NEUROLOGY

## 2018-05-31 PROCEDURE — 84100 ASSAY OF PHOSPHORUS: CPT

## 2018-05-31 PROCEDURE — 80202 ASSAY OF VANCOMYCIN: CPT

## 2018-05-31 PROCEDURE — 92526 ORAL FUNCTION THERAPY: CPT

## 2018-05-31 PROCEDURE — 25000003 PHARM REV CODE 250: Performed by: STUDENT IN AN ORGANIZED HEALTH CARE EDUCATION/TRAINING PROGRAM

## 2018-05-31 PROCEDURE — 82330 ASSAY OF CALCIUM: CPT | Mod: 91

## 2018-05-31 PROCEDURE — 82330 ASSAY OF CALCIUM: CPT

## 2018-05-31 PROCEDURE — 85610 PROTHROMBIN TIME: CPT

## 2018-05-31 PROCEDURE — 97112 NEUROMUSCULAR REEDUCATION: CPT

## 2018-05-31 PROCEDURE — 83735 ASSAY OF MAGNESIUM: CPT | Mod: 91

## 2018-05-31 PROCEDURE — 80069 RENAL FUNCTION PANEL: CPT | Mod: 91

## 2018-05-31 PROCEDURE — 99232 SBSQ HOSP IP/OBS MODERATE 35: CPT | Mod: ,,, | Performed by: INTERNAL MEDICINE

## 2018-05-31 PROCEDURE — 63600175 PHARM REV CODE 636 W HCPCS: Performed by: STUDENT IN AN ORGANIZED HEALTH CARE EDUCATION/TRAINING PROGRAM

## 2018-05-31 PROCEDURE — 63600175 PHARM REV CODE 636 W HCPCS: Performed by: INTERNAL MEDICINE

## 2018-05-31 PROCEDURE — 94640 AIRWAY INHALATION TREATMENT: CPT

## 2018-05-31 PROCEDURE — 27000221 HC OXYGEN, UP TO 24 HOURS

## 2018-05-31 PROCEDURE — 84100 ASSAY OF PHOSPHORUS: CPT | Mod: 91

## 2018-05-31 PROCEDURE — 25000003 PHARM REV CODE 250: Performed by: NURSE PRACTITIONER

## 2018-05-31 PROCEDURE — 94761 N-INVAS EAR/PLS OXIMETRY MLT: CPT

## 2018-05-31 RX ORDER — IPRATROPIUM BROMIDE AND ALBUTEROL SULFATE 2.5; .5 MG/3ML; MG/3ML
3 SOLUTION RESPIRATORY (INHALATION) EVERY 6 HOURS PRN
Status: DISCONTINUED | OUTPATIENT
Start: 2018-05-31 | End: 2018-06-15 | Stop reason: HOSPADM

## 2018-05-31 RX ORDER — METOCLOPRAMIDE HYDROCHLORIDE 5 MG/ML
10 INJECTION INTRAMUSCULAR; INTRAVENOUS EVERY 6 HOURS
Status: COMPLETED | OUTPATIENT
Start: 2018-05-31 | End: 2018-05-31

## 2018-05-31 RX ORDER — IPRATROPIUM BROMIDE AND ALBUTEROL SULFATE 2.5; .5 MG/3ML; MG/3ML
3 SOLUTION RESPIRATORY (INHALATION) EVERY 6 HOURS
Status: DISCONTINUED | OUTPATIENT
Start: 2018-05-31 | End: 2018-05-31

## 2018-05-31 RX ADMIN — CALCIUM GLUCONATE 3000 MG: 98 INJECTION, SOLUTION INTRAVENOUS at 12:05

## 2018-05-31 RX ADMIN — ASPIRIN 81 MG CHEWABLE TABLET 81 MG: 81 TABLET CHEWABLE at 08:05

## 2018-05-31 RX ADMIN — METOPROLOL TARTRATE 50 MG: 50 TABLET, FILM COATED ORAL at 09:05

## 2018-05-31 RX ADMIN — METOCLOPRAMIDE 10 MG: 5 INJECTION, SOLUTION INTRAMUSCULAR; INTRAVENOUS at 05:05

## 2018-05-31 RX ADMIN — METOCLOPRAMIDE 10 MG: 5 INJECTION, SOLUTION INTRAMUSCULAR; INTRAVENOUS at 11:05

## 2018-05-31 RX ADMIN — METOPROLOL TARTRATE 50 MG: 50 TABLET, FILM COATED ORAL at 02:05

## 2018-05-31 RX ADMIN — HEPARIN SODIUM 5000 UNITS: 5000 INJECTION, SOLUTION INTRAVENOUS; SUBCUTANEOUS at 09:05

## 2018-05-31 RX ADMIN — HEPARIN SODIUM 5000 UNITS: 5000 INJECTION, SOLUTION INTRAVENOUS; SUBCUTANEOUS at 05:05

## 2018-05-31 RX ADMIN — DEXMEDETOMIDINE HYDROCHLORIDE 0.5 MCG/KG/HR: 100 INJECTION, SOLUTION, CONCENTRATE INTRAVENOUS at 12:05

## 2018-05-31 RX ADMIN — METOPROLOL TARTRATE 50 MG: 50 TABLET, FILM COATED ORAL at 08:05

## 2018-05-31 RX ADMIN — CHLORHEXIDINE GLUCONATE 15 ML: 1.2 RINSE ORAL at 08:05

## 2018-05-31 RX ADMIN — BUSPIRONE HYDROCHLORIDE 10 MG: 10 TABLET ORAL at 08:05

## 2018-05-31 RX ADMIN — BUSPIRONE HYDROCHLORIDE 10 MG: 10 TABLET ORAL at 02:05

## 2018-05-31 RX ADMIN — BUSPIRONE HYDROCHLORIDE 10 MG: 10 TABLET ORAL at 09:05

## 2018-05-31 RX ADMIN — IPRATROPIUM BROMIDE AND ALBUTEROL SULFATE 3 ML: .5; 3 SOLUTION RESPIRATORY (INHALATION) at 08:05

## 2018-05-31 RX ADMIN — DEXTROSE MONOHYDRATE, SODIUM CITRATE, AND CITRIC ACID MONOHYDRATE: 2.45; 2.2; .8 INJECTION, SOLUTION INTRAVENOUS at 01:05

## 2018-05-31 RX ADMIN — PANTOPRAZOLE SODIUM 40 MG: 40 GRANULE, DELAYED RELEASE ORAL at 08:05

## 2018-05-31 RX ADMIN — HEPARIN SODIUM 5000 UNITS: 5000 INJECTION, SOLUTION INTRAVENOUS; SUBCUTANEOUS at 02:05

## 2018-05-31 NOTE — PROGRESS NOTES
[facility]  Nephrology  Progress Note    Patient Name: Los Mendez  MRN: [id number]  Admission Date: 5/19/2018  Hospital Length of Stay: 12 days  Attending Provider: Zaki Hooper MD   Primary Care Physician: Provider Notinsystem  Principal Problem:Anoxic brain injury    Subjective:     HPI: Los Mendez is a 47 year old male with past medical history of HTN, heavy alcohol user and COPD. Transferred from [facility] following witnessed cardiac arrest while in the ED waiting room. Patient had presented complaining of upper respiratory track infection and shortness of breath symptoms, while in the ED waiting room he was noted to be choking, became apneic, which led to witnessed seizure like activity followed by cardiac arrest. Per discussion with ED staff, patient required about 22 minutes of ACLS/5 shocks before returning to normal sinus rhythm. During intubation a lozenge was removed from patients airway. Hyperthermia protocol was initiated prior to transferring [place] [facility] [place]. On arrival to [facility] [place] Arrived with increased serum creatinine from 2.9-->5.1 (unkown baseline), BUN 35--> 52, Trop 2.4-->1.3, Lactic acidosis 6.4-->1.3, chest x ray with increased parenchymal interstitial attenuation and parenchymal opacities suggestive of pulmonary edema. On mechanical ventilation with a FiO2 50%. Currently also anuric at present moment. Per cardiology patient has a dilated cardiomyopathy EF looks about 10-15% and LVEDD is 7.3 cm.    Interval History:   Patient evaluated at bedside, no significant event overnight. Was placed on SLED yesterday and well tolerated.      Review of patient's allergies indicates:   Allergen Reactions    Penicillins      Tolerated cefepime May 2018     Current Facility-Administered Medications   Medication Frequency    0.9%  NaCl infusion (CRRT USE ONLY) Continuous    0.9%  NaCl infusion (for blood administration) Q24H PRN    0.9%  NaCl infusion (for blood administration) Q24H PRN     0.9%  NaCl infusion (for blood administration) Q24H PRN    acetaminophen tablet 325 mg Q6H PRN    albuterol-ipratropium 2.5 mg-0.5 mg/3 mL nebulizer solution 3 mL Q6H PRN    aspirin chewable tablet 81 mg Daily    busPIRone tablet 10 mg TID    calcium gluconate 3,000 mg in dextrose 5 % 100 mL IVPB Continuous    chlorhexidine 0.12 % solution 15 mL BID    dexmedetomidine (PRECEDEX) 400mcg/100mL 0.9% NaCL infusion Continuous    dextrose 50% injection 12.5 g PRN    dextrose 50% injection 25 g PRN    DEXTROSE-SOD CITRATE-CITRIC AC 2.45-2.2 GRAM- 730 MG/100 ML MISC SOLN Continuous    fentaNYL injection 25 mcg Q2H PRN    glucagon (human recombinant) injection 1 mg PRN    glucose chewable tablet 16 g PRN    glucose chewable tablet 24 g PRN    heparin (porcine) injection 5,000 Units Q8H    insulin aspart U-100 pen 1-10 Units Q6H PRN    magnesium sulfate 2g in water 50mL IVPB (premix) PRN    metoprolol tartrate (LOPRESSOR) tablet 50 mg TID    ondansetron 4 mg/5 mL solution 4 mg Q8H PRN    pantoprazole suspension 40 mg Daily    sodium chloride 0.9% flush 3 mL PRN    sodium phosphate 20.01 mmol in dextrose 5 % 250 mL IVPB PRN    sodium phosphate 30 mmol in dextrose 5 % 250 mL IVPB PRN    sodium phosphate 39.99 mmol in dextrose 5 % 250 mL IVPB PRN       Objective:     Vital Signs (Most Recent):  Temp: 98.1 °F (36.7 °C) (05/31/18 0705)  Pulse: 77 (05/31/18 0705)  Resp: 19 (05/31/18 0705)  BP: (!) 141/82 (05/31/18 0705)  SpO2: 100 % (05/31/18 0705)  O2 Device (Oxygen Therapy): High Flow nasal Cannula (05/30/18 0830) Vital Signs (24h Range):  Temp:  [98.1 °F (36.7 °C)-99.6 °F (37.6 °C)] 98.1 °F (36.7 °C)  Pulse:  [76-90] 77  Resp:  [17-27] 19  SpO2:  [92 %-100 %] 100 %  BP: (115-147)/(65-86) 141/82     Weight: (!) 144.8 kg (319 lb 3.6 oz) (05/31/18 0300)  Body mass index is 48.54 kg/m².  Body surface area is 2.64 meters squared.    I/O last 3 completed shifts:  In: 5741.8 [I.V.:2886.8; NG/GT:235; IV  Piggyback:2620]  Out: 6038 [Drains:1400; Other:3888; Stool:750]    Physical Exam   Constitutional: Nasal cannula in place.   HENT:   Head: Normocephalic and atraumatic.   Right Ear: External ear normal.   Left Ear: External ear normal.   NGT   Eyes: Right eye exhibits no discharge. Left eye exhibits no discharge.   Neck: No JVD present.   Cardiovascular: Normal rate and regular rhythm.  Exam reveals no friction rub.    Rt femoral trialysis cath    Pulmonary/Chest: He has rales.   Abdominal: Soft. He exhibits no distension.   Neurological:       Skin: Skin is warm.       Significant Labs:  ABGs:   Recent Labs  Lab 05/31/18  0612   PH 7.406   PCO2 48.4*   HCO3 30.4*   POCSATURATED 98   BE 6     BMP:   Recent Labs  Lab 05/31/18  0359 05/31/18  1136     108 87     105 105   CO2 28  28 29   BUN 24*  24* 30*   CREATININE 3.9*  3.9* 4.6*   CALCIUM 11.0*  11.0* 10.8*   MG 3.0*  --      CBC:   Recent Labs  Lab 05/31/18 0359   WBC 15.31*   RBC 3.65*   HGB 8.1*   HCT 27.5*      MCV 75*   MCH 22.2*   MCHC 29.5*     CMP:   Recent Labs  Lab 05/31/18  0359 05/31/18  1136     108 87   CALCIUM 11.0*  11.0* 10.8*   ALBUMIN 2.5*  2.5* 2.5*   PROT 7.1  --      144 144   K 4.3  4.3 4.4   CO2 28  28 29     105 105   BUN 24*  24* 30*   CREATININE 3.9*  3.9* 4.6*   ALKPHOS 162*  --    ALT 57*  --    AST 53*  --    BILITOT 1.2*  --      All labs within the past 24 hours have been reviewed.     Assessment/Plan:     MARY (acute kidney injury)    Los Mendez is a 47 year old male who is consulted for MARY, which is mostly secondary to iATN from previous cardiac arrest where he presented with hypotension which decrease perfusion to the kidneys (required to be started on pressors). Has echo with EF 10-15%.     Plan:  - Will hold dialysis treatment today and will have trial of HD tomorrow   - Hypercalcemia: Ca 10.8 today, has to start top trend downward since calcium gluconate was  discontinued. Will repeat lab again later on today.   - Blood pressures have been stable.  - Still anuric. Nuno cath removed  - Continue to monitor intake and output. Bladder scan BID to evaluate for any sign of recovery  - On nasal canula 4 L O2               Sascha Leone  Nephrology  Fellow  [facility]    Pager [phone]

## 2018-05-31 NOTE — ASSESSMENT & PLAN NOTE
- Cardiac arrest x 3 at OSH; longest code was 22 minutes  - extubated 5/28  - MRI w/o evidence of anoxic brain injury however patient encephalopathic  - Repeat MRI may be warranted

## 2018-05-31 NOTE — ASSESSMENT & PLAN NOTE
Los Mendez is a 47 year old male who is consulted for MARY, which is mostly secondary to iATN from previous cardiac arrest where he presented with hypotension which decrease perfusion to the kidneys (required to be started on pressors). Has echo with EF 10-15%.     Plan:  - Will hold dialysis treatment today and will have trial of HD tomorrow   - Hypercalcemia: Ca 10.8 today, has to start top trend downward since calcium gluconate was discontinued. Will repeat lab again later on today.   - Blood pressures have been stable.  - Still anuric. Nuno cath removed  - Continue to monitor intake and output. Bladder scan BID to evaluate for any sign of recovery  - On nasal canula 4 L O2

## 2018-05-31 NOTE — PLAN OF CARE
Problem: SLP Goal  Goal: SLP Goal  Short Term Goals expected to be met by 6/6:  1. Pt will participate in BSS to determine least restrictive diet.  2. Pt will tolerate full liquids po diet with no overt s/s of aspiration. NOT MET  3. Pt will tolerate advanced diet trials with no overt s/s of aspiration.     Pt with increased s/s of aspiration noted at bedside. Recommend NPO diet at this time.   Shari Waggoner CCC-SLP  5/31/2018

## 2018-05-31 NOTE — PROGRESS NOTES
[facility]  Neurocritical Care  Progress Note    Admit Date: 5/19/2018  Service Date: 05/31/2018  Length of Stay: 12    Subjective:     Chief Complaint: Anoxic brain injury    History of Present Illness: 48 y/o man w/ hx of HTN, COPD? Transferred from [facility] following witnessed cardiac arrest while in the ED waiting room. Patient had presented complaining of URI/SOB symptoms, while in the ED waiting room he was noted to be choking, became apneic, which led to witnessed seizure like activity followed by cardiac arrest. Per discussion with ED staff, patient required about 22 minutes of ACLS/5 shocks before returning to normal sinus rhythm. During intubation a lozenge was removed from patients airway. Hyperthermia protocol was initiated prior to transferring to [facility] for NCC care. Per ED records, patient was acidotic with ph of 7.1 this morning. At the time of arrival to NICU, patient was on paralytics, however pupillary reflex was present. Will continue hypothermia protocol for additional 24 h.    Hospital Course: 5/19: arrived intubated, sedated, artic sun blanket  5/20: pressor requirment going up, DO NOT DIURESE, continue TTM, nimbex off, LFT improving, trop improving, family updated.  5/21: reach normothermia, MRI today, remove EEG, place HD line, consult nephrology, LFTs trending down, family updated  5/22: Anoxic brain injury. Has been responding. Last night placed on propofol  MRI only with L head of caudate infarc and some cerebellar small infarctions.  5/23: Anoxic brain injury.  On CRRT. BNP 1046. Got back on Propofol and pressors. Still agitated. He will be started on Buspar.  Trop I normalizing.  5/25 CRRT today. Amiodarone dcd and Metoprolol started. Monitor CBC q8h if H/H continues to drop will consult GI. Vascular surgery consulted concerning  Possible arterial occlusion. Right hand cold.. Arterial line dcd. US RUE pending. Abdominal distention, KUB showed gas pattern. Bladder  pressures q4h. Initial 21.  5/28 H/H 6.8/23, 1 unit PRBC transfused.Keep Hgb greater than or equal to 8. CRRT stopped line clotted. Changed trialysis catheter changed. Wean sedation today. Once off sedation begin weaning ventilator settings to CPAP.  Weaned vent to CPAP and able to Extubated in afternoon without s/s stridor or difficulty breathing. HR elevated 140s - 160s fentanyl x2 prn for pain/agitation. Metoprolol 5mg IV x3 given remains ST 140s.  And restless inbed. Will start precedex.  5/30: Leukocytosis, pan cx; kelley discontinued; remove IJ, speech eval for diet   5/31: No acute events, still with gastric secretions coming out of NG tube will start reglan; remains encephalopathic    Interval History:  No acute events, afebrile, remains encephalopathic    Review of Systems   Respiratory: Positive for shortness of breath.    Neurological: Positive for speech difficulty and weakness.   Psychiatric/Behavioral: Positive for confusion.   All other systems reviewed and are negative.    Objective:     Vitals:  Temp: 98.5 °F (36.9 °C)  Pulse: 74  Rhythm: normal sinus rhythm  BP: 125/75  MAP (mmHg): 96  Resp: (!) 26  SpO2: 97 %  O2 Device (Oxygen Therapy): nasal cannula w/ humidification    Temp  Min: 98.1 °F (36.7 °C)  Max: 99.6 °F (37.6 °C)  Pulse  Min: 73  Max: 90  BP  Min: 115/77  Max: 147/83  MAP (mmHg)  Min: 89  Max: 110  Resp  Min: 17  Max: 27  SpO2  Min: 92 %  Max: 100 %  Oxygen Concentration (%)  Min: 1  Max: 4    05/30 0701 - 05/31 0700  In: 5401.4 [I.V.:2626.4]  Out: 5138 [Drains:500]   Unmeasured Output  Stool Occurrence: 1       Physical Exam   Constitutional: He appears lethargic. No distress.   Pulmonary/Chest:   tachypnic   Neurological: He appears lethargic. GCS eye subscore is 4. GCS verbal subscore is 4. GCS motor subscore is 6.   Obese male, resting in bed, encephalopathic, following simple midline commands  Oriented to self only  Speaking but difficult to understand; perseverating on going  home  Moving all extremities to requests   Nursing note and vitals reviewed.        Medications:  Continuous  sodium chloride 0.9%    dexmedetomidine (PRECEDEX) infusion Last Rate: Stopped (05/31/18 1105)   Scheduled  aspirin 81 mg Daily   busPIRone 10 mg TID   heparin (porcine) 5,000 Units Q8H   metoclopramide HCl 10 mg Q6H   metoprolol tartrate 50 mg TID   PRN  sodium chloride  Q24H PRN   sodium chloride  Q24H PRN   sodium chloride  Q24H PRN   acetaminophen 325 mg Q6H PRN   albuterol-ipratropium 3 mL Q6H PRN   dextrose 50% 12.5 g PRN   dextrose 50% 25 g PRN   fentaNYL 25 mcg Q2H PRN   glucagon (human recombinant) 1 mg PRN   glucose 16 g PRN   glucose 24 g PRN   insulin aspart U-100 1-10 Units Q6H PRN   ondansetron 4 mg Q8H PRN   sodium chloride 0.9% 3 mL PRN     Today I personally reviewed pertinent medications, lines/drains/airways, imaging, cardiology, lab results, microbiology results, notably:    Diet  Diet full liquid  Diet full liquid        Assessment/Plan:     Neuro   * Anoxic brain injury    - Cardiac arrest x 3 at OSH; longest code was 22 minutes  - extubated 5/28  - MRI w/o evidence of anoxic brain injury however patient encephalopathic  - Repeat MRI may be warranted        Pulmonary   Pulmonary edema    - no hx of HF, patient with URI symptoms for last month per discussion with transporting paramedic            Acute respiratory failure with hypoxia    - intubated on arrival  -Vent SIMV 20/450/40%/5/15  - Weaned to CPAP, tolerated well  - 5/28 extubated to ventimask O2Sats 97% no s/s stridor or difficulty breathing  duonebs q6h prn  Monitor daily cxr/ abg        Cardiac/Vascular   Acute decompensated heart failure    - ECHO w/ severe HFrEF  - continue metoprol 50 mg TID; currently rate controlled/hemodynamically stable        Essential hypertension    - holding home medications        Cardiac arrest    - 3 arrests prior to arrival  - trop trended down  - heart failure            Renal/   MARY (acute  kidney injury)    - nephrology following, SLED         Acute renal failure with tubular necrosis    BUN/Cr cont to trend up 40/6.2  Oliguric-->anuric   Monitor close  Nephrology following w/ CRRT  May need HD longterm; will need permanent access if so         Oncology   Acute blood loss anemia    Monitor daily CBC  Difficulty overnight with CRRT Machine and dialysis line clotting  H/H trended down 6.8/23, transfused with 2 units total PRBCs  Recheck H/H 8/26  Goal Hgb 8,  transfuse for <8        Other   Respiratory arrest    Arrest at OSH  Here for MARIA ELENA  TTM protocol  See ARF        Class 3 severe obesity due to excess calories with serious comorbidity and body mass index (BMI) of 40.0 to 44.9 in adult    - nutrition consult  Body mass index is 48.54 kg/m².              Prophylaxis:  Venous Thromboembolism: chemical  Stress Ulcer: None  Ventilator Pneumonia: no     Activity Orders          None        Full Code    Mike Clarke MD  Neurocritical Care  [facility]

## 2018-05-31 NOTE — PT/OT/SLP PROGRESS
Speech Language Pathology Treatment    Patient Name:  Los Mendez   MRN:  [id number]  Admitting Diagnosis: Anoxic brain injury    Recommendations:                 General Recommendations:  Dysphagia therapy  Diet recommendations:  NPO, Liquid Diet Level: NPO   Aspiration Precautions: Strict aspiration precautions   General Precautions: Standard, aspiration, fall  Communication strategies:  provide increased time to answer    Subjective     Awake yet lethargic    Pain/Comfort:  · Pain Rating 1: 0/10  · Pain Rating Post-Intervention 1: 0/10    Objective:     Has the patient been evaluated by SLP for swallowing?   Yes  Keep patient NPO? No   Current Respiratory Status: nasal cannula      Pt upright with family feeding him breakfast upon entry. Ice cream x3 and thin via straw x2 tolerated with delayed AP transit time, decreased closure noted around utensil and multiple swallows per bolus. Wet vocal quality and coughing noted throughout PO trials. No further PO trials presented. Pt consistently falling asleep throughout session. Sever dysarthria also noted.  NG tube noted to be hooked up to suction upon entry. PO trials okay by NSG for ongoing swallow assessment. Recommend NPO diet at this time. SLP reviewed need for NPO diet and risk of aspiration at this time.     Assessment:     Los Mendez is a 47 y.o. male with an SLP diagnosis of Dysphagia.    Goals:    SLP Goals        Problem: SLP Goal    Goal Priority Disciplines Outcome   SLP Goal     SLP Ongoing (interventions implemented as appropriate)   Description:  Short Term Goals expected to be met by 6/6:  1. Pt will participate in BSS to determine least restrictive diet.  2. Pt will tolerate full liquids po diet with no overt s/s of aspiration. NOT MET  3. Pt will tolerate advanced diet trials with no overt s/s of aspiration.                       Plan:     · Patient to be seen:  4 x/week   · Plan of Care expires:     · Plan of Care reviewed with:  spouse, patient    · SLP Follow-Up:  Yes       Discharge recommendations:   (TBD pending pt's progress)       Time Tracking:     SLP Treatment Date:   05/31/18  Speech Start Time:  1306  Speech Stop Time:  1315     Speech Total Time (min):  9 min    Billable Minutes: Treatment Swallowing Dysfunction 9    Shari Waggoner CCC-SLP  05/31/2018

## 2018-05-31 NOTE — PLAN OF CARE
05/31/18 1203   Discharge Reassessment   Assessment Type Discharge Planning Reassessment   Provided patient/caregiver education on the expected discharge date and the discharge plan No   Do you have any problems affording any of your prescribed medications? No   Discharge Plan A Rehab   Discharge Plan B Home with family;New Nursing Home placement - senior care care facility   Patient choice form signed by patient/caregiver N/A   Can the patient answer the patient profile reliably? No, cognitively impaired   How does the patient rate their overall health at the present time? (cody)   Describe the patient's ability to walk at the present time. Does not walk or unable to take any steps at all   How often would a person be available to care for the patient? Whenever needed   Number of comorbid conditions (as recorded on the chart) Three   During the past month, has the patient often been bothered by feeling down, depressed or hopeless? (cody)   During the past month, has the patient often been bothered by little interest or pleasure in doing things? (cody)       Per MD, patient with NTG to LIWS due to distended abd.  Awaiting therapy recs.  Not medically stable for discharge.   Oxygen Concentration (%):  [1-4] 1    Nisha Hansen RN, CCRN-K, [facility]  Neuro-Critical Care   [phone]

## 2018-05-31 NOTE — SUBJECTIVE & OBJECTIVE
Interval History:  No acute events, afebrile, remains encephalopathic    Review of Systems   Respiratory: Positive for shortness of breath.    Neurological: Positive for speech difficulty and weakness.   Psychiatric/Behavioral: Positive for confusion.   All other systems reviewed and are negative.    Objective:     Vitals:  Temp: 98.5 °F (36.9 °C)  Pulse: 74  Rhythm: normal sinus rhythm  BP: 125/75  MAP (mmHg): 96  Resp: (!) 26  SpO2: 97 %  O2 Device (Oxygen Therapy): nasal cannula w/ humidification    Temp  Min: 98.1 °F (36.7 °C)  Max: 99.6 °F (37.6 °C)  Pulse  Min: 73  Max: 90  BP  Min: 115/77  Max: 147/83  MAP (mmHg)  Min: 89  Max: 110  Resp  Min: 17  Max: 27  SpO2  Min: 92 %  Max: 100 %  Oxygen Concentration (%)  Min: 1  Max: 4    05/30 0701 - 05/31 0700  In: 5401.4 [I.V.:2626.4]  Out: 5138 [Drains:500]   Unmeasured Output  Stool Occurrence: 1       Physical Exam   Constitutional: He appears lethargic. No distress.   Pulmonary/Chest:   tachypnic   Neurological: He appears lethargic. GCS eye subscore is 4. GCS verbal subscore is 4. GCS motor subscore is 6.   Obese male, resting in bed, encephalopathic, following simple midline commands  Oriented to self only  Speaking but difficult to understand; perseverating on going home  Moving all extremities to requests   Nursing note and vitals reviewed.        Medications:  Continuous  sodium chloride 0.9%    dexmedetomidine (PRECEDEX) infusion Last Rate: Stopped (05/31/18 1105)   Scheduled  aspirin 81 mg Daily   busPIRone 10 mg TID   heparin (porcine) 5,000 Units Q8H   metoclopramide HCl 10 mg Q6H   metoprolol tartrate 50 mg TID   PRN  sodium chloride  Q24H PRN   sodium chloride  Q24H PRN   sodium chloride  Q24H PRN   acetaminophen 325 mg Q6H PRN   albuterol-ipratropium 3 mL Q6H PRN   dextrose 50% 12.5 g PRN   dextrose 50% 25 g PRN   fentaNYL 25 mcg Q2H PRN   glucagon (human recombinant) 1 mg PRN   glucose 16 g PRN   glucose 24 g PRN   insulin aspart U-100 1-10 Units Q6H  PRN   ondansetron 4 mg Q8H PRN   sodium chloride 0.9% 3 mL PRN     Today I personally reviewed pertinent medications, lines/drains/airways, imaging, cardiology, lab results, microbiology results, notably:    Diet  Diet full liquid  Diet full liquid

## 2018-05-31 NOTE — PLAN OF CARE
Problem: Physical Therapy Goal  Goal: Physical Therapy Goal  Outcome: Ongoing (interventions implemented as appropriate)  Initial eval completed.  Results, POC, and therapy recommendations discussed with patient.  Complete evaluation documentation to follow.     EOB with therapy only.     Dilma Vences, PT  5/31/2018  [phone] (pager)

## 2018-05-31 NOTE — SUBJECTIVE & OBJECTIVE
Interval History:   Patient evaluated at bedside, no significant event overnight. Was placed on SLED yesterday and well tolerated.      Review of patient's allergies indicates:   Allergen Reactions    Penicillins      Tolerated cefepime May 2018     Current Facility-Administered Medications   Medication Frequency    0.9%  NaCl infusion (CRRT USE ONLY) Continuous    0.9%  NaCl infusion (for blood administration) Q24H PRN    0.9%  NaCl infusion (for blood administration) Q24H PRN    0.9%  NaCl infusion (for blood administration) Q24H PRN    acetaminophen tablet 325 mg Q6H PRN    albuterol-ipratropium 2.5 mg-0.5 mg/3 mL nebulizer solution 3 mL Q6H PRN    aspirin chewable tablet 81 mg Daily    busPIRone tablet 10 mg TID    calcium gluconate 3,000 mg in dextrose 5 % 100 mL IVPB Continuous    chlorhexidine 0.12 % solution 15 mL BID    dexmedetomidine (PRECEDEX) 400mcg/100mL 0.9% NaCL infusion Continuous    dextrose 50% injection 12.5 g PRN    dextrose 50% injection 25 g PRN    DEXTROSE-SOD CITRATE-CITRIC AC 2.45-2.2 GRAM- 730 MG/100 ML MISC SOLN Continuous    fentaNYL injection 25 mcg Q2H PRN    glucagon (human recombinant) injection 1 mg PRN    glucose chewable tablet 16 g PRN    glucose chewable tablet 24 g PRN    heparin (porcine) injection 5,000 Units Q8H    insulin aspart U-100 pen 1-10 Units Q6H PRN    magnesium sulfate 2g in water 50mL IVPB (premix) PRN    metoprolol tartrate (LOPRESSOR) tablet 50 mg TID    ondansetron 4 mg/5 mL solution 4 mg Q8H PRN    pantoprazole suspension 40 mg Daily    sodium chloride 0.9% flush 3 mL PRN    sodium phosphate 20.01 mmol in dextrose 5 % 250 mL IVPB PRN    sodium phosphate 30 mmol in dextrose 5 % 250 mL IVPB PRN    sodium phosphate 39.99 mmol in dextrose 5 % 250 mL IVPB PRN       Objective:     Vital Signs (Most Recent):  Temp: 98.1 °F (36.7 °C) (05/31/18 0705)  Pulse: 77 (05/31/18 0705)  Resp: 19 (05/31/18 0705)  BP: (!) 141/82 (05/31/18 0705)  SpO2:  100 % (05/31/18 0705)  O2 Device (Oxygen Therapy): High Flow nasal Cannula (05/30/18 0830) Vital Signs (24h Range):  Temp:  [98.1 °F (36.7 °C)-99.6 °F (37.6 °C)] 98.1 °F (36.7 °C)  Pulse:  [76-90] 77  Resp:  [17-27] 19  SpO2:  [92 %-100 %] 100 %  BP: (115-147)/(65-86) 141/82     Weight: (!) 144.8 kg (319 lb 3.6 oz) (05/31/18 0300)  Body mass index is 48.54 kg/m².  Body surface area is 2.64 meters squared.    I/O last 3 completed shifts:  In: 5741.8 [I.V.:2886.8; NG/GT:235; IV Piggyback:2620]  Out: 6038 [Drains:1400; Other:3888; Stool:750]    Physical Exam   Constitutional: Nasal cannula in place.   HENT:   Head: Normocephalic and atraumatic.   Right Ear: External ear normal.   Left Ear: External ear normal.   NGT   Eyes: Right eye exhibits no discharge. Left eye exhibits no discharge.   Neck: No JVD present.   Cardiovascular: Normal rate and regular rhythm.  Exam reveals no friction rub.    Rt femoral trialysis cath    Pulmonary/Chest: He has rales.   Abdominal: Soft. He exhibits no distension.   Neurological:       Skin: Skin is warm.       Significant Labs:  ABGs:   Recent Labs  Lab 05/31/18  0612   PH 7.406   PCO2 48.4*   HCO3 30.4*   POCSATURATED 98   BE 6     BMP:   Recent Labs  Lab 05/31/18 0359 05/31/18  1136     108 87     105 105   CO2 28  28 29   BUN 24*  24* 30*   CREATININE 3.9*  3.9* 4.6*   CALCIUM 11.0*  11.0* 10.8*   MG 3.0*  --      CBC:   Recent Labs  Lab 05/31/18 0359   WBC 15.31*   RBC 3.65*   HGB 8.1*   HCT 27.5*      MCV 75*   MCH 22.2*   MCHC 29.5*     CMP:   Recent Labs  Lab 05/31/18 0359 05/31/18  1136     108 87   CALCIUM 11.0*  11.0* 10.8*   ALBUMIN 2.5*  2.5* 2.5*   PROT 7.1  --      144 144   K 4.3  4.3 4.4   CO2 28  28 29     105 105   BUN 24*  24* 30*   CREATININE 3.9*  3.9* 4.6*   ALKPHOS 162*  --    ALT 57*  --    AST 53*  --    BILITOT 1.2*  --      All labs within the past 24 hours have been reviewed.

## 2018-05-31 NOTE — PROGRESS NOTES
"[facility]  Adult Nutrition  Progress Note    SUMMARY     Recommendations  Recommendation/Intervention:     1. If able to maintain diet order, recommend adding Novasource ONS TID.     2. If oral intake <50% recommend initiating nocturnal feeds of Novasource renal @ 50mL/hr running from 8p-8a to provide 1440 kcal, 65g protein and 516 mL.   -This regimen would meet about 50% of EEN/EPN.     3. If speech recommends pt be NPO, recommend initiating TF regimen of Novasource Renal @ goal of 50mL/hr x 24hrs.   -Initiate @ 10mL/hr and advance by 10mL q4h as tolerated until goal of 50mL/hr is met.   -Hold for residuals >500mL.   -This regimen provides 2400 kcal, 109 g protein and 860 mL of free H2O.     4. Recommend providing ergocalciferol supplement to address vitamin D level of 11.     RD to monitor    Goals: Pt to receive nutrition by RD follow up  Nutrition Goal Status: goal met  Communication of RD Recs: reviewed with RN    Reason for Assessment  Reason for Assessment: RD follow-up  Diagnosis: other (see comments) (anoxic brain injury)  Relevant Medical History: HTN, COPD, obesity  Interdisciplinary Rounds: attended  General Information Comments: Pt placed n full liquid diet. Breakfast in room at time of visit, but nurse report concerns of safety due to pts lethargy and large amounts of secretions.   Nutrition Discharge Planning: unable to determine at this time    Nutrition Risk Screen  Nutrition Risk Screen: dysphagia or difficulty swallowing    Nutrition/Diet History  Typical Food/Fluid Intake: LEVI intake PTA. Pt TF held for extubation.  Food Preferences: LEVI Mandaen/cultural food preferences at this time  Do you have any cultural, spiritual, Mandaen conflicts, given your current situation?:  (none)  Factors Affecting Nutritional Intake: difficulty/impaired swallowing, impaired cognitive status/motor control    Anthropometrics  Temp: 98.1 °F (36.7 °C)  Height: 5' 8" (172.7 cm)  Height " (inches): 68 in  Weight Method: Bed Scale  Weight: (!) 144.8 kg (319 lb 3.6 oz)  Weight (lb): (!) 319.23 lb  Ideal Body Weight (IBW), Male: 154 lb  % Ideal Body Weight, Male (lb): 207.01 lb  BMI (Calculated): 48.6  BMI Grade: greater than 40 - morbid obesity    Lab/Procedures/Med  Pertinent Labs Reviewed: reviewed  Pertinent Labs Comments: BUN 24, Cr 3.9, GFR 19.9, Ca 11.0, Alb 2.5, Mg 3.0, Alk Phos 162, Tbili 1.2, AST 53, ALT 57, Vit D 11  Pertinent Medications Reviewed: reviewed  Pertinent Medications Comments: Metoprolol, heparin, IVF, precedex    Physical Findings/Assessment  Overall Physical Appearance: obese, on oxygen therapy, lethargic, weak  Tubes: nasogastric tube  Skin: edema    Estimated/Assessed Needs  Weight Used For Calorie Calculations: (!) 144.6 kg (318 lb 12.6 oz)  Energy Calorie Requirements (kcal): 2,370 kcal/day  Energy Need Method: [place] (x 1.25)    Protein Requirements: 116-145 g/day (0.8-1.0 g/kg)  Weight Used For Protein Calculations: (!) 144.6 kg (318 lb 12.6 oz)    Fluid Requirements (mL): 1mL/kcal or per MD  Fluid Need Method: RDA Method  RDA Method (mL): 2370       Nutrition Prescription Ordered  Current Diet Order: Full Liquid    Evaluation of Received Nutrient/Fluid Intake  I/O: +I/O, LBM 5/30  % Intake of Estimated Energy Needs: 0-25%  % Meal Intake: 0-25%    Nutrition Risk  Level of Risk/Frequency of Follow-up:  (f/u 2x/week)     Assessment and Plan  * Anoxic brain injury     Contributing Nutrition Diagnosis  Inadequate energy intake     Related to (etiology):   NPO, no alternative means of nutrition     Signs and Symptoms (as evidenced by):   Pt receiving <85% EEN and EPN.      Interventions/Recommendations (treatment strategy):  Please see RD recs above.     Nutrition Diagnosis Status:   Progressing towards goal       Monitor and Evaluation  Food and Nutrient Intake: energy intake, food and beverage intake, enteral nutrition intake  Food and Nutrient Adminstration: diet  order, enteral and parenteral nutrition administration  Anthropometric Measurements: weight, weight change, body mass index  Biochemical Data, Medical Tests and Procedures: electrolyte and renal panel, gastrointestinal profile, glucose/endocrine profile  Nutrition-Focused Physical Findings: overall appearance, skin     Nutrition Follow-Up  RD Follow-up?: Yes    I certify that I directed the dietetic intern in service delivery and guided them using my skilled judgment. As the cosigning dietitian, I have reviewed the dietetic interns documentation and am responsible for the treatment, assessment, and plan.    Note Completed by: Lauren Strickland

## 2018-05-31 NOTE — PLAN OF CARE
ICU Attending Note  Neurocritical Care    E3V2M6    -aspirin  -buspirone  -off dexmedetomidine  -SpO2 >90%  -metoprolol  -HD per Nephrology  -NGT to LWS  -metoclopramide for gastroparesis  -heparin prophylaxis  -stop pantoprazole

## 2018-05-31 NOTE — PLAN OF CARE
Problem: Patient Care Overview  Goal: Plan of Care Review  Outcome: Ongoing (interventions implemented as appropriate)  POC reviewed with pt and family at 1100. Pt's wife verbalized understanding. Questions and concerns addressed. No acute events today. Pt worked with PT today. Pt has remained on low intermittent suction due backing up of NGT. Pt was also evaluated by speech and was suggested NPO due to lethargy and coughing. Pt progressing toward goals. Will continue to monitor. See flowsheets for full assessment and VS info.

## 2018-05-31 NOTE — PROGRESS NOTES
Pt agitated with distended abdomen. NGT hooked back up to suction and pt placed back on precedex. NCC team aware. Will continue to monitor.

## 2018-06-01 PROBLEM — R09.2 RESPIRATORY ARREST: Status: RESOLVED | Noted: 2018-05-21 | Resolved: 2018-06-01

## 2018-06-01 PROBLEM — R20.9 COLD HANDS: Status: RESOLVED | Noted: 2018-05-25 | Resolved: 2018-06-01

## 2018-06-01 LAB
ALBUMIN SERPL BCP-MCNC: 2.6 G/DL
ALBUMIN SERPL BCP-MCNC: 2.6 G/DL
ALBUMIN SERPL BCP-MCNC: 2.7 G/DL
ALBUMIN SERPL BCP-MCNC: 3 G/DL
ALP SERPL-CCNC: 167 U/L
ALT SERPL W/O P-5'-P-CCNC: 50 U/L
ANION GAP SERPL CALC-SCNC: 12 MMOL/L
ANION GAP SERPL CALC-SCNC: 13 MMOL/L
ANION GAP SERPL CALC-SCNC: 14 MMOL/L
ANION GAP SERPL CALC-SCNC: 14 MMOL/L
ANISOCYTOSIS BLD QL SMEAR: SLIGHT
AST SERPL-CCNC: 51 U/L
BASO STIPL BLD QL SMEAR: ABNORMAL
BASOPHILS # BLD AUTO: 0.08 K/UL
BASOPHILS NFR BLD: 0.6 %
BILIRUB SERPL-MCNC: 1.3 MG/DL
BUN SERPL-MCNC: 26 MG/DL
BUN SERPL-MCNC: 38 MG/DL
BUN SERPL-MCNC: 44 MG/DL
BUN SERPL-MCNC: 45 MG/DL
C DIFF GDH STL QL: NEGATIVE
C DIFF TOX A+B STL QL IA: NEGATIVE
CALCIUM SERPL-MCNC: 10.1 MG/DL
CALCIUM SERPL-MCNC: 10.6 MG/DL
CALCIUM SERPL-MCNC: 9.7 MG/DL
CALCIUM SERPL-MCNC: 9.9 MG/DL
CHLORIDE SERPL-SCNC: 104 MMOL/L
CHLORIDE SERPL-SCNC: 104 MMOL/L
CHLORIDE SERPL-SCNC: 105 MMOL/L
CHLORIDE SERPL-SCNC: 107 MMOL/L
CO2 SERPL-SCNC: 21 MMOL/L
CO2 SERPL-SCNC: 22 MMOL/L
CO2 SERPL-SCNC: 28 MMOL/L
CO2 SERPL-SCNC: 28 MMOL/L
CREAT SERPL-MCNC: 4.2 MG/DL
CREAT SERPL-MCNC: 6.1 MG/DL
CREAT SERPL-MCNC: 6.5 MG/DL
CREAT SERPL-MCNC: 6.7 MG/DL
DIFFERENTIAL METHOD: ABNORMAL
EOSINOPHIL # BLD AUTO: 0.2 K/UL
EOSINOPHIL NFR BLD: 1.6 %
ERYTHROCYTE [DISTWIDTH] IN BLOOD BY AUTOMATED COUNT: 28.7 %
EST. GFR  (AFRICAN AMERICAN): 10.3 ML/MIN/1.73 M^2
EST. GFR  (AFRICAN AMERICAN): 10.7 ML/MIN/1.73 M^2
EST. GFR  (AFRICAN AMERICAN): 11.6 ML/MIN/1.73 M^2
EST. GFR  (AFRICAN AMERICAN): 18.2 ML/MIN/1.73 M^2
EST. GFR  (NON AFRICAN AMERICAN): 10 ML/MIN/1.73 M^2
EST. GFR  (NON AFRICAN AMERICAN): 15.7 ML/MIN/1.73 M^2
EST. GFR  (NON AFRICAN AMERICAN): 8.9 ML/MIN/1.73 M^2
EST. GFR  (NON AFRICAN AMERICAN): 9.3 ML/MIN/1.73 M^2
GLUCOSE SERPL-MCNC: 73 MG/DL
GLUCOSE SERPL-MCNC: 77 MG/DL
GLUCOSE SERPL-MCNC: 78 MG/DL
GLUCOSE SERPL-MCNC: 89 MG/DL
HCT VFR BLD AUTO: 27.7 %
HGB BLD-MCNC: 8.3 G/DL
HYPOCHROMIA BLD QL SMEAR: ABNORMAL
IMM GRANULOCYTES # BLD AUTO: 0.06 K/UL
IMM GRANULOCYTES NFR BLD AUTO: 0.4 %
INR PPP: 1.1
LYMPHOCYTES # BLD AUTO: 2.1 K/UL
LYMPHOCYTES NFR BLD: 14.3 %
MAGNESIUM SERPL-MCNC: 2.8 MG/DL
MAGNESIUM SERPL-MCNC: 2.8 MG/DL
MAGNESIUM SERPL-MCNC: 3.4 MG/DL
MCH RBC QN AUTO: 22.1 PG
MCHC RBC AUTO-ENTMCNC: 30 G/DL
MCV RBC AUTO: 74 FL
MONOCYTES # BLD AUTO: 2.1 K/UL
MONOCYTES NFR BLD: 14.2 %
NEUTROPHILS # BLD AUTO: 10 K/UL
NEUTROPHILS NFR BLD: 68.9 %
NRBC BLD-RTO: 0 /100 WBC
PHOSPHATE SERPL-MCNC: 2.8 MG/DL
PHOSPHATE SERPL-MCNC: 3.4 MG/DL
PHOSPHATE SERPL-MCNC: 4.3 MG/DL
PHOSPHATE SERPL-MCNC: 5.2 MG/DL
PHOSPHATE SERPL-MCNC: 5.3 MG/DL
PHOSPHATE SERPL-MCNC: 5.6 MG/DL
PLATELET # BLD AUTO: 241 K/UL
PLATELET BLD QL SMEAR: ABNORMAL
PMV BLD AUTO: 10 FL
POCT GLUCOSE: 76 MG/DL (ref 70–110)
POCT GLUCOSE: 77 MG/DL (ref 70–110)
POCT GLUCOSE: 78 MG/DL (ref 70–110)
POCT GLUCOSE: 80 MG/DL (ref 70–110)
POCT GLUCOSE: 85 MG/DL (ref 70–110)
POCT GLUCOSE: 91 MG/DL (ref 70–110)
POLYCHROMASIA BLD QL SMEAR: ABNORMAL
POTASSIUM SERPL-SCNC: 3.9 MMOL/L
POTASSIUM SERPL-SCNC: 4 MMOL/L
POTASSIUM SERPL-SCNC: 4 MMOL/L
POTASSIUM SERPL-SCNC: 4.1 MMOL/L
PROT SERPL-MCNC: 7.6 G/DL
PROTHROMBIN TIME: 11.6 SEC
RBC # BLD AUTO: 3.76 M/UL
SODIUM SERPL-SCNC: 139 MMOL/L
SODIUM SERPL-SCNC: 141 MMOL/L
SODIUM SERPL-SCNC: 146 MMOL/L
SODIUM SERPL-SCNC: 146 MMOL/L
WBC # BLD AUTO: 14.49 K/UL

## 2018-06-01 PROCEDURE — 97112 NEUROMUSCULAR REEDUCATION: CPT

## 2018-06-01 PROCEDURE — 80100014 HC HEMODIALYSIS 1:1

## 2018-06-01 PROCEDURE — 84100 ASSAY OF PHOSPHORUS: CPT

## 2018-06-01 PROCEDURE — 80074 ACUTE HEPATITIS PANEL: CPT

## 2018-06-01 PROCEDURE — 25000003 PHARM REV CODE 250: Performed by: STUDENT IN AN ORGANIZED HEALTH CARE EDUCATION/TRAINING PROGRAM

## 2018-06-01 PROCEDURE — 92526 ORAL FUNCTION THERAPY: CPT

## 2018-06-01 PROCEDURE — 99233 SBSQ HOSP IP/OBS HIGH 50: CPT | Mod: ,,, | Performed by: PSYCHIATRY & NEUROLOGY

## 2018-06-01 PROCEDURE — 83735 ASSAY OF MAGNESIUM: CPT | Mod: 91

## 2018-06-01 PROCEDURE — 85025 COMPLETE CBC W/AUTO DIFF WBC: CPT

## 2018-06-01 PROCEDURE — 20000000 HC ICU ROOM

## 2018-06-01 PROCEDURE — 87449 NOS EACH ORGANISM AG IA: CPT

## 2018-06-01 PROCEDURE — 63600175 PHARM REV CODE 636 W HCPCS: Performed by: STUDENT IN AN ORGANIZED HEALTH CARE EDUCATION/TRAINING PROGRAM

## 2018-06-01 PROCEDURE — 97166 OT EVAL MOD COMPLEX 45 MIN: CPT

## 2018-06-01 PROCEDURE — 85610 PROTHROMBIN TIME: CPT

## 2018-06-01 PROCEDURE — 25000003 PHARM REV CODE 250: Performed by: NURSE PRACTITIONER

## 2018-06-01 PROCEDURE — 80053 COMPREHEN METABOLIC PANEL: CPT

## 2018-06-01 PROCEDURE — 25000003 PHARM REV CODE 250: Performed by: INTERNAL MEDICINE

## 2018-06-01 PROCEDURE — 80069 RENAL FUNCTION PANEL: CPT

## 2018-06-01 PROCEDURE — 94761 N-INVAS EAR/PLS OXIMETRY MLT: CPT

## 2018-06-01 PROCEDURE — 84100 ASSAY OF PHOSPHORUS: CPT | Mod: 91

## 2018-06-01 PROCEDURE — 99232 SBSQ HOSP IP/OBS MODERATE 35: CPT | Mod: ,,, | Performed by: INTERNAL MEDICINE

## 2018-06-01 RX ORDER — SODIUM CHLORIDE 9 MG/ML
INJECTION, SOLUTION INTRAVENOUS
Status: DISCONTINUED | OUTPATIENT
Start: 2018-06-01 | End: 2018-06-13

## 2018-06-01 RX ORDER — SODIUM CHLORIDE 9 MG/ML
INJECTION, SOLUTION INTRAVENOUS ONCE
Status: DISCONTINUED | OUTPATIENT
Start: 2018-06-01 | End: 2018-06-01

## 2018-06-01 RX ADMIN — ASPIRIN 81 MG CHEWABLE TABLET 81 MG: 81 TABLET CHEWABLE at 08:06

## 2018-06-01 RX ADMIN — BUSPIRONE HYDROCHLORIDE 10 MG: 10 TABLET ORAL at 08:06

## 2018-06-01 RX ADMIN — METOPROLOL TARTRATE 50 MG: 50 TABLET, FILM COATED ORAL at 08:06

## 2018-06-01 RX ADMIN — HEPARIN SODIUM 5000 UNITS: 5000 INJECTION, SOLUTION INTRAVENOUS; SUBCUTANEOUS at 02:06

## 2018-06-01 RX ADMIN — BUSPIRONE HYDROCHLORIDE 10 MG: 10 TABLET ORAL at 02:06

## 2018-06-01 RX ADMIN — HEPARIN SODIUM 5000 UNITS: 5000 INJECTION, SOLUTION INTRAVENOUS; SUBCUTANEOUS at 09:06

## 2018-06-01 RX ADMIN — METOPROLOL TARTRATE 50 MG: 50 TABLET, FILM COATED ORAL at 09:06

## 2018-06-01 RX ADMIN — SODIUM CHLORIDE 300 ML: 9 INJECTION, SOLUTION INTRAVENOUS at 12:06

## 2018-06-01 RX ADMIN — BUSPIRONE HYDROCHLORIDE 10 MG: 10 TABLET ORAL at 09:06

## 2018-06-01 RX ADMIN — HEPARIN SODIUM 5000 UNITS: 5000 INJECTION, SOLUTION INTRAVENOUS; SUBCUTANEOUS at 05:06

## 2018-06-01 NOTE — ASSESSMENT & PLAN NOTE
-Cardiac arrest ~ 22 minutes at OSH.  Pt doing relatively well.  Extubated 5/28.  -05/21/18 MRI w/o evidence of anoxic brain injury, but several small strokes; pt encephalopathic  -Encephalopathy likely multifactorial--strokes, metabolic (kidney/liver injury), delirium

## 2018-06-01 NOTE — PLAN OF CARE
Problem: Occupational Therapy Goal  Goal: Occupational Therapy Goal  Goals to be met by: 6/15/2018   Patient will increase functional independence with ADLs by performing:    Pt will report daily orientation x3 with added time and 0 added cues.  Rolling R/L with bed rails and max(A) x1 person.  Supine<>sit and sit<>supine with max(A) x1 person.  UE Dressing while seated EOB with Maximum Assistance.  Grooming while EOB with Maximum Assistance.  Sitting at edge of bed x15 minutes with Maximum Assistance.  Pt/CG demo understanding for B UE ROM and positioning.      Outcome: Ongoing (interventions implemented as appropriate)  OT eval completed. Discharge rec TBD- progress towards Rehab. OT to follow up 4x/w at this time. SAMMI Villanueva 6/1/2018

## 2018-06-01 NOTE — ASSESSMENT & PLAN NOTE
-BP over past 24 h:  110-151/63-86  -Holding home antihypertensives  -Pt started on metoprolol 50 mg tid (SVT on EKG; HFrEF tx)

## 2018-06-01 NOTE — PROGRESS NOTES
Received patient awake and alert, with spontaneous eye opening upon name calling. With Temporary femoral catheter on right, with good a and v flows. Hooked to HD aseptically, 3hrs x 2; with BP precautions. Will monitor accordingly.

## 2018-06-01 NOTE — SUBJECTIVE & OBJECTIVE
Interval History:   Patient evaluated at bedside, no significant event overnight. From NGT had a total 1,8 L removed and stool 100 cc for NET negative 1.6 L     Review of patient's allergies indicates:   Allergen Reactions    Penicillins      Tolerated cefepime May 2018     Current Facility-Administered Medications   Medication Frequency    0.9%  NaCl infusion (for blood administration) Q24H PRN    0.9%  NaCl infusion (for blood administration) Q24H PRN    0.9%  NaCl infusion (for blood administration) Q24H PRN    0.9%  NaCl infusion PRN    0.9%  NaCl infusion Once    acetaminophen tablet 325 mg Q6H PRN    albuterol-ipratropium 2.5 mg-0.5 mg/3 mL nebulizer solution 3 mL Q6H PRN    aspirin chewable tablet 81 mg Daily    busPIRone tablet 10 mg TID    dextrose 50% injection 12.5 g PRN    dextrose 50% injection 25 g PRN    fentaNYL injection 25 mcg Q2H PRN    glucagon (human recombinant) injection 1 mg PRN    glucose chewable tablet 16 g PRN    glucose chewable tablet 24 g PRN    heparin (porcine) injection 5,000 Units Q8H    insulin aspart U-100 pen 1-10 Units Q6H PRN    metoprolol tartrate (LOPRESSOR) tablet 50 mg TID    ondansetron 4 mg/5 mL solution 4 mg Q8H PRN    sodium chloride 0.9% flush 3 mL PRN       Objective:     Vital Signs (Most Recent):  Temp: 98.3 °F (36.8 °C) (06/01/18 1105)  Pulse: 87 (06/01/18 1405)  Resp: 16 (06/01/18 1405)  BP: (!) 103/59 (06/01/18 1445)  SpO2: 98 % (06/01/18 1405)  O2 Device (Oxygen Therapy): nasal cannula w/ humidification (06/01/18 1105) Vital Signs (24h Range):  Temp:  [98.3 °F (36.8 °C)-99.3 °F (37.4 °C)] 98.3 °F (36.8 °C)  Pulse:  [76-94] 87  Resp:  [16-30] 16  SpO2:  [90 %-100 %] 98 %  BP: (103-151)/(59-86) 103/59     Weight: (!) 144.8 kg (319 lb 3.6 oz) (05/31/18 0300)  Body mass index is 48.54 kg/m².  Body surface area is 2.64 meters squared.    I/O last 3 completed shifts:  In: 5164.4 [I.V.:2204.4; NG/GT:340; IV Piggyback:2620]  Out: 5417 [Drains:1860;  Other:3357; Stool:200]    Physical Exam   Constitutional: Nasal cannula in place.   HENT:   Head: Normocephalic and atraumatic.   Right Ear: External ear normal.   Left Ear: External ear normal.   NGT   Eyes: Right eye exhibits no discharge. Left eye exhibits no discharge.   Neck: No JVD present.   Cardiovascular: Normal rate and regular rhythm.  Exam reveals no friction rub.    Rt femoral trialysis cath    Pulmonary/Chest: He has rales.   Abdominal: Soft. He exhibits no distension.   Neurological:       Skin: Skin is warm.       Significant Labs:  ABGs:   Recent Labs  Lab 05/31/18 0612   PH 7.406   PCO2 48.4*   HCO3 30.4*   POCSATURATED 98   BE 6     BMP:   Recent Labs  Lab 06/01/18  0152 06/01/18  0549   GLU 78 77    104   CO2 28 28   BUN 44* 45*   CREATININE 6.5* 6.7*   CALCIUM 10.1 10.6*   MG 3.4*  --      CBC:   Recent Labs  Lab 06/01/18 0152   WBC 14.49*   RBC 3.76*   HGB 8.3*   HCT 27.7*      MCV 74*   MCH 22.1*   MCHC 30.0*     CMP:   Recent Labs  Lab 06/01/18  0152 06/01/18  0549   GLU 78 77   CALCIUM 10.1 10.6*   ALBUMIN 2.6* 2.6*   PROT 7.6  --    * 146*   K 4.0 4.0   CO2 28 28    104   BUN 44* 45*   CREATININE 6.5* 6.7*   ALKPHOS 167*  --    ALT 50*  --    AST 51*  --    BILITOT 1.3*  --      All labs within the past 24 hours have been reviewed.

## 2018-06-01 NOTE — PT/OT/SLP PROGRESS
Speech Language Pathology Treatment    Patient Name:  Los Mendez   MRN:  [id number]  Admitting Diagnosis: Anoxic brain injury    Recommendations:                 General Recommendations:  Dysphagia therapy  Diet recommendations:  Puree, Liquid Diet Level: Thin   Aspiration Precautions: 1 bite/sip at a time, Feed only when awake/alert, HOB to 90 degrees and Small bites/sips. Occasional ice chips okay if approved by primary team.  General Precautions: Standard, aphasia, aspiration, fall  Communication strategies:  provide increased time to answer    Subjective     Awake yet lethargic      Pain/Comfort:  · Pain Rating 1: 0/10  · Pain Rating Post-Intervention 1: 0/10    Objective:     Has the patient been evaluated by SLP for swallowing?   Yes  Keep patient NPO? No   Current Respiratory Status: nasal cannula      Pt repositioned upright in bed for PO trials. NG tube hooked to suction, but paused for SLP swallow evaluation. Per team, pt to remain NPO today, but would like swallow assessment to determine if pt able to tolerate diet starting tomorrow. Significant dysarthria noted throughout session. Pt tolerated thin liquids x6 oz via cup/straw and puree x3 for ongoing swallow assessment. Decreased closure around utensil and slow AP transit noted across consistencies. Pt with throat clear noted post consecutive large sips via straw. No overt s/s of airway compromise noted with puree or thin liquids via cup and single straw sips. Cracker trials deferred at this time 2/2 waxing/waning alertness and weak oral phase. When deemed appropriate by team, recommend puree diet/thin liquids. SLP educated spouse on swallow precautions, s/s of aspiration, to only feed pt when awake/alert, and to monitor for s/s of aspiration. If pt shows s/s of airway compromise stop feeding immediately.     Assessment:     Los Mendez is a 47 y.o. male with an SLP diagnosis of Dysphagia.      Goals:    SLP Goals        Problem: SLP Goal    Goal  Priority Disciplines Outcome   SLP Goal     SLP Ongoing (interventions implemented as appropriate)   Description:  Short Term Goals expected to be met by 6/6:  1. Pt will participate in BSS to determine least restrictive diet.  2. Pt will tolerate full liquids po diet with no overt s/s of aspiration.   3. Pt will tolerate advanced diet trials with no overt s/s of aspiration.                        Plan:     · Patient to be seen:  4 x/week   · Plan of Care reviewed with:  patient, spouse   · SLP Follow-Up:  Yes       Discharge recommendations:   (TBD pending pt's progress)       Time Tracking:     SLP Treatment Date:   06/01/18  Speech Start Time:  1053  Speech Stop Time:  1102     Speech Total Time (min):  9 min    Billable Minutes: Treatment Swallowing Dysfunction 9    Shari Waggoner CCC-SLP  06/01/2018

## 2018-06-01 NOTE — PLAN OF CARE
ICU Attending Note  Neurocritical Care    E4V4M6    -aspirin  -buspirone  -stop dexmedetomidine  -metoprolol  -HD per Nephrology  -HGB >7  -stop LWS, continue NPO except medications  -heparin prophylaxis

## 2018-06-01 NOTE — ASSESSMENT & PLAN NOTE
-No known hx of CHF, URI sxs x 1 mo per paramedic on arrival  -06/01/18 CXR stable, mild edema and cardiomegaly  -No furosemide.  Continue daily weights, strict I&Os.

## 2018-06-01 NOTE — ASSESSMENT & PLAN NOTE
-Difficulty with CRRT Machine and dialysis line clotting; H/H trended down 6.8/23, transfused with 2 U PRBCs  -06/01/18 Hgb 8.3 g/dL, stable.  No signs of acute blood loss currently.  -Continue CBC qd, transfuse for Hgb < 8.0 g/dL

## 2018-06-01 NOTE — PROGRESS NOTES
[facility]  Nephrology  Progress Note    Patient Name: Los Mendez  MRN: [id number]  Admission Date: 5/19/2018  Hospital Length of Stay: 13 days  Attending Provider: Zaki Hooper MD   Primary Care Physician: Provider [employer]  Principal Problem:Anoxic brain injury    Subjective:     HPI: Los Mendez is a 47 year old male with past medical history of HTN, heavy alcohol user and COPD. Transferred from [facility] following witnessed cardiac arrest while in the ED waiting room. Patient had presented complaining of upper respiratory track infection and shortness of breath symptoms, while in the ED waiting room he was noted to be choking, became apneic, which led to witnessed seizure like activity followed by cardiac arrest. Per discussion with ED staff, patient required about 22 minutes of ACLS/5 shocks before returning to normal sinus rhythm. During intubation a lozenge was removed from patients airway. Hyperthermia protocol was initiated prior to [place] [facility] [facility] [place]rived with increased serum creatinine from 2.9-->5.1 (unkown baseline), BUN 35--> 52, Trop 2.4-->1.3, Lactic acidosis 6.4-->1.3, chest x ray with increased parenchymal interstitial attenuation and parenchymal opacities suggestive of pulmonary edema. On mechanical ventilation with a FiO2 50%. Currently also anuric at present moment. Per cardiology patient has a dilated cardiomyopathy EF looks about 10-15% and LVEDD is 7.3 cm.    Interval History:   Patient evaluated at bedside, no significant event overnight. From NGT had a total 1,8 L removed and stool 100 cc for NET negative 1.6 L     Review of patient's allergies indicates:   Allergen Reactions    Penicillins      Tolerated cefepime May 2018     Current Facility-Administered Medications   Medication Frequency    0.9%  NaCl infusion (for blood administration) Q24H PRN    0.9%  NaCl infusion (for blood administration) Q24H PRN    0.9%  NaCl infusion  (for blood administration) Q24H PRN    0.9%  NaCl infusion PRN    0.9%  NaCl infusion Once    acetaminophen tablet 325 mg Q6H PRN    albuterol-ipratropium 2.5 mg-0.5 mg/3 mL nebulizer solution 3 mL Q6H PRN    aspirin chewable tablet 81 mg Daily    busPIRone tablet 10 mg TID    dextrose 50% injection 12.5 g PRN    dextrose 50% injection 25 g PRN    fentaNYL injection 25 mcg Q2H PRN    glucagon (human recombinant) injection 1 mg PRN    glucose chewable tablet 16 g PRN    glucose chewable tablet 24 g PRN    heparin (porcine) injection 5,000 Units Q8H    insulin aspart U-100 pen 1-10 Units Q6H PRN    metoprolol tartrate (LOPRESSOR) tablet 50 mg TID    ondansetron 4 mg/5 mL solution 4 mg Q8H PRN    sodium chloride 0.9% flush 3 mL PRN       Objective:     Vital Signs (Most Recent):  Temp: 98.3 °F (36.8 °C) (06/01/18 1105)  Pulse: 87 (06/01/18 1405)  Resp: 16 (06/01/18 1405)  BP: (!) 103/59 (06/01/18 1445)  SpO2: 98 % (06/01/18 1405)  O2 Device (Oxygen Therapy): nasal cannula w/ humidification (06/01/18 1105) Vital Signs (24h Range):  Temp:  [98.3 °F (36.8 °C)-99.3 °F (37.4 °C)] 98.3 °F (36.8 °C)  Pulse:  [76-94] 87  Resp:  [16-30] 16  SpO2:  [90 %-100 %] 98 %  BP: (103-151)/(59-86) 103/59     Weight: (!) 144.8 kg (319 lb 3.6 oz) (05/31/18 0300)  Body mass index is 48.54 kg/m².  Body surface area is 2.64 meters squared.    I/O last 3 completed shifts:  In: 5164.4 [I.V.:2204.4; NG/GT:340; IV Piggyback:2620]  Out: 5417 [Drains:1860; Other:3357; Stool:200]    Physical Exam   Constitutional: Nasal cannula in place.   HENT:   Head: Normocephalic and atraumatic.   Right Ear: External ear normal.   Left Ear: External ear normal.   NGT   Eyes: Right eye exhibits no discharge. Left eye exhibits no discharge.   Neck: No JVD present.   Cardiovascular: Normal rate and regular rhythm.  Exam reveals no friction rub.    Rt femoral trialysis cath    Pulmonary/Chest: He has rales.   Abdominal: Soft. He exhibits no  distension.   Neurological:       Skin: Skin is warm.       Significant Labs:  ABGs:   Recent Labs  Lab 05/31/18 0612   PH 7.406   PCO2 48.4*   HCO3 30.4*   POCSATURATED 98   BE 6     BMP:   Recent Labs  Lab 06/01/18 0152 06/01/18  0549   GLU 78 77    104   CO2 28 28   BUN 44* 45*   CREATININE 6.5* 6.7*   CALCIUM 10.1 10.6*   MG 3.4*  --      CBC:   Recent Labs  Lab 06/01/18 0152   WBC 14.49*   RBC 3.76*   HGB 8.3*   HCT 27.7*      MCV 74*   MCH 22.1*   MCHC 30.0*     CMP:   Recent Labs  Lab 06/01/18 0152 06/01/18  0549   GLU 78 77   CALCIUM 10.1 10.6*   ALBUMIN 2.6* 2.6*   PROT 7.6  --    * 146*   K 4.0 4.0   CO2 28 28    104   BUN 44* 45*   CREATININE 6.5* 6.7*   ALKPHOS 167*  --    ALT 50*  --    AST 51*  --    BILITOT 1.3*  --      All labs within the past 24 hours have been reviewed.       Assessment/Plan:     MARY (acute kidney injury)    Los Mendez is a 47 year old male who is consulted for MARY, which is mostly secondary to iATN from previous cardiac arrest where he presented with hypotension which decrease perfusion to the kidneys (required to be started on pressors). Has echo with EF 10-15%.     Plan:  - Will trial of HD today for clearance only   - Will reassess tomorrow if any more dialysis required   - No UF due to currently patient was negative 1.6 L from yesterday and has been negative 5 L since admission.    - Blood pressures have been stable.  - Still anuric. Nuno cath removed  - Continue to monitor intake and output. Bladder scan BID to evaluate for any sign of recovery  - On nasal canula 4 L O2   - Slightly more alert today           Sascha Leone  Nephrology  Fellow  [facility]    Pager [phone]

## 2018-06-01 NOTE — PROGRESS NOTES
[facility]  Neurocritical Care  Progress Note    Admit Date: 5/19/2018  Service Date: 06/01/2018  Length of Stay: 13    Subjective:     Chief Complaint: Anoxic brain injury    History of Present Illness: 46 y/o man w/ hx of HTN, COPD? Transferred from [facility] following witnessed cardiac arrest while in the ED waiting room. Patient had presented complaining of URI/SOB symptoms, while in the ED waiting room he was noted to be choking, became apneic, which led to witnessed seizure like activity followed by cardiac arrest. Per discussion with ED staff, patient required about 22 minutes of ACLS/5 shocks before returning to normal sinus rhythm. During intubation a lozenge was removed from patients airway. Hyperthermia protocol was initiated prior to [place] [facility] [place]. Per ED records, patient was acidotic with ph of 7.1 this morning. At the time of arrival to NICU, patient was on paralytics, however pupillary reflex was present. Will continue hypothermia protocol for additional 24 h.    Hospital Course: 05/19/18:  Arrived intubated, sedated, artic sun blanket  05/20/18:  Pressor requirment going up, NO DIURESIS, continue TTM, nimbex off, LFT improving, trop improving, family updated.  05/21/18:  Reach normothermia, MRI today, remove EEG, place HD line, consult nephrology, LFTs trending down, family updated.  05/22/18:  Anoxic brain injury. Pt responding. Last night placed on propofol  MRI --L head of caudate and cerebellar small infarctions.  05/23/18:  Anoxic brain injury.  On CRRT. BNP 1046. Back on propofol, pressors. Still agitated. Started on Buspar. Trop I normalizing.  05/25/18:  CRRT today. Amiodarone dc'd, metoprolol started. CBC t8c--fx H/H continues to drop will consult GI. Vascular surgery consulted concerning  Possible arterial occlusion. RUE cold. Arterial line dcd. US RUE pending. Abdominal distention, KUB showed gas pattern. Bladder pressures q4h. Initial 21.  05/28/18:   H/H 6.8/23, 1 unit PRBC transfused. Keep Hgb greater than or equal to 8. CRRT stopped, line clotted. Changed trialysis catheter changed. Wean sedation today. Once off sedation begin weaning ventilator settings to CPAP.  Weaned vent to CPAP and able to Extubated in afternoon without s/s stridor or difficulty breathing. HR elevated 140s - 160s fentanyl x2 prn for pain/agitation. Metoprolol 5mg IV x3 given remains ST 140s.  Restless, will start precedex.  05/30/18:  Leukocytosis, pan cx; kelley discontinued; remove IJ, speech eval for diet   05/31/18:  No acute events, still with gastric secretions coming out of NG tube will start reglan; remains encephalopathic  06/01/18:  Will hold NGT suction, NPO per SLP.  Continue metoclopramide.  Hgb stable at 8.3 g/dL.    Interval History:  >4 elements OR status of 3 inpatient conditions  Patient is able to answer a few simple questions this am--states his name, able to say hospital but unclear if he knows which hospital.  Speech is heavily slurred, he is intermittently somnolent but can be roused and will follow simple commands with repeated prompting.  Per SLP, will make NPO except for meds.  Patient thought to be appropriate for Full Liquid Diet at some point recently when more awake with SLP.  Hgb stable at 8.3 g/dL.  No change in meds today.  Per Nephrology, will trial HD--BUN 45, Cr 6.7 this am.    Review of Systems   Unable to perform ROS: Mental status change   Constitutional: Negative for fever.   Cardiovascular: Negative for leg swelling.   Skin: Negative for rash and wound.   Neurological: Positive for speech difficulty (slurred) and weakness.   Hematological: Negative for adenopathy. Does not bruise/bleed easily.   Psychiatric/Behavioral: Positive for confusion.     Objective:     Vitals:  Temp: 98.3 °F (36.8 °C)  Pulse: 79  Rhythm: normal sinus rhythm  BP: 129/73  MAP (mmHg): 105  Resp: 19  SpO2: 100 %  O2 Device (Oxygen Therapy): nasal cannula w/  humidification    Temp  Min: 98.3 °F (36.8 °C)  Max: 99.3 °F (37.4 °C)  Pulse  Min: 76  Max: 94  BP  Min: 128/66  Max: 151/86  MAP (mmHg)  Min: 90  Max: 112  Resp  Min: 18  Max: 30  SpO2  Min: 90 %  Max: 100 %    05/31 0701 - 06/01 0700  In: 315 [I.V.:85]  Out: 1960 [Drains:1860]   Unmeasured Output  Stool Occurrence: 1     Physical Exam  General:  Well-developed, well-nourished, nad  HEENT:  NCAT, PERRLA, EOMI, oropharyngeal membranes non-erythematous/without exudate  Neck:  Supple, normal ROM without nuchal rigidity  Resp:  Symmetric expansion, no increased wob--on 3 L NC  CVS:  No LE edema, extremities warm/well-perfused  GI:  Abd soft, non-distended, non-tender to palpation  Neurologic Exam:  Mental Status:  Wakes to voice, able to say his name and hospital for location.  Speech is heavily slurred, patient difficult to understand.  Able to follow simple commands reliably.  Somnolent throughout exam but consistently wakes to voice and follows commands.  Cranial Nerves:  VFs intact on blink to threat bilaterally. PERRLA, EOMI. Facial movement intact and symmetric.  Palate raises symmetrically, tongue protrudes midline.  Trapezius 5/5 bilaterally.  Motor:  Normal bulk and tone. BUE  strength 5/5, able to hold both arms up for few seconds.  Moves BLE.  Sensory:  No significant withdrawal to noxious stimuli at BLE.  Withdraws at BUE.  Reflexes:  Biceps, brachioradialis, patellar 2+ and symmetric. No ankle clonus. Equivocal toe bilaterally.  Coordination:  No resting tremor or myoclonus.  Unable to follow commands for FNF/HTS/EUGENIA.  Gait:  Deferred 2/2 AMS, fall risk.    Medications:  Continuous Scheduled  sodium chloride 0.9%  Once   aspirin 81 mg Daily   busPIRone 10 mg TID   heparin (porcine) 5,000 Units Q8H   metoprolol tartrate 50 mg TID   PRN  sodium chloride  Q24H PRN   sodium chloride  Q24H PRN   sodium chloride  Q24H PRN   sodium chloride 0.9%  PRN   acetaminophen 325 mg Q6H PRN   albuterol-ipratropium 3  mL Q6H PRN   dextrose 50% 12.5 g PRN   dextrose 50% 25 g PRN   fentaNYL 25 mcg Q2H PRN   glucagon (human recombinant) 1 mg PRN   glucose 16 g PRN   glucose 24 g PRN   insulin aspart U-100 1-10 Units Q6H PRN   ondansetron 4 mg Q8H PRN   sodium chloride 0.9% 3 mL PRN     Today I personally reviewed pertinent medications, lines/drains/airways, imaging, cardiology, lab results, microbiology results, notably:    Diet  Diet NPO Except for: Medication  Diet NPO Except for: Medication    Medications:    Current Facility-Administered Medications:     0.9%  NaCl infusion (for blood administration), , Intravenous, Q24H PRN, Amos Carter NP    0.9%  NaCl infusion (for blood administration), , Intravenous, Q24H PRN, Regino Mayorga MD    0.9%  NaCl infusion (for blood administration), , Intravenous, Q24H PRN, Rena Gillette NP    0.9%  NaCl infusion, , Intravenous, PRN, Sascha Alcantar MD, Last Rate: 100 mL/hr at 06/01/18 1253, 300 mL at 06/01/18 1253    0.9%  NaCl infusion, , Intravenous, Once, Sascha Alcantar MD    acetaminophen tablet 325 mg, 325 mg, Per NG tube, Q6H PRN, Regino Mayorga MD, 325 mg at 05/29/18 1523    albuterol-ipratropium 2.5 mg-0.5 mg/3 mL nebulizer solution 3 mL, 3 mL, Nebulization, q6h prn, MD Carolyn, 3 mL at 05/31/18 2004    aspirin chewable tablet 81 mg, 81 mg, Per NG tube, Daily, Regino Mayorga MD, 81 mg at 06/01/18 0837    busPIRone tablet 10 mg, 10 mg, Per NG tube, TID, Regino Mayorga MD, 10 mg at 06/01/18 0837    dextrose 50% injection 12.5 g, 12.5 g, Intravenous, PRN, Regino Mayorga MD, 12.5 g at 05/26/18 1614    dextrose 50% injection 25 g, 25 g, Intravenous, PRN, Mike Clarke MD    fentaNYL injection 25 mcg, 25 mcg, Intravenous, Q2H PRN, Serg Myles NP, 25 mcg at 05/28/18 2046    glucagon (human recombinant) injection 1 mg, 1 mg, Intramuscular, PRN, Regino Mayorga MD    glucose chewable tablet 16 g, 16 g, Oral, PRN, Mike Matthews  MD Vince    glucose chewable tablet 24 g, 24 g, Oral, PRN, Mike Clarke MD    heparin (porcine) injection 5,000 Units, 5,000 Units, Subcutaneous, Q8H, Regino Mayorga MD, 5,000 Units at 18 0554    insulin aspart U-100 pen 1-10 Units, 1-10 Units, Subcutaneous, Q6H PRN, Regino Mayorga MD    metoprolol tartrate (LOPRESSOR) tablet 50 mg, 50 mg, Per NG tube, TID, Amos Carter NP, 50 mg at 18 0837    ondansetron 4 mg/5 mL solution 4 mg, 4 mg, Per NG tube, Q8H PRN, Keith Cohen MD, 4 mg at 18 1004    sodium chloride 0.9% flush 3 mL, 3 mL, Intravenous, PRN, Keith Cohen MD, 3 mL at 18 2118    LDA:  18 R Femoral Trialysis Catheter  18 LUE PIV  18 Rectal Tube  18 NGT L nostril    Imagin18 MRI Brain w/o contrast:  Several scattered small bilateral cerebellar infarcts.  There is a punctate focus of diffusion restriction in the left caudate which may represent embolic infarcts versus sequela of hypoxic ischemic injury in light of history.  Clinical correlation and follow-up advised.  No evidence for hydrocephalus or parenchymal hemorrhage.     Cardiology:  18 2D Echo w/ CFD:  CONCLUSIONS     1 - Severe left ventricular enlargement.     2 - Eccentric hypertrophy.     3 - Severely depressed left ventricular systolic function (EF 10-15%).     4 - Impaired LV relaxation, normal LAP (grade 1 diastolic dysfunction).     5 - Right atrial enlargement.     6 - Right ventricular enlargement with moderately depressed systolic function.     7 - Moderate mitral regurgitation.     8 - Mild tricuspid regurgitation.     9 - Increased central venous pressure.     18 2D Echo w/ CFD:  CONCLUSIONS     1 - Eccentric hypertrophy.     2 - Severely depressed left ventricular systolic function (EF 10-15%).     3 - Biatrial enlargement.     4 - Impaired LV relaxation, normal LAP (grade 1 diastolic dysfunction).     5 - Right ventricular  enlargement with moderately depressed systolic function.     6 - The estimated PA systolic pressure is 37 mmHg.     7 - Mild mitral regurgitation.     8 - Mild tricuspid regurgitation.     Labs:    Recent Labs  Lab 06/01/18  0152   WBC 14.49*   RBC 3.76*   HGB 8.3*   HCT 27.7*      MCV 74*   MCH 22.1*   MCHC 30.0*       Recent Labs  Lab 06/01/18  0152 06/01/18  0549   CALCIUM 10.1 10.6*   PROT 7.6  --    * 146*   K 4.0 4.0   CO2 28 28    104   BUN 44* 45*   CREATININE 6.5* 6.7*   ALKPHOS 167*  --    ALT 50*  --    AST 51*  --    BILITOT 1.3*  --      Micro:  Microbiology Results (last 7 days)     Procedure Component Value Units Date/Time    Blood culture [[id number]] Collected:  05/29/18 2332    Order Status:  Completed Specimen:  Blood from Peripheral, Wrist, Left Updated:  06/01/18 0828     Blood Culture, Routine Gram stain aer bottle: Gram positive cocci in clusters resembling Staph      COAGULASE-NEGATIVE STAPHYLOCOCCUS SPECIES  Organism is a probable contaminant      Narrative:       Blood cultures from 2 different sites. 4 bottles total.  Please draw before starting antibiotics.    Blood culture [[id number]] Collected:  05/29/18 2332    Order Status:  Completed Specimen:  Blood from Peripheral, Upper Arm, Right Updated:  06/01/18 0612     Blood Culture, Routine No Growth to date    Narrative:       Blood cultures x 2 different sites. 4 bottles total. Please  draw cultures before administering antibiotics.    Culture, Respiratory with Gram Stain [[id number]]     Order Status:  Canceled Specimen:  Respiratory          Assessment/Plan:     Neuro   * Anoxic brain injury    -Cardiac arrest ~ 22 minutes at OSH.  Pt doing relatively well.  Extubated 5/28.  -05/21/18 MRI w/o evidence of anoxic brain injury, but several small strokes; pt encephalopathic  -Encephalopathy likely multifactorial--strokes, metabolic (kidney/liver injury), delirium        Pulmonary   Pulmonary edema    -No known hx of CHF,  URI sxs x 1 mo per paramedic on arrival  -06/01/18 CXR stable, mild edema and cardiomegaly  -No furosemide.  Continue daily weights, strict I&Os.        Acute respiratory failure with hypoxia    -Intubated on arrival--extubated 05/28/18  -Satting % on 3L NC.    -05/31/18 ABG appropriate, mild hypercarbia (48.4).        Cardiac/Vascular   Acute decompensated heart failure    -05/24/18 Echo w/ EF 10-15%  -Continue metoprol 50 mg tid        Essential hypertension    -BP over past 24 h:  110-151/63-86  -Holding home antihypertensives  -Pt started on metoprolol 50 mg tid (SVT on EKG; HFrEF tx)        Cardiac arrest    -3 cardiac arrests prior to arrival  -Troponin trended down  -05/24/18 2D Echo notable for EF 10-15%        Renal/   MARY (acute kidney injury)    -Per above, BUN and Cr continue to trend up  -Nephrology following, HD today         Acute renal failure with tubular necrosis    -06/01/18 BUN 45, Cr 6.7  -Nephrology following--plan for HD today  -R femoral trialysis catheter placed, unclear whether pt will need longterm HD        Oncology   Acute blood loss anemia    -Difficulty with CRRT Machine and dialysis line clotting; H/H trended down 6.8/23, transfused with 2 U PRBCs  -06/01/18 Hgb 8.3 g/dL, stable.  No signs of acute blood loss currently.  -Continue CBC qd, transfuse for Hgb < 8.0 g/dL        Other   Class 3 severe obesity due to excess calories with serious comorbidity and body mass index (BMI) of 40.0 to 44.9 in adult    -S/p nutrition consult  Body mass index is 48.54 kg/m².         Prophylaxis:  Venous Thromboembolism: mechanical chemical  Stress Ulcer: None  Ventilator Pneumonia: not applicable     Activity Orders          None        Full Code    Brittany Rodriguez MD  Neurocritical Care  [facility]

## 2018-06-01 NOTE — PLAN OF CARE
Problem: Patient Care Overview  Goal: Plan of Care Review  Outcome: Ongoing (interventions implemented as appropriate)  POC reviewed with Los Mendez and wife at bedside at 0500. Unable to understand pt's incomprehensible speech for understanding, wife verbalized understanding. Questions and concerns addressed. No acute events overnight. Pt progressing toward goals. Will continue to monitor. See flowsheets for full assessment and VS info. NGT still connected to LIS. POC is for a HD trial today.

## 2018-06-01 NOTE — PLAN OF CARE
Problem: SLP Goal  Goal: SLP Goal  Short Term Goals expected to be met by 6/6:  1. Pt will participate in BSS to determine least restrictive diet.  2. Pt will tolerate full liquids po diet with no overt s/s of aspiration.   3. Pt will tolerate advanced diet trials with no overt s/s of aspiration.      When pt appropriate, recommend puree diet/thin liquids. Monitor for s/s of aspiration.   Shari Waggoner CCC-SLP  6/1/2018

## 2018-06-01 NOTE — ASSESSMENT & PLAN NOTE
-Intubated on arrival--extubated 05/28/18  -Satting % on 3L NC.    -05/31/18 ABG appropriate, mild hypercarbia (48.4).
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
## 2018-06-01 NOTE — ASSESSMENT & PLAN NOTE
Los Mendez is a 47 year old male who is consulted for MARY, which is mostly secondary to iATN from previous cardiac arrest where he presented with hypotension which decrease perfusion to the kidneys (required to be started on pressors). Has echo with EF 10-15%.     Plan:  - Will trial of HD today for clearance only   - Will reassess tomorrow if any more dialysis required   - No UF due to currently patient was negative 1.6 L from yesterday and has been negative 5 L since admission.    - Blood pressures have been stable.  - Still anuric. Nuno cath removed  - Continue to monitor intake and output. Bladder scan BID to evaluate for any sign of recovery  - On nasal canula 4 L O2   - Slightly more alert today

## 2018-06-01 NOTE — SUBJECTIVE & OBJECTIVE
Interval History:  >4 elements OR status of 3 inpatient conditions  Patient is able to answer a few simple questions this am--states his name, able to say hospital but unclear if he knows which hospital.  Speech is heavily slurred, he is intermittently somnolent but can be roused and will follow simple commands with repeated prompting.  Per SLP, will make NPO except for meds.  Patient thought to be appropriate for Full Liquid Diet at some point recently when more awake with SLP.  Hgb stable at 8.3 g/dL.  No change in meds today.  Per Nephrology, will trial HD--BUN 45, Cr 6.7 this am.    Review of Systems   Unable to perform ROS: Mental status change   Constitutional: Negative for fever.   Cardiovascular: Negative for leg swelling.   Skin: Negative for rash and wound.   Neurological: Positive for speech difficulty (slurred) and weakness.   Hematological: Negative for adenopathy. Does not bruise/bleed easily.   Psychiatric/Behavioral: Positive for confusion.     Objective:     Vitals:  Temp: 98.3 °F (36.8 °C)  Pulse: 79  Rhythm: normal sinus rhythm  BP: 129/73  MAP (mmHg): 105  Resp: 19  SpO2: 100 %  O2 Device (Oxygen Therapy): nasal cannula w/ humidification    Temp  Min: 98.3 °F (36.8 °C)  Max: 99.3 °F (37.4 °C)  Pulse  Min: 76  Max: 94  BP  Min: 128/66  Max: 151/86  MAP (mmHg)  Min: 90  Max: 112  Resp  Min: 18  Max: 30  SpO2  Min: 90 %  Max: 100 %    05/31 0701 - 06/01 0700  In: 315 [I.V.:85]  Out: 1960 [Drains:1860]   Unmeasured Output  Stool Occurrence: 1     Physical Exam  General:  Well-developed, well-nourished, nad  HEENT:  NCAT, PERRLA, EOMI, oropharyngeal membranes non-erythematous/without exudate  Neck:  Supple, normal ROM without nuchal rigidity  Resp:  Symmetric expansion, no increased wob--on 3 L NC  CVS:  No LE edema, extremities warm/well-perfused  GI:  Abd soft, non-distended, non-tender to palpation  Neurologic Exam:  Mental Status:  Wakes to voice, able to say his name and hospital for location.   Speech is heavily slurred, patient difficult to understand.  Able to follow simple commands reliably.  Somnolent throughout exam but consistently wakes to voice and follows commands.  Cranial Nerves:  VFs intact on blink to threat bilaterally. PERRLA, EOMI. Facial movement intact and symmetric.  Palate raises symmetrically, tongue protrudes midline.  Trapezius 5/5 bilaterally.  Motor:  Normal bulk and tone. BUE  strength 5/5, able to hold both arms up for few seconds.  Moves BLE.  Sensory:  No significant withdrawal to noxious stimuli at BLE.  Withdraws at BUE.  Reflexes:  Biceps, brachioradialis, patellar 2+ and symmetric. No ankle clonus. Equivocal toe bilaterally.  Coordination:  No resting tremor or myoclonus.  Unable to follow commands for FNF/HTS/EUGENIA.  Gait:  Deferred 2/2 AMS, fall risk.    Medications:  Continuous Scheduled  sodium chloride 0.9%  Once   aspirin 81 mg Daily   busPIRone 10 mg TID   heparin (porcine) 5,000 Units Q8H   metoprolol tartrate 50 mg TID   PRN  sodium chloride  Q24H PRN   sodium chloride  Q24H PRN   sodium chloride  Q24H PRN   sodium chloride 0.9%  PRN   acetaminophen 325 mg Q6H PRN   albuterol-ipratropium 3 mL Q6H PRN   dextrose 50% 12.5 g PRN   dextrose 50% 25 g PRN   fentaNYL 25 mcg Q2H PRN   glucagon (human recombinant) 1 mg PRN   glucose 16 g PRN   glucose 24 g PRN   insulin aspart U-100 1-10 Units Q6H PRN   ondansetron 4 mg Q8H PRN   sodium chloride 0.9% 3 mL PRN     Today I personally reviewed pertinent medications, lines/drains/airways, imaging, cardiology, lab results, microbiology results, notably:    Diet  Diet NPO Except for: Medication  Diet NPO Except for: Medication    Medications:    Current Facility-Administered Medications:     0.9%  NaCl infusion (for blood administration), , Intravenous, Q24H PRN, Amos Carter NP    0.9%  NaCl infusion (for blood administration), , Intravenous, Q24H PRN, Regino Mayorga MD    0.9%  NaCl infusion (for blood  administration), , Intravenous, Q24H PRN, Rena Gillette NP    0.9%  NaCl infusion, , Intravenous, PRN, Sascha Alcantar MD, Last Rate: 100 mL/hr at 06/01/18 1253, 300 mL at 06/01/18 1253    0.9%  NaCl infusion, , Intravenous, Once, Sascha Alcantar MD    acetaminophen tablet 325 mg, 325 mg, Per NG tube, Q6H PRN, Regino Mayorga MD, 325 mg at 05/29/18 1523    albuterol-ipratropium 2.5 mg-0.5 mg/3 mL nebulizer solution 3 mL, 3 mL, Nebulization, q6h prn, MD Carolyn, 3 mL at 05/31/18 2004    aspirin chewable tablet 81 mg, 81 mg, Per NG tube, Daily, Regino Mayorga MD, 81 mg at 06/01/18 0837    busPIRone tablet 10 mg, 10 mg, Per NG tube, TID, Regino Mayorga MD, 10 mg at 06/01/18 0837    dextrose 50% injection 12.5 g, 12.5 g, Intravenous, PRN, Regino Mayorga MD, 12.5 g at 05/26/18 1614    dextrose 50% injection 25 g, 25 g, Intravenous, PRN, Mike Clarke MD    fentaNYL injection 25 mcg, 25 mcg, Intravenous, Q2H PRN, Serg Myles NP, 25 mcg at 05/28/18 2046    glucagon (human recombinant) injection 1 mg, 1 mg, Intramuscular, PRN, Regino Mayorga MD    glucose chewable tablet 16 g, 16 g, Oral, PRN, Mike Clarke MD    glucose chewable tablet 24 g, 24 g, Oral, PRN, Mike Clarke MD    heparin (porcine) injection 5,000 Units, 5,000 Units, Subcutaneous, Q8H, Regino Mayorga MD, 5,000 Units at 06/01/18 0554    insulin aspart U-100 pen 1-10 Units, 1-10 Units, Subcutaneous, Q6H PRN, Regino Mayorga MD    metoprolol tartrate (LOPRESSOR) tablet 50 mg, 50 mg, Per NG tube, TID, Amos Carter, JOSE, 50 mg at 06/01/18 0837    ondansetron 4 mg/5 mL solution 4 mg, 4 mg, Per NG tube, Q8H PRN, Keith Cohen MD, 4 mg at 05/29/18 1004    sodium chloride 0.9% flush 3 mL, 3 mL, Intravenous, PRN, Keith Cohen MD, 3 mL at 05/30/18 5275    LDA:  05/28/18 R Femoral Trialysis Catheter  05/30/18 LUE PIV  05/30/18 Rectal Tube  05/28/18 NGT L  nostril    Imagin18 MRI Brain w/o contrast:  Several scattered small bilateral cerebellar infarcts.  There is a punctate focus of diffusion restriction in the left caudate which may represent embolic infarcts versus sequela of hypoxic ischemic injury in light of history.  Clinical correlation and follow-up advised.  No evidence for hydrocephalus or parenchymal hemorrhage.     Cardiology:  18 2D Echo w/ CFD:  CONCLUSIONS     1 - Severe left ventricular enlargement.     2 - Eccentric hypertrophy.     3 - Severely depressed left ventricular systolic function (EF 10-15%).     4 - Impaired LV relaxation, normal LAP (grade 1 diastolic dysfunction).     5 - Right atrial enlargement.     6 - Right ventricular enlargement with moderately depressed systolic function.     7 - Moderate mitral regurgitation.     8 - Mild tricuspid regurgitation.     9 - Increased central venous pressure.     18 2D Echo w/ CFD:  CONCLUSIONS     1 - Eccentric hypertrophy.     2 - Severely depressed left ventricular systolic function (EF 10-15%).     3 - Biatrial enlargement.     4 - Impaired LV relaxation, normal LAP (grade 1 diastolic dysfunction).     5 - Right ventricular enlargement with moderately depressed systolic function.     6 - The estimated PA systolic pressure is 37 mmHg.     7 - Mild mitral regurgitation.     8 - Mild tricuspid regurgitation.     Labs:    Recent Labs  Lab 18  0152   WBC 14.49*   RBC 3.76*   HGB 8.3*   HCT 27.7*      MCV 74*   MCH 22.1*   MCHC 30.0*       Recent Labs  Lab 18  0152 18  0549   CALCIUM 10.1 10.6*   PROT 7.6  --    * 146*   K 4.0 4.0   CO2 28 28    104   BUN 44* 45*   CREATININE 6.5* 6.7*   ALKPHOS 167*  --    ALT 50*  --    AST 51*  --    BILITOT 1.3*  --      Micro:  Microbiology Results (last 7 days)     Procedure Component Value Units Date/Time    Blood culture [[id number]] Collected:  18 4552    Order Status:  Completed Specimen:  Blood  from Peripheral, Wrist, Left Updated:  06/01/18 0828     Blood Culture, Routine Gram stain aer bottle: Gram positive cocci in clusters resembling Staph      COAGULASE-NEGATIVE STAPHYLOCOCCUS SPECIES  Organism is a probable contaminant      Narrative:       Blood cultures from 2 different sites. 4 bottles total.  Please draw before starting antibiotics.    Blood culture [[id number]] Collected:  05/29/18 2332    Order Status:  Completed Specimen:  Blood from Peripheral, Upper Arm, Right Updated:  06/01/18 0612     Blood Culture, Routine No Growth to date    Narrative:       Blood cultures x 2 different sites. 4 bottles total. Please  draw cultures before administering antibiotics.    Culture, Respiratory with Gram Stain [[id number]]     Order Status:  Canceled Specimen:  Respiratory

## 2018-06-01 NOTE — PROGRESS NOTES
HD ended 9mins prior to end d/t hypotension and symptomatic., UF of 2L achieved.Rinse back done. Arterial and Venous port flushed with NSS and capped. Blood pressure improved after blood return done. Endorsed to primary nurse.

## 2018-06-01 NOTE — ASSESSMENT & PLAN NOTE
-06/01/18 BUN 45, Cr 6.7  -Nephrology following--plan for HD today  -R femoral trialysis catheter placed, unclear whether pt will need longterm HD

## 2018-06-01 NOTE — ASSESSMENT & PLAN NOTE
-3 cardiac arrests prior to arrival  -Troponin trended down  -05/24/18 2D Echo notable for EF 10-15%

## 2018-06-02 LAB
ALBUMIN SERPL BCP-MCNC: 2.7 G/DL
ALBUMIN SERPL BCP-MCNC: 2.8 G/DL
ALP SERPL-CCNC: 177 U/L
ALT SERPL W/O P-5'-P-CCNC: 50 U/L
ANION GAP SERPL CALC-SCNC: 12 MMOL/L
ANION GAP SERPL CALC-SCNC: 12 MMOL/L
ANION GAP SERPL CALC-SCNC: 13 MMOL/L
ANION GAP SERPL CALC-SCNC: 13 MMOL/L
ANISOCYTOSIS BLD QL SMEAR: SLIGHT
AST SERPL-CCNC: 51 U/L
BACTERIA BLD CULT: NORMAL
BASOPHILS # BLD AUTO: 0.09 K/UL
BASOPHILS NFR BLD: 0.6 %
BILIRUB SERPL-MCNC: 1.4 MG/DL
BUN SERPL-MCNC: 44 MG/DL
BUN SERPL-MCNC: 44 MG/DL
BUN SERPL-MCNC: 53 MG/DL
BUN SERPL-MCNC: 61 MG/DL
CALCIUM SERPL-MCNC: 9.4 MG/DL
CALCIUM SERPL-MCNC: 9.5 MG/DL
CALCIUM SERPL-MCNC: 9.6 MG/DL
CALCIUM SERPL-MCNC: 9.6 MG/DL
CHLORIDE SERPL-SCNC: 107 MMOL/L
CHLORIDE SERPL-SCNC: 108 MMOL/L
CO2 SERPL-SCNC: 21 MMOL/L
CREAT SERPL-MCNC: 6.5 MG/DL
CREAT SERPL-MCNC: 6.5 MG/DL
CREAT SERPL-MCNC: 7.6 MG/DL
CREAT SERPL-MCNC: 8.3 MG/DL
DIFFERENTIAL METHOD: ABNORMAL
EOSINOPHIL # BLD AUTO: 0.3 K/UL
EOSINOPHIL NFR BLD: 1.9 %
ERYTHROCYTE [DISTWIDTH] IN BLOOD BY AUTOMATED COUNT: 28.1 %
EST. GFR  (AFRICAN AMERICAN): 10.7 ML/MIN/1.73 M^2
EST. GFR  (AFRICAN AMERICAN): 10.7 ML/MIN/1.73 M^2
EST. GFR  (AFRICAN AMERICAN): 8 ML/MIN/1.73 M^2
EST. GFR  (AFRICAN AMERICAN): 8.9 ML/MIN/1.73 M^2
EST. GFR  (NON AFRICAN AMERICAN): 6.9 ML/MIN/1.73 M^2
EST. GFR  (NON AFRICAN AMERICAN): 7.7 ML/MIN/1.73 M^2
EST. GFR  (NON AFRICAN AMERICAN): 9.3 ML/MIN/1.73 M^2
EST. GFR  (NON AFRICAN AMERICAN): 9.3 ML/MIN/1.73 M^2
GLUCOSE SERPL-MCNC: 74 MG/DL
GLUCOSE SERPL-MCNC: 76 MG/DL
GLUCOSE SERPL-MCNC: 76 MG/DL
GLUCOSE SERPL-MCNC: 93 MG/DL
HCT VFR BLD AUTO: 29.3 %
HGB BLD-MCNC: 8.7 G/DL
HYPOCHROMIA BLD QL SMEAR: ABNORMAL
IMM GRANULOCYTES # BLD AUTO: 0.09 K/UL
IMM GRANULOCYTES NFR BLD AUTO: 0.6 %
INR PPP: 1.1
LYMPHOCYTES # BLD AUTO: 2 K/UL
LYMPHOCYTES NFR BLD: 13.1 %
MAGNESIUM SERPL-MCNC: 2.8 MG/DL
MAGNESIUM SERPL-MCNC: 2.8 MG/DL
MAGNESIUM SERPL-MCNC: 3.3 MG/DL
MCH RBC QN AUTO: 22.1 PG
MCHC RBC AUTO-ENTMCNC: 29.7 G/DL
MCV RBC AUTO: 74 FL
MONOCYTES # BLD AUTO: 1.9 K/UL
MONOCYTES NFR BLD: 12.2 %
NEUTROPHILS # BLD AUTO: 10.9 K/UL
NEUTROPHILS NFR BLD: 71.6 %
NRBC BLD-RTO: 0 /100 WBC
PHOSPHATE SERPL-MCNC: 4.1 MG/DL
PHOSPHATE SERPL-MCNC: 4.1 MG/DL
PHOSPHATE SERPL-MCNC: 4.4 MG/DL
PHOSPHATE SERPL-MCNC: 4.4 MG/DL
PHOSPHATE SERPL-MCNC: 4.5 MG/DL
PHOSPHATE SERPL-MCNC: 4.5 MG/DL
PLATELET # BLD AUTO: 263 K/UL
PLATELET BLD QL SMEAR: ABNORMAL
PMV BLD AUTO: 10.3 FL
POCT GLUCOSE: 122 MG/DL (ref 70–110)
POCT GLUCOSE: 68 MG/DL (ref 70–110)
POCT GLUCOSE: 78 MG/DL (ref 70–110)
POCT GLUCOSE: 81 MG/DL (ref 70–110)
POCT GLUCOSE: 81 MG/DL (ref 70–110)
POCT GLUCOSE: 83 MG/DL (ref 70–110)
POCT GLUCOSE: 88 MG/DL (ref 70–110)
POIKILOCYTOSIS BLD QL SMEAR: SLIGHT
POLYCHROMASIA BLD QL SMEAR: ABNORMAL
POTASSIUM SERPL-SCNC: 3.9 MMOL/L
POTASSIUM SERPL-SCNC: 3.9 MMOL/L
POTASSIUM SERPL-SCNC: 4 MMOL/L
POTASSIUM SERPL-SCNC: 4 MMOL/L
PROT SERPL-MCNC: 7.7 G/DL
PROTHROMBIN TIME: 11.8 SEC
RBC # BLD AUTO: 3.94 M/UL
SODIUM SERPL-SCNC: 140 MMOL/L
SODIUM SERPL-SCNC: 140 MMOL/L
SODIUM SERPL-SCNC: 141 MMOL/L
SODIUM SERPL-SCNC: 142 MMOL/L
SPHEROCYTES BLD QL SMEAR: ABNORMAL
STOMATOCYTES BLD QL SMEAR: PRESENT
TARGETS BLD QL SMEAR: ABNORMAL
WBC # BLD AUTO: 15.27 K/UL

## 2018-06-02 PROCEDURE — 25000003 PHARM REV CODE 250: Performed by: STUDENT IN AN ORGANIZED HEALTH CARE EDUCATION/TRAINING PROGRAM

## 2018-06-02 PROCEDURE — 99232 SBSQ HOSP IP/OBS MODERATE 35: CPT | Mod: ,,, | Performed by: INTERNAL MEDICINE

## 2018-06-02 PROCEDURE — 63600175 PHARM REV CODE 636 W HCPCS: Performed by: STUDENT IN AN ORGANIZED HEALTH CARE EDUCATION/TRAINING PROGRAM

## 2018-06-02 PROCEDURE — 80069 RENAL FUNCTION PANEL: CPT | Mod: 91

## 2018-06-02 PROCEDURE — 85610 PROTHROMBIN TIME: CPT

## 2018-06-02 PROCEDURE — 25000003 PHARM REV CODE 250: Performed by: NURSE PRACTITIONER

## 2018-06-02 PROCEDURE — 94761 N-INVAS EAR/PLS OXIMETRY MLT: CPT

## 2018-06-02 PROCEDURE — 85025 COMPLETE CBC W/AUTO DIFF WBC: CPT

## 2018-06-02 PROCEDURE — 20000000 HC ICU ROOM

## 2018-06-02 PROCEDURE — 83735 ASSAY OF MAGNESIUM: CPT | Mod: 91

## 2018-06-02 PROCEDURE — 80053 COMPREHEN METABOLIC PANEL: CPT

## 2018-06-02 PROCEDURE — 99233 SBSQ HOSP IP/OBS HIGH 50: CPT | Mod: ,,, | Performed by: NURSE PRACTITIONER

## 2018-06-02 RX ADMIN — DEXTROSE MONOHYDRATE 12.5 G: 25 INJECTION, SOLUTION INTRAVENOUS at 12:06

## 2018-06-02 RX ADMIN — BUSPIRONE HYDROCHLORIDE 10 MG: 10 TABLET ORAL at 08:06

## 2018-06-02 RX ADMIN — METOPROLOL TARTRATE 50 MG: 50 TABLET, FILM COATED ORAL at 02:06

## 2018-06-02 RX ADMIN — HEPARIN SODIUM 5000 UNITS: 5000 INJECTION, SOLUTION INTRAVENOUS; SUBCUTANEOUS at 02:06

## 2018-06-02 RX ADMIN — METOPROLOL TARTRATE 50 MG: 50 TABLET, FILM COATED ORAL at 09:06

## 2018-06-02 RX ADMIN — HEPARIN SODIUM 5000 UNITS: 5000 INJECTION, SOLUTION INTRAVENOUS; SUBCUTANEOUS at 06:06

## 2018-06-02 RX ADMIN — ASPIRIN 81 MG CHEWABLE TABLET 81 MG: 81 TABLET CHEWABLE at 08:06

## 2018-06-02 RX ADMIN — METOPROLOL TARTRATE 50 MG: 50 TABLET, FILM COATED ORAL at 08:06

## 2018-06-02 RX ADMIN — HEPARIN SODIUM 5000 UNITS: 5000 INJECTION, SOLUTION INTRAVENOUS; SUBCUTANEOUS at 09:06

## 2018-06-02 NOTE — PLAN OF CARE
Problem: Patient Care Overview  Goal: Plan of Care Review  Outcome: Ongoing (interventions implemented as appropriate)  POC reviewed with Los Mendez and wife at bedside at 0500. Pt able to verbalize some understanding, needs constant reinforcement, but will eventually repeat what was said without having to say it to him first, wife verbalized understanding. Questions and concerns addressed. No acute events overnight. Pt progressing toward goals. Will continue to monitor. See flowsheets for full assessment and VS info. NGT residuals have been 5cc throughout night. POC is to try HD again. Pt awake throughout most of night, could benefit from sleeping aid, kept asking what time it was, will pass along to AM RN. C.diff clxs both negative, isolation d/c'd.    Trialysis venous and distal ports no longer drawing back but are still flushing, arterial port flushing and drawing back; JOSE Trinidad notified at bedside; will pass along to AM RN.    Restraints: R wrist still present for pulling at lines,  6-2-18 @ 1431, could possibly be d/c'd if trail without it on was done.

## 2018-06-02 NOTE — ASSESSMENT & PLAN NOTE
-Intubated on arrival--extubated 05/28/18  -Satting 94% on RA.    Prn duonebs Q6h  6/1 CXR shows mild pulm. edema

## 2018-06-02 NOTE — SUBJECTIVE & OBJECTIVE
Interval History:   Patient evaluated at bedside, currently more alert than yesterday, had bladder scan done and presented with  cc. Last dialysis was yesterday which had to be cut short due to decreased blood pressure.    Review of patient's allergies indicates:   Allergen Reactions    Penicillins      Tolerated cefepime May 2018     Current Facility-Administered Medications   Medication Frequency    0.9%  NaCl infusion PRN    acetaminophen tablet 325 mg Q6H PRN    albuterol-ipratropium 2.5 mg-0.5 mg/3 mL nebulizer solution 3 mL Q6H PRN    aspirin chewable tablet 81 mg Daily    dextrose 50% injection 12.5 g PRN    dextrose 50% injection 25 g PRN    fentaNYL injection 25 mcg Q2H PRN    glucagon (human recombinant) injection 1 mg PRN    glucose chewable tablet 16 g PRN    glucose chewable tablet 24 g PRN    heparin (porcine) injection 5,000 Units Q8H    insulin aspart U-100 pen 1-10 Units Q6H PRN    metoprolol tartrate (LOPRESSOR) tablet 50 mg TID    ondansetron 4 mg/5 mL solution 4 mg Q8H PRN    sodium chloride 0.9% flush 3 mL PRN       Objective:     Vital Signs (Most Recent):  Temp: 99.1 °F (37.3 °C) (06/02/18 1505)  Pulse: 79 (06/02/18 1505)  Resp: (!) 22 (06/02/18 1505)  BP: (!) 158/83 (06/02/18 1505)  SpO2: (!) 94 % (06/02/18 1505)  O2 Device (Oxygen Therapy): room air (06/02/18 1412) Vital Signs (24h Range):  Temp:  [98.3 °F (36.8 °C)-99.1 °F (37.3 °C)] 99.1 °F (37.3 °C)  Pulse:  [71-84] 79  Resp:  [14-34] 22  SpO2:  [90 %-100 %] 94 %  BP: (109-162)/(65-98) 158/83     Weight: (!) 144.8 kg (319 lb 3.6 oz) (05/31/18 0300)  Body mass index is 48.54 kg/m².  Body surface area is 2.64 meters squared.    I/O last 3 completed shifts:  In: 1351.7 [P.O.:200; I.V.:276.7; Other:600; NG/GT:275]  Out: 4710 [Drains:1510; Other:3000; Stool:200]    Physical Exam   Constitutional: Nasal cannula in place.   HENT:   Head: Normocephalic and atraumatic.   Right Ear: External ear normal.   Left Ear: External  ear normal.   NGT   Eyes: Right eye exhibits no discharge. Left eye exhibits no discharge.   Neck: No JVD present.   Cardiovascular: Normal rate and regular rhythm.  Exam reveals no friction rub.    Rt femoral trialysis cath    Pulmonary/Chest: He has rales.   Abdominal: Soft. He exhibits no distension.   Neurological:       Skin: Skin is warm.       Significant Labs:  ABGs:   Recent Labs  Lab 05/31/18  0612   PH 7.406   PCO2 48.4*   HCO3 30.4*   POCSATURATED 98   BE 6     BMP:   Recent Labs  Lab 06/02/18  1430   GLU 74      CO2 21*   BUN 53*   CREATININE 7.6*   CALCIUM 9.5   MG 2.8*     CBC:   Recent Labs  Lab 06/02/18  0329   WBC 15.27*   RBC 3.94*   HGB 8.7*   HCT 29.3*      MCV 74*   MCH 22.1*   MCHC 29.7*     CMP:   Recent Labs  Lab 06/02/18  0329 06/02/18  1430   GLU 76  76 74   CALCIUM 9.6  9.6 9.5   ALBUMIN 2.7*  2.7* 2.7*   PROT 7.7  --      140 142   K 4.0  4.0 3.9   CO2 21*  21* 21*     107 108   BUN 44*  44* 53*   CREATININE 6.5*  6.5* 7.6*   ALKPHOS 177*  --    ALT 50*  --    AST 51*  --    BILITOT 1.4*  --      All labs within the past 24 hours have been reviewed.

## 2018-06-02 NOTE — ASSESSMENT & PLAN NOTE
-Difficulty with CRRT Machine and dialysis line clotting; H/H trended down 6.8/23, transfused with 2 U PRBCs  -06/02/18 Hgb 8.7/HCT 29.3 trending up.  No signs of acute blood loss currently.  -Continue CBC qd, transfuse for Hgb < 8.0 g/dL

## 2018-06-02 NOTE — PROGRESS NOTES
[facility]  Nephrology  Progress Note    Patient Name: Los Mendez  MRN: [id number]  Admission Date: 5/19/2018  Hospital Length of Stay: 14 days  Attending Provider: Tunde Smalls MD   Primary Care Physician: Provider Notinsystem  Principal Problem:Anoxic brain injury    Subjective:     HPI: Los Mendez is a 47 year old male with past medical history of HTN, heavy alcohol user and COPD. Transferred from [facility] following witnessed cardiac arrest while in the ED waiting room. Patient had presented complaining of upper respiratory track infection and shortness of breath symptoms, while in the ED waiting room he was noted to be choking, became apneic, which led to witnessed seizure like activity followed by cardiac arrest. Per discussion with ED staff, patient required about 22 minutes of ACLS/5 shocks before returning to normal sinus rhythm. During intubation a lozenge was removed from patients airway. Hyperthermia protocol was initiated prior to transferring to [facility]. On arrival to [facility] Arrived with increased serum creatinine from 2.9-->5.1 (unkown baseline), BUN 35--> 52, Trop 2.4-->1.3, Lactic acidosis 6.4-->1.3, chest x ray with increased parenchymal interstitial attenuation and parenchymal opacities suggestive of pulmonary edema. On mechanical ventilation with a FiO2 50%. Currently also anuric at present moment. Per cardiology patient has a dilated cardiomyopathy EF looks about 10-15% and LVEDD is 7.3 cm.    Interval History:   Patient evaluated at bedside, currently more alert than yesterday, had bladder scan done and presented with  cc. Last dialysis was yesterday which had to be cut short due to decreased blood pressure.    Review of patient's allergies indicates:   Allergen Reactions    Penicillins      Tolerated cefepime May 2018     Current Facility-Administered Medications   Medication Frequency    0.9%  NaCl infusion PRN    acetaminophen tablet 325 mg Q6H PRN     albuterol-ipratropium 2.5 mg-0.5 mg/3 mL nebulizer solution 3 mL Q6H PRN    aspirin chewable tablet 81 mg Daily    dextrose 50% injection 12.5 g PRN    dextrose 50% injection 25 g PRN    fentaNYL injection 25 mcg Q2H PRN    glucagon (human recombinant) injection 1 mg PRN    glucose chewable tablet 16 g PRN    glucose chewable tablet 24 g PRN    heparin (porcine) injection 5,000 Units Q8H    insulin aspart U-100 pen 1-10 Units Q6H PRN    metoprolol tartrate (LOPRESSOR) tablet 50 mg TID    ondansetron 4 mg/5 mL solution 4 mg Q8H PRN    sodium chloride 0.9% flush 3 mL PRN       Objective:     Vital Signs (Most Recent):  Temp: 99.1 °F (37.3 °C) (06/02/18 1505)  Pulse: 79 (06/02/18 1505)  Resp: (!) 22 (06/02/18 1505)  BP: (!) 158/83 (06/02/18 1505)  SpO2: (!) 94 % (06/02/18 1505)  O2 Device (Oxygen Therapy): room air (06/02/18 1412) Vital Signs (24h Range):  Temp:  [98.3 °F (36.8 °C)-99.1 °F (37.3 °C)] 99.1 °F (37.3 °C)  Pulse:  [71-84] 79  Resp:  [14-34] 22  SpO2:  [90 %-100 %] 94 %  BP: (109-162)/(65-98) 158/83     Weight: (!) 144.8 kg (319 lb 3.6 oz) (05/31/18 0300)  Body mass index is 48.54 kg/m².  Body surface area is 2.64 meters squared.    I/O last 3 completed shifts:  In: 1351.7 [P.O.:200; I.V.:276.7; Other:600; NG/GT:275]  Out: 4710 [Drains:1510; Other:3000; Stool:200]    Physical Exam   Constitutional: Nasal cannula in place.   HENT:   Head: Normocephalic and atraumatic.   Right Ear: External ear normal.   Left Ear: External ear normal.   NGT   Eyes: Right eye exhibits no discharge. Left eye exhibits no discharge.   Neck: No JVD present.   Cardiovascular: Normal rate and regular rhythm.  Exam reveals no friction rub.    Rt femoral trialysis cath    Pulmonary/Chest: He has rales.   Abdominal: Soft. He exhibits no distension.   Neurological:       Skin: Skin is warm.       Significant Labs:  ABGs:   Recent Labs  Lab 05/31/18  0612   PH 7.406   PCO2 48.4*   HCO3 30.4*   POCSATURATED 98   BE 6     BMP:    Recent Labs  Lab 06/02/18  1430   GLU 74      CO2 21*   BUN 53*   CREATININE 7.6*   CALCIUM 9.5   MG 2.8*     CBC:   Recent Labs  Lab 06/02/18  0329   WBC 15.27*   RBC 3.94*   HGB 8.7*   HCT 29.3*      MCV 74*   MCH 22.1*   MCHC 29.7*     CMP:   Recent Labs  Lab 06/02/18  0329 06/02/18  1430   GLU 76  76 74   CALCIUM 9.6  9.6 9.5   ALBUMIN 2.7*  2.7* 2.7*   PROT 7.7  --      140 142   K 4.0  4.0 3.9   CO2 21*  21* 21*     107 108   BUN 44*  44* 53*   CREATININE 6.5*  6.5* 7.6*   ALKPHOS 177*  --    ALT 50*  --    AST 51*  --    BILITOT 1.4*  --      All labs within the past 24 hours have been reviewed.       Assessment/Plan:     MARY (acute kidney injury)    Los Mendez is a 47 year old male who is consulted for MARY, which is mostly secondary to iATN from previous cardiac arrest where he presented with hypotension which decrease perfusion to the kidneys (required to be started on pressors). Has echo with EF 10-15%.     Plan:  - Yesterday dialysis had to be stopped shortly due to drop of blood pressure  - No dialysis treatment today   - Will reassess tomorrow if any more dialysis required   - Had bladder scan which presented with  cc of urine, continue to follow intake and output    - Blood pressures have been stable.  - On nasal canula 4 L O2           Sascha Leone  Nephrology  Fellow  [facility]    Pager [phone]

## 2018-06-02 NOTE — PROGRESS NOTES
[facility]  Neurocritical Care  Progress Note    Admit Date: 5/19/2018  Service Date: 06/02/2018  Length of Stay: 14    Subjective:     Chief Complaint: Anoxic brain injury    History of Present Illness: 46 y/o man w/ hx of HTN, COPD? Transferred from [facility] following witnessed cardiac arrest while in the ED waiting room. Patient had presented complaining of URI/SOB symptoms, while in the ED waiting room he was noted to be choking, became apneic, which led to witnessed seizure like activity followed by cardiac arrest. Per discussion with ED staff, patient required about 22 minutes of ACLS/5 shocks before returning to normal sinus rhythm. During intubation a lozenge was removed from patients airway. Hyperthermia protocol was initiated prior to [place] [facility] [place]. Per ED records, patient was acidotic with ph of 7.1 this morning. At the time of arrival to NICU, patient was on paralytics, however pupillary reflex was present. Will continue hypothermia protocol for additional 24 h.    Hospital Course: 05/19/18:  Arrived intubated, sedated, artic sun blanket  05/20/18:  Pressor requirment going up, NO DIURESIS, continue TTM, nimbex off, LFT improving, trop improving, family updated.  05/21/18:  Reach normothermia, MRI today, remove EEG, place HD line, consult nephrology, LFTs trending down, family updated.  05/22/18:  Anoxic brain injury. Pt responding. Last night placed on propofol  MRI --L head of caudate and cerebellar small infarctions.  05/23/18:  Anoxic brain injury.  On CRRT. BNP 1046. Back on propofol, pressors. Still agitated. Started on Buspar. Trop I normalizing.  05/25/18:  CRRT today. Amiodarone dc'd, metoprolol started. CBC m2r--dc H/H continues to drop will consult GI. Vascular surgery consulted concerning  Possible arterial occlusion. RUE cold. Arterial line dcd. US RUE pending. Abdominal distention, KUB showed gas pattern. Bladder pressures q4h. Initial 21.  05/28/18:   H/H 6.8/23, 1 unit PRBC transfused. Keep Hgb greater than or equal to 8. CRRT stopped, line clotted. Changed trialysis catheter changed. Wean sedation today. Once off sedation begin weaning ventilator settings to CPAP.  Weaned vent to CPAP and able to Extubated in afternoon without s/s stridor or difficulty breathing. HR elevated 140s - 160s fentanyl x2 prn for pain/agitation. Metoprolol 5mg IV x3 given remains ST 140s.  Restless, will start precedex.  05/30/18:  Leukocytosis, pan cx; kelley discontinued; remove IJ, speech eval for diet   05/31/18:  No acute events, still with gastric secretions coming out of NG tube will start reglan; remains encephalopathic  06/01/18:  Will hold NGT suction, NPO per SLP.  Continue metoclopramide.  Hgb stable at 8.3 g/dL.  6/2 Continue NG tube for now. Will advance to pureed diet as recommended by SLP and evaluate intake before removing NG.. WBC remain elevated but afebrile, cultures negative and USUE/Diamond 5/24 showed no DVT only thrombophlebitis of LUE cephalic vein. Continue to monitor daily CBC. Nephrology following will hold HD today and possibly tomorrow depending on labs    Interval History: Continue NG tube for now. Will advance to pureed diet as recommended by SLP and evaluate intake before removing NG.. WBC remain elevated but afebrile, cultures negative and USUE/Diamond 5/24 showed no DVT only thrombophlebitis of LUE cephalic vein. Continue to monitor daily CBC. Nephrology following will hold HD today and possibly tomorrow depending on labs    Review of Systems:   Constitutional: Denies fevers or chills.  Pulmonary: Denies shortness of breath or cough.  Cardiology: Denies chest pain or palpitations.  GI: Denies abdominal pain or constipation.  Neurologic: Denies new weakness,  headache, or paresthesias.    Vitals:   Temp: 98.5 °F (36.9 °C)  Pulse: 78  Rhythm: normal sinus rhythm  BP: (!) 154/91  MAP (mmHg): 117  Resp: 19  SpO2: 95 %  O2 Device (Oxygen Therapy): room  air    Temp  Min: 98.3 °F (36.8 °C)  Max: 98.8 °F (37.1 °C)  Pulse  Min: 71  Max: 91  BP  Min: 103/59  Max: 162/98  MAP (mmHg)  Min: 81  Max: 121  Resp  Min: 18  Max: 34  SpO2  Min: 90 %  Max: 100 %    06/01 0701 - 06/02 0700  In: 1266.7 [P.O.:200; I.V.:276.7]  Out: 3650 [Drains:450]   Unmeasured Output  Stool Occurrence: 1     Examination:   Constitutional:  Obese Well-nourished and -developed. No apparent distress.   Eyes: Conjunctiva clear, anicteric. Lids no lesions.  Head/Ears/Nose/Mouth/Throat/Neck: Moist mucous membranes. External ears, nose atraumatic.   Cardiovascular: Regular rhythm. No murmurs. No leg edema.  Respiratory: Comfortable respirations. Bibasilar crackles  Gastrointestinal: No hernia. Soft, nondistended, nontender. + bowel sounds.    Neurologic:  -GCS [id number]  -Alert. Oriented to self. Speech fluent. Follows commands.  -Cranial nerves II-XII  Grossly intact PERRL 4+ EOMI. + cough, gag, corneals  -Motor 5/5 all extremities agudelo spontaneously and to command  -Sensation bilat intact to all extremities    Medications:   Continuous Scheduled  aspirin 81 mg Daily   heparin (porcine) 5,000 Units Q8H   metoprolol tartrate 50 mg TID   PRN  sodium chloride 0.9%  PRN   acetaminophen 325 mg Q6H PRN   albuterol-ipratropium 3 mL Q6H PRN   dextrose 50% 12.5 g PRN   dextrose 50% 25 g PRN   fentaNYL 25 mcg Q2H PRN   glucagon (human recombinant) 1 mg PRN   glucose 16 g PRN   glucose 24 g PRN   insulin aspart U-100 1-10 Units Q6H PRN   ondansetron 4 mg Q8H PRN   sodium chloride 0.9% 3 mL PRN      Today I independently reviewed pertinent medications, lines/drains/airways, imaging, lab results, microbiology results, notably:   Assessment/Plan:     Neuro   * Anoxic brain injury    -Cardiac arrest ~ 22 minutes at OSH.  Pt doing relatively well.  -05/21/18 MRI w/o evidence of anoxic brain injury, but several small strokes; pt encephalopathic  -Encephalopathy likely multifactorial--strokes, metabolic (kidney/liver  injury), delirium    Extubated 5/28.  ASA 81mg daily  Fentanyl 25mcg q2hprn, agitation          Pulmonary   Pulmonary edema    -No known hx of CHF, URI sxs x 1 mo per paramedic on arrival  -06/01/18 CXR stable, mild edema and cardiomegaly  -No furosemide.  Continue daily weights, strict I&Os.              Acute respiratory failure with hypoxia    -Intubated on arrival--extubated 05/28/18  -Satting 94% on RA.    Prn duonebs Q6h  6/1 CXR shows mild pulm. edema        Cardiac/Vascular   Acute decompensated heart failure    -05/24/18 Echo w/ EF 10-15%  -Continue metoprol 50 mg tid        Essential hypertension    -BP over past 24 h:  110-151/63-86  -Holding home antihypertensives  -Pt started on metoprolol 50 mg tid (SVT on EKG; HFrEF tx)        Cardiac arrest    -3 cardiac arrests prior to arrival 5/19  -Troponin trended down  - metoprolol 50mg tid  -05/24/18 2D Echo 2 - Severely depressed left ventricular systolic function (EF 10-15%).     3 - Biatrial enlargement.     4 - Impaired LV relaxation, normal LAP (grade 1 diastolic dysfunction).     5 - Right ventricular enlargement with moderately depressed systolic function.     6 - The estimated PA systolic pressure is 37 mmHg.     7 - Mild mitral regurgitation.     8 - Mild tricuspid regurgitation.             Renal/   MARY (acute kidney injury)    -Per above, BUN and Cr continue to trend up 44/6.5  -Nephrology following, will hold HD today and tomorrow         Acute renal failure with tubular necrosis    -06/01/18 BUN 45, Cr 6.7  -Nephrology following--plan for HD today  -R femoral trialysis catheter placed, unclear whether pt will need longterm HD        Oncology   Acute blood loss anemia    -Difficulty with CRRT Machine and dialysis line clotting; H/H trended down 6.8/23, transfused with 2 U PRBCs  -06/02/18 Hgb 8.7/HCT 29.3 trending up.  No signs of acute blood loss currently.  -Continue CBC qd, transfuse for Hgb < 8.0 g/dL        Other   Class 3 severe obesity due  to excess calories with serious comorbidity and body mass index (BMI) of 40.0 to 44.9 in adult    -S/p nutrition consult  Body mass index is 48.54 kg/m².              Prophylaxis:  Venous Thromboembolism: mechanical chemical  Stress Ulcer: None  Ventilator Pneumonia: not applicable     Activity Orders          None        Full Code     I have spent 35 min with this patient, with over 50% of this time spent coordinating care and speaking with the family    Rena Gillette NP  Neurocritical Care  [facility]

## 2018-06-02 NOTE — PLAN OF CARE
Problem: Patient Care Overview  Goal: Plan of Care Review  Outcome: Ongoing (interventions implemented as appropriate)  POC reviewed with pt and wife at 1300. Pt's wife verbalized understanding. Questions and concerns addressed. No acute events today. Pt progressing toward goals. Will continue to monitor. Pt is now being fed, evaluating how he is tolerating it before pulling NGT. See flowsheets for full assessment and VS info.

## 2018-06-02 NOTE — ASSESSMENT & PLAN NOTE
-Per above, BUN and Cr continue to trend up 44/6.5  -Nephrology following, will hold HD today and tomorrow

## 2018-06-02 NOTE — PROGRESS NOTES
C. Diff antigen and toxin both resulted negative. Notified Charge RN and MD Gracie. Okayed to d/c isolation orders.

## 2018-06-02 NOTE — ASSESSMENT & PLAN NOTE
-Cardiac arrest ~ 22 minutes at OSH.  Pt doing relatively well.  -05/21/18 MRI w/o evidence of anoxic brain injury, but several small strokes; pt encephalopathic  -Encephalopathy likely multifactorial--strokes, metabolic (kidney/liver injury), delirium    Extubated 5/28.  ASA 81mg daily  Fentanyl 25mcg q2hprn, agitation

## 2018-06-02 NOTE — ASSESSMENT & PLAN NOTE
Los Mendez is a 47 year old male who is consulted for MARY, which is mostly secondary to iATN from previous cardiac arrest where he presented with hypotension which decrease perfusion to the kidneys (required to be started on pressors). Has echo with EF 10-15%.     Plan:  - Yesterday dialysis had to be stopped shortly due to drop of blood pressure  - No dialysis treatment today   - Will reassess tomorrow if any more dialysis required   - Had bladder scan which presented with  cc of urine, continue to follow intake and output    - Blood pressures have been stable.  - On nasal canula 4 L O2

## 2018-06-02 NOTE — ASSESSMENT & PLAN NOTE
-3 cardiac arrests prior to arrival 5/19  -Troponin trended down  - metoprolol 50mg tid  -05/24/18 2D Echo 2 - Severely depressed left ventricular systolic function (EF 10-15%).     3 - Biatrial enlargement.     4 - Impaired LV relaxation, normal LAP (grade 1 diastolic dysfunction).     5 - Right ventricular enlargement with moderately depressed systolic function.     6 - The estimated PA systolic pressure is 37 mmHg.     7 - Mild mitral regurgitation.     8 - Mild tricuspid regurgitation.

## 2018-06-03 LAB
ALBUMIN SERPL BCP-MCNC: 2.7 G/DL
ALP SERPL-CCNC: 175 U/L
ALT SERPL W/O P-5'-P-CCNC: 57 U/L
ANION GAP SERPL CALC-SCNC: 13 MMOL/L
ANION GAP SERPL CALC-SCNC: 14 MMOL/L
ANION GAP SERPL CALC-SCNC: 15 MMOL/L
ANION GAP SERPL CALC-SCNC: 15 MMOL/L
ANISOCYTOSIS BLD QL SMEAR: SLIGHT
AST SERPL-CCNC: 71 U/L
BASOPHILS # BLD AUTO: 0.12 K/UL
BASOPHILS NFR BLD: 0.8 %
BILIRUB SERPL-MCNC: 1.1 MG/DL
BUN SERPL-MCNC: 65 MG/DL
BUN SERPL-MCNC: 72 MG/DL
BUN SERPL-MCNC: 79 MG/DL
BUN SERPL-MCNC: 79 MG/DL
CALCIUM SERPL-MCNC: 9.1 MG/DL
CALCIUM SERPL-MCNC: 9.4 MG/DL
CHLORIDE SERPL-SCNC: 107 MMOL/L
CO2 SERPL-SCNC: 19 MMOL/L
CO2 SERPL-SCNC: 20 MMOL/L
CREAT SERPL-MCNC: 10.2 MG/DL
CREAT SERPL-MCNC: 10.2 MG/DL
CREAT SERPL-MCNC: 8.6 MG/DL
CREAT SERPL-MCNC: 9.6 MG/DL
DIFFERENTIAL METHOD: ABNORMAL
EOSINOPHIL # BLD AUTO: 0.3 K/UL
EOSINOPHIL NFR BLD: 2.4 %
ERYTHROCYTE [DISTWIDTH] IN BLOOD BY AUTOMATED COUNT: 28.7 %
EST. GFR  (AFRICAN AMERICAN): 6.2 ML/MIN/1.73 M^2
EST. GFR  (AFRICAN AMERICAN): 6.2 ML/MIN/1.73 M^2
EST. GFR  (AFRICAN AMERICAN): 6.7 ML/MIN/1.73 M^2
EST. GFR  (AFRICAN AMERICAN): 7.7 ML/MIN/1.73 M^2
EST. GFR  (NON AFRICAN AMERICAN): 5.4 ML/MIN/1.73 M^2
EST. GFR  (NON AFRICAN AMERICAN): 5.4 ML/MIN/1.73 M^2
EST. GFR  (NON AFRICAN AMERICAN): 5.8 ML/MIN/1.73 M^2
EST. GFR  (NON AFRICAN AMERICAN): 6.6 ML/MIN/1.73 M^2
GLUCOSE SERPL-MCNC: 79 MG/DL
GLUCOSE SERPL-MCNC: 79 MG/DL
GLUCOSE SERPL-MCNC: 83 MG/DL
GLUCOSE SERPL-MCNC: 97 MG/DL
HCT VFR BLD AUTO: 29.2 %
HGB BLD-MCNC: 8.9 G/DL
HYPOCHROMIA BLD QL SMEAR: ABNORMAL
IMM GRANULOCYTES # BLD AUTO: 0.08 K/UL
IMM GRANULOCYTES NFR BLD AUTO: 0.6 %
INR PPP: 1.1
LYMPHOCYTES # BLD AUTO: 2.1 K/UL
LYMPHOCYTES NFR BLD: 14.6 %
MAGNESIUM SERPL-MCNC: 3 MG/DL
MCH RBC QN AUTO: 22.2 PG
MCHC RBC AUTO-ENTMCNC: 30.5 G/DL
MCV RBC AUTO: 73 FL
MONOCYTES # BLD AUTO: 1.7 K/UL
MONOCYTES NFR BLD: 11.9 %
NEUTROPHILS # BLD AUTO: 9.8 K/UL
NEUTROPHILS NFR BLD: 69.7 %
NRBC BLD-RTO: 0 /100 WBC
PHOSPHATE SERPL-MCNC: 4 MG/DL
PHOSPHATE SERPL-MCNC: 4 MG/DL
PHOSPHATE SERPL-MCNC: 4.3 MG/DL
PLATELET # BLD AUTO: 317 K/UL
PLATELET BLD QL SMEAR: ABNORMAL
PMV BLD AUTO: 10 FL
POCT GLUCOSE: 106 MG/DL (ref 70–110)
POCT GLUCOSE: 79 MG/DL (ref 70–110)
POCT GLUCOSE: 82 MG/DL (ref 70–110)
POCT GLUCOSE: 89 MG/DL (ref 70–110)
POIKILOCYTOSIS BLD QL SMEAR: SLIGHT
POLYCHROMASIA BLD QL SMEAR: ABNORMAL
POTASSIUM SERPL-SCNC: 3.9 MMOL/L
POTASSIUM SERPL-SCNC: 3.9 MMOL/L
POTASSIUM SERPL-SCNC: 4.1 MMOL/L
POTASSIUM SERPL-SCNC: 4.1 MMOL/L
PROT SERPL-MCNC: 7.6 G/DL
PROTHROMBIN TIME: 11.4 SEC
RBC # BLD AUTO: 4.01 M/UL
SODIUM SERPL-SCNC: 139 MMOL/L
SODIUM SERPL-SCNC: 141 MMOL/L
SPHEROCYTES BLD QL SMEAR: ABNORMAL
STOMATOCYTES BLD QL SMEAR: PRESENT
TARGETS BLD QL SMEAR: ABNORMAL
WBC # BLD AUTO: 14.13 K/UL

## 2018-06-03 PROCEDURE — 84100 ASSAY OF PHOSPHORUS: CPT

## 2018-06-03 PROCEDURE — 83735 ASSAY OF MAGNESIUM: CPT | Mod: 91

## 2018-06-03 PROCEDURE — 85025 COMPLETE CBC W/AUTO DIFF WBC: CPT

## 2018-06-03 PROCEDURE — 25000003 PHARM REV CODE 250: Performed by: STUDENT IN AN ORGANIZED HEALTH CARE EDUCATION/TRAINING PROGRAM

## 2018-06-03 PROCEDURE — 85610 PROTHROMBIN TIME: CPT

## 2018-06-03 PROCEDURE — 80069 RENAL FUNCTION PANEL: CPT

## 2018-06-03 PROCEDURE — 63600175 PHARM REV CODE 636 W HCPCS: Performed by: STUDENT IN AN ORGANIZED HEALTH CARE EDUCATION/TRAINING PROGRAM

## 2018-06-03 PROCEDURE — 25000003 PHARM REV CODE 250: Performed by: NURSE PRACTITIONER

## 2018-06-03 PROCEDURE — 63600175 PHARM REV CODE 636 W HCPCS: Performed by: INTERNAL MEDICINE

## 2018-06-03 PROCEDURE — 94761 N-INVAS EAR/PLS OXIMETRY MLT: CPT

## 2018-06-03 PROCEDURE — 99232 SBSQ HOSP IP/OBS MODERATE 35: CPT | Mod: ,,, | Performed by: INTERNAL MEDICINE

## 2018-06-03 PROCEDURE — 80053 COMPREHEN METABOLIC PANEL: CPT

## 2018-06-03 PROCEDURE — 20000000 HC ICU ROOM

## 2018-06-03 RX ORDER — FUROSEMIDE 10 MG/ML
120 INJECTION INTRAMUSCULAR; INTRAVENOUS ONCE
Status: COMPLETED | OUTPATIENT
Start: 2018-06-03 | End: 2018-06-03

## 2018-06-03 RX ADMIN — METOPROLOL TARTRATE 50 MG: 50 TABLET, FILM COATED ORAL at 08:06

## 2018-06-03 RX ADMIN — HEPARIN SODIUM 5000 UNITS: 5000 INJECTION, SOLUTION INTRAVENOUS; SUBCUTANEOUS at 01:06

## 2018-06-03 RX ADMIN — ASPIRIN 81 MG CHEWABLE TABLET 81 MG: 81 TABLET CHEWABLE at 08:06

## 2018-06-03 RX ADMIN — HEPARIN SODIUM 5000 UNITS: 5000 INJECTION, SOLUTION INTRAVENOUS; SUBCUTANEOUS at 09:06

## 2018-06-03 RX ADMIN — FUROSEMIDE 120 MG: 10 INJECTION, SOLUTION INTRAMUSCULAR; INTRAVENOUS at 09:06

## 2018-06-03 RX ADMIN — HEPARIN SODIUM 5000 UNITS: 5000 INJECTION, SOLUTION INTRAVENOUS; SUBCUTANEOUS at 06:06

## 2018-06-03 RX ADMIN — METOPROLOL TARTRATE 50 MG: 50 TABLET, FILM COATED ORAL at 09:06

## 2018-06-03 NOTE — SUBJECTIVE & OBJECTIVE
Interval History:  >4 elements OR status of 3 inpatient conditions:  Patient is feeling well today, seems much more alert than when seen last by me on 06/01/18.  Notes no issues, he is eager to work with rehab and get back to his normal routine.  Started eating again, no BM yet but feels like he is going to have one today.  Wife at bedside, no concerns or questions at this time.  Discussed kidneys and possible need for dialysis as an outpatient.  Patient seems to understand this.    Review of Systems   Constitutional: Negative for chills and fever.   Eyes: Negative for visual disturbance.   Respiratory: Negative for cough.    Gastrointestinal: Negative for constipation, diarrhea, nausea and vomiting.   Genitourinary: Negative for difficulty urinating and flank pain.   Skin: Negative for rash and wound.   Neurological: Positive for speech difficulty (Mild slurring--improved from initial exam). Negative for weakness and numbness.   Hematological: Negative for adenopathy. Does not bruise/bleed easily.   Psychiatric/Behavioral: Negative for agitation and confusion.       Objective:     Vitals:  Temp: 98.7 °F (37.1 °C)  Pulse: 80  Rhythm: normal sinus rhythm  BP: 121/74  MAP (mmHg): 92  Resp: (!) 23  SpO2: 97 %  O2 Device (Oxygen Therapy): room air    Temp  Min: 97.2 °F (36.2 °C)  Max: 98.7 °F (37.1 °C)  Pulse  Min: 68  Max: 84  BP  Min: 105/57  Max: 144/84  MAP (mmHg)  Min: 76  Max: 109  Resp  Min: 15  Max: 26  SpO2  Min: 91 %  Max: 99 %    06/02 0701 - 06/03 0700  In: 1200 [P.O.:1200]  Out: -    Unmeasured Output  Stool Occurrence: 1     Physical Exam  General:  Well-developed, well-nourished, nad  HEENT:  NCAT, PERRLA, EOMI, oropharyngeal membranes non-erythematous/without exudate  Neck:  Supple, normal ROM without nuchal rigidity  Resp:  Symmetric expansion, no increased wob  CVS:  No LE edema, peripheral pulses 2+ (radial, dorsalis pedis)  GI:  Abd soft, obese, non-tender to palpation  Neurologic Exam:  Mental  Status:  Awake, alert, oriented to self, location, and year--not to month but able to tell me that the DOM Finals are on tonight.  Speech mildly slurred, but coherent.  Thought content appropriate.    Cranial Nerves:  VFs intact on counting fingers in all quadrants bilaterally.  PERRLA, EOMI.  Facial movement intact and symmetric.  Palate raises symmetrically, tongue protrudes midline.  Trapezius 5/5 bilaterally.  Motor:  Normal bulk and tone.  BUE shoulder abduction, biceps/triceps,  strength 5/5.  BLE hip flexors, knee flexion/extension, plantarflexion/dorsiflexion 5/5.  Sensory:  Intact to light touch at all extremities without inattention.  Vibratory sensation intact at BUE/BLE digits.  Reflexes:  Biceps, brachioradialis, patellar, Achilles 2+ and symmetric. No ankle clonus. Equivocal toe bilaterally.  Coordination:  No resting tremor or myoclonus.  FNF, EUGENIA, HTS without dysmetria/ataxia/dysdiadochokinesia.  Gait:  Deferred 2/2 fall risk.    Medications:  Continuous Scheduled  aspirin 81 mg Daily   chlorothiazide 500 mg Once   heparin (porcine) 5,000 Units Q8H   metoprolol tartrate 50 mg TID   PRN  sodium chloride 0.9%  PRN   acetaminophen 325 mg Q6H PRN   albuterol-ipratropium 3 mL Q6H PRN   dextrose 50% 12.5 g PRN   dextrose 50% 25 g PRN   fentaNYL 25 mcg Q2H PRN   glucagon (human recombinant) 1 mg PRN   glucose 16 g PRN   glucose 24 g PRN   insulin aspart U-100 1-10 Units Q6H PRN   ondansetron 4 mg Q8H PRN   sodium chloride 0.9% 3 mL PRN     Today I personally reviewed pertinent medications, lines/drains/airways, imaging, cardiology, lab results, microbiology results, notably:    Diet  Diet Dysphagia Pureed (IDDSI Level 4)  Diet Dysphagia Pureed (IDDSI Level 4)    Medications:  Per above    LDA:  R femoral trialysis catheter  PIV LUE    Imagin18 MRI Brain w/o contrast:  Several scattered small bilateral cerebellar infarcts.  There is a punctate focus of diffusion restriction in the left caudate  which may represent embolic infarcts versus sequela of hypoxic ischemic injury in light of history.  Clinical correlation and follow-up advised.  No evidence for hydrocephalus or parenchymal hemorrhage.       Cardiology:  05/19/18 2D Echo w/ CFD:  CONCLUSIONS     1 - Severe left ventricular enlargement.     2 - Eccentric hypertrophy.     3 - Severely depressed left ventricular systolic function (EF 10-15%).     4 - Impaired LV relaxation, normal LAP (grade 1 diastolic dysfunction).     5 - Right atrial enlargement.     6 - Right ventricular enlargement with moderately depressed systolic function.     7 - Moderate mitral regurgitation.     8 - Mild tricuspid regurgitation.     9 - Increased central venous pressure.      05/24/18 2D Echo w/ CFD:  CONCLUSIONS     1 - Eccentric hypertrophy.     2 - Severely depressed left ventricular systolic function (EF 10-15%).     3 - Biatrial enlargement.     4 - Impaired LV relaxation, normal LAP (grade 1 diastolic dysfunction).     5 - Right ventricular enlargement with moderately depressed systolic function.     6 - The estimated PA systolic pressure is 37 mmHg.     7 - Mild mitral regurgitation.     8 - Mild tricuspid regurgitation.     Labs:    Recent Labs  Lab 06/03/18  0323   WBC 14.13*   RBC 4.01*   HGB 8.9*   HCT 29.2*      MCV 73*   MCH 22.2*   MCHC 30.5*       Recent Labs  Lab 06/03/18  0323 06/03/18  1314   CALCIUM 9.4 9.1   PROT 7.6  --     141   K 3.9 3.9   CO2 19* 20*    107   BUN 65* 72*   CREATININE 8.6* 9.6*   ALKPHOS 175*  --    ALT 57*  --    AST 71*  --    BILITOT 1.1*  --      Microbiology:  Microbiology Results (last 7 days)     Procedure Component Value Units Date/Time    Blood culture [[id number]] Collected:  05/29/18 2332    Order Status:  Completed Specimen:  Blood from Peripheral, Upper Arm, Right Updated:  06/03/18 0612     Blood Culture, Routine No Growth to date    Narrative:       Blood cultures x 2 different sites. 4 bottles total.  Please  draw cultures before administering antibiotics.    Blood culture [[id number]] Collected:  05/29/18 2332    Order Status:  Completed Specimen:  Blood from Peripheral, Wrist, Left Updated:  06/02/18 1053     Blood Culture, Routine Gram stain aer bottle: Gram positive cocci in clusters resembling Staph      COAGULASE-NEGATIVE STAPHYLOCOCCUS SPECIES  Organism is a probable contaminant      Narrative:       Blood cultures from 2 different sites. 4 bottles total.  Please draw before starting antibiotics.    Clostridium difficile EIA [[id number]] Collected:  06/01/18 1611    Order Status:  Completed Specimen:  Stool from Stool Updated:  06/01/18 2145     C. diff Antigen Negative     C difficile Toxins A+B, EIA Negative     Comment: Testing not recommended for children <24 months old.       Culture, Respiratory with Gram Stain [[id number]]     Order Status:  Canceled Specimen:  Respiratory

## 2018-06-03 NOTE — ASSESSMENT & PLAN NOTE
Los Mendez is a 47 year old male who is consulted for MARY, which is mostly secondary to iATN from previous cardiac arrest where he presented with hypotension which decrease perfusion to the kidneys (required to be started on pressors). Has echo with EF 10-15%.     Plan:  - No dialysis treatment today  - Will reassess for need of dialysis tomorrow   - Had bladder scan and presented with 160 cc of urine, condom kelley was placed  - Gave Lasix 120 mg to see if starts making more urine     - Continue to follow intake and output    - Blood pressures have been stable.  - No longer require oxygen

## 2018-06-03 NOTE — PLAN OF CARE
Problem: Patient Care Overview  Goal: Plan of Care Review  Outcome: Ongoing (interventions implemented as appropriate)  POC reviewed with pt and wife at 1400. Pt and wife verbalized understanding. Questions and concerns addressed. No acute events today.  Flexy was removed and pt was able to verbalize need to go to the bathroom and use the bedpan. Pt progressing toward goals. Will continue to monitor. See flowsheets for full assessment and VS info.

## 2018-06-03 NOTE — PROGRESS NOTES
[facility]  Neurocritical Care  Progress Note    Admit Date: 5/19/2018  Service Date: 06/03/2018  Length of Stay: 15    Subjective:     Chief Complaint: Anoxic brain injury    History of Present Illness: 48 y/o man w/ hx of HTN, COPD? Transferred from [facility] following witnessed cardiac arrest while in the ED waiting room. Patient had presented complaining of URI/SOB symptoms, while in the ED waiting room he was noted to be choking, became apneic, which led to witnessed seizure like activity followed by cardiac arrest. Per discussion with ED staff, patient required about 22 minutes of ACLS/5 shocks before returning to normal sinus rhythm. During intubation a lozenge was removed from patients airway. Hyperthermia protocol was initiated prior to transferring to [facility] for NCC care. Per ED records, patient was acidotic with ph of 7.1 this morning. At the time of arrival to NICU, patient was on paralytics, however pupillary reflex was present. Will continue hypothermia protocol for additional 24 h.    Hospital Course: 05/19/18:  Arrived intubated, sedated, artic sun blanket  05/20/18:  Pressor requirment going up, NO DIURESIS, continue TTM, nimbex off, LFT improving, trop improving, family updated.  05/21/18:  Reach normothermia, MRI today, remove EEG, place HD line, consult nephrology, LFTs trending down, family updated.  05/22/18:  Anoxic brain injury. Pt responding. Last night placed on propofol  MRI --L head of caudate and cerebellar small infarctions.  05/23/18:  Anoxic brain injury.  On CRRT. BNP 1046. Back on propofol, pressors. Still agitated. Started on Buspar. Trop I normalizing.  05/25/18:  CRRT today. Amiodarone dc'd, metoprolol started. CBC h1j--uw H/H continues to drop will consult GI. Vascular surgery consulted concerning  Possible arterial occlusion. RUE cold. Arterial line dcd. US RUE pending. Abdominal distention, KUB showed gas pattern. Bladder pressures q4h. Initial 21.  05/28/18:   H/H 6.8/23, 1 unit PRBC transfused. Keep Hgb greater than or equal to 8. CRRT stopped, line clotted. Changed trialysis catheter changed. Wean sedation today. Once off sedation begin weaning ventilator settings to CPAP.  Weaned vent to CPAP and able to Extubated in afternoon without s/s stridor or difficulty breathing. HR elevated 140s - 160s fentanyl x2 prn for pain/agitation. Metoprolol 5mg IV x3 given remains ST 140s.  Restless, will start precedex.  05/30/18:  Leukocytosis, pan cx; kelley discontinued; remove IJ, speech eval for diet   05/31/18:  No acute events, still with gastric secretions coming out of NG tube will start reglan; remains encephalopathic  06/01/18:  Will hold NGT suction, NPO per SLP.  Continue metoclopramide.  Hgb stable at 8.3 g/dL.  6/2 Continue NG tube for now. Will advance to pureed diet as recommended by SLP and evaluate intake before removing NG.. WBC remain elevated but afebrile, cultures negative and ISA/Diamond 5/24 showed no DVT only thrombophlebitis of LUE cephalic vein. Continue to monitor daily CBC. Nephrology following will hold HD today and possibly tomorrow depending on labs    Interval History:  >4 elements OR status of 3 inpatient conditions:  Patient is feeling well today, seems much more alert than when seen last by me on 06/01/18.  Notes no issues, he is eager to work with rehab and get back to his normal routine.  Started eating again, no BM yet but feels like he is going to have one today.  Wife at bedside, no concerns or questions at this time.  Discussed kidneys and possible need for dialysis as an outpatient.  Patient seems to understand this.    Review of Systems   Constitutional: Negative for chills and fever.   Eyes: Negative for visual disturbance.   Respiratory: Negative for cough.    Gastrointestinal: Negative for constipation, diarrhea, nausea and vomiting.   Genitourinary: Negative for difficulty urinating and flank pain.   Skin: Negative for rash and wound.    Neurological: Positive for speech difficulty (Mild slurring--improved from initial exam). Negative for weakness and numbness.   Hematological: Negative for adenopathy. Does not bruise/bleed easily.   Psychiatric/Behavioral: Negative for agitation and confusion.       Objective:     Vitals:  Temp: 98.7 °F (37.1 °C)  Pulse: 80  Rhythm: normal sinus rhythm  BP: 121/74  MAP (mmHg): 92  Resp: (!) 23  SpO2: 97 %  O2 Device (Oxygen Therapy): room air    Temp  Min: 97.2 °F (36.2 °C)  Max: 98.7 °F (37.1 °C)  Pulse  Min: 68  Max: 84  BP  Min: 105/57  Max: 144/84  MAP (mmHg)  Min: 76  Max: 109  Resp  Min: 15  Max: 26  SpO2  Min: 91 %  Max: 99 %    06/02 0701 - 06/03 0700  In: 1200 [P.O.:1200]  Out: -    Unmeasured Output  Stool Occurrence: 1     Physical Exam  General:  Well-developed, well-nourished, nad  HEENT:  NCAT, PERRLA, EOMI, oropharyngeal membranes non-erythematous/without exudate  Neck:  Supple, normal ROM without nuchal rigidity  Resp:  Symmetric expansion, no increased wob  CVS:  No LE edema, peripheral pulses 2+ (radial, dorsalis pedis)  GI:  Abd soft, obese, non-tender to palpation  Neurologic Exam:  Mental Status:  Awake, alert, oriented to self, location, and year--not to month but able to tell me that the DOM Finals are on tonight.  Speech mildly slurred, but coherent.  Thought content appropriate.    Cranial Nerves:  VFs intact on counting fingers in all quadrants bilaterally.  PERRLA, EOMI.  Facial movement intact and symmetric.  Palate raises symmetrically, tongue protrudes midline.  Trapezius 5/5 bilaterally.  Motor:  Normal bulk and tone.  BUE shoulder abduction, biceps/triceps,  strength 5/5.  BLE hip flexors, knee flexion/extension, plantarflexion/dorsiflexion 5/5.  Sensory:  Intact to light touch at all extremities without inattention.  Vibratory sensation intact at BUE/BLE digits.  Reflexes:  Biceps, brachioradialis, patellar, Achilles 2+ and symmetric. No ankle clonus. Equivocal toe  bilaterally.  Coordination:  No resting tremor or myoclonus.  FNF, EUGENIA, HTS with mild dysmetria on FNF (R > L).  Gait:  Deferred 2/2 fall risk.    Medications:  Continuous Scheduled  aspirin 81 mg Daily   chlorothiazide 500 mg Once   heparin (porcine) 5,000 Units Q8H   metoprolol tartrate 50 mg TID   PRN  sodium chloride 0.9%  PRN   acetaminophen 325 mg Q6H PRN   albuterol-ipratropium 3 mL Q6H PRN   dextrose 50% 12.5 g PRN   dextrose 50% 25 g PRN   fentaNYL 25 mcg Q2H PRN   glucagon (human recombinant) 1 mg PRN   glucose 16 g PRN   glucose 24 g PRN   insulin aspart U-100 1-10 Units Q6H PRN   ondansetron 4 mg Q8H PRN   sodium chloride 0.9% 3 mL PRN     Today I personally reviewed pertinent medications, lines/drains/airways, imaging, cardiology, lab results, microbiology results, notably:    Diet  Diet Dysphagia Pureed (IDDSI Level 4)  Diet Dysphagia Pureed (IDDSI Level 4)    Medications:  Per above    LDA:  R femoral trialysis catheter  PIV LUE    Imagin18 MRI Brain w/o contrast:  Several scattered small bilateral cerebellar infarcts.  There is a punctate focus of diffusion restriction in the left caudate which may represent embolic infarcts versus sequela of hypoxic ischemic injury in light of history.  Clinical correlation and follow-up advised.  No evidence for hydrocephalus or parenchymal hemorrhage.       Cardiology:  18 2D Echo w/ CFD:  CONCLUSIONS     1 - Severe left ventricular enlargement.     2 - Eccentric hypertrophy.     3 - Severely depressed left ventricular systolic function (EF 10-15%).     4 - Impaired LV relaxation, normal LAP (grade 1 diastolic dysfunction).     5 - Right atrial enlargement.     6 - Right ventricular enlargement with moderately depressed systolic function.     7 - Moderate mitral regurgitation.     8 - Mild tricuspid regurgitation.     9 - Increased central venous pressure.      18 2D Echo w/ CFD:  CONCLUSIONS     1 - Eccentric hypertrophy.     2 - Severely  depressed left ventricular systolic function (EF 10-15%).     3 - Biatrial enlargement.     4 - Impaired LV relaxation, normal LAP (grade 1 diastolic dysfunction).     5 - Right ventricular enlargement with moderately depressed systolic function.     6 - The estimated PA systolic pressure is 37 mmHg.     7 - Mild mitral regurgitation.     8 - Mild tricuspid regurgitation.     Labs:    Recent Labs  Lab 06/03/18  0323   WBC 14.13*   RBC 4.01*   HGB 8.9*   HCT 29.2*      MCV 73*   MCH 22.2*   MCHC 30.5*       Recent Labs  Lab 06/03/18  0323 06/03/18  1314   CALCIUM 9.4 9.1   PROT 7.6  --     141   K 3.9 3.9   CO2 19* 20*    107   BUN 65* 72*   CREATININE 8.6* 9.6*   ALKPHOS 175*  --    ALT 57*  --    AST 71*  --    BILITOT 1.1*  --      Microbiology:  Microbiology Results (last 7 days)     Procedure Component Value Units Date/Time    Blood culture [[id number]] Collected:  05/29/18 2332    Order Status:  Completed Specimen:  Blood from Peripheral, Upper Arm, Right Updated:  06/03/18 0612     Blood Culture, Routine No Growth to date    Narrative:       Blood cultures x 2 different sites. 4 bottles total. Please  draw cultures before administering antibiotics.    Blood culture [[id number]] Collected:  05/29/18 2332    Order Status:  Completed Specimen:  Blood from Peripheral, Wrist, Left Updated:  06/02/18 1053     Blood Culture, Routine Gram stain aer bottle: Gram positive cocci in clusters resembling Staph      COAGULASE-NEGATIVE STAPHYLOCOCCUS SPECIES  Organism is a probable contaminant      Narrative:       Blood cultures from 2 different sites. 4 bottles total.  Please draw before starting antibiotics.    Clostridium difficile EIA [[id number]] Collected:  06/01/18 1611    Order Status:  Completed Specimen:  Stool from Stool Updated:  06/01/18 2145     C. diff Antigen Negative     C difficile Toxins A+B, EIA Negative     Comment: Testing not recommended for children <24 months old.       Culture,  Respiratory with Gram Stain [[id number]]     Order Status:  Canceled Specimen:  Respiratory        Assessment/Plan:     Neuro   * Anoxic brain injury    -Cardiac arrest ~ 22 minutes at OSH.  Pt doing very well from neurologic standpoint.  -05/21/18 MRI w/o evidence of anoxic brain injury, but several small strokes  -Encephalopathy likely multifactorial--strokes, metabolic (kidney/liver injury), delirium  -Continue ASA 81 mg daily  -Fentanyl 25 mcg q2h prn agitation        Pulmonary   Pulmonary edema    -No known hx of CHF, URI sxs x 1 mo per paramedic on arrival  -06/01/18 CXR stable, mild edema and cardiomegaly  -1 x  mg furosemide.  Continue daily weights, strict I&Os.        Acute respiratory failure with hypoxia    -Intubated on arrival--extubated 05/28/18  -Satting 94% on RA.    -Continue duonebs q6h prn SOB  -6/1 CXR: mild pulmonary edema   -s/p IV furosemide 120 mg 6/3/18   -Follow up 6/4 CXR        Cardiac/Vascular   Acute decompensated heart failure    -05/24/18 Echo w/ EF 10-15%  -Continue metoprol 50 mg tid        Essential hypertension    -BP over past 24 h:  /57-86  -Holding home antihypertensives  -Started metoprolol 50 mg tid (SVT on EKG; HFrEF tx); diuresing        Cardiac arrest    -3 cardiac arrests prior to arrival 5/19  -Troponin trended down  -Continue metoprolol 50 mg tid  -05/24/18 2D Echo notable for EF 10-15%        Renal/   MARY (acute kidney injury)    -Per above, BUN and Cr continue to trend up--72, 9.6  -Nephrology following, will hold HD for today  -IV furosemide 120 mg x 1 06/03/18  -Mental status improving, no signs of uremic encephalopathy        Acute renal failure with tubular necrosis    -06/03/18 BUN 72, Cr 9.6  -Nephrology following, recs for furosemide--assessing for HD on daily basis  -R femoral trialysis catheter placed, unclear whether pt will need longterm HD        Oncology   Acute blood loss anemia    -Difficulty with CRRT machine, dialysis line clotting; H/H  trended down 6.8/23, transfused 2 U PRBCs  -06/02/18 Hgb 8.7/Hct 29.3 trending up.  No signs of acute blood loss currently.  -Continue CBC qd, transfuse for Hgb < 8.0 g/dL        Other   Class 3 severe obesity due to excess calories with serious comorbidity and body mass index (BMI) of 40.0 to 44.9 in adult    -S/p nutrition consult; counseled on lifestyle modifications  Body mass index is 48.54 kg/m².         Prophylaxis:  Venous Thromboembolism: mechanical chemical  Stress Ulcer: None  Ventilator Pneumonia: not applicable     Activity Orders          None        Full Code    Brittany Rodriguez MD  Neurocritical Care  [facility]

## 2018-06-03 NOTE — ASSESSMENT & PLAN NOTE
-Difficulty with CRRT machine, dialysis line clotting; H/H trended down 6.8/23, transfused 2 U PRBCs  -06/02/18 Hgb 8.7/Hct 29.3 trending up.  No signs of acute blood loss currently.  -Continue CBC qd, transfuse for Hgb < 8.0 g/dL

## 2018-06-03 NOTE — ASSESSMENT & PLAN NOTE
-Per above, BUN and Cr continue to trend up--72, 9.6  -Nephrology following, will hold HD for today  -IV furosemide 120 mg x 1 06/03/18  -Mental status improving, no signs of uremic encephalopathy

## 2018-06-03 NOTE — PLAN OF CARE
Problem: Patient Care Overview  Goal: Plan of Care Review  Outcome: Ongoing (interventions implemented as appropriate)  Pt remains free from falls and injuries. R fem trialysis, flexiseal, and NGT remain. Blood glucose monitored as ordered. Pt repositioned frequently. Plan of care discussed with pt and wife, who remains at bedside. VSS, no acute issues overnight.

## 2018-06-03 NOTE — PROGRESS NOTES
[facility]  Nephrology  Progress Note    Patient Name: Los Mendez  MRN: [id number]  Admission Date: 5/19/2018  Hospital Length of Stay: 15 days  Attending Provider: Tunde Smalls MD   Primary Care Physician: Provider Notinsystem  Principal Problem:Anoxic brain injury    Subjective:     HPI: Los Mendez is a 47 year old male with past medical history of HTN, heavy alcohol user and COPD. Transferred from [facility] following witnessed cardiac arrest while in the ED waiting room. Patient had presented complaining of upper respiratory track infection and shortness of breath symptoms, while in the ED waiting room he was noted to be choking, became apneic, which led to witnessed seizure like activity followed by cardiac arrest. Per discussion with ED staff, patient required about 22 minutes of ACLS/5 shocks before returning to normal sinus rhythm. During intubation a lozenge was removed from patients airway. Hyperthermia protocol was initiated prior to transferring to [facility]. On arrival to [facility] Arrived with increased serum creatinine from 2.9-->5.1 (unkown baseline), BUN 35--> 52, Trop 2.4-->1.3, Lactic acidosis 6.4-->1.3, chest x ray with increased parenchymal interstitial attenuation and parenchymal opacities suggestive of pulmonary edema. On mechanical ventilation with a FiO2 50%. Currently also anuric at present moment. Per cardiology patient has a dilated cardiomyopathy EF looks about 10-15% and LVEDD is 7.3 cm.    Interval History:   Patient evaluated at bedside, no significant event overnight.     Review of patient's allergies indicates:   Allergen Reactions    Penicillins      Tolerated cefepime May 2018     Current Facility-Administered Medications   Medication Frequency    0.9%  NaCl infusion PRN    acetaminophen tablet 325 mg Q6H PRN    albuterol-ipratropium 2.5 mg-0.5 mg/3 mL nebulizer solution 3 mL Q6H PRN    aspirin chewable tablet 81 mg Daily    dextrose 50% injection 12.5 g PRN     dextrose 50% injection 25 g PRN    fentaNYL injection 25 mcg Q2H PRN    glucagon (human recombinant) injection 1 mg PRN    glucose chewable tablet 16 g PRN    glucose chewable tablet 24 g PRN    heparin (porcine) injection 5,000 Units Q8H    insulin aspart U-100 pen 1-10 Units Q6H PRN    metoprolol tartrate (LOPRESSOR) tablet 50 mg TID    ondansetron 4 mg/5 mL solution 4 mg Q8H PRN    sodium chloride 0.9% flush 3 mL PRN       Objective:     Vital Signs (Most Recent):  Temp: 98.4 °F (36.9 °C) (06/03/18 0705)  Pulse: 78 (06/03/18 1111)  Resp: (!) 24 (06/03/18 1111)  BP: 130/74 (06/03/18 0905)  SpO2: 99 % (06/03/18 1111)  O2 Device (Oxygen Therapy): room air (06/03/18 1111) Vital Signs (24h Range):  Temp:  [97.2 °F (36.2 °C)-99.1 °F (37.3 °C)] 98.4 °F (36.9 °C)  Pulse:  [68-84] 78  Resp:  [14-26] 24  SpO2:  [91 %-99 %] 99 %  BP: (105-159)/(57-92) 130/74     Weight: (!) 144.8 kg (319 lb 3.6 oz) (05/31/18 0300)  Body mass index is 48.54 kg/m².  Body surface area is 2.64 meters squared.    I/O last 3 completed shifts:  In: 1385 [P.O.:1200; I.V.:40; NG/GT:145]  Out: 450 [Drains:450]    Physical Exam   Constitutional: Nasal cannula in place.   HENT:   Head: Normocephalic and atraumatic.   Right Ear: External ear normal.   Left Ear: External ear normal.   NGT   Eyes: Right eye exhibits no discharge. Left eye exhibits no discharge.   Neck: No JVD present.   Cardiovascular: Normal rate and regular rhythm.  Exam reveals no friction rub.    Rt femoral trialysis cath    Pulmonary/Chest: He has rales.   Abdominal: Soft. He exhibits no distension.   Neurological:       Skin: Skin is warm.       Significant Labs:  ABGs:   Recent Labs  Lab 05/31/18 0612   PH 7.406   PCO2 48.4*   HCO3 30.4*   POCSATURATED 98   BE 6     BMP:   Recent Labs  Lab 06/03/18 0323   GLU 83      CO2 19*   BUN 65*   CREATININE 8.6*   CALCIUM 9.4   MG 3.0*     CBC:   Recent Labs  Lab 06/03/18 0323   WBC 14.13*   RBC 4.01*   HGB 8.9*   HCT  29.2*      MCV 73*   MCH 22.2*   MCHC 30.5*     CMP:   Recent Labs  Lab 06/03/18  0323   GLU 83   CALCIUM 9.4   ALBUMIN 2.7*   PROT 7.6      K 3.9   CO2 19*      BUN 65*   CREATININE 8.6*   ALKPHOS 175*   ALT 57*   AST 71*   BILITOT 1.1*     All labs within the past 24 hours have been reviewed.       Assessment/Plan:     MARY (acute kidney injury)    Los Mendez is a 47 year old male who is consulted for MARY, which is mostly secondary to iATN from previous cardiac arrest where he presented with hypotension which decrease perfusion to the kidneys (required to be started on pressors). Has echo with EF 10-15%.     Plan:  - No dialysis treatment today  - Will reassess for need of dialysis tomorrow   - Had bladder scan and presented with 160 cc of urine, condom kellye was placed  - Gave Lasix 120 mg to see if starts making more urine     - Continue to follow intake and output    - Blood pressures have been stable.  - No longer require oxygen             Sascha Leone  Nephrology  Fellow  [facility]    Pager [phone]

## 2018-06-03 NOTE — ASSESSMENT & PLAN NOTE
-No known hx of CHF, URI sxs x 1 mo per paramedic on arrival  -06/01/18 CXR stable, mild edema and cardiomegaly  -1 x  mg furosemide.  Continue daily weights, strict I&Os.

## 2018-06-03 NOTE — ASSESSMENT & PLAN NOTE
-BP over past 24 h:  /57-86  -Holding home antihypertensives  -Started metoprolol 50 mg tid (SVT on EKG; HFrEF tx); yinging

## 2018-06-03 NOTE — ASSESSMENT & PLAN NOTE
-3 cardiac arrests prior to arrival 5/19  -Troponin trended down  -Continue metoprolol 50 mg tid  -05/24/18 2D Echo notable for EF 10-15%

## 2018-06-03 NOTE — SUBJECTIVE & OBJECTIVE
Interval History:   Patient evaluated at bedside, no significant event overnight.     Review of patient's allergies indicates:   Allergen Reactions    Penicillins      Tolerated cefepime May 2018     Current Facility-Administered Medications   Medication Frequency    0.9%  NaCl infusion PRN    acetaminophen tablet 325 mg Q6H PRN    albuterol-ipratropium 2.5 mg-0.5 mg/3 mL nebulizer solution 3 mL Q6H PRN    aspirin chewable tablet 81 mg Daily    dextrose 50% injection 12.5 g PRN    dextrose 50% injection 25 g PRN    fentaNYL injection 25 mcg Q2H PRN    glucagon (human recombinant) injection 1 mg PRN    glucose chewable tablet 16 g PRN    glucose chewable tablet 24 g PRN    heparin (porcine) injection 5,000 Units Q8H    insulin aspart U-100 pen 1-10 Units Q6H PRN    metoprolol tartrate (LOPRESSOR) tablet 50 mg TID    ondansetron 4 mg/5 mL solution 4 mg Q8H PRN    sodium chloride 0.9% flush 3 mL PRN       Objective:     Vital Signs (Most Recent):  Temp: 98.4 °F (36.9 °C) (06/03/18 0705)  Pulse: 78 (06/03/18 1111)  Resp: (!) 24 (06/03/18 1111)  BP: 130/74 (06/03/18 0905)  SpO2: 99 % (06/03/18 1111)  O2 Device (Oxygen Therapy): room air (06/03/18 1111) Vital Signs (24h Range):  Temp:  [97.2 °F (36.2 °C)-99.1 °F (37.3 °C)] 98.4 °F (36.9 °C)  Pulse:  [68-84] 78  Resp:  [14-26] 24  SpO2:  [91 %-99 %] 99 %  BP: (105-159)/(57-92) 130/74     Weight: (!) 144.8 kg (319 lb 3.6 oz) (05/31/18 0300)  Body mass index is 48.54 kg/m².  Body surface area is 2.64 meters squared.    I/O last 3 completed shifts:  In: 1385 [P.O.:1200; I.V.:40; NG/GT:145]  Out: 450 [Drains:450]    Physical Exam   Constitutional: Nasal cannula in place.   HENT:   Head: Normocephalic and atraumatic.   Right Ear: External ear normal.   Left Ear: External ear normal.   NGT   Eyes: Right eye exhibits no discharge. Left eye exhibits no discharge.   Neck: No JVD present.   Cardiovascular: Normal rate and regular rhythm.  Exam reveals no friction  rub.    Rt femoral trialysis cath    Pulmonary/Chest: He has rales.   Abdominal: Soft. He exhibits no distension.   Neurological:       Skin: Skin is warm.       Significant Labs:  ABGs:   Recent Labs  Lab 05/31/18 0612   PH 7.406   PCO2 48.4*   HCO3 30.4*   POCSATURATED 98   BE 6     BMP:   Recent Labs  Lab 06/03/18 0323   GLU 83      CO2 19*   BUN 65*   CREATININE 8.6*   CALCIUM 9.4   MG 3.0*     CBC:   Recent Labs  Lab 06/03/18 0323   WBC 14.13*   RBC 4.01*   HGB 8.9*   HCT 29.2*      MCV 73*   MCH 22.2*   MCHC 30.5*     CMP:   Recent Labs  Lab 06/03/18 0323   GLU 83   CALCIUM 9.4   ALBUMIN 2.7*   PROT 7.6      K 3.9   CO2 19*      BUN 65*   CREATININE 8.6*   ALKPHOS 175*   ALT 57*   AST 71*   BILITOT 1.1*     All labs within the past 24 hours have been reviewed.

## 2018-06-03 NOTE — ASSESSMENT & PLAN NOTE
-Intubated on arrival--extubated 05/28/18  -Satting 94% on RA.    -Continue duonebs q6h prn SOB  -6/1 CXR: mild pulmonary edema   -s/p IV furosemide 120 mg 6/3/18   -Follow up 6/4 CXR

## 2018-06-03 NOTE — ASSESSMENT & PLAN NOTE
-06/03/18 BUN 72, Cr 9.6  -Nephrology following, recs for furosemide--assessing for HD on daily basis  -R femoral trialysis catheter placed, unclear whether pt will need longterm HD

## 2018-06-03 NOTE — ASSESSMENT & PLAN NOTE
-Cardiac arrest ~ 22 minutes at OSH.  Pt doing very well from neurologic standpoint.  -05/21/18 MRI w/o evidence of anoxic brain injury, but several small strokes  -Encephalopathy likely multifactorial--strokes, metabolic (kidney/liver injury), delirium  -Continue ASA 81 mg daily  -Fentanyl 25 mcg q2h prn agitation

## 2018-06-04 PROBLEM — I63.89 ACUTE BILAT WATERSHED INFARCTION: Status: ACTIVE | Noted: 2018-05-21

## 2018-06-04 LAB
ALBUMIN SERPL BCP-MCNC: 2.8 G/DL
ALP SERPL-CCNC: 169 U/L
ALT SERPL W/O P-5'-P-CCNC: 53 U/L
ANION GAP SERPL CALC-SCNC: 10 MMOL/L
ANION GAP SERPL CALC-SCNC: 10 MMOL/L
ANION GAP SERPL CALC-SCNC: 16 MMOL/L
ANION GAP SERPL CALC-SCNC: 16 MMOL/L
ANISOCYTOSIS BLD QL SMEAR: SLIGHT
AST SERPL-CCNC: 60 U/L
BACTERIA BLD CULT: NORMAL
BASO STIPL BLD QL SMEAR: ABNORMAL
BASOPHILS # BLD AUTO: 0.15 K/UL
BASOPHILS NFR BLD: 1.2 %
BILIRUB SERPL-MCNC: 1 MG/DL
BUN SERPL-MCNC: 56 MG/DL
BUN SERPL-MCNC: 56 MG/DL
BUN SERPL-MCNC: 81 MG/DL
BUN SERPL-MCNC: 81 MG/DL
BURR CELLS BLD QL SMEAR: ABNORMAL
CALCIUM SERPL-MCNC: 8.8 MG/DL
CALCIUM SERPL-MCNC: 8.8 MG/DL
CALCIUM SERPL-MCNC: 9.3 MG/DL
CALCIUM SERPL-MCNC: 9.3 MG/DL
CHLORIDE SERPL-SCNC: 104 MMOL/L
CHLORIDE SERPL-SCNC: 104 MMOL/L
CHLORIDE SERPL-SCNC: 106 MMOL/L
CHLORIDE SERPL-SCNC: 106 MMOL/L
CO2 SERPL-SCNC: 18 MMOL/L
CO2 SERPL-SCNC: 18 MMOL/L
CO2 SERPL-SCNC: 24 MMOL/L
CO2 SERPL-SCNC: 24 MMOL/L
CREAT SERPL-MCNC: 10.3 MG/DL
CREAT SERPL-MCNC: 10.3 MG/DL
CREAT SERPL-MCNC: 7.2 MG/DL
CREAT SERPL-MCNC: 7.2 MG/DL
DACRYOCYTES BLD QL SMEAR: ABNORMAL
DIFFERENTIAL METHOD: ABNORMAL
EOSINOPHIL # BLD AUTO: 0.3 K/UL
EOSINOPHIL NFR BLD: 2.4 %
ERYTHROCYTE [DISTWIDTH] IN BLOOD BY AUTOMATED COUNT: 28.4 %
EST. GFR  (AFRICAN AMERICAN): 6.2 ML/MIN/1.73 M^2
EST. GFR  (AFRICAN AMERICAN): 6.2 ML/MIN/1.73 M^2
EST. GFR  (AFRICAN AMERICAN): 9.5 ML/MIN/1.73 M^2
EST. GFR  (AFRICAN AMERICAN): 9.5 ML/MIN/1.73 M^2
EST. GFR  (NON AFRICAN AMERICAN): 5.3 ML/MIN/1.73 M^2
EST. GFR  (NON AFRICAN AMERICAN): 5.3 ML/MIN/1.73 M^2
EST. GFR  (NON AFRICAN AMERICAN): 8.2 ML/MIN/1.73 M^2
EST. GFR  (NON AFRICAN AMERICAN): 8.2 ML/MIN/1.73 M^2
GIANT PLATELETS BLD QL SMEAR: PRESENT
GLUCOSE SERPL-MCNC: 79 MG/DL
HAV IGM SERPL QL IA: NEGATIVE
HBV CORE IGM SERPL QL IA: NEGATIVE
HBV SURFACE AG SERPL QL IA: NEGATIVE
HCT VFR BLD AUTO: 29.7 %
HCV AB SERPL QL IA: NEGATIVE
HGB BLD-MCNC: 9 G/DL
HYPOCHROMIA BLD QL SMEAR: ABNORMAL
IMM GRANULOCYTES # BLD AUTO: 0.06 K/UL
IMM GRANULOCYTES NFR BLD AUTO: 0.5 %
INR PPP: 1.1
LYMPHOCYTES # BLD AUTO: 2.1 K/UL
LYMPHOCYTES NFR BLD: 16.9 %
MAGNESIUM SERPL-MCNC: 2.5 MG/DL
MAGNESIUM SERPL-MCNC: 2.5 MG/DL
MAGNESIUM SERPL-MCNC: 3.1 MG/DL
MCH RBC QN AUTO: 22 PG
MCHC RBC AUTO-ENTMCNC: 30.3 G/DL
MCV RBC AUTO: 72 FL
MONOCYTES # BLD AUTO: 1.4 K/UL
MONOCYTES NFR BLD: 11.5 %
NEUTROPHILS # BLD AUTO: 8.2 K/UL
NEUTROPHILS NFR BLD: 67.5 %
NRBC BLD-RTO: 0 /100 WBC
OVALOCYTES BLD QL SMEAR: ABNORMAL
PHOSPHATE SERPL-MCNC: 2.4 MG/DL
PHOSPHATE SERPL-MCNC: 4.4 MG/DL
PHOSPHATE SERPL-MCNC: 4.4 MG/DL
PLATELET # BLD AUTO: 339 K/UL
PLATELET BLD QL SMEAR: ABNORMAL
PMV BLD AUTO: 10.5 FL
POCT GLUCOSE: 112 MG/DL (ref 70–110)
POCT GLUCOSE: 63 MG/DL (ref 70–110)
POCT GLUCOSE: 65 MG/DL (ref 70–110)
POCT GLUCOSE: 67 MG/DL (ref 70–110)
POCT GLUCOSE: 74 MG/DL (ref 70–110)
POCT GLUCOSE: 76 MG/DL (ref 70–110)
POCT GLUCOSE: 77 MG/DL (ref 70–110)
POCT GLUCOSE: 80 MG/DL (ref 70–110)
POCT GLUCOSE: 90 MG/DL (ref 70–110)
POCT GLUCOSE: 92 MG/DL (ref 70–110)
POIKILOCYTOSIS BLD QL SMEAR: SLIGHT
POLYCHROMASIA BLD QL SMEAR: ABNORMAL
POTASSIUM SERPL-SCNC: 4 MMOL/L
POTASSIUM SERPL-SCNC: 4 MMOL/L
POTASSIUM SERPL-SCNC: 4.2 MMOL/L
POTASSIUM SERPL-SCNC: 4.2 MMOL/L
PROT SERPL-MCNC: 7.6 G/DL
PROTHROMBIN TIME: 11.6 SEC
RBC # BLD AUTO: 4.1 M/UL
SCHISTOCYTES BLD QL SMEAR: ABNORMAL
SODIUM SERPL-SCNC: 138 MMOL/L
SODIUM SERPL-SCNC: 138 MMOL/L
SODIUM SERPL-SCNC: 140 MMOL/L
SODIUM SERPL-SCNC: 140 MMOL/L
SPHEROCYTES BLD QL SMEAR: ABNORMAL
STOMATOCYTES BLD QL SMEAR: PRESENT
TARGETS BLD QL SMEAR: ABNORMAL
WBC # BLD AUTO: 12.21 K/UL

## 2018-06-04 PROCEDURE — 97112 NEUROMUSCULAR REEDUCATION: CPT

## 2018-06-04 PROCEDURE — 25000003 PHARM REV CODE 250: Performed by: NURSE PRACTITIONER

## 2018-06-04 PROCEDURE — 97530 THERAPEUTIC ACTIVITIES: CPT

## 2018-06-04 PROCEDURE — 99233 SBSQ HOSP IP/OBS HIGH 50: CPT | Mod: ,,, | Performed by: PSYCHIATRY & NEUROLOGY

## 2018-06-04 PROCEDURE — 94761 N-INVAS EAR/PLS OXIMETRY MLT: CPT

## 2018-06-04 PROCEDURE — 85610 PROTHROMBIN TIME: CPT

## 2018-06-04 PROCEDURE — 80100014 HC HEMODIALYSIS 1:1

## 2018-06-04 PROCEDURE — 84100 ASSAY OF PHOSPHORUS: CPT

## 2018-06-04 PROCEDURE — 92526 ORAL FUNCTION THERAPY: CPT

## 2018-06-04 PROCEDURE — 99232 SBSQ HOSP IP/OBS MODERATE 35: CPT | Mod: ,,, | Performed by: INTERNAL MEDICINE

## 2018-06-04 PROCEDURE — 80053 COMPREHEN METABOLIC PANEL: CPT

## 2018-06-04 PROCEDURE — 25000003 PHARM REV CODE 250: Performed by: STUDENT IN AN ORGANIZED HEALTH CARE EDUCATION/TRAINING PROGRAM

## 2018-06-04 PROCEDURE — 36593 DECLOT VASCULAR DEVICE: CPT

## 2018-06-04 PROCEDURE — 83735 ASSAY OF MAGNESIUM: CPT

## 2018-06-04 PROCEDURE — 20000000 HC ICU ROOM

## 2018-06-04 PROCEDURE — 63600175 PHARM REV CODE 636 W HCPCS: Mod: JG | Performed by: INTERNAL MEDICINE

## 2018-06-04 PROCEDURE — 92523 SPEECH SOUND LANG COMPREHEN: CPT

## 2018-06-04 PROCEDURE — 80069 RENAL FUNCTION PANEL: CPT

## 2018-06-04 PROCEDURE — 63600175 PHARM REV CODE 636 W HCPCS: Performed by: STUDENT IN AN ORGANIZED HEALTH CARE EDUCATION/TRAINING PROGRAM

## 2018-06-04 PROCEDURE — 85025 COMPLETE CBC W/AUTO DIFF WBC: CPT

## 2018-06-04 PROCEDURE — 90935 HEMODIALYSIS ONE EVALUATION: CPT

## 2018-06-04 PROCEDURE — 97535 SELF CARE MNGMENT TRAINING: CPT

## 2018-06-04 RX ADMIN — ASPIRIN 81 MG CHEWABLE TABLET 81 MG: 81 TABLET CHEWABLE at 08:06

## 2018-06-04 RX ADMIN — Medication 16 G: at 04:06

## 2018-06-04 RX ADMIN — HEPARIN SODIUM 5000 UNITS: 5000 INJECTION, SOLUTION INTRAVENOUS; SUBCUTANEOUS at 02:06

## 2018-06-04 RX ADMIN — HEPARIN SODIUM 5000 UNITS: 5000 INJECTION, SOLUTION INTRAVENOUS; SUBCUTANEOUS at 09:06

## 2018-06-04 RX ADMIN — HEPARIN SODIUM 5000 UNITS: 5000 INJECTION, SOLUTION INTRAVENOUS; SUBCUTANEOUS at 06:06

## 2018-06-04 RX ADMIN — ALTEPLASE 4 MG: 2.2 INJECTION, POWDER, LYOPHILIZED, FOR SOLUTION INTRAVENOUS at 03:06

## 2018-06-04 RX ADMIN — METOPROLOL TARTRATE 50 MG: 50 TABLET, FILM COATED ORAL at 08:06

## 2018-06-04 RX ADMIN — METOPROLOL TARTRATE 50 MG: 50 TABLET, FILM COATED ORAL at 02:06

## 2018-06-04 NOTE — PLAN OF CARE
Problem: Patient Care Overview  Goal: Plan of Care Review  Outcome: Ongoing (interventions implemented as appropriate)  POC reviewed with Losakbar Mendez and wife at bedside at 0500. Pt able to verbalize understanding, but needs constant reinforcement. Wife verbalized understanding. Questions and concerns addressed. No acute events overnight. Pt progressing toward goals. Will continue to monitor. See flowsheets for full assessment and VS info. POC is to try HD again. Pt awake throughout most of night, could benefit from sleeping aid, kept turning and taking off all wires throughout night - will pass along to AM RN.

## 2018-06-04 NOTE — PROGRESS NOTES
Patient taken to STAT CT scan of head via bed and portable monitor by Rashaun Lance RN. Tolerated transfer well. Now resting comfortable in bed on cell phone. Will monitor.

## 2018-06-04 NOTE — PLAN OF CARE
06/04/18 1312   Discharge Reassessment   Assessment Type Discharge Planning Reassessment   Provided patient/caregiver education on the expected discharge date and the discharge plan No   Do you have any problems affording any of your prescribed medications? No   Discharge Plan A Rehab   Discharge Plan B Home with family;New Nursing Home placement - residential care facility   Patient choice form signed by patient/caregiver N/A   Can the patient answer the patient profile reliably? No, cognitively impaired   How does the patient rate their overall health at the present time? (cody)   Describe the patient's ability to walk at the present time. Does not walk or unable to take any steps at all   How often would a person be available to care for the patient? Whenever needed   Number of comorbid conditions (as recorded on the chart) Three   During the past month, has the patient often been bothered by feeling down, depressed or hopeless? (cody)   During the past month, has the patient often been bothered by little interest or pleasure in doing things? (cody)       Patient not medically stable for discharge.    Nisha Hansen RN, CCRN-K, [facility]  Neuro-Critical Care   [phone]

## 2018-06-04 NOTE — PROGRESS NOTES
Patient was found on ground after unwitnessed fall. Bed alarm was on and went off and I checked the patient and he was on the ground. Side rails were up as well. The patient stated that he did not hit his head and was stood up and placed back in bed. No visible deficits noted otherwise, pupils equal and reactive at 3 mm, patient following commands and orientation unchanged from previous assessment. No visible bruising or ecchymosis seen on patient's head. Dr. Meier and JOSE Rendon at bedside to assess patient.

## 2018-06-04 NOTE — PROGRESS NOTES
[facility]  Neurocritical Care  Progress Note    Admit Date: 5/19/2018  Service Date: 06/04/2018  Length of Stay: 16    Subjective:     Chief Complaint: Anoxic brain injury    History of Present Illness: 48 y/o man w/ hx of HTN, COPD? Transferred from [facility] following witnessed cardiac arrest while in the ED waiting room. Patient had presented complaining of URI/SOB symptoms, while in the ED waiting room he was noted to be choking, became apneic, which led to witnessed seizure like activity followed by cardiac arrest. Per discussion with ED staff, patient required about 22 minutes of ACLS/5 shocks before returning to normal sinus rhythm. During intubation a lozenge was removed from patients airway. Hyperthermia protocol was initiated prior to transferring to [facility] for NCC care. Per ED records, patient was acidotic with ph of 7.1 this morning. At the time of arrival to NICU, patient was on paralytics, however pupillary reflex was present. Will continue hypothermia protocol for additional 24 h.    Hospital Course: 05/19/18:  Arrived intubated, sedated, artic sun blanket  05/20/18:  Pressor requirment going up, NO DIURESIS, continue TTM, nimbex off, LFT improving, trop improving, family updated.  05/21/18:  Reach normothermia, MRI today, remove EEG, place HD line, consult nephrology, LFTs trending down, family updated.  05/22/18:  Anoxic brain injury. Pt responding. Last night placed on propofol  MRI --L head of caudate and cerebellar small infarctions.  05/23/18:  Anoxic brain injury.  On CRRT. BNP 1046. Back on propofol, pressors. Still agitated. Started on Buspar. Trop I normalizing.  05/25/18:  CRRT today. Amiodarone dc'd, metoprolol started. CBC l6g--sg H/H continues to drop will consult GI. Vascular surgery consulted concerning  Possible arterial occlusion. RUE cold. Arterial line dcd. US RUE pending. Abdominal distention, KUB showed gas pattern. Bladder pressures q4h. Initial 21.  05/28/18:   H/H 6.8/23, 1 unit PRBC transfused. Keep Hgb greater than or equal to 8. CRRT stopped, line clotted. Changed trialysis catheter changed. Wean sedation today. Once off sedation begin weaning ventilator settings to CPAP.  Weaned vent to CPAP and able to Extubated in afternoon without s/s stridor or difficulty breathing. HR elevated 140s - 160s fentanyl x2 prn for pain/agitation. Metoprolol 5mg IV x3 given remains ST 140s.  Restless, will start precedex.  05/30/18:  Leukocytosis, pan cx; kelley discontinued; remove IJ, speech eval for diet   05/31/18:  No acute events, still with gastric secretions coming out of NG tube will start reglan; remains encephalopathic  06/01/18:  Will hold NGT suction, NPO per SLP.  Continue metoclopramide.  Hgb stable at 8.3 g/dL.  6/2 Continue NG tube for now. Will advance to pureed diet as recommended by SLP and evaluate intake before removing NG.. WBC remain elevated but afebrile, cultures negative and USUE/Diamond 5/24 showed no DVT only thrombophlebitis of LUE cephalic vein. Continue to monitor daily CBC. Nephrology following will hold HD today and possibly tomorrow depending on labs  6/4: patient fell from bed en route to bathroom (unassisted), CT no ICH, patient neuro stable, step down    Review of Symptoms:   Constitutional: Denies fevers or chills.  ENT: no hearing difficulty, no visual changes  Pulmonary: Denies shortness of breath or cough.  Cardiology: Denies chest pain or palpitations.  GI: Denies abdominal pain or constipation.  Neurologic: Denies new weakness, headaches, or paresthesias.   : no dysuria  Musk: no muscle pain, no joint pain  Psych: no hallucinations    Physical Exam:  GA: Alert, comfortable, no acute distress.   HEENT: No scleral icterus or JVD.   Pulmonary: Clear to auscultation A/L. No wheezing, crackles, or rhonchi.  Cardiac: RRR S1 & S2 w/o rubs/murmurs/gallops.   Abdominal: Bowel sounds present x 4. No appreciable hepatosplenomegaly.  Skin: No jaundice,  rashes, or visible lesions.  Pulses:    Neuro:  --sedation: none  --GCS: E4V4M6  --Mental Status:  AOx2 intermittent to location and time  --CN II-XII grossly intact.   --Pupils 3-->2mm, PERRL.   --brainstem: intact  --Motor: 5/5 throughout  --sensory: intact to soft touch and pain throughout  --Reflexes: not tested  --Gait: deferred      Recent Labs  Lab 06/04/18  0402   WBC 12.21   RBC 4.10*   HGB 9.0*   HCT 29.7*      MCV 72*   MCH 22.0*   MCHC 30.3*       Recent Labs  Lab 06/04/18  0402   CALCIUM 9.3  9.3   PROT 7.6     140   K 4.2  4.2   CO2 18*  18*     106   BUN 81*  81*   CREATININE 10.3*  10.3*   ALKPHOS 169*   ALT 53*   AST 60*   BILITOT 1.0       Recent Labs  Lab 06/04/18  0402   INR 1.1          Temp: 98.2 °F (36.8 °C)  Pulse: 76  Rhythm: normal sinus rhythm  BP: 115/64  MAP (mmHg): 85  Resp: (!) 22  SpO2: 99 %  O2 Device (Oxygen Therapy): room air    Temp  Min: 97.5 °F (36.4 °C)  Max: 98.7 °F (37.1 °C)  Pulse  Min: 65  Max: 82  BP  Min: 98/59  Max: 150/86  MAP (mmHg)  Min: 73  Max: 112  Resp  Min: 16  Max: 34  SpO2  Min: 92 %  Max: 99 %    06/03 0701 - 06/04 0700  In: 1610 [P.O.:1610]  Out: -    Unmeasured Output  Stool Occurrence: 1    Nutrition Prescription Ordered  Current Diet Order: Dysphagia Pureed   Nutrition Order Comments: Pt report tolerating well with good intake  Current Nutrition Support Formula Ordered: Novasource Renal  Current Nutrition Support Rate Ordered: 10 (ml) (goal of 40)  Current Nutrition Support Frequency Ordered: mL/hr  Last Bowel Movement: 06/04/18    Body mass index is 37.64 kg/m².      I have personally reviewed all labs, imaging, and studies today      Assessment/Plan:     Neuro   * Anoxic brain injury    -Cardiac arrest ~ 22 minutes at OSH.  Pt doing very well from neurologic standpoint.  -05/21/18 MRI w/o evidence of anoxic brain injury, but several small strokes  -mild encephalopathy  (post anoxic)  -repeat CT head stable          Cytotoxic  cerebral edema    - secondary to anoxic brain injury, 22 minutes PEA-->vfib  - MRI brain 5/21 for anoxic assessment.  see anoxic brain injury           Pulmonary   Pulmonary edema    - continue lasix per nephrology              Acute respiratory failure with hypoxia    Extubated successfully           Cardiac/Vascular   Acute decompensated heart failure    -05/24/18 Echo w/ EF 10-15%  -Continue metoprol 50 mg tid        Essential hypertension    -BP over past 24 h:  /57-86  -Holding home antihypertensives  -Started metoprolol 50 mg tid (SVT on EKG; HFrEF tx); diuresing        Cardiac arrest    -3 cardiac arrests prior to arrival 5/19  -Troponin trended down  -Continue metoprolol 50 mg tid  -05/24/18 2D Echo notable for EF 10-15%          Renal/   MARY (acute kidney injury)    -Per above, BUN and Cr continue to trend up--72, 9.6  -Nephrology following, will hold HD for today  -IV furosemide 120 mg per nephrology  -Mental status improving, no signs of uremic encephalopathy        Acute renal failure with tubular necrosis    -06/03/18 BUN 72, Cr 9.6  -Nephrology following, recs for furosemide--assessing for HD on daily basis  -R femoral trialysis catheter placed, unclear whether pt will need longterm HD  -lasix per nephrology        Other   Class 3 severe obesity due to excess calories with serious comorbidity and body mass index (BMI) of 40.0 to 44.9 in adult    -S/p nutrition consult; counseled on lifestyle modifications  Body mass index is 37.64 kg/m².              Prophylaxis:  Venous Thromboembolism: chemical  Stress Ulcer: NA  Ventilator Pneumonia: not applicable     Activity Orders          None        Full Code    Keith Cohen MD  Neurocritical Care  [facility]    Level III

## 2018-06-04 NOTE — SUBJECTIVE & OBJECTIVE
Interval History: last received HD on Friday with 2L removed. Started having UOP over the weekend; received lasix 120mg IV x1 around 9am yesterday. No UOP recorded. Doing okay this morning. Complaining on left sided weakness. No SOB. Reports small amount of UOP with BM this AM; none since. Drinking orange juice and milk.     Review of patient's allergies indicates:   Allergen Reactions    Penicillins      Tolerated cefepime May 2018     Current Facility-Administered Medications   Medication Frequency    0.9%  NaCl infusion PRN    acetaminophen tablet 325 mg Q6H PRN    albuterol-ipratropium 2.5 mg-0.5 mg/3 mL nebulizer solution 3 mL Q6H PRN    aspirin chewable tablet 81 mg Daily    dextrose 50% injection 12.5 g PRN    dextrose 50% injection 25 g PRN    fentaNYL injection 25 mcg Q2H PRN    glucagon (human recombinant) injection 1 mg PRN    glucose chewable tablet 16 g PRN    glucose chewable tablet 24 g PRN    heparin (porcine) injection 5,000 Units Q8H    insulin aspart U-100 pen 1-10 Units Q6H PRN    metoprolol tartrate (LOPRESSOR) tablet 50 mg TID    ondansetron 4 mg/5 mL solution 4 mg Q8H PRN    sodium chloride 0.9% flush 3 mL PRN       Objective:     Vital Signs (Most Recent):  Temp: 98.2 °F (36.8 °C) (06/04/18 1130)  Pulse: 76 (06/04/18 1300)  Resp: (!) 22 (06/04/18 1300)  BP: 115/64 (06/04/18 1300)  SpO2: 99 % (06/04/18 1300)  O2 Device (Oxygen Therapy): room air (06/04/18 1149) Vital Signs (24h Range):  Temp:  [97.5 °F (36.4 °C)-98.7 °F (37.1 °C)] 98.2 °F (36.8 °C)  Pulse:  [65-82] 76  Resp:  [16-34] 22  SpO2:  [92 %-99 %] 99 %  BP: ()/(56-93) 115/64     Weight: 112.3 kg (247 lb 9.2 oz) (06/04/18 0900)  Body mass index is 37.64 kg/m².  Body surface area is 2.32 meters squared.    I/O last 3 completed shifts:  In: 2560 [P.O.:2560]  Out: -     Physical Exam   Constitutional: He appears well-developed and well-nourished. No distress.   HENT:   Head: Normocephalic and atraumatic.   Neck: Neck  supple. No thyromegaly present.   Cardiovascular: Normal rate and regular rhythm.    Pulmonary/Chest: He has decreased breath sounds. He has no wheezes. He has no rales.   Abdominal: Soft. He exhibits no distension.   Musculoskeletal: He exhibits no edema or deformity.   Skin: Skin is warm and dry. He is not diaphoretic.       Significant Labs:  CBC:   Recent Labs  Lab 06/04/18  0402   WBC 12.21   RBC 4.10*   HGB 9.0*   HCT 29.7*      MCV 72*   MCH 22.0*   MCHC 30.3*     CMP:   Recent Labs  Lab 06/04/18  0402   GLU 79  79   CALCIUM 9.3  9.3   ALBUMIN 2.8*  2.8*   PROT 7.6     140   K 4.2  4.2   CO2 18*  18*     106   BUN 81*  81*   CREATININE 10.3*  10.3*   ALKPHOS 169*   ALT 53*   AST 60*   BILITOT 1.0     All labs within the past 24 hours have been reviewed.     Significant Imaging:  Labs: Reviewed

## 2018-06-04 NOTE — PROGRESS NOTES
HD treatment stopped after 30 minutes arterial pressure to high Dr. Mascorro notified and cathflo instilled.

## 2018-06-04 NOTE — PLAN OF CARE
Problem: Physical Therapy Goal  Goal: Physical Therapy Goal    Goals to be met by 6/15/2018    1. Pt will perform supine to sit from both sides of the bed with supervision   2. Pt will perform sit to supine with supervision   3. Pt will perform sit to stand transfers with stand by assistance without AD   4. Pt will perform bed <> chair transfers with stand by assistance with or without least restrictive AD.   5. Pt will perform gait x 50 feet with contact guard assistance with or without least restrictive AD.   6. Pt will perform dynamic standing balance activities x  5 minutes with stand by assistance to reduce fall risk   Outcome: Ongoing (interventions implemented as appropriate)  Goals updated 2/2 pt progress; continue current POC, progressing as tolerated.     Bushra Cannon, PT, DPT   6/4/2018  Pager: [phone]

## 2018-06-04 NOTE — PT/OT/SLP EVAL
Speech Language Pathology Evaluation  Cognitive/Bedside Swallow    Patient Name:  Los Mendez   MRN:  [id number]  Admitting Diagnosis: Anoxic brain injury    Recommendations:                  General Recommendations:  Dysphagia therapy and Cognitive-linguistic therapy  Diet recommendations:  Dental Soft, Thin   Aspiration Precautions: 1 bite/sip at a time, Assistance with meals, HOB to 90 degrees, Meds whole 1 at a time and Strict aspiration precautions   General Precautions: Standard, aphasia, aspiration, fall    History:     History reviewed. No pertinent past medical history.    History reviewed. No pertinent surgical history.    Please refer to initial assessment     Subjective     Pt awake and cooperative     Pain/Comfort:  · Pain Rating 1: 0/10  · Pain Rating Post-Intervention 1: 0/10    Objective:     Cognitive Status:    Arousal/Alertness Appropriate response to stimuli  Attention Sustained attention deficit very tangential warranting frequent redirection to tasks  Orientation Person and Place  Memory Immediate Recall poor appearing limited by attention deficits  and Delayed impaired unable to truly assess secondary to poor immediate recall   Problem Solving Categories impaired, Solutions warranting max vercueing and reidrection to task  and Compare/contrast 50% acc   Safety awareness poor      Receptive Language:   Comprehension: with max cueing to tasks Pt underlying attention deficits impacting overall performance      Questions Simple yes/no 100% acc ind  Commands  One step WFL  two step basic commands WFL  multistep basic commands WFL     Pragmatics:    topic maintenance significanlty diminished     Expressive Language:  Verbal:    Automatic Speech  Counting WFL and Days of the week WFL  Naming Confrontation WFL and Single word responsive naming WFL; Divergent naming- Pt able to provide avg 6 items per category   Conversational speech fair; poor attention and decreased topic maintenance       Motor  Speech:  Dysarthria mild     Voice:   strong and clear     Visual-Spatial:  not yet assessed     Reading:   ongoing assessment warranted      Written Expression:   ongoing assessment warranted     Oral Musculature Evaluation  · Oral Musculature:  (unable to assess 2/2 pt's limited participation)  · Dentition: scattered dentition  · Mucosal Quality: dry, sticky  · Volitional Swallow:  (unable to assess 2/2 pt's limited participation)  · Voice Prior to PO Intake:  (limited verbal output, mumbled speech-- however pt with raspy vocal quality)    Treatment: Pt offered trials of regular solids. Pt managing in a timely fashion without any notable oral residue. Given Pt attention deficits at this time Pt appearing safe to advance diet to dental soft solids and thin liquids.     Assessment:     Los Mendez is a 47 y.o. male with an SLP diagnosis of Cognitive-Linguistic Impairment.     Goals:    SLP Goals        Problem: SLP Goal    Goal Priority Disciplines Outcome   SLP Goal     SLP Ongoing (interventions implemented as appropriate)   Description:  Short Term Goals expected to be met by 6/11:    1. Pt will tolerate diet of soft solids and thin liquids without overt clinical signs of aspiration   2. Pt will complete problem solving skills w/75 % acc ind to enhance safety awareness   3 Pt will generate 10 items per category to enhance organizations skills   4 Pt will demonstrate sustained attention to task across 5 consecutive minutes w/ min cues to enhance attention skills   5. Pt will participate in ongoing assessment of speech language and cognitive linguistic skills to help rule out deficits and determine therapeutic plan of care                          Plan:     · Patient to be seen:  4 x/week   · Plan of Care expires:     · Plan of Care reviewed with:  patient, spouse   · SLP Follow-Up:  Yes       Discharge recommendations:  Discharge Facility/Level Of Care Needs: rehabilitation facility   Barriers to Discharge:   Level of Skilled Assistance Needed      Time Tracking:     SLP Treatment Date:   06/04/18  Speech Start Time:  0948  Speech Stop Time:  1007     Speech Total Time (min):  19 min    Billable Minutes: Eval 10  and Treatment Swallowing Dysfunction 9    Deann Rosado CCC-SLP  06/04/2018

## 2018-06-04 NOTE — HPI
"47M tx from [facility] after witnessed cardiac arrest while in the ED waiting room, tx to [facility] 5/19/18 intubated w/ arctic sun.   Initial presentation to ED was for URI/SOB, then had apneic / choking episode followed by "seizure activity" and then the cardiac arrest.   ACLS time = 22 minutes w/ 5 shocks w/ eventual RSC. Patient went through hypothermia protocol.    MRI was obtained on 5/21 which showed punctate scattered infarcts within the bilateral cerebellar hemispheres and another small area of questionable restriction seen in the left caudate head.    We have been consulted to review MRI as patient will be transferring to hospital medicine today.  "

## 2018-06-04 NOTE — ASSESSMENT & PLAN NOTE
-Per above, BUN and Cr continue to trend up--72, 9.6  -Nephrology following, will hold HD for today  -IV furosemide 120 mg per nephrology  -Mental status improving, no signs of uremic encephalopathy

## 2018-06-04 NOTE — PLAN OF CARE
Problem: Patient Care Overview  Goal: Plan of Care Review  Outcome: Ongoing (interventions implemented as appropriate)  Plan of care reviewed with patient and family. VSS. Taken to CT scan after fall. One episode hypoglycemia so far treated with oral juice/milk/PRN medication. All questions answered. Will monitor.

## 2018-06-04 NOTE — SUBJECTIVE & OBJECTIVE
History reviewed. No pertinent past medical history.  History reviewed. No pertinent surgical history.  History reviewed. No pertinent family history.  Social History   Substance Use Topics    Smoking status: Unknown If Ever Smoked    Smokeless tobacco: Current User     Types: Chew    Alcohol use Not on file     Review of patient's allergies indicates:   Allergen Reactions    Penicillins      Tolerated cefepime May 2018       Medications: I have reviewed the current medication administration record.    No prescriptions prior to admission.       Review of Systems   Constitutional: Negative for appetite change, chills and fever.   HENT: Negative for congestion and sore throat.    Eyes: Negative for discharge and itching.   Respiratory: Negative for apnea and shortness of breath.    Cardiovascular: Negative for chest pain and palpitations.   Gastrointestinal: Negative for abdominal pain and anal bleeding.   Endocrine: Negative for cold intolerance and polydipsia.   Genitourinary: Negative for dysuria and hematuria.   Musculoskeletal: Negative for joint swelling and myalgias.   Skin: Negative for color change and rash.   Neurological: Negative for tremors.   Psychiatric/Behavioral: Negative for hallucinations and self-injury.     Objective:     Vital Signs (Most Recent):  Temp: 98.2 °F (36.8 °C) (06/04/18 0700)  Pulse: 75 (06/04/18 1112)  Resp: (!) 25 (06/04/18 1112)  BP: 131/64 (06/04/18 1112)  SpO2: 98 % (06/04/18 1112)    Vital Signs Range (Last 24H):  Temp:  [97.5 °F (36.4 °C)-98.7 °F (37.1 °C)]   Pulse:  [65-83]   Resp:  [16-34]   BP: ()/(56-93)   SpO2:  [92 %-99 %]     Physical Exam   Constitutional: He appears well-nourished. No distress.   HENT:   Head: Atraumatic.   Right Ear: External ear normal.   Left Ear: External ear normal.   Eyes: Conjunctivae are normal. No scleral icterus.   Neck: Normal range of motion.   Pulmonary/Chest: Effort normal.   Abdominal: He exhibits no distension. There is no  guarding.   Musculoskeletal: Normal range of motion. He exhibits no deformity.   Neurological: He is alert.   Skin: Skin is warm and dry.   Psychiatric: He has a normal mood and affect.       Neurological Exam:   LOC: alert  Attention Span: poor  Language: No aphasia  Articulation: No dysarthria  Orientation: poor time, situation, age  Visual Fields: Full  EOM (CN III, IV, VI): Full/intact  Motor: symmetrical motor strengthg=  Cebellar: No evidence of appendicular or axial ataxia  Sensation: in tact light touvh      Laboratory:  CMP:   Recent Labs  Lab 06/04/18  0402   CALCIUM 9.3  9.3   ALBUMIN 2.8*  2.8*   PROT 7.6     140   K 4.2  4.2   CO2 18*  18*     106   BUN 81*  81*   CREATININE 10.3*  10.3*   ALKPHOS 169*   ALT 53*   AST 60*   BILITOT 1.0     CBC:   Recent Labs  Lab 06/04/18  0402   WBC 12.21   RBC 4.10*   HGB 9.0*   HCT 29.7*      MCV 72*   MCH 22.0*   MCHC 30.3*     Lipid Panel: No results for input(s): CHOL, LDLCALC, HDL, TRIG in the last 168 hours.  Coagulation:   Recent Labs  Lab 06/04/18  0402   INR 1.1     Hgb A1C: No results for input(s): HGBA1C in the last 168 hours.  TSH: No results for input(s): TSH in the last 168 hours.    Diagnostic Results:      Brain imaging:  MRI 5/21/18  - Cytotoxic cerebral edema identified on imaging studies consistent with ischemic stroke in bilateral cerebellar hemispheres, appears watershed in nature; also area of punctate DWI+ in left caudate head, difficult to correlate on ADC      Cardiac Evaluation:   TTE 5/24  CONCLUSIONS     1 - Eccentric hypertrophy.     2 - Severely depressed left ventricular systolic function (EF 10-15%).     3 - Biatrial enlargement.     4 - Impaired LV relaxation, normal LAP (grade 1 diastolic dysfunction).     5 - Right ventricular enlargement with moderately depressed systolic function.     6 - The estimated PA systolic pressure is 37 mmHg.     7 - Mild mitral regurgitation.     8 - Mild tricuspid  regurgitation.     TTE 5/19  CONCLUSIONS     1 - Severe left ventricular enlargement.     2 - Eccentric hypertrophy.     3 - Severely depressed left ventricular systolic function (EF 10-15%).     4 - Impaired LV relaxation, normal LAP (grade 1 diastolic dysfunction).     5 - Right atrial enlargement.     6 - Right ventricular enlargement with moderately depressed systolic function.     7 - Moderate mitral regurgitation.     8 - Mild tricuspid regurgitation.     9 - Increased central venous pressure.

## 2018-06-04 NOTE — ASSESSMENT & PLAN NOTE
- baseline renal function unknown. Renal US without report of CKD-related changes though kidneys more echogenic than liver.   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 LVEF 10-15%.   - last received HD on Friday  - starting to make urine; but remains oliguric  - no improvement in sCr yet  - repeat bladder scan today  - will plan for HD today. UF goal 1-2L as tolerated to match intake (net +1.6L yesterday).   - drinking OJ and milk this AM. Recommend adding renal restrictions to diet.

## 2018-06-04 NOTE — CONSULTS
[facility]  Vascular Neurology  Comprehensive Stroke Center  Consult Note    Inpatient consult to Vascular (Stroke) Neurology  Consult performed by: YOSVANY DRISCOLL  Consult ordered by: CHEL CERDA        Assessment/Plan:     Patient is a 47 y.o. year old male with:    Acute bilat watershed infarction    MRI w/ incidental find of what appear to be bilateral cerebellar watershed infarcts in the setting of cardiac arrest x ~22 minutes w/ several defibrillation attempts. At this time these infarcts are for the most part asymptomatic however he does overall findings of poor attention and concentration and disorientation, as to be expected in setting of anoxic injury. No further recommendations or neurovascular workup necessary at this time.    Antithrombotics for secondary stroke prevention: Antiplatelets: None: mechanism unlikely to be thrombotic in etiology    Statins for secondary stroke prevention and hyperlipidemia, if present:   No atherosclerotic mechanism for infarcts, statin not required     Rehab efforts: PT/OT/SLP to evaluate and treat    Diagnostics ordered/pending: None     VTE prophylaxis: per primary team    BP parameters: goal is long term BP < 130/80 mmHg            Cytotoxic cerebral edema    Noted on imaging as incidental findings of watershed infarct pattern on MRI            STROKE DOCUMENTATION          NIH Scale:  1a. Level Of Consciousness: 0-->Alert: keenly responsive  1b. LOC Questions: 2-->Answers neither question correctly  1c. LOC Commands: 0-->Performs both tasks correctly  2. Best Gaze: 0-->Normal  3. Visual: 0-->No visual loss  4. Facial Palsy: 1-->Minor paralysis (flattened nasolabial fold, asymmetry on smiling) (left)  5a. Motor Arm, Left: 0-->No drift: limb holds 90 (or 45) degrees for full 10 secs  5b. Motor Arm, Right: 0-->No drift: limb holds 90 (or 45) degrees for full 10 secs  6a. Motor Leg, Left: 0-->No drift: leg holds 30 degree position for full 5  "secs  6b. Motor Leg, Right: 0-->No drift: leg holds 30 degree position for full 5 secs  7. Limb Ataxia: 0-->Absent  8. Sensory: 0-->Normal: no sensory loss  9. Best Language: 0-->No aphasia: normal  10. Dysarthria: 0-->Normal  11. Extinction and Inattention (formerly Neglect): 0-->No abnormality  Total (NIH Stroke Scale): 3    Modified Melodie    Shalini Coma Scale:    ABCD2 Score:    [id number] Score:   HAS -BLED Score:   ICH Score:   Hunt & Sanchez Classification:       Thrombolysis Candidate? No, Out of window       Interventional Revascularization Candidate?   Is the patient eligible for mechanical endovascular reperfusion (LYNNETTE)?  No; No significant neurological deficit      Hemorrhagic change of an Ischemic Stroke: Does this patient have an ischemic stroke with hemorrhagic changes? No     Subjective:     History of Present Illness:  47M tx from [facility] after witnessed cardiac arrest while in the ED waiting room, tx to [facility] 5/19/18 intubated w/ arctic sun.   Initial presentation to ED was for URI/SOB, then had apneic / choking episode followed by "seizure activity" and then the cardiac arrest.   ACLS time = 22 minutes w/ 5 shocks w/ eventual RSC. Patient went through hypothermia protocol.    MRI was obtained on 5/21 which showed punctate scattered infarcts within the bilateral cerebellar hemispheres and another small area of questionable restriction seen in the left caudate head.    We have been consulted to review MRI as patient will be transferring to hospital medicine today.        History reviewed. No pertinent past medical history.  History reviewed. No pertinent surgical history.  History reviewed. No pertinent family history.  Social History   Substance Use Topics    Smoking status: Unknown If Ever Smoked    Smokeless tobacco: Current User     Types: Chew    Alcohol use Not on file     Review of patient's allergies indicates:   Allergen Reactions    Penicillins      Tolerated cefepime May 2018 "       Medications: I have reviewed the current medication administration record.    No prescriptions prior to admission.       Review of Systems   Constitutional: Negative for appetite change, chills and fever.   HENT: Negative for congestion and sore throat.    Eyes: Negative for discharge and itching.   Respiratory: Negative for apnea and shortness of breath.    Cardiovascular: Negative for chest pain and palpitations.   Gastrointestinal: Negative for abdominal pain and anal bleeding.   Endocrine: Negative for cold intolerance and polydipsia.   Genitourinary: Negative for dysuria and hematuria.   Musculoskeletal: Negative for joint swelling and myalgias.   Skin: Negative for color change and rash.   Neurological: Negative for tremors.   Psychiatric/Behavioral: Negative for hallucinations and self-injury.     Objective:     Vital Signs (Most Recent):  Temp: 98.2 °F (36.8 °C) (06/04/18 0700)  Pulse: 75 (06/04/18 1112)  Resp: (!) 25 (06/04/18 1112)  BP: 131/64 (06/04/18 1112)  SpO2: 98 % (06/04/18 1112)    Vital Signs Range (Last 24H):  Temp:  [97.5 °F (36.4 °C)-98.7 °F (37.1 °C)]   Pulse:  [65-83]   Resp:  [16-34]   BP: ()/(56-93)   SpO2:  [92 %-99 %]     Physical Exam   Constitutional: He appears well-nourished. No distress.   HENT:   Head: Atraumatic.   Right Ear: External ear normal.   Left Ear: External ear normal.   Eyes: Conjunctivae are normal. No scleral icterus.   Neck: Normal range of motion.   Pulmonary/Chest: Effort normal.   Abdominal: He exhibits no distension. There is no guarding.   Musculoskeletal: Normal range of motion. He exhibits no deformity.   Neurological: He is alert.   Skin: Skin is warm and dry.   Psychiatric: He has a normal mood and affect.       Neurological Exam:   LOC: alert  Attention Span: poor  Language: No aphasia  Articulation: No dysarthria  Orientation: poor time, situation, age  Visual Fields: Full  EOM (CN III, IV, VI): Full/intact  Motor: symmetrical motor  strengthg=  Cebellar: No evidence of appendicular or axial ataxia  Sensation: in tact light touvh      Laboratory:  CMP:   Recent Labs  Lab 06/04/18  0402   CALCIUM 9.3  9.3   ALBUMIN 2.8*  2.8*   PROT 7.6     140   K 4.2  4.2   CO2 18*  18*     106   BUN 81*  81*   CREATININE 10.3*  10.3*   ALKPHOS 169*   ALT 53*   AST 60*   BILITOT 1.0     CBC:   Recent Labs  Lab 06/04/18  0402   WBC 12.21   RBC 4.10*   HGB 9.0*   HCT 29.7*      MCV 72*   MCH 22.0*   MCHC 30.3*     Lipid Panel: No results for input(s): CHOL, LDLCALC, HDL, TRIG in the last 168 hours.  Coagulation:   Recent Labs  Lab 06/04/18 0402   INR 1.1     Hgb A1C: No results for input(s): HGBA1C in the last 168 hours.  TSH: No results for input(s): TSH in the last 168 hours.    Diagnostic Results:      Brain imaging:  MRI 5/21/18  - Cytotoxic cerebral edema identified on imaging studies consistent with ischemic stroke in bilateral cerebellar hemispheres, appears watershed in nature; also area of punctate DWI+ in left caudate head, difficult to correlate on ADC      Cardiac Evaluation:   TTE 5/24  CONCLUSIONS     1 - Eccentric hypertrophy.     2 - Severely depressed left ventricular systolic function (EF 10-15%).     3 - Biatrial enlargement.     4 - Impaired LV relaxation, normal LAP (grade 1 diastolic dysfunction).     5 - Right ventricular enlargement with moderately depressed systolic function.     6 - The estimated PA systolic pressure is 37 mmHg.     7 - Mild mitral regurgitation.     8 - Mild tricuspid regurgitation.     TTE 5/19  CONCLUSIONS     1 - Severe left ventricular enlargement.     2 - Eccentric hypertrophy.     3 - Severely depressed left ventricular systolic function (EF 10-15%).     4 - Impaired LV relaxation, normal LAP (grade 1 diastolic dysfunction).     5 - Right atrial enlargement.     6 - Right ventricular enlargement with moderately depressed systolic function.     7 - Moderate mitral regurgitation.     8 -  Mild tricuspid regurgitation.     9 - Increased central venous pressure.       Malvin Bravo MD  Comprehensive Stroke Center  Department of Vascular Neurology   [facility]

## 2018-06-04 NOTE — ASSESSMENT & PLAN NOTE
MRI w/ incidental find of what appear to be bilateral cerebellar watershed infarcts in the setting of cardiac arrest x ~22 minutes w/ several defibrillation attempts. At this time these infarcts are for the most part asymptomatic however he does overall findings of poor attention and concentration and disorientation, as to be expected in setting of anoxic injury. No further recommendations or neurovascular workup necessary at this time.    Antithrombotics for secondary stroke prevention: Antiplatelets: None: mechanism unlikely to be thrombotic in etiology    Statins for secondary stroke prevention and hyperlipidemia, if present:   No atherosclerotic mechanism for infarcts, statin not required     Rehab efforts: PT/OT/SLP to evaluate and treat    Diagnostics ordered/pending: None     VTE prophylaxis: per primary team    BP parameters: goal is long term BP < 130/80 mmHg

## 2018-06-04 NOTE — PLAN OF CARE
Problem: Occupational Therapy Goal  Goal: Occupational Therapy Goal  Goals to be met by: 6/15/2018   Patient will increase functional independence with ADLs by performing:    Pt will report daily orientation x3 with added time and 0 added cues.  Rolling R/L with bed rails and max(A) x1 person. MET  *Revised: With CGA with bed rails as needed.   Supine<>sit and sit<>supine with max(A) x1 person. MET  *Revised: With CGA and bed rail as needed.  UE Dressing while seated EOB with Maximum Assistance. MET  *Revised: with set up and supervision.  Grooming while EOB with Maximum Assistance. MET  *revised: in standing with CGA and set up.  Sitting at edge of bed x15 minutes with Maximum Assistance. MET  *Revised: dynamic standing activity X5 min with min(A).  Pt/CG demo understanding for B UE ROM and positioning.       Outcome: Ongoing (interventions implemented as appropriate)  Pt progressing well towards goals. SAMMI Villanueva 6/4/2018

## 2018-06-04 NOTE — PROGRESS NOTES
Acte bedside dialysis initiated via R femoral teporary cath venous side doesn't draw back and arterial side sliggish. BFR@ 300 with arteial pressure-210.

## 2018-06-04 NOTE — ASSESSMENT & PLAN NOTE
-06/03/18 BUN 72, Cr 9.6  -Nephrology following, recs for furosemide--assessing for HD on daily basis  -R femoral trialysis catheter placed, unclear whether pt will need longterm HD  -lasix per nephrology

## 2018-06-04 NOTE — PT/OT/SLP PROGRESS
Occupational Therapy      Patient Name:  Los Mendez   MRN:  [id number]    1030 AM  RN reported pt with fall (out of bed), pending scans this AM  Will follow up in PM as appropriate.     SAMMI Villanueva  6/4/2018

## 2018-06-04 NOTE — ASSESSMENT & PLAN NOTE
-Cardiac arrest ~ 22 minutes at OSH.  Pt doing very well from neurologic standpoint.  -05/21/18 MRI w/o evidence of anoxic brain injury, but several small strokes  -mild encephalopathy  (post anoxic)  -repeat CT head stable

## 2018-06-04 NOTE — RESIDENT HANDOFF
Neuro Critical Care Transfer of Care note    Date of Admit: 5/19/2018  Date of Transfer / Stepdown: 6/4/2018    Brief History of Present Illness:      48 y/o man w/ hx of HTN, COPD? Transferred from [facility] following witnessed cardiac arrest while in the ED waiting room. Patient had presented complaining of URI/SOB symptoms, while in the ED waiting room he was noted to be choking, became apneic, which led to witnessed seizure like activity followed by cardiac arrest. Per discussion with ED staff, patient required about 22 minutes of ACLS/5 shocks before returning to normal sinus rhythm. During intubation a lozenge was removed from patients airway. Hyperthermia protocol was initiated prior to transferring to [facility] care. Per ED records, patient was acidotic with ph of 7.1 this morning. At the time of arrival to NICU, patient was on paralytics, however pupillary reflex was present. Will continue hypothermia protocol for additional 24 h.     Hospital Course: 05/19/18:  Arrived intubated, sedated, artic sun blanket  05/20/18:  Pressor requirment going up, NO DIURESIS, continue TTM, nimbex off, LFT improving, trop improving, family updated.  05/21/18:  Reach normothermia, MRI today, remove EEG, place HD line, consult nephrology, LFTs trending down, family updated.  05/22/18:  Anoxic brain injury. Pt responding. Last night placed on propofol  MRI --L head of caudate and cerebellar small infarctions.  05/23/18:  Anoxic brain injury.  On CRRT. BNP 1046. Back on propofol, pressors. Still agitated. Started on Buspar. Trop I normalizing.  05/25/18:  CRRT today. Amiodarone dc'd, metoprolol started. CBC p1t--be H/H continues to drop will consult GI. Vascular surgery consulted concerning  Possible arterial occlusion. RUE cold. Arterial line dcd. US RUE pending. Abdominal distention, KUB showed gas pattern. Bladder pressures q4h. Initial 21.  05/28/18:  H/H 6.8/23, 1 unit PRBC transfused. Keep Hgb greater than or equal to  8. CRRT stopped, line clotted. Changed trialysis catheter changed. Wean sedation today. Once off sedation begin weaning ventilator settings to CPAP.  Weaned vent to CPAP and able to Extubated in afternoon without s/s stridor or difficulty breathing. HR elevated 140s - 160s fentanyl x2 prn for pain/agitation. Metoprolol 5mg IV x3 given remains ST 140s.  Restless, will start precedex.  05/30/18:  Leukocytosis, pan cx; kelley discontinued; remove IJ, speech eval for diet   05/31/18:  No acute events, still with gastric secretions coming out of NG tube will start reglan; remains encephalopathic  06/01/18:  Will hold NGT suction, NPO per SLP.  Continue metoclopramide.  Hgb stable at 8.3 g/dL.  6/2 Continue NG tube for now. Will advance to pureed diet as recommended by SLP and evaluate intake before removing NG.. WBC remain elevated but afebrile, cultures negative and USUE/Diamond 5/24 showed no DVT only thrombophlebitis of LUE cephalic vein. Continue to monitor daily CBC. Nephrology following will hold HD today and possibly tomorrow depending on labs  6/4: patient fell from bed en route to bathroom (unassisted), CT no ICH, patient neuro stable, step down         Hospital Course By Problem with Pertinent Findings:     Anoxic brain injury     -Cardiac arrest ~ 22 minutes at OSH.  Pt doing very well from neurologic standpoint.  -05/21/18 MRI w/o evidence of anoxic brain injury, but several small strokes  -mild encephalopathy  (post anoxic)  -repeat CT head stable             Cytotoxic cerebral edema     - secondary to anoxic brain injury, 22 minutes PEA-->vfib  - MRI brain 5/21 for anoxic assessment.  see anoxic brain injury             Pulmonary   Pulmonary edema     - continue lasix per nephrology                   Acute respiratory failure with hypoxia     Extubated successfully              Cardiac/Vascular   Acute decompensated heart failure     -05/24/18 Echo w/ EF 10-15%  -Continue metoprol 50 mg tid          Essential  hypertension     -BP over past 24 h:  /57-86  -Holding home antihypertensives  -Started metoprolol 50 mg tid (SVT on EKG; HFrEF tx); diuresing          Cardiac arrest     -3 cardiac arrests prior to arrival 5/19  -Troponin trended down  -Continue metoprolol 50 mg tid  -05/24/18 2D Echo notable for EF 10-15%             Renal/   MARY (acute kidney injury)     -Per above, BUN and Cr continue to trend up--72, 9.6  -Nephrology following, will hold HD for today  -IV furosemide 120 mg per nephrology  -Mental status improving, no signs of uremic encephalopathy          Acute renal failure with tubular necrosis     -06/03/18 BUN 72, Cr 9.6  -Nephrology following, recs for furosemide--assessing for HD on daily basis  -R femoral trialysis catheter placed, unclear whether pt will need longterm HD  -lasix per nephrology          Other   Class 3 severe obesity due to excess calories with serious comorbidity and body mass index (BMI) of 40.0 to 44.9 in adult     -S/p nutrition consult; counseled on lifestyle modifications  Body mass index is 37.64 kg/m².                 Consultants and Procedures:     Consultants:  Nephrology  Vascular Neurology    Procedures:    none    Transfer Information:     Diet:  Puree Diet,     Physical Activity:  PT/OT    To Do / Pending Studies / Follow ups:  Renal Function, UO, Dialysis needs        Justice Leon  Neuro Crtical Care

## 2018-06-04 NOTE — PROGRESS NOTES
Patient found to be hypoglycemic in mid-60 mg/dL. 2 orange juice cartons given to patient plus 16g oral glucose tablets. Patient eating less than 25% for each meal. One carton of milk given to patient as well. Patient encouraged to try to eat more during each meal. No symptoms currently.

## 2018-06-04 NOTE — PLAN OF CARE
SW attempted to meet with Pt or family to discuss rehab recs but no one was present and RN advised Pt unable to participate in this discussion at this time.    Pt is pending Medicaid so SW sent to [facility], [facility] rehab, and [facility]. Will present accepting facilities, if able, to the Pt family when available.    Lena Nguyen, ERIC  Neurocritical Care   [facility]  [id number]

## 2018-06-04 NOTE — PT/OT/SLP PROGRESS
"Physical Therapy Treatment    Patient Name:  Los Mendez   MRN:  [id number]    Recommendations:     Discharge Recommendations:  rehabilitation facility   Discharge Equipment Recommendations:  (TBD at next level of care)   Barriers to discharge: Inaccessible home and Decreased caregiver support    Assessment:     Los Mendez is a 47 y.o. male admitted with a medical diagnosis of Anoxic brain injury.  He presents with the following impairments/functional limitations:  weakness, impaired endurance, impaired self care skills, gait instability, impaired functional mobilty, impaired cognition, decreased coordination, decreased safety awareness, decreased lower extremity function. Pt demonstrated significant improvement in functional mobility this visit, tolerating EOB activity and several trials of sit to stand transfers with minimum assistance. However, pt not yet at PLOF as he demonstrates impaired standing balance, cognitive deficits and additional listed impairments as described above. Pt is an excellent candidate for inpatient rehabilitation and would benefit from continued PT intervention to address listed deficits, improve quality of life and maximize return to PLOF.     Rehab Prognosis:  good; patient would benefit from acute skilled PT services to address these deficits and reach maximum level of function.      Recent Surgery: * No surgery found *      Plan:     During this hospitalization, patient to be seen 4 x/week to address the above listed problems via gait training, therapeutic activities, therapeutic exercises, neuromuscular re-education  · Plan of Care Expires:  06/30/18   Plan of Care Reviewed with: patient, family    Subjective     Communicated with RN prior to session. Per RN, pt had fall OOB this AM- no neuro changes s/p fall. Patient found supine upon PT entry to room, agreeable to treatment.      Chief Complaint: "I have no balance"   Pain/Comfort:  · Pain Rating 1: 0/10  · Pain Rating " Post-Intervention 1: 0/10    Patients cultural, spiritual, Temple conflicts given the current situation: no conflicts    Objective:     Patient found with: blood pressure cuff, bed alarm, SCD, telemetry, peripheral IV     General Precautions: Standard, aphasia, aspiration, fall   Orthopedic Precautions:N/A   Braces: N/A     Functional Mobility:       Bed Mobility    · Supine to sit: minimum assistance with use of handrail   · Sit to supine: contact guard assistance         Transfers · Sit <> Stand: minimum assistance from EOB x 4 trials with no AD; HHA provided in standing        Gait  Pt performed side stepping to R along EOB with min-mod A for weight shifting, HHA and LE advancement. Pt also performed marching in place with BUE support on table x 10 reps with min A for balance 2/2 increased posterior lean and instability.          Therapeutic Activities and Exercises:  Therapeutic activities aimed to increase pt's independence, safety, and efficiency with bed mobility and functional transfers. See above for assistance levels. Frequent cueing required for hand placement and safety awareness, pt easily distracted.     Neuromuscular re-education facilitated to improve gross motor coordination and standing balance required for ADLs and gait. Therapist facilitated trials of side stepping and marching in place with tactile cueing and assistance for weight shifting, postural control and balance. Pt with multi-directional instability in standing.     Patient left HOB elevated with all lines intact, call button in reach, bed alarm on and RN notified, and family present.     AM-PAC 6 CLICK MOBILITY  Turning over in bed (including adjusting bedclothes, sheets and blankets)?: 3  Sitting down on and standing up from a chair with arms (e.g., wheelchair, bedside commode, etc.): 3  Moving from lying on back to sitting on the side of the bed?: 3  Moving to and from a bed to a chair (including a wheelchair)?: 2  Need to walk in  hospital room?: 2  Climbing 3-5 steps with a railing?: 1  Total Score: 14       GOALS:    Physical Therapy Goals        Problem: Physical Therapy Goal    Goal Priority Disciplines Outcome Goal Variances Interventions   Physical Therapy Goal     PT/OT, PT Ongoing (interventions implemented as appropriate)     Description:    Goals to be met by 6/15/2018    1. Pt will perform supine to sit from both sides of the bed with supervision   2. Pt will perform sit to supine with supervision   3. Pt will perform sit to stand transfers with stand by assistance without AD   4. Pt will perform bed <> chair transfers with stand by assistance with or without least restrictive AD.   5. Pt will perform gait x 50 feet with contact guard assistance with or without least restrictive AD.   6. Pt will perform dynamic standing balance activities x  5 minutes with stand by assistance to reduce fall risk                     Time Tracking:     PT Received On: 06/04/18  PT Start Time: 1231     PT Stop Time: 1255  PT Total Time (min): 24 min     Billable Minutes: Therapeutic Activity 12 min and Neuromuscular Re-education 12 min    Treatment Type: Treatment  PT/PTA: PT     PTA Visit Number: 0     Bushra Cannon PT, DPT   6/4/2018  Pager: [phone]

## 2018-06-04 NOTE — PROGRESS NOTES
[facility]  Nephrology  Progress Note    Patient Name: Los Mendez  MRN: [id number]  Admission Date: 5/19/2018  Hospital Length of Stay: 16 days  Attending Provider: Keith Cohen MD   Primary Care Physician: Provider Notinsystem  Principal Problem:Anoxic brain injury    Subjective:     HPI: Los Mendez is a 47 year old male with past medical history of HTN, heavy alcohol user and COPD. Transferred from [facility] following witnessed cardiac arrest while in the ED waiting room. Patient had presented complaining of upper respiratory track infection and shortness of breath symptoms, while in the ED waiting room he was noted to be choking, became apneic, which led to witnessed seizure like activity followed by cardiac arrest. Per discussion with ED staff, patient required about 22 minutes of ACLS/5 shocks before returning to normal sinus rhythm. During intubation a lozenge was removed from patients airway. Hypothermia protocol was initiated prior to transferring to [facility]. On arrival to [facility] Arrived with increased serum creatinine from 2.9-->5.1 (unkown baseline), BUN 35--> 52, Trop 2.4-->1.3, Lactic acidosis 6.4-->1.3, chest x ray with increased parenchymal interstitial attenuation and parenchymal opacities suggestive of pulmonary edema. On mechanical ventilation with a FiO2 50%. Currently also anuric at present moment. Per cardiology patient has a dilated cardiomyopathy EF looks about 10-15% and LVEDD is 7.3 cm.    Interval History: last received HD on Friday with 2L removed. Started having UOP over the weekend; received lasix 120mg IV x1 around 9am yesterday. No UOP recorded. Doing okay this morning. Complaining on left sided weakness. No SOB. Reports small amount of UOP with BM this AM; none since. Drinking orange juice and milk.     Review of patient's allergies indicates:   Allergen Reactions    Penicillins      Tolerated cefepime May 2018     Current Facility-Administered Medications    Medication Frequency    0.9%  NaCl infusion PRN    acetaminophen tablet 325 mg Q6H PRN    albuterol-ipratropium 2.5 mg-0.5 mg/3 mL nebulizer solution 3 mL Q6H PRN    aspirin chewable tablet 81 mg Daily    dextrose 50% injection 12.5 g PRN    dextrose 50% injection 25 g PRN    fentaNYL injection 25 mcg Q2H PRN    glucagon (human recombinant) injection 1 mg PRN    glucose chewable tablet 16 g PRN    glucose chewable tablet 24 g PRN    heparin (porcine) injection 5,000 Units Q8H    insulin aspart U-100 pen 1-10 Units Q6H PRN    metoprolol tartrate (LOPRESSOR) tablet 50 mg TID    ondansetron 4 mg/5 mL solution 4 mg Q8H PRN    sodium chloride 0.9% flush 3 mL PRN       Objective:     Vital Signs (Most Recent):  Temp: 98.2 °F (36.8 °C) (06/04/18 1130)  Pulse: 76 (06/04/18 1300)  Resp: (!) 22 (06/04/18 1300)  BP: 115/64 (06/04/18 1300)  SpO2: 99 % (06/04/18 1300)  O2 Device (Oxygen Therapy): room air (06/04/18 1149) Vital Signs (24h Range):  Temp:  [97.5 °F (36.4 °C)-98.7 °F (37.1 °C)] 98.2 °F (36.8 °C)  Pulse:  [65-82] 76  Resp:  [16-34] 22  SpO2:  [92 %-99 %] 99 %  BP: ()/(56-93) 115/64     Weight: 112.3 kg (247 lb 9.2 oz) (06/04/18 0900)  Body mass index is 37.64 kg/m².  Body surface area is 2.32 meters squared.    I/O last 3 completed shifts:  In: 2560 [P.O.:2560]  Out: -     Physical Exam   Constitutional: He appears well-developed and well-nourished. No distress.   HENT:   Head: Normocephalic and atraumatic.   Neck: Neck supple. No thyromegaly present.   Cardiovascular: Normal rate and regular rhythm.    Pulmonary/Chest: He has decreased breath sounds. He has no wheezes. He has no rales.   Abdominal: Soft. He exhibits no distension.   Musculoskeletal: He exhibits no edema or deformity.   Skin: Skin is warm and dry. He is not diaphoretic.       Significant Labs:  CBC:   Recent Labs  Lab 06/04/18  0402   WBC 12.21   RBC 4.10*   HGB 9.0*   HCT 29.7*      MCV 72*   MCH 22.0*   MCHC 30.3*      CMP:   Recent Labs  Lab 06/04/18  0402   GLU 79  79   CALCIUM 9.3  9.3   ALBUMIN 2.8*  2.8*   PROT 7.6     140   K 4.2  4.2   CO2 18*  18*     106   BUN 81*  81*   CREATININE 10.3*  10.3*   ALKPHOS 169*   ALT 53*   AST 60*   BILITOT 1.0     All labs within the past 24 hours have been reviewed.     Significant Imaging:  Labs: Reviewed    Assessment/Plan:     Acute renal failure with tubular necrosis    - baseline renal function unknown. Renal US without report of CKD-related changes though kidneys more echogenic than liver.   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 LVEF 10-15%.   - last received HD on Friday  - starting to make urine; but remains oliguric  - no improvement in sCr yet  - repeat bladder scan today  - will plan for HD today. UF goal 1-2L as tolerated to match intake (net +1.6L yesterday).   - drinking OJ and milk this AM. Recommend adding renal restrictions to diet.             Harper Weir, PGY-5  Nephrology Fellow  [facility]  Pager: [phone]

## 2018-06-04 NOTE — PROGRESS NOTES
"[facility]  Adult Nutrition  Progress Note    SUMMARY     Recommendations  Recommendation/Intervention:   Recommend adjusting diet order to Renal diet with texture per SLP.     If PO intake <50% for more than two meals, recommend providing Novasource Renal ONS TID.   RD to monitor.     Goals: PO intake to meet % of EEN/EPN  Nutrition Goal Status: new  Communication of RD Recs: reviewed with RN    Reason for Assessment  Reason for Assessment: RD follow-up  Diagnosis: other (see comments) (anoxic brain injury)  Relevant Medical History: HTN, COPD, obesity  Interdisciplinary Rounds: attended  General Information Comments: Pt awake and alert at visit, breakfast in the room and pt reported good appetite and no problems tolerating food or with N/V/D. WL noted.   Nutrition Discharge Planning: PO intake to meet % of EEN/EPN.     Nutrition Risk Screen  Nutrition Risk Screen: dysphagia or difficulty swallowing    Nutrition/Diet History  Typical Food/Fluid Intake: LEVI intake PTA. Pt TF held for extubation.  Food Preferences: LEVI Anabaptist/cultural food preferences at this time  Do you have any cultural, spiritual, Anabaptist conflicts, given your current situation?: none reported  Factors Affecting Nutritional Intake: difficulty/impaired swallowing    Anthropometrics  Temp: 98.2 °F (36.8 °C)  Height: 5' 8" (172.7 cm)  Height (inches): 68 in  Weight Method: Bed Scale  Weight: 112.3 kg (247 lb 9.2 oz)  Weight (lb): 247.58 lb  Ideal Body Weight (IBW), Male: 154 lb  % Ideal Body Weight, Male (lb): 160.77 lb  BMI (Calculated): 37.7  BMI Grade: 35 - 39.9 - obesity - grade II     Lab/Procedures/Meds  Pertinent Labs Reviewed: reviewed  Pertinent Labs Comments: CO2 18, BUN 81, Cr 10.3, Alb 2.8  Pertinent Medications Reviewed: reviewed  Pertinent Medications Comments: Furosemide, Heparin, metoprolol, tartrate    Physical Findings/Assessment  Overall Physical Appearance: obese, nourished  Tubes: nasogastric " tube  Skin: intact    Estimated/Assessed Needs  Weight Used For Calorie Calculations: 112.3 kg (247 lb 9.2 oz)  Energy Calorie Requirements (kcal): 2216 kcal/day  Energy Need Method: [place]-St Jeor (x 1.25- 250 kcal for slow wt loss )    Protein Requirements: 90-135g/ day (0.8-1.2 g/kg)  Weight Used For Protein Calculations: 112.3 kg (247 lb 9.2 oz)    Fluid Requirements (mL): 1mL/kcal or per MD  Fluid Need Method: RDA Method  RDA Method (mL): 2216     Nutrition Prescription Ordered  Current Diet Order: Dysphagia Pureed   Nutrition Order Comments: Pt report tolerating well with good intake    Evaluation of Received Nutrient/Fluid Intake  I/O: +I/O, LBM 6/4  % Intake of Estimated Energy Needs: 75 - 100 %  % Meal Intake: 75 - 100 %    Nutrition Risk  Level of Risk/Frequency of Follow-up: low - moderate (F/U 1x weekly)     Assessment and Plan  * Anoxic brain injury     Contributing Nutrition Diagnosis  Inadequate energy intake     Related to (etiology):   NPO, no alternative means of nutrition     Signs and Symptoms (as evidenced by):   Pt receiving <85% EEN and EPN.      Interventions/Recommendations (treatment strategy):  Please see RD recs above.     Nutrition Diagnosis Status:   Resolved     Monitor and Evaluation  Food and Nutrient Intake: energy intake, food and beverage intake, enteral nutrition intake  Food and Nutrient Adminstration: diet order, enteral and parenteral nutrition administration  Anthropometric Measurements: weight, weight change, body mass index  Biochemical Data, Medical Tests and Procedures: electrolyte and renal panel, gastrointestinal profile, glucose/endocrine profile  Nutrition-Focused Physical Findings: overall appearance, skin     Nutrition Follow-Up  RD Follow-up?: Yes    I certify that I directed the dietetic intern in service delivery and guided them using my skilled judgment. As the cosigning dietitian, I have reviewed the dietetic interns documentation and am responsible for the  treatment, assessment, and plan.    Note Completed by: Lauren Strickland     consult

## 2018-06-05 PROBLEM — J96.01 ACUTE RESPIRATORY FAILURE WITH HYPOXIA: Status: RESOLVED | Noted: 2018-05-19 | Resolved: 2018-06-05

## 2018-06-05 LAB
ALBUMIN SERPL BCP-MCNC: 2.8 G/DL
ALBUMIN SERPL BCP-MCNC: 2.8 G/DL
ALBUMIN SERPL BCP-MCNC: 3.1 G/DL
ALP SERPL-CCNC: 171 U/L
ALT SERPL W/O P-5'-P-CCNC: 47 U/L
ANION GAP SERPL CALC-SCNC: 13 MMOL/L
ANION GAP SERPL CALC-SCNC: 13 MMOL/L
ANION GAP SERPL CALC-SCNC: 14 MMOL/L
AST SERPL-CCNC: 54 U/L
BASOPHILS # BLD AUTO: 0.11 K/UL
BASOPHILS NFR BLD: 1.2 %
BILIRUB SERPL-MCNC: 1.1 MG/DL
BUN SERPL-MCNC: 67 MG/DL
BUN SERPL-MCNC: 67 MG/DL
BUN SERPL-MCNC: 73 MG/DL
CALCIUM SERPL-MCNC: 9.1 MG/DL
CALCIUM SERPL-MCNC: 9.1 MG/DL
CALCIUM SERPL-MCNC: 9.5 MG/DL
CHLORIDE SERPL-SCNC: 101 MMOL/L
CHLORIDE SERPL-SCNC: 103 MMOL/L
CHLORIDE SERPL-SCNC: 103 MMOL/L
CO2 SERPL-SCNC: 18 MMOL/L
CO2 SERPL-SCNC: 20 MMOL/L
CO2 SERPL-SCNC: 20 MMOL/L
CREAT SERPL-MCNC: 9.1 MG/DL
CREAT SERPL-MCNC: 9.1 MG/DL
CREAT SERPL-MCNC: 9.8 MG/DL
DIFFERENTIAL METHOD: ABNORMAL
EOSINOPHIL # BLD AUTO: 0.2 K/UL
EOSINOPHIL NFR BLD: 2.6 %
ERYTHROCYTE [DISTWIDTH] IN BLOOD BY AUTOMATED COUNT: 28.6 %
EST. GFR  (AFRICAN AMERICAN): 6.5 ML/MIN/1.73 M^2
EST. GFR  (AFRICAN AMERICAN): 7.1 ML/MIN/1.73 M^2
EST. GFR  (AFRICAN AMERICAN): 7.1 ML/MIN/1.73 M^2
EST. GFR  (NON AFRICAN AMERICAN): 5.7 ML/MIN/1.73 M^2
EST. GFR  (NON AFRICAN AMERICAN): 6.2 ML/MIN/1.73 M^2
EST. GFR  (NON AFRICAN AMERICAN): 6.2 ML/MIN/1.73 M^2
GLUCOSE SERPL-MCNC: 79 MG/DL
GLUCOSE SERPL-MCNC: 86 MG/DL
GLUCOSE SERPL-MCNC: 86 MG/DL
HCT VFR BLD AUTO: 28.6 %
HGB BLD-MCNC: 8.7 G/DL
IMM GRANULOCYTES # BLD AUTO: 0.03 K/UL
IMM GRANULOCYTES NFR BLD AUTO: 0.3 %
INR PPP: 1.1
LYMPHOCYTES # BLD AUTO: 2.1 K/UL
LYMPHOCYTES NFR BLD: 23.6 %
MAGNESIUM SERPL-MCNC: 2.8 MG/DL
MAGNESIUM SERPL-MCNC: 2.9 MG/DL
MCH RBC QN AUTO: 22 PG
MCHC RBC AUTO-ENTMCNC: 30.4 G/DL
MCV RBC AUTO: 72 FL
MONOCYTES # BLD AUTO: 1.3 K/UL
MONOCYTES NFR BLD: 14.7 %
NEUTROPHILS # BLD AUTO: 5.2 K/UL
NEUTROPHILS NFR BLD: 57.6 %
NRBC BLD-RTO: 0 /100 WBC
PHOSPHATE SERPL-MCNC: 3.5 MG/DL
PHOSPHATE SERPL-MCNC: 3.5 MG/DL
PHOSPHATE SERPL-MCNC: 3.6 MG/DL
PHOSPHATE SERPL-MCNC: 3.6 MG/DL
PLATELET # BLD AUTO: 337 K/UL
PMV BLD AUTO: 10.7 FL
POCT GLUCOSE: 81 MG/DL (ref 70–110)
POCT GLUCOSE: 82 MG/DL (ref 70–110)
POCT GLUCOSE: 88 MG/DL (ref 70–110)
POTASSIUM SERPL-SCNC: 4.1 MMOL/L
POTASSIUM SERPL-SCNC: 4.1 MMOL/L
POTASSIUM SERPL-SCNC: 4.5 MMOL/L
PROT SERPL-MCNC: 7.5 G/DL
PROTHROMBIN TIME: 11.2 SEC
RBC # BLD AUTO: 3.96 M/UL
SODIUM SERPL-SCNC: 133 MMOL/L
SODIUM SERPL-SCNC: 136 MMOL/L
SODIUM SERPL-SCNC: 136 MMOL/L
WBC # BLD AUTO: 9.01 K/UL

## 2018-06-05 PROCEDURE — 25000003 PHARM REV CODE 250: Performed by: NURSE PRACTITIONER

## 2018-06-05 PROCEDURE — 85610 PROTHROMBIN TIME: CPT

## 2018-06-05 PROCEDURE — 85025 COMPLETE CBC W/AUTO DIFF WBC: CPT

## 2018-06-05 PROCEDURE — 80053 COMPREHEN METABOLIC PANEL: CPT

## 2018-06-05 PROCEDURE — 80069 RENAL FUNCTION PANEL: CPT

## 2018-06-05 PROCEDURE — 99233 SBSQ HOSP IP/OBS HIGH 50: CPT | Mod: ,,, | Performed by: PSYCHIATRY & NEUROLOGY

## 2018-06-05 PROCEDURE — 63600175 PHARM REV CODE 636 W HCPCS: Performed by: STUDENT IN AN ORGANIZED HEALTH CARE EDUCATION/TRAINING PROGRAM

## 2018-06-05 PROCEDURE — 11000001 HC ACUTE MED/SURG PRIVATE ROOM

## 2018-06-05 PROCEDURE — 97116 GAIT TRAINING THERAPY: CPT

## 2018-06-05 PROCEDURE — 99232 SBSQ HOSP IP/OBS MODERATE 35: CPT | Mod: ,,, | Performed by: INTERNAL MEDICINE

## 2018-06-05 PROCEDURE — 97112 NEUROMUSCULAR REEDUCATION: CPT

## 2018-06-05 PROCEDURE — 25000003 PHARM REV CODE 250: Performed by: STUDENT IN AN ORGANIZED HEALTH CARE EDUCATION/TRAINING PROGRAM

## 2018-06-05 PROCEDURE — 83735 ASSAY OF MAGNESIUM: CPT

## 2018-06-05 PROCEDURE — 84100 ASSAY OF PHOSPHORUS: CPT

## 2018-06-05 PROCEDURE — 36415 COLL VENOUS BLD VENIPUNCTURE: CPT

## 2018-06-05 PROCEDURE — 82272 OCCULT BLD FECES 1-3 TESTS: CPT

## 2018-06-05 RX ORDER — RAMELTEON 8 MG/1
8 TABLET ORAL NIGHTLY PRN
Status: DISCONTINUED | OUTPATIENT
Start: 2018-06-05 | End: 2018-06-15 | Stop reason: HOSPADM

## 2018-06-05 RX ADMIN — METOPROLOL TARTRATE 50 MG: 50 TABLET, FILM COATED ORAL at 02:06

## 2018-06-05 RX ADMIN — ASPIRIN 81 MG CHEWABLE TABLET 81 MG: 81 TABLET CHEWABLE at 08:06

## 2018-06-05 RX ADMIN — HEPARIN SODIUM 5000 UNITS: 5000 INJECTION, SOLUTION INTRAVENOUS; SUBCUTANEOUS at 02:06

## 2018-06-05 RX ADMIN — METOPROLOL TARTRATE 50 MG: 50 TABLET, FILM COATED ORAL at 08:06

## 2018-06-05 RX ADMIN — HEPARIN SODIUM 5000 UNITS: 5000 INJECTION, SOLUTION INTRAVENOUS; SUBCUTANEOUS at 08:06

## 2018-06-05 RX ADMIN — HEPARIN SODIUM 5000 UNITS: 5000 INJECTION, SOLUTION INTRAVENOUS; SUBCUTANEOUS at 05:06

## 2018-06-05 NOTE — SUBJECTIVE & OBJECTIVE
"Interval History:   Catheter issues during HD yesterday; required cathflo. Subsequently completed HD but with poor clearances (only mild improvement on AM labs). UF only 250cc due to current BP goals. Doing okay this morning. Feels "great" today. No UOP since yesterday. Eating lots of ice.   Net negative 1L/24h.     Review of patient's allergies indicates:   Allergen Reactions    Penicillins      Tolerated cefepime May 2018     Current Facility-Administered Medications   Medication Frequency    0.9%  NaCl infusion PRN    acetaminophen tablet 325 mg Q6H PRN    albuterol-ipratropium 2.5 mg-0.5 mg/3 mL nebulizer solution 3 mL Q6H PRN    aspirin chewable tablet 81 mg Daily    dextrose 50% injection 12.5 g PRN    dextrose 50% injection 25 g PRN    fentaNYL injection 25 mcg Q2H PRN    glucagon (human recombinant) injection 1 mg PRN    glucose chewable tablet 16 g PRN    glucose chewable tablet 24 g PRN    heparin (porcine) injection 5,000 Units Q8H    insulin aspart U-100 pen 1-10 Units Q6H PRN    metoprolol tartrate (LOPRESSOR) tablet 50 mg TID    ondansetron 4 mg/5 mL solution 4 mg Q8H PRN    ramelteon tablet 8 mg Nightly PRN    sodium chloride 0.9% flush 3 mL PRN       Objective:     Vital Signs (Most Recent):  Temp: 98.2 °F (36.8 °C) (06/05/18 1301)  Pulse: 66 (06/05/18 1301)  Resp: 18 (06/05/18 1301)  BP: 128/83 (06/05/18 1301)  SpO2: 96 % (06/05/18 1301)  O2 Device (Oxygen Therapy): room air (06/05/18 1204) Vital Signs (24h Range):  Temp:  [98 °F (36.7 °C)-99.5 °F (37.5 °C)] 98.2 °F (36.8 °C)  Pulse:  [61-90] 66  Resp:  [14-44] 18  SpO2:  [86 %-100 %] 96 %  BP: (103-153)/(57-93) 128/83     Weight: 109.9 kg (242 lb 4.6 oz) (06/05/18 0302)  Body mass index is 36.84 kg/m².  Body surface area is 2.3 meters squared.    I/O last 3 completed shifts:  In: 830 [P.O.:130; Other:700]  Out: 1175 [Other:1175]    Physical Exam   Constitutional: He appears well-developed and well-nourished. No distress.   HENT: "   Head: Normocephalic and atraumatic.   Eyes: Conjunctivae and EOM are normal.   Cardiovascular: Normal rate and regular rhythm.    Pulmonary/Chest: Effort normal. He has rhonchi. He has rales.   Abdominal: Soft. Bowel sounds are normal.   Musculoskeletal: He exhibits edema. He exhibits no deformity.   Skin: Skin is warm and dry. He is not diaphoretic.       Significant Labs:  CBC:   Recent Labs  Lab 06/05/18 0429   WBC 9.01   RBC 3.96*   HGB 8.7*   HCT 28.6*      MCV 72*   MCH 22.0*   MCHC 30.4*     CMP:   Recent Labs  Lab 06/05/18 0429   GLU 86  86   CALCIUM 9.1  9.1   ALBUMIN 2.8*  2.8*   PROT 7.5     136   K 4.1  4.1   CO2 20*  20*     103   BUN 67*  67*   CREATININE 9.1*  9.1*   ALKPHOS 171*   ALT 47*   AST 54*   BILITOT 1.1*     All labs within the past 24 hours have been reviewed.     Significant Imaging:  Labs: Reviewed

## 2018-06-05 NOTE — PROGRESS NOTES
HD: treatment completed, pt ended tx 30 min early due to clotted dialyzer and venous chamber. Lines flushed, clamped, and capped. NET UF: 258ml. Patient stable, report given to floor nurse.

## 2018-06-05 NOTE — PLAN OF CARE
SW received call from Justine with [facility] rehab reporting they do not have beds until next week and that until the Pt gets the Medicaid number, they cannot accept him.     Lena Nguyen, ERIC  Neurocritical Care   [facility]  [id number]

## 2018-06-05 NOTE — CONSULTS
Inpatient consult to Physical Medicine Rehab  Consult performed by: RENE BLACKMON  Consult ordered by: TENISHA BAIG  Reason for consult: assess rehab needs        Reviewed patient history and current admission.  Rehab team following.  Full consult to follow.    MELLISA Yoo, FNP-C  Physical Medicine & Rehabilitation   06/05/2018  Spectralink: [id number]

## 2018-06-05 NOTE — PROGRESS NOTES
"[facility]  Nephrology  Progress Note    Patient Name: Los Mendez  MRN: [id number]  Admission Date: 5/19/2018  Hospital Length of Stay: 17 days  Attending Provider: Keith Cohen MD   Primary Care Physician: Provider Notinsystem  Principal Problem:Anoxic brain injury    Subjective:     HPI: Los Mendez is a 47 year old male with past medical history of HTN, heavy alcohol user and COPD. Transferred from [facility] following witnessed cardiac arrest while in the ED waiting room. Patient had presented complaining of upper respiratory track infection and shortness of breath symptoms, while in the ED waiting room he was noted to be choking, became apneic, which led to witnessed seizure like activity followed by cardiac arrest. Per discussion with ED staff, patient required about 22 minutes of ACLS/5 shocks before returning to normal sinus rhythm. During intubation a lozenge was removed from patients airway. Hypothermia protocol was initiated prior to transferring to [facility]. On arrival to [facility] Arrived with increased serum creatinine from 2.9-->5.1 (unkown baseline), BUN 35--> 52, Trop 2.4-->1.3, Lactic acidosis 6.4-->1.3, chest x ray with increased parenchymal interstitial attenuation and parenchymal opacities suggestive of pulmonary edema. On mechanical ventilation with a FiO2 50%. Currently also anuric at present moment. Per cardiology patient has a dilated cardiomyopathy EF looks about 10-15% and LVEDD is 7.3 cm.    Interval History:   Catheter issues during HD yesterday; required cathflo. Subsequently completed HD but with poor clearances (only mild improvement on AM labs). UF only 250cc due to current BP goals. Doing okay this morning. Feels "great" today. No UOP since yesterday. Eating lots of ice.   Net negative 1L/24h.     Review of patient's allergies indicates:   Allergen Reactions    Penicillins      Tolerated cefepime May 2018     Current Facility-Administered Medications   Medication " Frequency    0.9%  NaCl infusion PRN    acetaminophen tablet 325 mg Q6H PRN    albuterol-ipratropium 2.5 mg-0.5 mg/3 mL nebulizer solution 3 mL Q6H PRN    aspirin chewable tablet 81 mg Daily    dextrose 50% injection 12.5 g PRN    dextrose 50% injection 25 g PRN    fentaNYL injection 25 mcg Q2H PRN    glucagon (human recombinant) injection 1 mg PRN    glucose chewable tablet 16 g PRN    glucose chewable tablet 24 g PRN    heparin (porcine) injection 5,000 Units Q8H    insulin aspart U-100 pen 1-10 Units Q6H PRN    metoprolol tartrate (LOPRESSOR) tablet 50 mg TID    ondansetron 4 mg/5 mL solution 4 mg Q8H PRN    ramelteon tablet 8 mg Nightly PRN    sodium chloride 0.9% flush 3 mL PRN       Objective:     Vital Signs (Most Recent):  Temp: 98.2 °F (36.8 °C) (06/05/18 1301)  Pulse: 66 (06/05/18 1301)  Resp: 18 (06/05/18 1301)  BP: 128/83 (06/05/18 1301)  SpO2: 96 % (06/05/18 1301)  O2 Device (Oxygen Therapy): room air (06/05/18 1204) Vital Signs (24h Range):  Temp:  [98 °F (36.7 °C)-99.5 °F (37.5 °C)] 98.2 °F (36.8 °C)  Pulse:  [61-90] 66  Resp:  [14-44] 18  SpO2:  [86 %-100 %] 96 %  BP: (103-153)/(57-93) 128/83     Weight: 109.9 kg (242 lb 4.6 oz) (06/05/18 0302)  Body mass index is 36.84 kg/m².  Body surface area is 2.3 meters squared.    I/O last 3 completed shifts:  In: 830 [P.O.:130; Other:700]  Out: 1175 [Other:1175]    Physical Exam   Constitutional: He appears well-developed and well-nourished. No distress.   HENT:   Head: Normocephalic and atraumatic.   Eyes: Conjunctivae and EOM are normal.   Cardiovascular: Normal rate and regular rhythm.    Pulmonary/Chest: Effort normal. He has rhonchi. He has rales.   Abdominal: Soft. Bowel sounds are normal.   Musculoskeletal: He exhibits edema. He exhibits no deformity.   Skin: Skin is warm and dry. He is not diaphoretic.       Significant Labs:  CBC:   Recent Labs  Lab 06/05/18  0429   WBC 9.01   RBC 3.96*   HGB 8.7*   HCT 28.6*      MCV 72*   MCH  22.0*   MCHC 30.4*     CMP:   Recent Labs  Lab 06/05/18  0429   GLU 86  86   CALCIUM 9.1  9.1   ALBUMIN 2.8*  2.8*   PROT 7.5     136   K 4.1  4.1   CO2 20*  20*     103   BUN 67*  67*   CREATININE 9.1*  9.1*   ALKPHOS 171*   ALT 47*   AST 54*   BILITOT 1.1*     All labs within the past 24 hours have been reviewed.     Significant Imaging:  Labs: Reviewed    Assessment/Plan:     Acute renal failure with tubular necrosis    - baseline renal function unknown. Renal US without report of CKD-related changes though kidneys more echogenic than liver.   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 LVEF 10-15%.   - starting to make urine; but remains oliguric. No improvement in sCr yet  - bladder scan x2 yesterday: both negative  - received HD last night; UF only 250cc due to higher BP goals  - worsening crackles and edema on exam today. Remains on RA.   - will plan for HD again tomorrow; UF goal ~1L  - discussed with HUGO. Planning for TDC either tomorrow or Thursday morning.   - continue renal diet        Acute blood loss anemia    - will check iron stores with AM labs  - may need to start HIGINIO soon            Harper Weir, PGY-5  Nephrology Fellow  [facility]  Pager: [phone]

## 2018-06-05 NOTE — PLAN OF CARE
Problem: Patient Care Overview  Goal: Plan of Care Review  Outcome: Ongoing (interventions implemented as appropriate)  POC reviewed with Los Mendez and wife at bedside at 0500. Pt able to verbalize understanding, but needs constant reinforcement and redirecting. Wife verbalized understanding. Questions and concerns addressed. No acute events overnight. Pt progressing toward goals. Will continue to monitor. See flowsheets for full assessment and VS info. Received HD last night - trialysis now flushing and pulling blood back good. Pt awake throughout most of night, PRN nightly remelteon ordered. Bed alarm set to highest sensitivity d/t pt trying to get out of bed throughout night. POC is to TTF today.

## 2018-06-05 NOTE — PROGRESS NOTES
[facility]  Neurocritical Care  Progress Note    Admit Date: 5/19/2018  Service Date: 06/05/2018  Length of Stay: 17    Subjective:     Chief Complaint: Anoxic brain injury    History of Present Illness: 48 y/o man w/ hx of HTN, COPD? Transferred from [facility] following witnessed cardiac arrest while in the ED waiting room. Patient had presented complaining of URI/SOB symptoms, while in the ED waiting room he was noted to be choking, became apneic, which led to witnessed seizure like activity followed by cardiac arrest. Per discussion with ED staff, patient required about 22 minutes of ACLS/5 shocks before returning to normal sinus rhythm. During intubation a lozenge was removed from patients airway. Hyperthermia protocol was initiated prior to [place] [facility] [place]. Per ED records, patient was acidotic with ph of 7.1 this morning. At the time of arrival to NICU, patient was on paralytics, however pupillary reflex was present. Will continue hypothermia protocol for additional 24 h.    Hospital Course: 05/19/18:  Arrived intubated, sedated, artic sun blanket  05/20/18:  Pressor requirment going up, NO DIURESIS, continue TTM, nimbex off, LFT improving, trop improving, family updated.  05/21/18:  Reach normothermia, MRI today, remove EEG, place HD line, consult nephrology, LFTs trending down, family updated.  05/22/18:  Anoxic brain injury. Pt responding. Last night placed on propofol  MRI --L head of caudate and cerebellar small infarctions.  05/23/18:  Anoxic brain injury.  On CRRT. BNP 1046. Back on propofol, pressors. Still agitated. Started on Buspar. Trop I normalizing.  05/25/18:  CRRT today. Amiodarone dc'd, metoprolol started. CBC s3s--yh H/H continues to drop will consult GI. Vascular surgery consulted concerning  Possible arterial occlusion. RUE cold. Arterial line dcd. US RUE pending. Abdominal distention, KUB showed gas pattern. Bladder pressures q4h. Initial 21.  05/28/18:   H/H 6.8/23, 1 unit PRBC transfused. Keep Hgb greater than or equal to 8. CRRT stopped, line clotted. Changed trialysis catheter changed. Wean sedation today. Once off sedation begin weaning ventilator settings to CPAP.  Weaned vent to CPAP and able to Extubated in afternoon without s/s stridor or difficulty breathing. HR elevated 140s - 160s fentanyl x2 prn for pain/agitation. Metoprolol 5mg IV x3 given remains ST 140s.  Restless, will start precedex.  05/30/18:  Leukocytosis, pan cx; kelley discontinued; remove IJ, speech eval for diet   05/31/18:  No acute events, still with gastric secretions coming out of NG tube will start reglan; remains encephalopathic  06/01/18:  Will hold NGT suction, NPO per SLP.  Continue metoclopramide.  Hgb stable at 8.3 g/dL.  6/2 Continue NG tube for now. Will advance to pureed diet as recommended by SLP and evaluate intake before removing NG.. WBC remain elevated but afebrile, cultures negative and USUE/Diamond 5/24 showed no DVT only thrombophlebitis of LUE cephalic vein. Continue to monitor daily CBC. Nephrology following will hold HD today and possibly tomorrow depending on labs  6/4: patient fell from bed en route to bathroom (unassisted), CT no ICH, patient neuro stable, step down  6/5: transfer to medicine    Review of Symptoms:   Constitutional: Denies fevers or chills.  ENT: no hearing difficulty, no visual changes  Pulmonary: Denies shortness of breath or cough.  Cardiology: Denies chest pain or palpitations.  GI: Denies abdominal pain or constipation.  Neurologic: Denies new weakness, headaches, or paresthesias.   : no dysuria  Musk: no muscle pain, no joint pain  Psych: no hallucinations    Physical Exam:  GA: Alert, comfortable, no acute distress.   HEENT: No scleral icterus or JVD.   Pulmonary: Clear to auscultation A/L. No wheezing, crackles, or rhonchi.  Cardiac: RRR S1 & S2 w/o rubs/murmurs/gallops.   Abdominal: Bowel sounds present x 4. No appreciable  hepatosplenomegaly.  Skin: No jaundice, rashes, or visible lesions.  Pulses: 1+ Dp bilat    Neuro:  --sedation: none  --GCS: E4V4M6  --Mental Status:   Oriented to self and location  --CN II-XII grossly intact.   --Pupils 3-->2mm, PERRL.   --brainstem: intact  --Motor: 5/5 throughout  --sensory: intact to soft touch and pain throughout  --Reflexes: not tested  --Gait: deferred      Recent Labs  Lab 06/05/18 0429   WBC 9.01   RBC 3.96*   HGB 8.7*   HCT 28.6*      MCV 72*   MCH 22.0*   MCHC 30.4*       Recent Labs  Lab 06/05/18 0429   CALCIUM 9.1  9.1   PROT 7.5     136   K 4.1  4.1   CO2 20*  20*     103   BUN 67*  67*   CREATININE 9.1*  9.1*   ALKPHOS 171*   ALT 47*   AST 54*   BILITOT 1.1*       Recent Labs  Lab 06/05/18 0429   INR 1.1          Temp: 98.2 °F (36.8 °C)  Pulse: 66  Rhythm: normal sinus rhythm  BP: 128/83  MAP (mmHg): 101  Resp: 18  SpO2: 96 %  O2 Device (Oxygen Therapy): room air    Temp  Min: 98 °F (36.7 °C)  Max: 99.5 °F (37.5 °C)  Pulse  Min: 61  Max: 90  BP  Min: 103/57  Max: 153/62  MAP (mmHg)  Min: 77  Max: 108  Resp  Min: 14  Max: 44  SpO2  Min: 86 %  Max: 100 %    06/04 0701 - 06/05 0700  In: 770 [P.O.:70]  Out: 1175    Unmeasured Output  Urine Occurrence: 1  Stool Occurrence: 0    Nutrition Prescription Ordered  Current Diet Order: Dysphagia Pureed   Nutrition Order Comments: Pt report tolerating well with good intake  Current Nutrition Support Formula Ordered: Novasource Renal  Current Nutrition Support Rate Ordered: 10 (ml) (goal of 40)  Current Nutrition Support Frequency Ordered: mL/hr  Last Bowel Movement: 06/05/18    Body mass index is 36.84 kg/m².      I have personally reviewed all labs, imaging, and studies today      Assessment/Plan:     Neuro   * Anoxic brain injury    -Cardiac arrest ~ 22 minutes at OSH.  Pt doing very well from neurologic standpoint.  -05/21/18 MRI w/o evidence of anoxic brain injury, but several small strokes  -mild encephalopathy   (post anoxic)  -repeat CT head stable          Cytotoxic cerebral edema    - secondary to anoxic brain injury, 22 minutes PEA-->vfib  - MRI brain 5/21 for anoxic assessment.  see anoxic brain injury           Pulmonary   Pulmonary edema    - continue lasix per nephrology              Acute respiratory failure with hypoxia-resolved as of 6/5/2018    Extubated successfully           Cardiac/Vascular   Acute decompensated heart failure    -05/24/18 Echo w/ EF 10-15%  -Continue metoprol 50 mg tid        Essential hypertension    -BP over past 24 h:  /57-86  -Holding home antihypertensives  -Started metoprolol 50 mg tid (SVT on EKG; HFrEF tx); diuresing        Renal/   Acute renal failure with tubular necrosis    -06/03/18 BUN 72, Cr 9.6  -Nephrology following, recs for furosemide--assessing for HD on daily basis  -R femoral trialysis catheter placed, unclear whether pt will need longterm HD  -lasix per nephrology        Other   Class 3 severe obesity due to excess calories with serious comorbidity and body mass index (BMI) of 40.0 to 44.9 in adult    -S/p nutrition consult; counseled on lifestyle modifications  Body mass index is 36.84 kg/m².              Prophylaxis:  Venous Thromboembolism: chemical  Stress Ulcer: H2B  Ventilator Pneumonia: not applicable     Activity Orders          None        Full Code    Keith Cohen MD  Neurocritical Care  [facility]    Level III

## 2018-06-05 NOTE — PT/OT/SLP PROGRESS
"Physical Therapy Treatment    Patient Name:  Los Mendez   MRN:  [id number]    Recommendations:     Discharge Recommendations:  rehabilitation facility   Discharge Equipment Recommendations: walker, rolling   Barriers to discharge: Inaccessible home and Decreased caregiver support    Assessment:     Los Mendez is a 47 y.o. male admitted with a medical diagnosis of Anoxic brain injury. Pt tolerated progression of OOB activity well, demonstrating good effort during therapeutic activities. Pt continues to demonstrate impaired gross motor coordination, impaired endurance, impaired self care skills, impaired functional mobilty, gait instability, impaired balance, impaired cognition, and decreased safety awareness. Able to ambulate trial of gait x 15 ft with min A for balance, safety and RW management. Remains at risk for falls and is not at PLOF. Pt remains an excellent candidate for inpatient rehabilitation and would benefit from continued PT intervention to address below listed deficits and maximize return to PLOF.     Rehab Prognosis:  good; patient would benefit from acute skilled PT services to address these deficits and reach maximum level of function.      Recent Surgery: * No surgery found *     Plan:     During this hospitalization, patient to be seen 4 x/week to address the above listed problems via gait training, therapeutic activities, therapeutic exercises, neuromuscular re-education  · Plan of Care Expires:  06/30/18   Plan of Care Reviewed with: patient    Subjective     Communicated with RN prior to session.  Patient found supine upon PT entry to room, agreeable to treatment.      Chief Complaint: impaired balance   Patient comments/goals: "I'm ready to walk and do whatever you want me to do"   Pain/Comfort:  · Pain Rating 1: 0/10  · Pain Rating Post-Intervention 1: 0/10    Patients cultural, spiritual, Alevism conflicts given the current situation: no conflicts    Objective:     Patient found " with: telemetry, bed alarm     General Precautions: Standard, aphasia, fall   Orthopedic Precautions:N/A   Braces: N/A     Functional Mobility:       Bed Mobility    · Supine to sit: CGA    · Sit to supine: CGA      Transfers · Sit <> Stand: CGA from EOB x 2 trials with RW; progressed to SBA from EOB x 2 additional trials with RW        Gait  · Distance: pt performed initial trial of marching in place x 5 reps, followed by 3 steps forward/backward x 2 trials with RW and min A. 3rd trial of gait performed x 15 ft with RW     · Assistance level: RW and minimum assistance for balance, safety, and RW management; one episode of mod A required to maintain balance 2/2 increased instability during 180 degree turns     · Gait Deviations: decreased carlos, step-to gait pattern, decreased step length, multi-directional instability         Therapeutic Activities and Exercises:  Therapeutic activities aimed to increase pt's independence, safety, and efficiency with bed mobility and functional transfers. See above for assistance levels. Pt required frequent cueing for hand placement and RW management during transfers- several trials performed, pt demonstrating improvement with feedback.     Therapist facilitated progression of gait training to improve gait stability, endurance, and independence with functional ambulation. See above for details. Therapist provided verbal cueing and demonstration to encourage step-through gait pattern and improve management of RW- pt continued to have difficulty 2/2 impaired gross motor coordination.     Neuromuscular re-education facilitated to improve gross motor coordination and standing balanace:   - marching in place with BUE support via RW; pt required demonstration and min A to facilitate weight shifting   - forward reaching outside TRINO and across midline in standing x 10 reps with BUEs; 1 UE support via RW during activity, SBA provided for safety. Pt able to reach target 10/10 trials  bilaterally.   - dynamic side stepping to L and R with no UE support, mod A required to maintain balance   - seated alternating LAQ and ankle pumps; pt with difficulty maintaining rhythm of movement and correct alternating sequence despite demonstration; tactile cueing provided for assistance.       Patient left HOB elevated with all lines intact, call button in reach, bed alarm on, RN notified and spouse present.    AM-PAC 6 CLICK MOBILITY  Turning over in bed (including adjusting bedclothes, sheets and blankets)?: 3  Sitting down on and standing up from a chair with arms (e.g., wheelchair, bedside commode, etc.): 3  Moving from lying on back to sitting on the side of the bed?: 3  Moving to and from a bed to a chair (including a wheelchair)?: 3  Need to walk in hospital room?: 2  Climbing 3-5 steps with a railing?: 2  Total Score: 16       GOALS:    Physical Therapy Goals        Problem: Physical Therapy Goal    Goal Priority Disciplines Outcome Goal Variances Interventions   Physical Therapy Goal     PT/OT, PT Ongoing (interventions implemented as appropriate)     Description:    Goals to be met by 6/15/2018    1. Pt will perform supine to sit from both sides of the bed with supervision   2. Pt will perform sit to supine with supervision   3. Pt will perform sit to stand transfers with stand by assistance without AD   4. Pt will perform bed <> chair transfers with supervision with or without least restrictive AD.   5. Pt will perform gait x 50 feet with contact guard assistance with or without least restrictive AD.   6. Pt will perform dynamic standing balance activities x  5 minutes with stand by assistance to reduce fall risk                      Time Tracking:     PT Received On: 06/05/18  PT Start Time: 1318     PT Stop Time: 1343  PT Total Time (min): 25 min     Billable Minutes: Gait Training 15 min and Neuromuscular Re-education 10 min    Treatment Type: Treatment  PT/PTA: PT     PTA Visit Number: 0      Bushra Cannon, PT, DPT   6/5/2018  Pager: [phone]

## 2018-06-05 NOTE — PROGRESS NOTES
Patient to receive tunneled HD catheter tomorrow morning.  Placed order for NPO after midnight.  Please ensure patient has a peripheral IV prior to procedure.      Harper Weir, PGY-5  Nephrology Fellow  [facility]  Pager: [phone]

## 2018-06-05 NOTE — NURSING TRANSFER
Nursing Transfer Note      6/5/2018     Transfer To: 930A    Transfer via bed    Transfer with cardiac monitoring    Transported by ESTHER Payton    Medicines sent: none    Chart send with patient: Yes    Notified: spouse    Patient reassessed at: (5/5, 1140)    Upon arrival to floor: cardiac monitor applied, patient oriented to room, call bell in reach and bed in lowest position

## 2018-06-05 NOTE — PLAN OF CARE
Problem: Physical Therapy Goal  Goal: Physical Therapy Goal    Goals to be met by 6/15/2018    1. Pt will perform supine to sit from both sides of the bed with supervision   2. Pt will perform sit to supine with supervision   3. Pt will perform sit to stand transfers with stand by assistance without AD   4. Pt will perform bed <> chair transfers with supervision with or without least restrictive AD.   5. Pt will perform gait x 50 feet with contact guard assistance with or without least restrictive AD.   6. Pt will perform dynamic standing balance activities x  5 minutes with stand by assistance to reduce fall risk    Outcome: Ongoing (interventions implemented as appropriate)  Pt progressing towards goals; continue current POC.     Bushra Cannon PT, DPT   6/5/2018  Pager: [phone]

## 2018-06-05 NOTE — ASSESSMENT & PLAN NOTE
- baseline renal function unknown. Renal US without report of CKD-related changes though kidneys more echogenic than liver.   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 LVEF 10-15%.   - starting to make urine; but remains oliguric. No improvement in sCr yet  - bladder scan x2 yesterday: both negative  - received HD last night; UF only 250cc due to higher BP goals  - worsening crackles and edema on exam today. Remains on RA.   - will plan for HD again tomorrow; UF goal ~1L  - discussed with HUGO. Planning for TDC either tomorrow or Thursday morning.   - continue renal diet

## 2018-06-05 NOTE — PROGRESS NOTES
Orders clarified with Dr. Roger, ordered to keep SBP equal to or above 120, to attempt 1L as tolerated but if b/p drops to decrease UF. Patient currently stable at 123/73. Will continue to monitor.

## 2018-06-06 PROBLEM — D50.0 IRON DEFICIENCY ANEMIA DUE TO CHRONIC BLOOD LOSS: Status: ACTIVE | Noted: 2018-05-29

## 2018-06-06 PROBLEM — Z99.2 HEMODIALYSIS STATUS: Status: ACTIVE | Noted: 2018-06-06

## 2018-06-06 PROBLEM — R53.81 DEBILITY: Status: ACTIVE | Noted: 2018-06-06

## 2018-06-06 PROBLEM — K29.01 ACUTE SUPERFICIAL GASTRITIS WITH HEMORRHAGE: Status: ACTIVE | Noted: 2018-06-06

## 2018-06-06 PROBLEM — I10 ESSENTIAL HYPERTENSION: Chronic | Status: ACTIVE | Noted: 2018-05-19

## 2018-06-06 PROBLEM — J81.0 ACUTE PULMONARY EDEMA: Status: ACTIVE | Noted: 2018-05-19

## 2018-06-06 PROBLEM — J81.1 PULMONARY EDEMA: Status: RESOLVED | Noted: 2018-05-19 | Resolved: 2018-06-06

## 2018-06-06 PROBLEM — I50.41: Status: ACTIVE | Noted: 2018-05-19

## 2018-06-06 LAB
ALBUMIN SERPL BCP-MCNC: 3 G/DL
ALBUMIN SERPL BCP-MCNC: 3 G/DL
ALP SERPL-CCNC: 172 U/L
ALT SERPL W/O P-5'-P-CCNC: 48 U/L
ANION GAP SERPL CALC-SCNC: 15 MMOL/L
ANION GAP SERPL CALC-SCNC: 15 MMOL/L
ANISOCYTOSIS BLD QL SMEAR: SLIGHT
AST SERPL-CCNC: 52 U/L
BASOPHILS # BLD AUTO: 0.14 K/UL
BASOPHILS NFR BLD: 1.7 %
BILIRUB SERPL-MCNC: 1.1 MG/DL
BUN SERPL-MCNC: 78 MG/DL
BUN SERPL-MCNC: 78 MG/DL
CALCIUM SERPL-MCNC: 9.3 MG/DL
CALCIUM SERPL-MCNC: 9.3 MG/DL
CHLORIDE SERPL-SCNC: 103 MMOL/L
CHLORIDE SERPL-SCNC: 103 MMOL/L
CO2 SERPL-SCNC: 19 MMOL/L
CO2 SERPL-SCNC: 19 MMOL/L
CREAT SERPL-MCNC: 10.3 MG/DL
CREAT SERPL-MCNC: 10.3 MG/DL
DIFFERENTIAL METHOD: ABNORMAL
EOSINOPHIL # BLD AUTO: 0.2 K/UL
EOSINOPHIL NFR BLD: 2 %
ERYTHROCYTE [DISTWIDTH] IN BLOOD BY AUTOMATED COUNT: 29.3 %
EST. GFR  (AFRICAN AMERICAN): 6.2 ML/MIN/1.73 M^2
EST. GFR  (AFRICAN AMERICAN): 6.2 ML/MIN/1.73 M^2
EST. GFR  (NON AFRICAN AMERICAN): 5.3 ML/MIN/1.73 M^2
EST. GFR  (NON AFRICAN AMERICAN): 5.3 ML/MIN/1.73 M^2
FERRITIN SERPL-MCNC: 222 NG/ML
GIANT PLATELETS BLD QL SMEAR: PRESENT
GLUCOSE SERPL-MCNC: 78 MG/DL
GLUCOSE SERPL-MCNC: 78 MG/DL
HCT VFR BLD AUTO: 29.7 %
HGB BLD-MCNC: 9.1 G/DL
HYPOCHROMIA BLD QL SMEAR: ABNORMAL
IMM GRANULOCYTES # BLD AUTO: 0.05 K/UL
IMM GRANULOCYTES NFR BLD AUTO: 0.6 %
INR PPP: 1.1
IRON SERPL-MCNC: 62 UG/DL
LYMPHOCYTES # BLD AUTO: 1.8 K/UL
LYMPHOCYTES NFR BLD: 22.2 %
MAGNESIUM SERPL-MCNC: 2.6 MG/DL
MCH RBC QN AUTO: 22.6 PG
MCHC RBC AUTO-ENTMCNC: 30.6 G/DL
MCV RBC AUTO: 74 FL
MONOCYTES # BLD AUTO: 1.6 K/UL
MONOCYTES NFR BLD: 19.2 %
NEUTROPHILS # BLD AUTO: 4.4 K/UL
NEUTROPHILS NFR BLD: 54.3 %
NRBC BLD-RTO: 0 /100 WBC
OB PNL STL: NEGATIVE
OVALOCYTES BLD QL SMEAR: ABNORMAL
PHOSPHATE SERPL-MCNC: 3.8 MG/DL
PHOSPHATE SERPL-MCNC: 3.8 MG/DL
PLATELET # BLD AUTO: 405 K/UL
PMV BLD AUTO: 11.1 FL
POCT GLUCOSE: 103 MG/DL (ref 70–110)
POCT GLUCOSE: 75 MG/DL (ref 70–110)
POIKILOCYTOSIS BLD QL SMEAR: SLIGHT
POLYCHROMASIA BLD QL SMEAR: ABNORMAL
POTASSIUM SERPL-SCNC: 5 MMOL/L
POTASSIUM SERPL-SCNC: 5 MMOL/L
PROT SERPL-MCNC: 7.7 G/DL
PROTHROMBIN TIME: 11.1 SEC
RBC # BLD AUTO: 4.02 M/UL
SATURATED IRON: 17 %
SODIUM SERPL-SCNC: 137 MMOL/L
SODIUM SERPL-SCNC: 137 MMOL/L
SPHEROCYTES BLD QL SMEAR: ABNORMAL
TOTAL IRON BINDING CAPACITY: 361 UG/DL
TRANSFERRIN SERPL-MCNC: 244 MG/DL
WBC # BLD AUTO: 8.16 K/UL

## 2018-06-06 PROCEDURE — 25000003 PHARM REV CODE 250: Performed by: INTERNAL MEDICINE

## 2018-06-06 PROCEDURE — 25000003 PHARM REV CODE 250: Performed by: STUDENT IN AN ORGANIZED HEALTH CARE EDUCATION/TRAINING PROGRAM

## 2018-06-06 PROCEDURE — 86706 HEP B SURFACE ANTIBODY: CPT

## 2018-06-06 PROCEDURE — 86704 HEP B CORE ANTIBODY TOTAL: CPT

## 2018-06-06 PROCEDURE — C1894 INTRO/SHEATH, NON-LASER: HCPCS

## 2018-06-06 PROCEDURE — 63600175 PHARM REV CODE 636 W HCPCS

## 2018-06-06 PROCEDURE — 85610 PROTHROMBIN TIME: CPT

## 2018-06-06 PROCEDURE — 82728 ASSAY OF FERRITIN: CPT

## 2018-06-06 PROCEDURE — 63600175 PHARM REV CODE 636 W HCPCS: Performed by: INTERNAL MEDICINE

## 2018-06-06 PROCEDURE — 76937 US GUIDE VASCULAR ACCESS: CPT | Mod: 26,,, | Performed by: INTERNAL MEDICINE

## 2018-06-06 PROCEDURE — 80053 COMPREHEN METABOLIC PANEL: CPT

## 2018-06-06 PROCEDURE — 86580 TB INTRADERMAL TEST: CPT | Performed by: INTERNAL MEDICINE

## 2018-06-06 PROCEDURE — 85025 COMPLETE CBC W/AUTO DIFF WBC: CPT

## 2018-06-06 PROCEDURE — 36558 INSERT TUNNELED CV CATH: CPT | Mod: ,,, | Performed by: INTERNAL MEDICINE

## 2018-06-06 PROCEDURE — 83540 ASSAY OF IRON: CPT

## 2018-06-06 PROCEDURE — 0JH63XZ INSERTION OF TUNNELED VASCULAR ACCESS DEVICE INTO CHEST SUBCUTANEOUS TISSUE AND FASCIA, PERCUTANEOUS APPROACH: ICD-10-PCS | Performed by: INTERNAL MEDICINE

## 2018-06-06 PROCEDURE — 86709 HEPATITIS A IGM ANTIBODY: CPT

## 2018-06-06 PROCEDURE — 11000001 HC ACUTE MED/SURG PRIVATE ROOM

## 2018-06-06 PROCEDURE — 25000003 PHARM REV CODE 250

## 2018-06-06 PROCEDURE — 02H633Z INSERTION OF INFUSION DEVICE INTO RIGHT ATRIUM, PERCUTANEOUS APPROACH: ICD-10-PCS | Performed by: INTERNAL MEDICINE

## 2018-06-06 PROCEDURE — 84100 ASSAY OF PHOSPHORUS: CPT

## 2018-06-06 PROCEDURE — 90935 HEMODIALYSIS ONE EVALUATION: CPT

## 2018-06-06 PROCEDURE — 90935 HEMODIALYSIS ONE EVALUATION: CPT | Mod: ,,, | Performed by: INTERNAL MEDICINE

## 2018-06-06 PROCEDURE — 99233 SBSQ HOSP IP/OBS HIGH 50: CPT | Mod: ,,, | Performed by: INTERNAL MEDICINE

## 2018-06-06 PROCEDURE — 99232 SBSQ HOSP IP/OBS MODERATE 35: CPT | Mod: ,,, | Performed by: NURSE PRACTITIONER

## 2018-06-06 PROCEDURE — 99152 MOD SED SAME PHYS/QHP 5/>YRS: CPT | Mod: ,,, | Performed by: INTERNAL MEDICINE

## 2018-06-06 PROCEDURE — 63600175 PHARM REV CODE 636 W HCPCS: Performed by: STUDENT IN AN ORGANIZED HEALTH CARE EDUCATION/TRAINING PROGRAM

## 2018-06-06 PROCEDURE — 77001 FLUOROGUIDE FOR VEIN DEVICE: CPT | Mod: 26,,, | Performed by: INTERNAL MEDICINE

## 2018-06-06 PROCEDURE — 36415 COLL VENOUS BLD VENIPUNCTURE: CPT

## 2018-06-06 PROCEDURE — 87340 HEPATITIS B SURFACE AG IA: CPT

## 2018-06-06 PROCEDURE — 83735 ASSAY OF MAGNESIUM: CPT

## 2018-06-06 PROCEDURE — 93010 ELECTROCARDIOGRAM REPORT: CPT | Mod: ,,, | Performed by: INTERNAL MEDICINE

## 2018-06-06 PROCEDURE — 93005 ELECTROCARDIOGRAM TRACING: CPT

## 2018-06-06 RX ORDER — ATORVASTATIN CALCIUM 20 MG/1
40 TABLET, FILM COATED ORAL DAILY
Status: DISCONTINUED | OUTPATIENT
Start: 2018-06-06 | End: 2018-06-15 | Stop reason: HOSPADM

## 2018-06-06 RX ORDER — FERROUS SULFATE 325(65) MG
325 TABLET, DELAYED RELEASE (ENTERIC COATED) ORAL 2 TIMES DAILY
Status: DISCONTINUED | OUTPATIENT
Start: 2018-06-06 | End: 2018-06-07

## 2018-06-06 RX ORDER — ASCORBIC ACID 250 MG
250 TABLET ORAL 2 TIMES DAILY
Status: DISCONTINUED | OUTPATIENT
Start: 2018-06-06 | End: 2018-06-12

## 2018-06-06 RX ORDER — LISINOPRIL 2.5 MG/1
2.5 TABLET ORAL DAILY
Status: DISCONTINUED | OUTPATIENT
Start: 2018-06-06 | End: 2018-06-06

## 2018-06-06 RX ORDER — ACETAMINOPHEN 325 MG/1
325 TABLET ORAL EVERY 6 HOURS PRN
Status: DISCONTINUED | OUTPATIENT
Start: 2018-06-06 | End: 2018-06-10

## 2018-06-06 RX ORDER — HEPARIN SODIUM 1000 [USP'U]/ML
1000 INJECTION, SOLUTION INTRAVENOUS; SUBCUTANEOUS
Status: DISCONTINUED | OUTPATIENT
Start: 2018-06-06 | End: 2018-06-15 | Stop reason: HOSPADM

## 2018-06-06 RX ORDER — METOPROLOL SUCCINATE 50 MG/1
50 TABLET, EXTENDED RELEASE ORAL DAILY
Status: DISCONTINUED | OUTPATIENT
Start: 2018-06-06 | End: 2018-06-15 | Stop reason: HOSPADM

## 2018-06-06 RX ORDER — NAPROXEN SODIUM 220 MG/1
81 TABLET, FILM COATED ORAL DAILY
Status: DISCONTINUED | OUTPATIENT
Start: 2018-06-07 | End: 2018-06-15 | Stop reason: HOSPADM

## 2018-06-06 RX ADMIN — SODIUM CHLORIDE: 9 INJECTION, SOLUTION INTRAVENOUS at 11:06

## 2018-06-06 RX ADMIN — FERROUS SULFATE TAB EC 325 MG (65 MG FE EQUIVALENT) 325 MG: 325 (65 FE) TABLET DELAYED RESPONSE at 09:06

## 2018-06-06 RX ADMIN — IRON SUCROSE 100 MG: 20 INJECTION, SOLUTION INTRAVENOUS at 02:06

## 2018-06-06 RX ADMIN — HEPARIN SODIUM 5000 UNITS: 5000 INJECTION, SOLUTION INTRAVENOUS; SUBCUTANEOUS at 06:06

## 2018-06-06 RX ADMIN — Medication 5 UNITS: at 04:06

## 2018-06-06 RX ADMIN — HEPARIN SODIUM 1000 UNITS: 1000 INJECTION, SOLUTION INTRAVENOUS; SUBCUTANEOUS at 03:06

## 2018-06-06 NOTE — HPI
Los Mendez is a 47-year-old male with obesity.  Patient presented to [facility] ED with URI symptoms when he went into witnessed cardiac arrest  in the ED waiting room. Per ED staff, patient required about 22 minutes of ACLS/5 shocks before returning to normal sinus rhythm.  He was intubated, required pressor support, and an arctic sun blanket was placed. [place] [facility] [place] evaluation and management.  Upon admission, he was acidotic with pH of 7.1. Hospital course complicated by anoxic brain injury (noted on imaging from 5/22), leukocytosis, kidney failure requiring CRRT (now on HD), anemia (s/p transfusions), elevated HR requiring BB, encephalopathy, cognitive linguistic impairements, dysphagia (now on dental soft and thin diet), LUE cephalic vein DVT, and fall (on 6/4-unassisted.  CTH negative for ICH).      Functional History: Patient lives in [place] with wife and child in a mobile home with4 steps to enter.  Prior to admission, (I) with ADLs and mobility.   DME: none.

## 2018-06-06 NOTE — SUBJECTIVE & OBJECTIVE
Interval History:    Review of Systems   Constitutional: Negative for chills, fatigue and fever.   HENT: Negative for congestion and rhinorrhea.    Eyes: Negative for pain and redness.   Respiratory: Negative for cough, chest tightness, shortness of breath and wheezing.    Cardiovascular: Negative for chest pain, palpitations and leg swelling.   Gastrointestinal: Negative for abdominal pain, constipation, diarrhea, nausea and vomiting.   Endocrine: Negative for cold intolerance, heat intolerance, polydipsia and polyuria.   Genitourinary: Negative for dysuria and hematuria.   Musculoskeletal: Negative for arthralgias and myalgias.   Allergic/Immunologic: Negative for environmental allergies, food allergies and immunocompromised state.   Neurological: Negative for dizziness, light-headedness and headaches.   Hematological: Negative for adenopathy. Does not bruise/bleed easily.   Psychiatric/Behavioral: Negative for agitation and confusion.     Objective:     Vital Signs (Most Recent):  Temp: 98.4 °F (36.9 °C) (06/06/18 1636)  Pulse: 80 (06/06/18 1636)  Resp: 18 (06/06/18 1530)  BP: 126/82 (06/06/18 1636)  SpO2: 96 % (06/06/18 1636) Vital Signs (24h Range):  Temp:  [97.8 °F (36.6 °C)-98.7 °F (37.1 °C)] 98.4 °F (36.9 °C)  Pulse:  [59-80] 80  Resp:  [18-20] 18  SpO2:  [96 %-100 %] 96 %  BP: (118-143)/(59-86) 126/82     Weight: 109.9 kg (242 lb 4.6 oz)  Body mass index is 36.84 kg/m².    Intake/Output Summary (Last 24 hours) at 06/06/18 1749  Last data filed at 06/06/18 1530   Gross per 24 hour   Intake              625 ml   Output             1548 ml   Net             -923 ml      Physical Exam   Constitutional: He is oriented to person, place, and time. He appears well-developed and well-nourished. No distress.   HENT:   Head: Normocephalic and atraumatic.   Eyes: EOM are normal. Pupils are equal, round, and reactive to light.   Neck: Normal range of motion. Neck supple.   Cardiovascular: Normal rate, regular rhythm,  normal heart sounds and intact distal pulses.  Exam reveals no gallop and no friction rub.    No murmur heard.  Pulmonary/Chest: Effort normal and breath sounds normal. No respiratory distress. He exhibits no tenderness.   Abdominal: Soft. Bowel sounds are normal. He exhibits no distension. There is no tenderness.   Musculoskeletal: Normal range of motion. He exhibits no edema, tenderness or deformity.   Neurological: He is alert and oriented to person, place, and time. No cranial nerve deficit. Coordination normal.   Skin: Skin is warm and dry. No rash noted. He is not diaphoretic. No erythema. No pallor.   Psychiatric: He has a normal mood and affect. His behavior is normal. Judgment and thought content normal.   Nursing note and vitals reviewed.      Significant Labs:   Recent Lab Results       06/06/18  1639 06/06/18  0606 06/06/18  0335 06/06/18  0334 06/05/18  2159      Immature Granulocytes    0.6(H)      Immature Grans (Abs)    0.05  Comment:  Mild elevation in immature granulocytes is non specific and   can be seen in a variety of conditions including stress response,   acute inflammation, trauma and pregnancy. Correlation with other   laboratory and clinical findings is essential.  (H)      Albumin   3.0(L)  3.1(L)        3.0(L)       Alkaline Phosphatase   172(H)       ALT   48(H)       Anion Gap   15  14        15       Aniso    Slight      AST   52(H)       Baso #    0.14      Basophil%    1.7      Total Bilirubin   1.1  Comment:  For infants and newborns, interpretation of results should be based  on gestational age, weight and in agreement with clinical  observations.  Premature Infant recommended reference ranges:  Up to 24 hours.............<8.0 mg/dL  Up to 48 hours............<12.0 mg/dL  3-5 days..................<15.0 mg/dL  6-29 days.................<15.0 mg/dL  (H)       BUN, Bld   78(H)  73(H)        78(H)       Calcium   9.3  9.5        9.3       Chloride   103  101        103       CO2    19(L)  18(L)        19(L)       Creatinine   10.3(H)  9.8(H)        10.3(H)       Differential Method    Automated      eGFR if African American   6.2(A)  6.5(A)        6.2(A)       eGFR if non    5.3  Comment:  Calculation used to obtain the estimated glomerular filtration  rate (eGFR) is the CKD-EPI equation.   (A)  5.7  Comment:  Calculation used to obtain the estimated glomerular filtration  rate (eGFR) is the CKD-EPI equation.   (A)        5.3  Comment:  Calculation used to obtain the estimated glomerular filtration  rate (eGFR) is the CKD-EPI equation.   (A)       Eos #    0.2      Eosinophil%    2.0      Ferritin   222       Large/Giant Platelets    Present      Glucose   78  79        78       Gran # (ANC)    4.4      Gran%    54.3      Hematocrit    29.7(L)      Hemoglobin    9.1(L)      Hypo    Occasional      Coumadin Monitoring INR   1.1  Comment:  Coumadin Therapy:  2.0 - 3.0 for INR for all indicators except mechanical heart valves  and antiphospholipid syndromes which should use 2.5 - 3.5.         Iron   62       Lymph #    1.8      Lymph%    22.2      Magnesium   2.6  2.9(H)     MCH    22.6(L)      MCHC    30.6(L)      MCV    74(L)      Mono #    1.6(H)      Mono%    19.2(H)      MPV    11.1      nRBC    0      Ovalocytes    Occasional      Phosphorus   3.8  3.6        3.8  3.6     Platelets    405(H)      POCT Glucose 103 75        Poik    Slight      Poly    Occasional      Potassium   5.0  4.5        5.0       Total Protein   7.7       Protime   11.1       RBC    4.02(L)      RDW    29.3(H)      Saturated Iron   17(L)       Sodium   137  133(L)        137       Spherocytes    Occasional      TIBC   361       Transferrin   244       WBC    8.16                      Significant Imaging: I have reviewed all pertinent imaging results/findings within the past 24 hours.

## 2018-06-06 NOTE — ASSESSMENT & PLAN NOTE
Coffee ground material aspirated via NG on 5/23  Concurrent with acute drop in Hg  Hg has since uptrended.   No recurrent signs of upper GI bleed.

## 2018-06-06 NOTE — HOSPITAL COURSE
5/31/-6/1/18: Evaluated by therapy.  Bed mobility totalA x 2 ppl.  No sit to stand.  Sat EOB 10 mins MaxA.  UBD and LBD totalA.  6/5/18: Participated with therapy.   Bed mobility CGA.  Sit to stand CGA-SBA & RW.  Ambulated 15 ft Brittaney & RW.  6/4 grooming Brittaney.  6/6/18: No therapy 2/2 dialysis.   6/12/18: Passed for regular and thin. SLP diagnosis of Cognitive-Linguistic Impairment.

## 2018-06-06 NOTE — PT/OT/SLP PROGRESS
Physical Therapy      Patient Name:  Los Mendez   MRN:  [id number]    Patient not seen today secondary to Other (Comment). Pt off the floor in AM for permacath placement and then HD. Will follow-up when next scheduled.    CHEPE CAAL, PT   6/6/2018

## 2018-06-06 NOTE — HOSPITAL COURSE
05/19/18:  Arrived intubated, sedated, artic sun blanket  05/20/18:  Pressor requirment going up, NO DIURESIS, continue TTM, nimbex off, LFT improving, trop improving, family updated.  05/21/18:  Reach normothermia, MRI today, remove EEG, place HD line, consult nephrology, LFTs trending down, family updated.  05/22/18:  Anoxic brain injury. Pt responding. Last night placed on propofol  MRI --L head of caudate and cerebellar small infarctions.  05/23/18:  Anoxic brain injury.  On CRRT. BNP 1046. Back on propofol, pressors. Still agitated. Started on Buspar. Trop I normalizing.  05/25/18:  CRRT today. Amiodarone dc'd, metoprolol started. CBC r6o--ji H/H continues to drop will consult GI. Vascular surgery consulted concerning  Possible arterial occlusion. RUE cold. Arterial line dcd. US RUE pending. Abdominal distention, KUB showed gas pattern. Bladder pressures q4h. Initial 21.  05/28/18:  H/H 6.8/23, 1 unit PRBC transfused. Keep Hgb greater than or equal to 8. CRRT stopped, line clotted. Changed trialysis catheter changed. Wean sedation today. Once off sedation begin weaning ventilator settings to CPAP.  Weaned vent to CPAP and able to Extubated in afternoon without s/s stridor or difficulty breathing. HR elevated 140s - 160s fentanyl x2 prn for pain/agitation. Metoprolol 5mg IV x3 given remains ST 140s.  Restless, will start precedex.  05/30/18:  Leukocytosis, pan cx; kelley discontinued; remove IJ, speech eval for diet   05/31/18:  No acute events, still with gastric secretions coming out of NG tube will start reglan; remains encephalopathic  06/01/18:  Will hold NGT suction, NPO per SLP.  Continue metoclopramide.  Hgb stable at 8.3 g/dL.  6/2 Continue NG tube for now. Will advance to pureed diet as recommended by SLP and evaluate intake before removing NG.. WBC remain elevated but afebrile, cultures negative and USUE/Diamond 5/24 showed no DVT only thrombophlebitis of LUE cephalic vein. Continue to monitor daily CBC.  Nephrology following will hold HD today and possibly tomorrow depending on labs  6/4: patient fell from bed en route to bathroom (unassisted), CT no ICH, patient neuro stable, step down  6/5: Stepped down to hospital medicine  6/6: Permacath placed by HUGO. Underwent HD. Consult placed to PM&R for rehab placement. CM pursuing outpatient HD chair.  6/7: No acute events. Patient participating with Rehab. Pending placement in rehab.   6/8-6/10: ICD will be placed Monday. NPO Sunday night. NAEON. .   6/11: dcICD placed. Medicaid information provided to CM.  6/12-6/13: Clarithromycin ppx for 5 days post ICD placement. Awaiting HD chair. PT/OT recommends outpatient PT/OT. Will need cardiology/EP follow up.

## 2018-06-06 NOTE — PT/OT/SLP PROGRESS
Occupational Therapy      Patient Name:  Los Mendez   MRN:  [id number]    Patient not seen today secondary to pt DURAN for cath lab followed by HD.  Will follow-up 6/7 as appropriate.    SAMMI Villanueva  6/6/2018

## 2018-06-06 NOTE — CONSULTS
[facility]  Physical Medicine & Rehab  Consult Note    Patient Name: Los Mendez  MRN: [id number]  Admission Date: 5/19/2018  Hospital Length of Stay: 18 days  Attending Physician: Moisés Villalobos MD     Inpatient consult to Physical Medicine & Rehabilitation  Consult performed by: Yana Dela Cruz NP  Consult requested by:  Moisés Villalobos MD    Reason for Consult:  assess rehabilitation needs    Consults  Subjective:     Principal Problem: Anoxic brain injury    HPI: Los Mendez is a 47-year-old male with obesity.  Patient presented to [facility] ED with URI symptoms when he went into witnessed cardiac arrest  in the ED waiting room. Per ED staff, patient required about 22 minutes of ACLS/5 shocks before returning to normal sinus rhythm.  He was intubated, required pressor support, and an arctic sun blanket was placed. [place] [facility] [place] further evaluation and management.  Upon admission, he was acidotic with pH of 7.1. Hospital course complicated by anoxic brain injury (noted on imaging from 5/22), leukocytosis, kidney failure requiring CRRT (now on HD), anemia (s/p transfusions), elevated HR requiring BB, encephalopathy, cognitive linguistic impairements, dysphagia (now on dental soft and thin diet), LUE cephalic vein DVT, and fall (on 6/4-unassisted.  CTH negative for ICH).      Functional History: Patient lives in [place] with wife and child in a mobile home with4 steps to enter.  Prior to admission, (I) with ADLs and mobility.   DME: none.    Hospital Course: 5/31/-6/1/18: Evaluated by therapy.  Bed mobility totalA x 2 ppl.  No sit to stand.  Sat EOB 10 mins MaxA.  UBD and LBD totalA.  6/5/18: Participated with therapy.   Bed mobility CGA.  Sit to stand CGA-SBA & RW.  Ambulated 15 ft Brittaney & RW.  6/4 grooming Brittaney.    History reviewed. No pertinent past medical history.  History reviewed. No pertinent surgical history.  Review of patient's allergies indicates:   Allergen Reactions     Penicillins      Tolerated cefepime May 2018       Scheduled Medications:    ascorbic acid (vitamin C)  250 mg Oral BID    [START ON 6/7/2018] aspirin  81 mg Oral Daily    atorvastatin  40 mg Oral Daily    ferrous sulfate  325 mg Oral BID    heparin (porcine)  5,000 Units Subcutaneous Q8H    iron sucrose (VENOFER) IVPB  100 mg Intravenous Once    metoprolol succinate  50 mg Oral Daily       PRN Medications: sodium chloride 0.9%, acetaminophen, albuterol-ipratropium, ramelteon    Family History     Unknown as patient has cognitive impairments.         Social History Main Topics    Smoking status: Unknown If Ever Smoked    Smokeless tobacco: Current User     Types: Chew    Alcohol use Not on file    Drug use: Unknown    Sexual activity: Not on file     Review of Systems   Constitutional: Negative for chills, fatigue and fever.   HENT: Positive for trouble swallowing. Negative for voice change.    Eyes: Negative for photophobia and visual disturbance.   Respiratory: Negative for cough, shortness of breath and wheezing.    Cardiovascular: Negative for chest pain and palpitations.   Gastrointestinal: Negative for abdominal distention, nausea and vomiting.   Genitourinary: Negative for difficulty urinating and flank pain.   Musculoskeletal: Positive for gait problem. Negative for arthralgias and myalgias.   Skin: Negative for color change and rash.   Neurological: Positive for speech difficulty and weakness. Negative for numbness and headaches.   Psychiatric/Behavioral: Positive for confusion. Negative for agitation.     Objective:     Vital Signs (Most Recent):  Temp: 98.7 °F (37.1 °C) (06/06/18 1128)  Pulse: 64 (06/06/18 1245)  Resp: 20 (06/06/18 0932)  BP: 135/86 (06/06/18 1245)  SpO2: 100 % (06/06/18 1128)    Vital Signs (24h Range):  Temp:  [97.8 °F (36.6 °C)-98.7 °F (37.1 °C)] 98.7 °F (37.1 °C)  Pulse:  [64-78] 64  Resp:  [18-20] 20  SpO2:  [97 %-100 %] 100 %  BP: (118-140)/(71-86) 135/86     Body mass  index is 36.84 kg/m².    Physical Exam   Constitutional: He appears well-developed and well-nourished.   HENT:   Head: Normocephalic and atraumatic.   Eyes: EOM are normal. Pupils are equal, round, and reactive to light.   Neck: Normal range of motion.   Cardiovascular: Normal rate and regular rhythm.    Pulmonary/Chest: Effort normal. No respiratory distress.   Abdominal: Soft. There is no tenderness.   Musculoskeletal: Normal range of motion. He exhibits no deformity.   Neurological: He is alert. He exhibits normal muscle tone.   -oriented to self, place   + dysarthria + cognitive linguistic impairments  RUE: 5/5, 5/5 .  LUE: 5/5, 5/5 .  RLE: 5/5, DF 5/5, PF 5/5.  LLE: 5/5, DF 5/5, PF 5/5.  Skin: Skin is warm and dry.   Psychiatric: His speech is slurred. Cognition and memory are impaired.          Diagnostic Results:   Labs: Reviewed  ECG: Reviewed  X-Ray: Reviewed  US: Reviewed  CT: Reviewed  MRI: Reviewed    Assessment/Plan:     * Anoxic brain injury    -cardiac arrest at [facility] ED ~22 mins and intubated  -5/22-imaging consistent with anoxic brain injury?  -now extubated  -hospital course complicated by encephalopathy          MARY (acute kidney injury)    -nephrology following  -on HD        Debility    -2/2 cardiac arrest with imaging suggestive of anoxic brain injury with complicated hospital course  -much improved with PT/OT    See hospital course for functional, cognitive/speech/language, and nutrition/swallow status.      Recommendations  -  Encourage mobility, OOB in chair at least 3 hours per day, and early ambulation as appropriate  -  PT/OT evaluate and treat  -  Pain management  -  Monitor for and prevent skin breakdown and pressure ulcers  · Early mobility, repositioning/weight shifting every 20-30 minutes when sitting, turn patient every 2 hours, proper mattress/overlay and chair cushioning, pressure relief/heel protector boots  -  DVT prophylaxis:  [facility]  -  Reviewed discharge options (IP  rehab, SNF, HH therapy, and OP therapy)          Acute bilat watershed infarction    -per Vascular Neurology, MRI w/ incidental find of what appear to be bilateral cerebellar watershed infarcts in the setting of cardiac arrest x ~22 minutes w/ several defibrillation attempts.    See hospital course for functional, cognitive/speech/language, and nutrition/swallow status.      Recommendations  -  Encourage mobility, OOB in chair at least 3 hours per day, and early ambulation as appropriate   -  PT/OT evaluate and treat  -  SLP speech and cognitive evaluate and treat  -  Monitor sleep disturbances and establish consistent sleep-wake cycle  -  Monitor for bowel and bladder dysfunction  -  Monitor for shoulder pain and subluxation  -  Monitor for spasticity  -  Monitor for and prevent skin breakdown and pressure ulcers  · Early mobility, repositioning/weight shifting every 20-30 minutes when sitting, turn patient every 2 hours, proper mattress/overlay and chair cushioning, pressure relief/heel protector boots  -  DVT prophylaxis:  [facility]  -  Reviewed discharge options (IP rehab, SNF, HH therapy, and OP therapy)          Acute renal failure with tubular necrosis    -initially required CRRT now on HD        Cardiac arrest    -cardiac arrest PTA  -05/24/18 2D Echo notable for EF 10-15%  -troponin down trended        Patient has rehab goals and likely be a good rehab candidate. Participating with therapy and has improved. Will follow progress and discuss with rehab team for rehab recommendation.      Thank you for your consult.     Yana Dela Cruz NP  Department of Physical Medicine & Rehab  [facility]

## 2018-06-06 NOTE — ASSESSMENT & PLAN NOTE
-cardiac arrest at [facility] ED ~22 mins and intubated  -5/22-imaging consistent with anoxic brain injury?  -now extubated  -hospital course complicated by encephalopathy

## 2018-06-06 NOTE — PLAN OF CARE
Problem: Patient Care Overview  Goal: Plan of Care Review  Outcome: Ongoing (interventions implemented as appropriate)  Reviewed POC ,including inpatient rehab and dialysis, with pt and wife. Both verbalized understanding.

## 2018-06-06 NOTE — PHARMACY MED REC
"Admission Medication Reconciliation - Pharmacy Consult Note    The home medication history was taken by Eduarda Townsend Pharmacy Tech.  Based on information gathered and subsequent review by the clinical pharmacist, the items below may need attention.    You may go to "Admission" then "Reconcile Home Medications" tabs to review and/or act upon these items.        No issues noted with the medication reconciliation.        Please address this information as you see fit.  Feel free to contact us if you have any questions or require assistance.    Brittany Lebron PharmD, [facility]  Internal Medicine Clinical Pharmacy Specialist  Spectra link: [phone]                  .    .          "

## 2018-06-06 NOTE — CONSULTS
Inpatient consult to Physical Medicine Rehab  Consult performed by: RENE BLACKMON  Consult ordered by: FISH DOUGHERTY  Reason for consult: assess rehab needs        Additional consult. Reviewed patient history and current admission.  Rehab team following.  Full consult to follow.    MELLISA Yoo, FNP-C  Physical Medicine & Rehabilitation   06/06/2018  Spectralink: [phone]

## 2018-06-06 NOTE — NURSING
Spoke to brady for IM4 informed pt did not receive meds today due to being off unit in cath lab and dialysis most of the day. Pt also NPO and inquiring about when NPO status will be released.

## 2018-06-06 NOTE — HPI
47 year old gentleman who we are consulted on for w/u and mgmt of a cardiomyopathy of unknown etiology.     He has a hx of hypertension, significant drinking hx and hadn't really followed with a doctor however over the few weeks prior to his admission here he had not felt well: very short of breath, fatigued, and overall weak so he was urged to present to the ER at [facility]. While there in the waiting room he had a choking episode, had witnessed possible seizure activity followed by cardiac arrest. He required 22 minutes of ACLS/5 shocks for VT/VF before ROSC. He was intubated and a lozenge was removed from airway. Hypothermia protocol was initiated and [place] was [place] [facility]  His course had been complicated by anoxic brain injury and renal failure.  He has made significant neurological recovery but remains on HD.  Cardiology has been reconsulted for ICD placement and ischemic eval in the setting of this new-onset CMP.

## 2018-06-06 NOTE — PROGRESS NOTES
[facility] HEMODIALYSIS NOTE     Patient currently on hemodialysis for removal of uremic toxins and volume.    Patient seen and evaluated on hemodialysis, tolerating treatment, see HD flowsheet for vitals and assessments.      Ultrafiltration goal is      Labs have been reviewed and the dialysate bath has been adjusted.     Assessment/Plan:     HD today for removal of uremic toxins and volume.    Would add sodium bicarbonate  650 mg tid    2 gm k diet     Iron stores low, hbg 9.7   will order iron with HD x 5 and then reassess need for HIGINIO    Would checck 25 oh vit d level

## 2018-06-06 NOTE — PROGRESS NOTES
[facility]  Nephrology  Progress Note     Patient Name: Los Mendez  MRN: [id number]  Admission Date: 5/19/2018  Hospital Length of Stay: 17 days  Attending Provider: Keith Cohen MD   Primary Care Physician: Provider Notinsystem  Principal Problem:Anoxic brain injury     Subjective:      HPI: Los Mendez is a 47 year old male with past medical history of HTN, heavy alcohol user and COPD. Transferred from [facility] following witnessed cardiac arrest while in the ED waiting room. Patient had presented complaining of upper respiratory track infection and shortness of breath symptoms, while in the ED waiting room he was noted to be choking, became apneic, which led to witnessed seizure like activity followed by cardiac arrest. Per discussion with ED staff, patient required about 22 minutes of ACLS/5 shocks before returning to normal sinus rhythm. During intubation a lozenge was removed from patients airway. Hypothermia protocol was initiated prior to transferring to [facility]. On arrival to [facility] Arrived with increased serum creatinine from 2.9-->5.1 (unkown baseline), BUN 35--> 52, Trop 2.4-->1.3, Lactic acidosis 6.4-->1.3, chest x ray with increased parenchymal interstitial attenuation and parenchymal opacities suggestive of pulmonary edema. On mechanical ventilation with a FiO2 50%. Currently also anuric at present moment. Per cardiology patient has a dilated cardiomyopathy EF looks about 10-15% and LVEDD is 7.3 cm.     Interval History:   [facility] consulted for permacath placement.            Review of patient's allergies indicates:   Allergen Reactions    Penicillins         Tolerated cefepime May 2018           Current Facility-Administered Medications   Medication Frequency    0.9%  NaCl infusion PRN    acetaminophen tablet 325 mg Q6H PRN    albuterol-ipratropium 2.5 mg-0.5 mg/3 mL nebulizer solution 3 mL Q6H PRN    aspirin chewable tablet 81 mg Daily    dextrose 50% injection 12.5 g PRN  pt needs constant observation for their safety.    dextrose 50% injection 25 g PRN    fentaNYL injection 25 mcg Q2H PRN    glucagon (human recombinant) injection 1 mg PRN    glucose chewable tablet 16 g PRN    glucose chewable tablet 24 g PRN    heparin (porcine) injection 5,000 Units Q8H    insulin aspart U-100 pen 1-10 Units Q6H PRN    metoprolol tartrate (LOPRESSOR) tablet 50 mg TID    ondansetron 4 mg/5 mL solution 4 mg Q8H PRN    ramelteon tablet 8 mg Nightly PRN    sodium chloride 0.9% flush 3 mL PRN         Objective:      Vital Signs (Most Recent):  Temp: 98.2 °F (36.8 °C) (06/05/18 1301)  Pulse: 66 (06/05/18 1301)  Resp: 18 (06/05/18 1301)  BP: 128/83 (06/05/18 1301)  SpO2: 96 % (06/05/18 1301)  O2 Device (Oxygen Therapy): room air (06/05/18 1204) Vital Signs (24h Range):  Temp:  [98 °F (36.7 °C)-99.5 °F (37.5 °C)] 98.2 °F (36.8 °C)  Pulse:  [61-90] 66  Resp:  [14-44] 18  SpO2:  [86 %-100 %] 96 %  BP: (103-153)/(57-93) 128/83      Weight: 109.9 kg (242 lb 4.6 oz) (06/05/18 0302)  Body mass index is 36.84 kg/m².  Body surface area is 2.3 meters squared.     I/O last 3 completed shifts:  In: 830 [P.O.:130; Other:700]  Out: 1175 [Other:1175]     Physical Exam   Constitutional: He appears well-developed and well-nourished. No distress.   HENT:   Head: Normocephalic and atraumatic.   Eyes: Conjunctivae and EOM are normal.   Cardiovascular: Normal rate and regular rhythm.    Pulmonary/Chest: Effort normal. He has rhonchi. He has rales.   Abdominal: Soft. Bowel sounds are normal.   Musculoskeletal: He exhibits edema. He exhibits no deformity.   Skin: Skin is warm and dry. He is not diaphoretic.         Significant Labs:  CBC:   Recent Labs  Lab 06/05/18 0429   WBC 9.01   RBC 3.96*   HGB 8.7*   HCT 28.6*      MCV 72*   MCH 22.0*   MCHC 30.4*      CMP:   Recent Labs  Lab 06/05/18 0429   GLU 86  86   CALCIUM 9.1  9.1   ALBUMIN 2.8*  2.8*   PROT 7.5     136   K 4.1  4.1   CO2 20*  20*     103   BUN 67*  67*   CREATININE 9.1*   9.1*   ALKPHOS 171*   ALT 47*   AST 54*   BILITOT 1.1*      All labs within the past 24 hours have been reviewed.      Significant Imaging:  Labs: Reviewed     Assessment/Plan:          Acute renal failure with tubular necrosis     Will place tunneled HD catheter for ongoing HD.

## 2018-06-06 NOTE — PT/OT/SLP PROGRESS
Speech Language Pathology  Pt not seen    Los Mendez  MRN: [id number]    Patient not seen today secondary to pt DURAN for permacath placement followed by HD. SLP will follow up.    Shari Waggoner CCC-SLP  6/6/2018

## 2018-06-06 NOTE — HPI
48 y/o man w/ hx of HTN, COPD? Transferred from [facility] following witnessed cardiac arrest while in the ED waiting room. Patient had presented complaining of URI/SOB symptoms, while in the ED waiting room he was noted to be choking, became apneic, which led to witnessed seizure like activity followed by cardiac arrest. Per discussion with ED staff, patient required about 22 minutes of ACLS/5 shocks before returning to normal sinus rhythm. During intubation a lozenge was removed from patients airway. Hyperthermia protocol was initiated prior to transferring to [facility] for NCC care. Per ED records, patient was acidotic with ph of 7.1 this morning. At the time of arrival to NICU, patient was on paralytics, however pupillary reflex was present. Will continue hypothermia protocol for additional 24 h.

## 2018-06-06 NOTE — PROCEDURES
Procedure Date: 6/6/18    (s) and Role:   Jefe Coleman MD    Indication: HD access     Pre-op Diagnosis:    MARY requiring HD    Post-op Diagnosis;  MARY requiring HD    Procedure:   1. Insertion Tunneled HD Catheter with Fluoro   2. Conscious Sedation     Anesthesia: IV Sedation + Local     Findings/Key Components:   Catheter placed in RA. Good flush and draw through each port.  Estimated Blood Loss: 5mL     Specimens     None         PROCEDURE: After obtaining consent, the patient was taken to the HUGO suite. Sedation was administered. The right neck and chest were prepped and draped in usual sterile fashion. We began using ultrasound guidance to examine the right internal jugular vein. It appeared to be widely patent and was free of thrombus that appeared suitable for access for HD catheter. We accessed the internal jugular vein using a Seldinger technique with an micropunture needle without difficulty, then the thin wire was passed into the superior vena cava through the heart into the inferior vena cava. The wire position was confirmed with intraoperative fluoroscopy, then a 5Fr sheath was introduced over the wire. The wire was removed and the the guidewire was passed into the IVC under fluoroscopic guidance. Counterincision was made at the venotomy and on the chest wall just below the clavicle. Local anesthesia was used to anesthetize the track and a tunneler was used to pass the 23cm GlidePath catheter underneath the chest wall until the felt cuff was just underneath the counter incision. The 5fr sheath was removed and the vein was dilated using serial dilators. Then the large peel-away sheath was then inserted over the guidewire under fluoroscopic guidance. The dilator and wire were removed. The catheter was placed through the peel-away sheath and positioned appropriately at center of RA. Fluoroscopy was used to confirm that the catheter tip was in appropriate position and that there was no  kinking at the apex of the catheter. A permanent recording of the catheter position was also taken with fluoroscopy. Both lumens had excellent draw and flush. The catheter was then secured in place with 3-0 Prolene. The counterincision in the neck was closed with a 3-0 Vicryl. There were no immediate complications. Patient tolerated the procedure well and discharged in stable condition.     Anesthesia:  Conscious sedation was achieved with 100mcg of Fentanyl and 2 mg of Midazolam (Versed) 1MG/1ML. Local anesthesia was achieved with 20 ml of Lidocaine 2%. Moderate conscious sedation was performed and cardiorespiratory functions were monitored the entire procedure by me and RN. Sedation began at 818am and concluded at 848am, totaling 30 minutes.

## 2018-06-06 NOTE — SUBJECTIVE & OBJECTIVE
History reviewed. No pertinent past medical history.  History reviewed. No pertinent surgical history.  Review of patient's allergies indicates:   Allergen Reactions    Penicillins      Tolerated cefepime May 2018       Scheduled Medications:    ascorbic acid (vitamin C)  250 mg Oral BID    [START ON 6/7/2018] aspirin  81 mg Oral Daily    atorvastatin  40 mg Oral Daily    ferrous sulfate  325 mg Oral BID    heparin (porcine)  5,000 Units Subcutaneous Q8H    iron sucrose (VENOFER) IVPB  100 mg Intravenous Once    metoprolol succinate  50 mg Oral Daily       PRN Medications: sodium chloride 0.9%, acetaminophen, albuterol-ipratropium, ramelteon    Family History     None        Social History Main Topics    Smoking status: Unknown If Ever Smoked    Smokeless tobacco: Current User     Types: Chew    Alcohol use Not on file    Drug use: Unknown    Sexual activity: Not on file     Review of Systems   Constitutional: Negative for chills, fatigue and fever.   HENT: Positive for trouble swallowing. Negative for voice change.    Eyes: Negative for photophobia and visual disturbance.   Respiratory: Negative for cough, shortness of breath and wheezing.    Cardiovascular: Negative for chest pain and palpitations.   Gastrointestinal: Negative for abdominal distention, nausea and vomiting.   Genitourinary: Negative for difficulty urinating and flank pain.   Musculoskeletal: Positive for gait problem. Negative for arthralgias and myalgias.   Skin: Negative for color change and rash.   Neurological: Positive for speech difficulty and weakness. Negative for numbness and headaches.   Psychiatric/Behavioral: Positive for confusion. Negative for agitation.     Objective:     Vital Signs (Most Recent):  Temp: 98.7 °F (37.1 °C) (06/06/18 1128)  Pulse: 64 (06/06/18 1245)  Resp: 20 (06/06/18 0932)  BP: 135/86 (06/06/18 1245)  SpO2: 100 % (06/06/18 1128)    Vital Signs (24h Range):  Temp:  [97.8 °F (36.6 °C)-98.7 °F (37.1 °C)]  98.7 °F (37.1 °C)  Pulse:  [64-78] 64  Resp:  [18-20] 20  SpO2:  [97 %-100 %] 100 %  BP: (118-140)/(71-86) 135/86     Body mass index is 36.84 kg/m².    Physical Exam   Constitutional: He appears well-developed and well-nourished.   HENT:   Head: Normocephalic and atraumatic.   Eyes: EOM are normal. Pupils are equal, round, and reactive to light.   Neck: Normal range of motion.   Cardiovascular: Normal rate and regular rhythm.    Pulmonary/Chest: Effort normal. No respiratory distress.   Abdominal: Soft. There is no tenderness.   Musculoskeletal: Normal range of motion. He exhibits no deformity.   Neurological: He is alert. He exhibits normal muscle tone.   -oriented to self, place   + dysarthria + cognitive linguistic impairments  RUE: 5/5, 5/5 .  LUE: 5/5, 5/5 .  RLE: 5/5, DF 5/5, PF 5/5.  LLE: 5/5, DF 5/5, PF 5/5.     Skin: Skin is warm and dry.   Psychiatric: His speech is slurred. Cognition and memory are impaired.     NEUROLOGICAL EXAMINATION:     MENTAL STATUS   Speech: slurred     CRANIAL NERVES     CN III, IV, VI   Pupils are equal, round, and reactive to light.  Extraocular motions are normal.       Diagnostic Results:   Labs: Reviewed  ECG: Reviewed  X-Ray: Reviewed  US: Reviewed  CT: Reviewed  MRI: Reviewed

## 2018-06-06 NOTE — ASSESSMENT & PLAN NOTE
-2/2 cardiac arrest with imaging suggestive of anoxic brain injury with complicated hospital course  -much improved with PT/OT    See hospital course for functional, cognitive/speech/language, and nutrition/swallow status.      Recommendations  -  Encourage mobility, OOB in chair at least 3 hours per day, and early ambulation as appropriate  -  PT/OT evaluate and treat  -  Pain management  -  Monitor for and prevent skin breakdown and pressure ulcers  · Early mobility, repositioning/weight shifting every 20-30 minutes when sitting, turn patient every 2 hours, proper mattress/overlay and chair cushioning, pressure relief/heel protector boots  -  DVT prophylaxis:  [facility]  -  Reviewed discharge options (IP rehab, SNF, HH therapy, and OP therapy)

## 2018-06-06 NOTE — ASSESSMENT & PLAN NOTE
-per Vascular Neurology, MRI w/ incidental find of what appear to be bilateral cerebellar watershed infarcts in the setting of cardiac arrest x ~22 minutes w/ several defibrillation attempts.    See hospital course for functional, cognitive/speech/language, and nutrition/swallow status.      Recommendations  -  Encourage mobility, OOB in chair at least 3 hours per day, and early ambulation as appropriate   -  PT/OT evaluate and treat  -  SLP speech and cognitive evaluate and treat  -  Monitor sleep disturbances and establish consistent sleep-wake cycle  -  Monitor for bowel and bladder dysfunction  -  Monitor for shoulder pain and subluxation  -  Monitor for spasticity  -  Monitor for and prevent skin breakdown and pressure ulcers  · Early mobility, repositioning/weight shifting every 20-30 minutes when sitting, turn patient every 2 hours, proper mattress/overlay and chair cushioning, pressure relief/heel protector boots  -  DVT prophylaxis:  [facility]  -  Reviewed discharge options (IP rehab, SNF, HH therapy, and OP therapy)

## 2018-06-06 NOTE — PROGRESS NOTES
[facility] Hospital Medicine  Progress Note    Patient Name: Los Mendez  MRN: [id number]  Patient Class: IP- Inpatient   Admission Date: 5/19/2018  Length of Stay: 18 days  Attending Physician: Moisés Villalobos MD  Primary Care Provider: Provider [facility] Medicine Team: Networked reference to record PCT  Donovan Fontana MD    Subjective:     Principal Problem:Anoxic brain injury    HPI:  48 y/o man w/ hx of HTN, COPD? Transferred from [facility] following witnessed cardiac arrest while in the ED waiting room. Patient had presented complaining of URI/SOB symptoms, while in the ED waiting room he was noted to be choking, became apneic, which led to witnessed seizure like activity followed by cardiac arrest. Per discussion with ED staff, patient required about 22 minutes of ACLS/5 shocks before returning to normal sinus rhythm. During intubation a lozenge was removed from patients airway. Hyperthermia protocol was initiated prior to transferring to [facility] for [facility] care. Per ED records, patient was acidotic with ph of 7.1 this morning. At the time of arrival to NICU, patient was on paralytics, however pupillary reflex was present. Will continue hypothermia protocol for additional 24 h.    Hospital Course:  05/19/18:  Arrived intubated, sedated, artic sun blanket  05/20/18:  Pressor requirment going up, NO DIURESIS, continue TTM, nimbex off, LFT improving, trop improving, family updated.  05/21/18:  Reach normothermia, MRI today, remove EEG, place HD line, consult nephrology, LFTs trending down, family updated.  05/22/18:  Anoxic brain injury. Pt responding. Last night placed on propofol  MRI --L head of caudate and cerebellar small infarctions.  05/23/18:  Anoxic brain injury.  On CRRT. BNP 1046. Back on propofol, pressors. Still agitated. Started on Buspar. Trop I normalizing.  05/25/18:  CRRT today. Amiodarone dc'd, metoprolol started. CBC o0h--ww H/H continues to drop will consult GI.  Vascular surgery consulted concerning  Possible arterial occlusion. RUE cold. Arterial line dcd. US RUE pending. Abdominal distention, KUB showed gas pattern. Bladder pressures q4h. Initial 21.  05/28/18:  H/H 6.8/23, 1 unit PRBC transfused. Keep Hgb greater than or equal to 8. CRRT stopped, line clotted. Changed trialysis catheter changed. Wean sedation today. Once off sedation begin weaning ventilator settings to CPAP.  Weaned vent to CPAP and able to Extubated in afternoon without s/s stridor or difficulty breathing. HR elevated 140s - 160s fentanyl x2 prn for pain/agitation. Metoprolol 5mg IV x3 given remains ST 140s.  Restless, will start precedex.  05/30/18:  Leukocytosis, pan cx; kelley discontinued; remove IJ, speech eval for diet   05/31/18:  No acute events, still with gastric secretions coming out of NG tube will start reglan; remains encephalopathic  06/01/18:  Will hold NGT suction, NPO per SLP.  Continue metoclopramide.  Hgb stable at 8.3 g/dL.  6/2 Continue NG tube for now. Will advance to pureed diet as recommended by SLP and evaluate intake before removing NG.. WBC remain elevated but afebrile, cultures negative and ISA/Diamond 5/24 showed no DVT only thrombophlebitis of LUE cephalic vein. Continue to monitor daily CBC. Nephrology following will hold HD today and possibly tomorrow depending on labs  6/4: patient fell from bed en route to bathroom (unassisted), CT no ICH, patient neuro stable, step down  6/5: Stepped down to hospital medicine  6/6: Permacath placed by [facility]GO. Underwent HD. Consult placed to PM&R for rehab placement. CM pursuing outpatient HD chair.    Interval History:    Review of Systems   Constitutional: Negative for chills, fatigue and fever.   HENT: Negative for congestion and rhinorrhea.    Eyes: Negative for pain and redness.   Respiratory: Negative for cough, chest tightness, shortness of breath and wheezing.    Cardiovascular: Negative for chest pain, palpitations and leg  swelling.   Gastrointestinal: Negative for abdominal pain, constipation, diarrhea, nausea and vomiting.   Endocrine: Negative for cold intolerance, heat intolerance, polydipsia and polyuria.   Genitourinary: Negative for dysuria and hematuria.   Musculoskeletal: Negative for arthralgias and myalgias.   Allergic/Immunologic: Negative for environmental allergies, food allergies and immunocompromised state.   Neurological: Negative for dizziness, light-headedness and headaches.   Hematological: Negative for adenopathy. Does not bruise/bleed easily.   Psychiatric/Behavioral: Negative for agitation and confusion.     Objective:     Vital Signs (Most Recent):  Temp: 98.4 °F (36.9 °C) (06/06/18 1636)  Pulse: 80 (06/06/18 1636)  Resp: 18 (06/06/18 1530)  BP: 126/82 (06/06/18 1636)  SpO2: 96 % (06/06/18 1636) Vital Signs (24h Range):  Temp:  [97.8 °F (36.6 °C)-98.7 °F (37.1 °C)] 98.4 °F (36.9 °C)  Pulse:  [59-80] 80  Resp:  [18-20] 18  SpO2:  [96 %-100 %] 96 %  BP: (118-143)/(59-86) 126/82     Weight: 109.9 kg (242 lb 4.6 oz)  Body mass index is 36.84 kg/m².    Intake/Output Summary (Last 24 hours) at 06/06/18 1749  Last data filed at 06/06/18 1530   Gross per 24 hour   Intake              625 ml   Output             1548 ml   Net             -923 ml      Physical Exam   Constitutional: He is oriented to person, place, and time. He appears well-developed and well-nourished. No distress.   HENT:   Head: Normocephalic and atraumatic.   Eyes: EOM are normal. Pupils are equal, round, and reactive to light.   Neck: Normal range of motion. Neck supple.   Cardiovascular: Normal rate, regular rhythm, normal heart sounds and intact distal pulses.  Exam reveals no gallop and no friction rub.    No murmur heard.  Pulmonary/Chest: Effort normal and breath sounds normal. No respiratory distress. He exhibits no tenderness.   Abdominal: Soft. Bowel sounds are normal. He exhibits no distension. There is no tenderness.   Musculoskeletal:  Normal range of motion. He exhibits no edema, tenderness or deformity.   Neurological: He is alert and oriented to person, place, and time. No cranial nerve deficit. Coordination normal.   Skin: Skin is warm and dry. No rash noted. He is not diaphoretic. No erythema. No pallor.   Psychiatric: He has a normal mood and affect. His behavior is normal. Judgment and thought content normal.   Nursing note and vitals reviewed.      Significant Labs:   Recent Lab Results       06/06/18  1639 06/06/18  0606 06/06/18  0335 06/06/18  0334 06/05/18  2159      Immature Granulocytes    0.6(H)      Immature Grans (Abs)    0.05  Comment:  Mild elevation in immature granulocytes is non specific and   can be seen in a variety of conditions including stress response,   acute inflammation, trauma and pregnancy. Correlation with other   laboratory and clinical findings is essential.  (H)      Albumin   3.0(L)  3.1(L)        3.0(L)       Alkaline Phosphatase   172(H)       ALT   48(H)       Anion Gap   15  14        15       Aniso    Slight      AST   52(H)       Baso #    0.14      Basophil%    1.7      Total Bilirubin   1.1  Comment:  For infants and newborns, interpretation of results should be based  on gestational age, weight and in agreement with clinical  observations.  Premature Infant recommended reference ranges:  Up to 24 hours.............<8.0 mg/dL  Up to 48 hours............<12.0 mg/dL  3-5 days..................<15.0 mg/dL  6-29 days.................<15.0 mg/dL  (H)       BUN, Bld   78(H)  73(H)        78(H)       Calcium   9.3  9.5        9.3       Chloride   103  101        103       CO2   19(L)  18(L)        19(L)       Creatinine   10.3(H)  9.8(H)        10.3(H)       Differential Method    Automated      eGFR if African American   6.2(A)  6.5(A)        6.2(A)       eGFR if non    5.3  Comment:  Calculation used to obtain the estimated glomerular filtration  rate (eGFR) is the CKD-EPI equation.   (A)   5.7  Comment:  Calculation used to obtain the estimated glomerular filtration  rate (eGFR) is the CKD-EPI equation.   (A)        5.3  Comment:  Calculation used to obtain the estimated glomerular filtration  rate (eGFR) is the CKD-EPI equation.   (A)       Eos #    0.2      Eosinophil%    2.0      Ferritin   222       Large/Giant Platelets    Present      Glucose   78  79        78       Gran # (ANC)    4.4      Gran%    54.3      Hematocrit    29.7(L)      Hemoglobin    9.1(L)      Hypo    Occasional      Coumadin Monitoring INR   1.1  Comment:  Coumadin Therapy:  2.0 - 3.0 for INR for all indicators except mechanical heart valves  and antiphospholipid syndromes which should use 2.5 - 3.5.         Iron   62       Lymph #    1.8      Lymph%    22.2      Magnesium   2.6  2.9(H)     MCH    22.6(L)      MCHC    30.6(L)      MCV    74(L)      Mono #    1.6(H)      Mono%    19.2(H)      MPV    11.1      nRBC    0      Ovalocytes    Occasional      Phosphorus   3.8  3.6        3.8  3.6     Platelets    405(H)      POCT Glucose 103 75        Poik    Slight      Poly    Occasional      Potassium   5.0  4.5        5.0       Total Protein   7.7       Protime   11.1       RBC    4.02(L)      RDW    29.3(H)      Saturated Iron   17(L)       Sodium   137  133(L)        137       Spherocytes    Occasional      TIBC   361       Transferrin   244       WBC    8.16                      Significant Imaging: I have reviewed all pertinent imaging results/findings within the past 24 hours.    Assessment/Plan:      * Anoxic brain injury    -Cardiac arrest ~ 22 minutes at OSH.  Pt doing relatively well.  Extubated 5/28.  -05/21/18 MRI w/o evidence of anoxic brain injury, but several small strokes; pt encephalopathic  -Encephalopathy likely multifactorial--strokes, metabolic (kidney/liver injury), delirium        Acute decompensated heart failure    S/p cardiac arrest on 5/19  LVEF 10-15%  - ACEi held in setting of ARF  - metoprolol  succinate 50mg daily  - will evaluate and start with hydralazine 10mg tid and isordil 10mg tid for afterload reduction if BP can tolerate while on HD  - aldosterone antagonist held in the setting of poor renal function  - start high intensity statin therapy with atorvastatin 40mg  - transfuse for goal Hb >8  - Consult placed to Cardiology for ischemic eval, PPM placement now that patient has made significant neurologic recovery        Acute superficial gastritis with hemorrhage    Coffee ground material aspirated via NG on 5/23  Concurrent with acute drop in Hg  Hg has since uptrended.   No recurrent signs of upper GI bleed.              Debility    PT/OT  Consult placed to PM&R for rehab placement        Iron deficiency anemia due to chronic blood loss              Acute combined systolic and diastolic ACC/AHA stage C congestive heart failure      See acute decompensated heart failure        Cytotoxic cerebral edema    - secondary to anoxic brain injury, 22 minutes PEA-->vfib  - MRI brain 5/21 for anoxic assessment.  see anoxic brain injury        Acute renal failure with tubular necrosis    - unknown baseline renal function, not previously dialysis dependent  - ischemic ATN 2/2 cardiac arrest  - Remains oliguric; starting to make small volumes of urine but not clearing volume or electrolytes effectively  - Receiving HD PRN  - Underwent permacath placement 6/6  - Renal diet  - History of iron deficiency anemia; plan for EPO if counts do not recover promptly  - Followed by Nephrology, appreciate recs          Essential hypertension      See acute decompensated heart failure        Acute pulmonary edema    - Managing volume status with HD given ARF        Class 3 severe obesity due to excess calories with serious comorbidity and body mass index (BMI) of 40.0 to 44.9 in adult    S/p nutrition consult; counseled on lifestyle modifications  Body mass index is 36.84 kg/m².          Cardiac arrest      See acute  decompensated heart failure          VTE Risk Mitigation         Ordered     heparin (porcine) injection 1,000 Units  As needed (PRN)      06/06/18 1433     heparin (porcine) injection 5,000 Units  Every 8 hours      05/27/18 1017              Donovan Fontana MD  Department of Hospital Medicine   [facility]

## 2018-06-06 NOTE — ASSESSMENT & PLAN NOTE
S/p cardiac arrest on 5/19  LVEF 10-15%  - ACEi held in setting of ARF  - metoprolol succinate 50mg daily  - will evaluate and start with hydralazine 10mg tid and isordil 10mg tid for afterload reduction if BP can tolerate while on HD  - aldosterone antagonist held in the setting of poor renal function  - start high intensity statin therapy with atorvastatin 40mg  - transfuse for goal Hb >8  - Consult placed to Cardiology for ischemic eval, PPM placement now that patient has made significant neurologic recovery

## 2018-06-06 NOTE — ASSESSMENT & PLAN NOTE
- unknown baseline renal function, not previously dialysis dependent  - ischemic ATN 2/2 cardiac arrest  - Remains oliguric; starting to make small volumes of urine but not clearing volume or electrolytes effectively  - Receiving HD PRN  - Underwent permacath placement 6/6  - Renal diet  - History of iron deficiency anemia; plan for EPO if counts do not recover promptly  - Followed by Nephrology, appreciate recs

## 2018-06-07 PROBLEM — I42.9 CARDIOMYOPATHY: Status: ACTIVE | Noted: 2018-06-07

## 2018-06-07 LAB
ALBUMIN SERPL BCP-MCNC: 2.9 G/DL
ANION GAP SERPL CALC-SCNC: 12 MMOL/L
ANISOCYTOSIS BLD QL SMEAR: SLIGHT
BASOPHILS # BLD AUTO: 0.08 K/UL
BASOPHILS NFR BLD: 1.1 %
BUN SERPL-MCNC: 50 MG/DL
CALCIUM SERPL-MCNC: 9.3 MG/DL
CHLORIDE SERPL-SCNC: 101 MMOL/L
CO2 SERPL-SCNC: 22 MMOL/L
CREAT SERPL-MCNC: 8.6 MG/DL
DIFFERENTIAL METHOD: ABNORMAL
EOSINOPHIL # BLD AUTO: 0.1 K/UL
EOSINOPHIL NFR BLD: 1.9 %
ERYTHROCYTE [DISTWIDTH] IN BLOOD BY AUTOMATED COUNT: 29.4 %
EST. GFR  (AFRICAN AMERICAN): 7.7 ML/MIN/1.73 M^2
EST. GFR  (NON AFRICAN AMERICAN): 6.6 ML/MIN/1.73 M^2
GIANT PLATELETS BLD QL SMEAR: PRESENT
GLUCOSE SERPL-MCNC: 80 MG/DL
HAV IGM SERPL QL IA: NEGATIVE
HBV CORE AB SERPL QL IA: NEGATIVE
HBV SURFACE AB SER-ACNC: NEGATIVE M[IU]/ML
HBV SURFACE AG SERPL QL IA: NEGATIVE
HCT VFR BLD AUTO: 26.3 %
HGB BLD-MCNC: 8.1 G/DL
HYPOCHROMIA BLD QL SMEAR: ABNORMAL
IMM GRANULOCYTES # BLD AUTO: 0.04 K/UL
IMM GRANULOCYTES NFR BLD AUTO: 0.6 %
INR PPP: 1.1
LYMPHOCYTES # BLD AUTO: 1.7 K/UL
LYMPHOCYTES NFR BLD: 24.4 %
MCH RBC QN AUTO: 22.6 PG
MCHC RBC AUTO-ENTMCNC: 30.8 G/DL
MCV RBC AUTO: 73 FL
MONOCYTES # BLD AUTO: 1.5 K/UL
MONOCYTES NFR BLD: 21.9 %
NEUTROPHILS # BLD AUTO: 3.5 K/UL
NEUTROPHILS NFR BLD: 50.1 %
NRBC BLD-RTO: 0 /100 WBC
OVALOCYTES BLD QL SMEAR: ABNORMAL
PHOSPHATE SERPL-MCNC: 3.9 MG/DL
PLATELET # BLD AUTO: 398 K/UL
PMV BLD AUTO: 11.1 FL
POCT GLUCOSE: 99 MG/DL (ref 70–110)
POIKILOCYTOSIS BLD QL SMEAR: SLIGHT
POLYCHROMASIA BLD QL SMEAR: ABNORMAL
POTASSIUM SERPL-SCNC: 4.1 MMOL/L
PROTHROMBIN TIME: 11 SEC
RBC # BLD AUTO: 3.59 M/UL
SODIUM SERPL-SCNC: 135 MMOL/L
TARGETS BLD QL SMEAR: ABNORMAL
WBC # BLD AUTO: 7 K/UL

## 2018-06-07 PROCEDURE — 92507 TX SP LANG VOICE COMM INDIV: CPT

## 2018-06-07 PROCEDURE — 80069 RENAL FUNCTION PANEL: CPT

## 2018-06-07 PROCEDURE — 99233 SBSQ HOSP IP/OBS HIGH 50: CPT | Mod: ,,, | Performed by: INTERNAL MEDICINE

## 2018-06-07 PROCEDURE — 99232 SBSQ HOSP IP/OBS MODERATE 35: CPT | Mod: ,,, | Performed by: INTERNAL MEDICINE

## 2018-06-07 PROCEDURE — 25000003 PHARM REV CODE 250: Performed by: INTERNAL MEDICINE

## 2018-06-07 PROCEDURE — 63600175 PHARM REV CODE 636 W HCPCS: Performed by: STUDENT IN AN ORGANIZED HEALTH CARE EDUCATION/TRAINING PROGRAM

## 2018-06-07 PROCEDURE — 85610 PROTHROMBIN TIME: CPT

## 2018-06-07 PROCEDURE — 99222 1ST HOSP IP/OBS MODERATE 55: CPT | Mod: ,,, | Performed by: INTERNAL MEDICINE

## 2018-06-07 PROCEDURE — 11000001 HC ACUTE MED/SURG PRIVATE ROOM

## 2018-06-07 PROCEDURE — 97530 THERAPEUTIC ACTIVITIES: CPT

## 2018-06-07 PROCEDURE — 99232 SBSQ HOSP IP/OBS MODERATE 35: CPT | Mod: ,,, | Performed by: NURSE PRACTITIONER

## 2018-06-07 PROCEDURE — 85025 COMPLETE CBC W/AUTO DIFF WBC: CPT

## 2018-06-07 PROCEDURE — 97116 GAIT TRAINING THERAPY: CPT

## 2018-06-07 PROCEDURE — 25000003 PHARM REV CODE 250: Performed by: STUDENT IN AN ORGANIZED HEALTH CARE EDUCATION/TRAINING PROGRAM

## 2018-06-07 PROCEDURE — 99223 1ST HOSP IP/OBS HIGH 75: CPT | Mod: ,,, | Performed by: INTERNAL MEDICINE

## 2018-06-07 PROCEDURE — 36415 COLL VENOUS BLD VENIPUNCTURE: CPT

## 2018-06-07 PROCEDURE — 63600175 PHARM REV CODE 636 W HCPCS: Mod: JG | Performed by: INTERNAL MEDICINE

## 2018-06-07 RX ADMIN — ASPIRIN 81 MG CHEWABLE TABLET 81 MG: 81 TABLET CHEWABLE at 09:06

## 2018-06-07 RX ADMIN — FERROUS SULFATE TAB EC 325 MG (65 MG FE EQUIVALENT) 325 MG: 325 (65 FE) TABLET DELAYED RESPONSE at 09:06

## 2018-06-07 RX ADMIN — METOPROLOL SUCCINATE 50 MG: 50 TABLET, EXTENDED RELEASE ORAL at 09:06

## 2018-06-07 RX ADMIN — ATORVASTATIN CALCIUM 40 MG: 20 TABLET, FILM COATED ORAL at 09:06

## 2018-06-07 RX ADMIN — Medication 250 MG: at 09:06

## 2018-06-07 RX ADMIN — FERUMOXYTOL 510 MG: 510 INJECTION INTRAVENOUS at 06:06

## 2018-06-07 RX ADMIN — HEPARIN SODIUM 5000 UNITS: 5000 INJECTION, SOLUTION INTRAVENOUS; SUBCUTANEOUS at 05:06

## 2018-06-07 NOTE — ASSESSMENT & PLAN NOTE
Patient with dilated cardiomyopathy EF looks about 10-15% and LVEDD is 7.3 cm, unknown etiology at this time but could be related to heavy alcohol use as he drinks about 1 case of beer and 2/5 of vodka per day.  uptitrate toprol xl to hr 50-60 --  Increase to 75mg po daily  Add afterload reduction  If renal recovery is not expected at this time would add lisinopril 5mg po daily  If renal recovery is expected would ad hydralazine 25mg po tid and isordil 10mg po tid  This is to maximize GDMT as he is not currently optimized on a good heart failure regimen  Please order PET stress for ischemic evaluation

## 2018-06-07 NOTE — PT/OT/SLP PROGRESS
"Speech Language Pathology Treatment    Patient Name:  Los Mendez   MRN:  [id number]  Admitting Diagnosis: Anoxic brain injury    Recommendations:                 General Recommendations:  Cognitive-linguistic therapy  Diet recommendations:  Dental Soft, Liquid Diet Level: Thin   Aspiration Precautions: Standard aspiration precautions   General Precautions: Standard, aspiration, fall, NPO  Communication strategies:  none    Subjective     " I think I'm doing well."     Pain/Comfort:  · Pain Rating 1: 0/10  · Pain Rating Post-Intervention 1: 0/10    Objective:     Has the patient been evaluated by SLP for swallowing?   Yes  Keep patient NPO? No   Current Respiratory Status: room air      Pt in bed upon entry with wife at bedside. Pt stated he is beginning to feel "more like himself" recently. Word fluency task completed. Pt named an average of 7 items per category provided min-mod cues within one minute. Compare/contrast task completed with 100% accy provided min cues. Pt was 100% intelligible throughout spontaneous conversation. Pt tolerated deyanira cracker via 1/2 and thin liquids via cup with timely swallow initiation, adequate bolus control and no overt s/s of aspiration. SLP reviewed swallow precautions with pt and spouse Continue POC at this time, all goals remain appropriate at this time.     Assessment:     Los Mendez is a 47 y.o. male with an SLP diagnosis of Cognitive-Linguistic Impairment.      Goals:    SLP Goals        Problem: SLP Goal    Goal Priority Disciplines Outcome   SLP Goal     SLP Ongoing (interventions implemented as appropriate)   Description:  Short Term Goals expected to be met by 6/11:    1. Pt will tolerate diet of soft solids and thin liquids without overt clinical signs of aspiration   2. Pt will complete problem solving skills w/75 % acc ind to enhance safety awareness   3 Pt will generate 10 items per category to enhance organizations skills   4 Pt will demonstrate sustained " attention to task across 5 consecutive minutes w/ min cues to enhance attention skills   5. Pt will participate in ongoing assessment of speech language and cognitive linguistic skills to help rule out deficits and determine therapeutic plan of care                          Plan:     · Patient to be seen:  4 x/week   · Plan of Care reviewed with:  patient, spouse   · SLP Follow-Up:  Yes       Discharge recommendations:  rehabilitation facility       Time Tracking:     SLP Treatment Date:   06/07/18  Speech Start Time:  0918  Speech Stop Time:  0934     Speech Total Time (min):  16 min    Billable Minutes: Speech Therapy Individual 8 and Treatment Swallowing Dysfunction 8    Shari Waggoner CCC-SLP  06/07/2018

## 2018-06-07 NOTE — SUBJECTIVE & OBJECTIVE
History reviewed. No pertinent past medical history.    History reviewed. No pertinent surgical history.    Review of patient's allergies indicates:   Allergen Reactions    Penicillins      Tolerated cefepime May 2018       No current facility-administered medications on file prior to encounter.      No current outpatient prescriptions on file prior to encounter.     Family History     None        Social History Main Topics    Smoking status: Unknown If Ever Smoked    Smokeless tobacco: Current User     Types: Chew    Alcohol use Not on file    Drug use: Unknown    Sexual activity: Not on file     Review of Systems   Constitution: Negative.   HENT: Negative.    Eyes: Negative.    Cardiovascular: Negative.    Respiratory: Negative.    Endocrine: Negative.    Skin: Negative.    Musculoskeletal: Negative.    Gastrointestinal: Negative.    Genitourinary: Negative.    Neurological: Negative.      Objective:     Vital Signs (Most Recent):  Temp: 98.4 °F (36.9 °C) (06/06/18 1636)  Pulse: 80 (06/06/18 1636)  Resp: 18 (06/06/18 1530)  BP: 126/82 (06/06/18 1636)  SpO2: 96 % (06/06/18 1636) Vital Signs (24h Range):  Temp:  [97.8 °F (36.6 °C)-98.7 °F (37.1 °C)] 98.4 °F (36.9 °C)  Pulse:  [59-80] 80  Resp:  [18-20] 18  SpO2:  [96 %-100 %] 96 %  BP: (118-143)/(59-86) 126/82     Weight: 109.9 kg (242 lb 4.6 oz)  Body mass index is 36.84 kg/m².    SpO2: 96 %  O2 Device (Oxygen Therapy): room air    Physical Exam   Constitutional: He is oriented to person, place, and time. He appears well-developed and well-nourished.   HENT:   Head: Normocephalic and atraumatic.   Eyes: Conjunctivae and EOM are normal. Pupils are equal, round, and reactive to light.   Neck: Normal range of motion. Neck supple. No JVD present.   Cardiovascular: Normal rate, regular rhythm and normal heart sounds.  Exam reveals no gallop and no friction rub.    No murmur heard.  Pulmonary/Chest: Effort normal and breath sounds normal. No respiratory distress.  He has no wheezes. He has no rales. He exhibits no tenderness.   Abdominal: Soft. Bowel sounds are normal. He exhibits no distension. There is no tenderness.   Musculoskeletal: Normal range of motion. He exhibits no edema or tenderness.   Neurological: He is alert and oriented to person, place, and time.   Skin: Skin is warm and dry. No erythema. No pallor.       Significant Labs:   EP:     Recent Labs  Lab 06/07/18  0356 06/07/18  1715 06/08/18  0324   *  --  136   K 4.1  --  4.4     --  103   CO2 22*  --  19*   GLU 80  --  79   BUN 50*  --  55*   CREATININE 8.6*  --  9.8*   CALCIUM 9.3  --  9.6   ALBUMIN 2.9*  --  2.9*   ANIONGAP 12  --  14   ESTGFRAFRICA 7.7*  --  6.5*   EGFRNONAA 6.6*  --  5.7*   WBC 7.00  --  7.38   HGB 8.1*  --  8.0*   HCT 26.3*  --  26.1*   *  --  427*   INR  --  1.1  --        Significant Imaging: Echocardiogram:   2D echo with color flow doppler:   Results for orders placed or performed during the hospital encounter of 05/19/18   2D echo with color flow doppler   Result Value Ref Range    EF 10 (A) 55 - 65    Mitral Valve Regurgitation MILD     Diastolic Dysfunction Yes (A)     Est. PA Systolic Pressure 37.47     Tricuspid Valve Regurgitation MILD     and EKG: nothing current

## 2018-06-07 NOTE — PROGRESS NOTES
[facility] Hospital Medicine  Progress Note    Patient Name: Los Mendez  MRN: [id number]  Patient Class: IP- Inpatient   Admission Date: 5/19/2018  Length of Stay: 19 days  Attending Physician: Moisés Villalobos MD  Primary Care Provider: Provider [facility] Medicine Team: Networked reference to record PCT  Amos Roche MD    Subjective:     Principal Problem:Anoxic brain injury    HPI:  46 y/o man w/ hx of HTN, COPD? Transferred from [facility] following witnessed cardiac arrest while in the ED waiting room. Patient had presented complaining of URI/SOB symptoms, while in the ED waiting room he was noted to be choking, became apneic, which led to witnessed seizure like activity followed by cardiac arrest. Per discussion with ED staff, patient required about 22 minutes of ACLS/5 shocks before returning to normal sinus rhythm. During intubation a lozenge was removed from patients airway. Hyperthermia protocol was initiated prior to transferring to [facility]. Per ED records, patient was acidotic with ph of 7.1 this morning. At the time of arrival to NICU, patient was on paralytics, however pupillary reflex was present. Will continue hypothermia protocol for additional 24 h.    Hospital Course:  05/19/18:  Arrived intubated, sedated, artic sun blanket  05/20/18:  Pressor requirment going up, NO DIURESIS, continue TTM, nimbex off, LFT improving, trop improving, family updated.  05/21/18:  Reach normothermia, MRI today, remove EEG, place HD line, consult nephrology, LFTs trending down, family updated.  05/22/18:  Anoxic brain injury. Pt responding. Last night placed on propofol  MRI --L head of caudate and cerebellar small infarctions.  05/23/18:  Anoxic brain injury.  On CRRT. BNP 1046. Back on propofol, pressors. Still agitated. Started on Buspar. Trop I normalizing.  05/25/18:  CRRT today. Amiodarone dc'd, metoprolol started. CBC t9n--cx H/H continues to drop will consult GI.  Vascular surgery consulted concerning  Possible arterial occlusion. RUE cold. Arterial line dcd. US RUE pending. Abdominal distention, KUB showed gas pattern. Bladder pressures q4h. Initial 21.  05/28/18:  H/H 6.8/23, 1 unit PRBC transfused. Keep Hgb greater than or equal to 8. CRRT stopped, line clotted. Changed trialysis catheter changed. Wean sedation today. Once off sedation begin weaning ventilator settings to CPAP.  Weaned vent to CPAP and able to Extubated in afternoon without s/s stridor or difficulty breathing. HR elevated 140s - 160s fentanyl x2 prn for pain/agitation. Metoprolol 5mg IV x3 given remains ST 140s.  Restless, will start precedex.  05/30/18:  Leukocytosis, pan cx; kelley discontinued; remove IJ, speech eval for diet   05/31/18:  No acute events, still with gastric secretions coming out of NG tube will start reglan; remains encephalopathic  06/01/18:  Will hold NGT suction, NPO per SLP.  Continue metoclopramide.  Hgb stable at 8.3 g/dL.  6/2 Continue NG tube for now. Will advance to pureed diet as recommended by SLP and evaluate intake before removing NG.. WBC remain elevated but afebrile, cultures negative and ISA/Diamond 5/24 showed no DVT only thrombophlebitis of LUE cephalic vein. Continue to monitor daily CBC. Nephrology following will hold HD today and possibly tomorrow depending on labs  6/4: patient fell from bed en route to bathroom (unassisted), CT no ICH, patient neuro stable, step down  6/5: Stepped down to hospital medicine  6/6: Permacath placed by HUGO. Underwent HD. Consult placed to PM&R for rehab placement. CM pursuing outpatient HD chair.  6/7: No acute events. Patient participating with Rehab. Pending placement in rehab.     Interval History:     NAEON    Review of Systems   Constitutional: Negative for activity change, chills, fatigue and fever.   HENT: Negative for congestion, facial swelling, sore throat and voice change.    Eyes: Negative for pain, redness and visual  disturbance.   Respiratory: Negative for cough, chest tightness and shortness of breath.    Cardiovascular: Negative for chest pain and leg swelling.   Gastrointestinal: Negative for abdominal pain, constipation, diarrhea and nausea.   Endocrine: Negative for cold intolerance and heat intolerance.   Musculoskeletal: Negative for arthralgias, back pain and neck pain.        Strength 5/5 in both upper and lower extremities.    Skin: Negative for rash and wound.   Neurological: Negative for dizziness, speech difficulty, weakness, light-headedness and headaches.   Psychiatric/Behavioral: Negative for agitation and confusion.        Slurred speech       Objective:     Vital Signs (Most Recent):  Temp: 98.1 °F (36.7 °C) (06/06/18 2000)  Pulse: 72 (06/06/18 2000)  Resp: 18 (06/06/18 2000)  BP: 126/75 (06/06/18 2000)  SpO2: 96 % (06/06/18 1636) Vital Signs (24h Range):  Temp:  [98.1 °F (36.7 °C)-98.7 °F (37.1 °C)] 98.1 °F (36.7 °C)  Pulse:  [59-80] 72  Resp:  [18-20] 18  SpO2:  [96 %-100 %] 96 %  BP: (122-143)/(59-86) 126/75     Weight: 109.9 kg (242 lb 4.6 oz)  Body mass index is 36.84 kg/m².    Intake/Output Summary (Last 24 hours) at 06/07/18 0641  Last data filed at 06/06/18 1800   Gross per 24 hour   Intake              625 ml   Output             1548 ml   Net             -923 ml      Physical Exam   Constitutional: He is oriented to person, place, and time. He appears well-developed and well-nourished. No distress.   HENT:   Head: Normocephalic and atraumatic.   Eyes: EOM are normal. Pupils are equal, round, and reactive to light.   Neck: Normal range of motion. Neck supple.   Cardiovascular: Normal rate, regular rhythm, normal heart sounds and intact distal pulses.  Exam reveals no gallop and no friction rub.    No murmur heard.  Pulmonary/Chest: Effort normal and breath sounds normal. No respiratory distress. He exhibits no tenderness.   Abdominal: Soft. Bowel sounds are normal. He exhibits no distension. There is  no tenderness.   Musculoskeletal: Normal range of motion. He exhibits no edema, tenderness or deformity.   Neurological: He is alert and oriented to person, place, and time. No cranial nerve deficit. Coordination normal.   Skin: Skin is warm and dry. No rash noted. He is not diaphoretic. No erythema. No pallor.   Psychiatric: He has a normal mood and affect. His behavior is normal. Judgment and thought content normal.   Nursing note and vitals reviewed.    Significant Labs:   BMP:   Recent Labs  Lab 06/06/18  0335 06/07/18  0356   GLU 78  78 80     137 135*   K 5.0  5.0 4.1     103 101   CO2 19*  19* 22*   BUN 78*  78* 50*   CREATININE 10.3*  10.3* 8.6*   CALCIUM 9.3  9.3 9.3   MG 2.6  --      CBC:   Recent Labs  Lab 06/06/18  0334 06/07/18  0356   WBC 8.16 7.00   HGB 9.1* 8.1*   HCT 29.7* 26.3*   * 398*     Assessment/Plan:      * Anoxic brain injury    -Cardiac arrest ~ 22 minutes at OSH.  Pt doing relatively well.  Extubated 5/28.  -05/21/18 MRI w/o evidence of anoxic brain injury, but several small strokes; pt encephalopathic  -Encephalopathy likely multifactorial--strokes, metabolic (kidney/liver injury), delirium        Acute superficial gastritis with hemorrhage    Coffee ground material aspirated via NG on 5/23  Concurrent with acute drop in Hg  Hg has since uptrended.   No recurrent signs of upper GI bleed.              Debility    PT/OT  Consult placed to PM&R for rehab placement        Iron deficiency anemia due to chronic blood loss              Acute bilat watershed infarction    - Incidental finding on MRI.   - Continues to have slurred speech but participating in rehab.        Acute combined systolic and diastolic ACC/AHA stage C congestive heart failure    S/p cardiac arrest on 5/19  LVEF 10-15%  - ACEi held in setting of ARF  - metoprolol succinate 50mg daily  - will evaluate and start with hydralazine 10mg tid and isordil 10mg tid for afterload reduction if BP can  tolerate while on HD  - aldosterone antagonist held in the setting of poor renal function  - start high intensity statin therapy with atorvastatin 40mg  - transfuse for goal Hb >8  - Cardiology consulted. Will be evaluated for an ICD placement later today.         Cytotoxic cerebral edema    - secondary to anoxic brain injury, 22 minutes PEA-->vfib  - MRI brain 5/21 for anoxic assessment.  see anoxic brain injury        Acute renal failure with tubular necrosis    - unknown baseline renal function, not previously dialysis dependent  - ischemic ATN 2/2 cardiac arrest  - Remains oliguric; starting to make small volumes of urine but not clearing volume or electrolytes effectively  - Receiving HD PRN  - Underwent permacath placement 6/6  - Renal diet  - History of iron deficiency anemia; plan for EPO if counts do not recover promptly  - Followed by Nephrology  - Had HD on 6/6.           Essential hypertension      See acute decompensated heart failure        Acute pulmonary edema    - Managing volume status with HD given ARF        Class 3 severe obesity due to excess calories with serious comorbidity and body mass index (BMI) of 40.0 to 44.9 in adult    S/p nutrition consult; counseled on lifestyle modifications  Body mass index is 36.84 kg/m².          Cardiac arrest      See acute decompensated heart failure          VTE Risk Mitigation         Ordered     heparin (porcine) injection 1,000 Units  As needed (PRN)      06/06/18 1433     heparin (porcine) injection 5,000 Units  Every 8 hours      05/27/18 1017        Dispo: Awaiting placement for dialysis chair and rehab center. Patient would like to be close to home in [place].     Amos Roche MD  Department of Hospital Medicine   [facility]

## 2018-06-07 NOTE — ASSESSMENT & PLAN NOTE
- unknown baseline renal function, not previously dialysis dependent  - ischemic ATN 2/2 cardiac arrest  - Remains oliguric; starting to make small volumes of urine but not clearing volume or electrolytes effectively  - Receiving HD PRN  - Underwent permacath placement 6/6  - Renal diet  - History of iron deficiency anemia; plan for EPO if counts do not recover promptly  - Followed by Nephrology  - Had HD on 6/6.

## 2018-06-07 NOTE — ASSESSMENT & PLAN NOTE
-2/2 cardiac arrest with imaging suggestive of anoxic brain injury with complicated hospital course  -much improved with PT/OT    See hospital course for functional, cognitive/speech/language, and nutrition/swallow status.      Recommendations  -  Encourage mobility, OOB in chair at least 3 hours per day, and early ambulation as appropriate  -  PT/OT evaluate and treat  -  Pain management  -  Monitor for and prevent skin breakdown and pressure ulcers  · Early mobility, repositioning/weight shifting every 20-30 minutes when sitting, turn patient every 2 hours, proper mattress/overlay and chair cushioning, pressure relief/heel protector boots  -  DVT prophylaxis:  [facility]  -  Reviewed discharge options (IP rehab, SNF, HH therapy, and OP therapy)

## 2018-06-07 NOTE — ASSESSMENT & PLAN NOTE
-per Vascular Neurology, MRI w/ incidental find of what appear to be bilateral cerebellar watershed infarcts in the setting of cardiac arrest x ~22 minutes w/ several defibrillation attempts.    See hospital course for functional, cognitive/speech/language, and nutrition/swallow status.      Recommendations  -  Encourage mobility, OOB in chair at least 3 hours per day, and early ambulation as appropriate   -  PT/OT evaluate and treat  -  SLP speech and cognitive evaluate and treat  -  Monitor sleep disturbances and establish consistent sleep-wake cycle  -  Monitor for bowel and bladder dysfunction  -  Monitor for shoulder pain and subluxation  -  Monitor for spasticity  -  Monitor for and prevent skin breakdown and pressure ulcers  · Early mobility, repositioning/weight shifting every 20-30 minutes when sitting, turn patient every 2 hours, proper mattress/overlay and chair cushioning, pressure relief/heel protector boots  -  DVT prophylaxis:  [facility]  -  Reviewed discharge options (IP rehab, SNF, HH therapy, and OP therapy)

## 2018-06-07 NOTE — PROGRESS NOTES
[facility]  Physical Medicine & Rehab  Progress Note    Patient Name: Los Mendez  MRN: [id number]  Admission Date: 5/19/2018  Length of Stay: 19 days  Attending Physician: Moisés Villalobos MD    Subjective:     Principal Problem:Anoxic brain injury    Hospital Course:   5/31/-6/1/18: Evaluated by therapy.  Bed mobility totalA x 2 ppl.  No sit to stand.  Sat EOB 10 mins MaxA.  UBD and LBD totalA.  6/5/18: Participated with therapy.   Bed mobility CGA.  Sit to stand CGA-SBA & RW.  Ambulated 15 ft Brittaney & RW.  6/4 grooming Brittaney.  6/6/18: No therapy 2/2 dialysis.     Interval History 6/7/2018:  Patient is seen for follow-up rehab evaluation and recommendations: Participating with therapy. ICD placement vs life vest pending.     HPI, Past Medical, Family, and Social History remains the same as documented in the initial encounter.    Scheduled Medications:    ascorbic acid (vitamin C)  250 mg Oral BID    aspirin  81 mg Oral Daily    atorvastatin  40 mg Oral Daily    ferrous sulfate  325 mg Oral BID    heparin (porcine)  5,000 Units Subcutaneous Q8H    metoprolol succinate  50 mg Oral Daily       Diagnostic Results: Labs: Reviewed    PRN Medications: sodium chloride 0.9%, acetaminophen, albuterol-ipratropium, heparin (porcine), ramelteon    Review of Systems   Constitutional: Negative for chills, fatigue and fever.   HENT: Positive for trouble swallowing. Negative for voice change.    Eyes: Negative for photophobia and visual disturbance.   Respiratory: Negative for cough, shortness of breath and wheezing.    Cardiovascular: Negative for chest pain and palpitations.   Gastrointestinal: Negative for abdominal distention, nausea and vomiting.   Genitourinary: Negative for difficulty urinating and flank pain.   Musculoskeletal: Positive for gait problem. Negative for arthralgias and myalgias.   Skin: Negative for color change and rash.   Neurological: Positive for speech difficulty and weakness.  Negative for numbness and headaches.   Psychiatric/Behavioral: Positive for confusion. Negative for agitation.     Objective:     Vital Signs (Most Recent):  Temp: 98.2 °F (36.8 °C) (06/07/18 1202)  Pulse: 71 (06/07/18 1202)  Resp: 18 (06/07/18 1202)  BP: 120/68 (06/07/18 1202)  SpO2: 96 % (06/07/18 0738)    Vital Signs (24h Range):  Temp:  [98.1 °F (36.7 °C)-98.7 °F (37.1 °C)] 98.2 °F (36.8 °C)  Pulse:  [59-84] 71  Resp:  [18-20] 18  SpO2:  [96 %] 96 %  BP: (120-143)/(59-84) 120/68     Physical Exam   Constitutional: He appears well-developed and well-nourished.   Wife at bedside   HENT:   Head: Normocephalic and atraumatic.   Eyes: EOM are normal. Pupils are equal, round, and reactive to light.   Neck: Normal range of motion.   Cardiovascular: Normal rate and regular rhythm.    Pulmonary/Chest: Effort normal. No respiratory distress.   Abdominal: Soft. There is no tenderness.   Musculoskeletal: Normal range of motion. He exhibits no deformity.   Neurological: He is alert. He exhibits normal muscle tone.   +oriented to self, place   + dysarthria + cognitive linguistic impairments  RUE: 5/5, 5/5 .  LUE: 5/5, 5/5 .  RLE: 5/5, DF 5/5, PF 5/5.  LLE: 5/5, DF 5/5, PF 5/5.  Skin: Skin is warm and dry.   Psychiatric: His speech is slurred. Cognition and memory are impaired.     Assessment/Plan:      * Anoxic brain injury    -cardiac arrest at [facility] ED ~22 mins and intubated  -5/22-imaging consistent with anoxic brain injury?  -now extubated  -hospital course complicated by encephalopathy          Debility    -2/2 cardiac arrest with imaging suggestive of anoxic brain injury with complicated hospital course  -much improved with PT/OT    See hospital course for functional, cognitive/speech/language, and nutrition/swallow status.      Recommendations  -  Encourage mobility, OOB in chair at least 3 hours per day, and early ambulation as appropriate  -  PT/OT evaluate and treat  -  Pain management  -  Monitor for  and prevent skin breakdown and pressure ulcers  · Early mobility, repositioning/weight shifting every 20-30 minutes when sitting, turn patient every 2 hours, proper mattress/overlay and chair cushioning, pressure relief/heel protector boots  -  DVT prophylaxis:  [facility]  -  Reviewed discharge options (IP rehab, SNF, HH therapy, and OP therapy)          Iron deficiency anemia due to chronic blood loss    -s/p transfusions  -stable         Acute bilat watershed infarction    -per Vascular Neurology, MRI w/ incidental find of what appear to be bilateral cerebellar watershed infarcts in the setting of cardiac arrest x ~22 minutes w/ several defibrillation attempts.    See hospital course for functional, cognitive/speech/language, and nutrition/swallow status.      Recommendations  -  Encourage mobility, OOB in chair at least 3 hours per day, and early ambulation as appropriate   -  PT/OT evaluate and treat  -  SLP speech and cognitive evaluate and treat  -  Monitor sleep disturbances and establish consistent sleep-wake cycle  -  Monitor for bowel and bladder dysfunction  -  Monitor for shoulder pain and subluxation  -  Monitor for spasticity  -  Monitor for and prevent skin breakdown and pressure ulcers  · Early mobility, repositioning/weight shifting every 20-30 minutes when sitting, turn patient every 2 hours, proper mattress/overlay and chair cushioning, pressure relief/heel protector boots  -  DVT prophylaxis:  [facility]  -  Reviewed discharge options (IP rehab, SNF, HH therapy, and OP therapy)          Acute renal failure with tubular necrosis    -initially required CRRT now on HD  -permacath place yesterday         Cardiac arrest    -cardiac arrest PTA  -05/24/18 2D Echo notable for EF 10-15%  -troponin down trended  -EP consulted for possible ICD placement vs. Life vest         Recommend Inpatient Rehab.  IRF preference per patient/[facility].  Barriers to IRF admission:  ICD placement possibly pending?      Yana ORTA  JOSE Dela Cruz  Department of Physical Medicine & Rehab   [facility]

## 2018-06-07 NOTE — SUBJECTIVE & OBJECTIVE
Interval History 6/7/2018:  Patient is seen for follow-up rehab evaluation and recommendations: Participating with therapy. ICD placement vs life vest pending.     HPI, Past Medical, Family, and Social History remains the same as documented in the initial encounter.    Scheduled Medications:    ascorbic acid (vitamin C)  250 mg Oral BID    aspirin  81 mg Oral Daily    atorvastatin  40 mg Oral Daily    ferrous sulfate  325 mg Oral BID    heparin (porcine)  5,000 Units Subcutaneous Q8H    metoprolol succinate  50 mg Oral Daily       Diagnostic Results: Labs: Reviewed    PRN Medications: sodium chloride 0.9%, acetaminophen, albuterol-ipratropium, heparin (porcine), ramelteon    Review of Systems   Constitutional: Negative for chills, fatigue and fever.   HENT: Positive for trouble swallowing. Negative for voice change.    Eyes: Negative for photophobia and visual disturbance.   Respiratory: Negative for cough, shortness of breath and wheezing.    Cardiovascular: Negative for chest pain and palpitations.   Gastrointestinal: Negative for abdominal distention, nausea and vomiting.   Genitourinary: Negative for difficulty urinating and flank pain.   Musculoskeletal: Positive for gait problem. Negative for arthralgias and myalgias.   Skin: Negative for color change and rash.   Neurological: Positive for speech difficulty and weakness. Negative for numbness and headaches.   Psychiatric/Behavioral: Positive for confusion. Negative for agitation.     Objective:     Vital Signs (Most Recent):  Temp: 98.2 °F (36.8 °C) (06/07/18 1202)  Pulse: 71 (06/07/18 1202)  Resp: 18 (06/07/18 1202)  BP: 120/68 (06/07/18 1202)  SpO2: 96 % (06/07/18 0738)    Vital Signs (24h Range):  Temp:  [98.1 °F (36.7 °C)-98.7 °F (37.1 °C)] 98.2 °F (36.8 °C)  Pulse:  [59-84] 71  Resp:  [18-20] 18  SpO2:  [96 %] 96 %  BP: (120-143)/(59-84) 120/68     Physical Exam   Constitutional: He appears well-developed and well-nourished.   Wife at bedside    HENT:   Head: Normocephalic and atraumatic.   Eyes: EOM are normal. Pupils are equal, round, and reactive to light.   Neck: Normal range of motion.   Cardiovascular: Normal rate and regular rhythm.    Pulmonary/Chest: Effort normal. No respiratory distress.   Abdominal: Soft. There is no tenderness.   Musculoskeletal: Normal range of motion. He exhibits no deformity.   Neurological: He is alert. He exhibits normal muscle tone.   +oriented to self, place   + dysarthria + cognitive linguistic impairments  RUE: 5/5, 5/5 .  LUE: 5/5, 5/5 .  RLE: 5/5, DF 5/5, PF 5/5.  LLE: 5/5, DF 5/5, PF 5/5.     Skin: Skin is warm and dry.   Psychiatric: His speech is slurred. Cognition and memory are impaired.     NEUROLOGICAL EXAMINATION:     MENTAL STATUS   Speech: slurred     CRANIAL NERVES     CN III, IV, VI   Pupils are equal, round, and reactive to light.  Extraocular motions are normal.

## 2018-06-07 NOTE — PLAN OF CARE
Problem: SLP Goal  Goal: SLP Goal  Short Term Goals expected to be met by 6/11:    1. Pt will tolerate diet of soft solids and thin liquids without overt clinical signs of aspiration   2. Pt will complete problem solving skills w/75 % acc ind to enhance safety awareness   3 Pt will generate 10 items per category to enhance organizations skills   4 Pt will demonstrate sustained attention to task across 5 consecutive minutes w/ min cues to enhance attention skills   5. Pt will participate in ongoing assessment of speech language and cognitive linguistic skills to help rule out deficits and determine therapeutic plan of care         Continue POC at this time, all goals remain appropriate.   Shari Waggoner CCC-SLP  6/7/2018

## 2018-06-07 NOTE — ASSESSMENT & PLAN NOTE
Awaiting PET stress  Depending upon results we will further delineate plan  If non-ischemic, will implant ICD prior to dc  If ischemic, will pursue revascularization and GDMT with lifevest x 3 months and then reassess EF  Repeat 2d echo to assess ef in the interim

## 2018-06-07 NOTE — ASSESSMENT & PLAN NOTE
-cardiac arrest at [facility] ED ~22 mins and intubated  -5/22-imaging consistent with anoxic brain injury?  -now extubated  -hospital course complicated by encephalopathy

## 2018-06-07 NOTE — ASSESSMENT & PLAN NOTE
Patient with dilated cardiomyopathy EF looks about 10-15% and LVEDD is 7.3 cm, unknown etiology at this time but could be related to heavy alcohol use as he drinks about 1 case of beer and 2/5 of vodka per day.  uptitrate toprol xl to hr 50-60 --  Increase to 75mg po daily  -Add afterload reduction  -If renal recovery is not expected at this time would add lisinopril 5mg po daily  -If renal recovery is expected would ad hydralazine 25mg po tid and isordil 10mg po tid  -This is to maximize GDMT as he is not currently optimized on a good heart failure regimen  -Plan is for dual chamber ICD implantation once ischemic evaluation is complete.

## 2018-06-07 NOTE — PT/OT/SLP PROGRESS
Occupational Therapy   Treatment    Name: Los Mendez  MRN: [id number]  Admitting Diagnosis:  Anoxic brain injury       Recommendations:     Discharge Recommendations: rehabilitation facility  Discharge Equipment Recommendations:  walker, rolling  Barriers to discharge:  Inaccessible home environment    Subjective     Communicated with: RN prior to session. Pt's wife present throughout  Pain/Comfort:  · Pain Rating 1: 0/10  · Pain Rating Post-Intervention 1: 0/10    Patients cultural, spiritual, Gnosticist conflicts given the current situation: none reported    Objective:     Patient found with: peripheral IV    General Precautions: Standard, aspiration, fall, NPO   Orthopedic Precautions:N/A   Braces: N/A     Occupational Performance:    Bed Mobility:    · Not completed; Pt OOB in bathroom when entering room    Functional Mobility/Transfers:  · Patient completed Sit <> Stand Transfer with stand by assistance  with  rolling walker   · Patient completed Step Transfer/Pivots  technique with stand by assistance with rolling walker  · Functional Mobility: SBA at household distance with rolling walker    Activities of Daily Living:  · Grooming: stand by assistance while standing at sink for hand hygiene  · UB Dressing: minimum assistance while standing to don gown as jacket; complete bilateral tie closure with added time and trials  · LB Dressing: contact guard assistance to don B socks; added time to complete    Patient left seated EOB with all lines intact and wife present    AMPAC 6 Click:  AMPAC Total Score: 19     Therapeutic Exercise: chest press and shoulder press   Modality Used: rolled towel for endurance training; Bilateral grasp  # of Sets: 1  # of Reps: 10  Endurance Level: fair  Where Completed: standing at EOB    Treatment & Education:  -Pt alert and agreeable to therapy session; oriented x4  -Demo appropriate sequencing with ADLs with added time  -mobility to gather gown from closet with CGA with reaching  task-- appropriate sequencing with added time  -Communication board updated; questions/concerns addressed within OT scope of practice    Education:    Assessment:     Los Mendez is a 47 y.o. male with a medical diagnosis of Anoxic brain injury.  He presents with great improvement and gains over rehab course. He demo improvement in mentation and attention to task on this date. Will cont to follow up 3x/w for OT services at this time.  Performance deficits affecting function are impaired endurance, impaired self care skills, impaired functional mobilty, gait instability, impaired balance, impaired cognition, decreased safety awareness.      Rehab Prognosis:  Great; patient would benefit from acute skilled OT services to address these deficits and reach maximum level of function.       Plan:     Patient to be seen 3 x/week to address the above listed problems via self-care/home management, therapeutic activities, therapeutic exercises, neuromuscular re-education, cognitive retraining  · Plan of Care Expires: 06/29/18  · Plan of Care Reviewed with: patient, spouse    This Plan of care has been discussed with the patient who was involved in its development and understands and is in agreement with the identified goals and treatment plan    GOALS:    Occupational Therapy Goals        Problem: Occupational Therapy Goal    Goal Priority Disciplines Outcome Interventions   Occupational Therapy Goal     OT, PT/OT Ongoing (interventions implemented as appropriate)    Description:  Goals to be met by: 6/14/2018 (revised and extended 6/7 due to pt's progress)  Patient will increase functional independence with ADLs by performing:    Dynamic standing task to simulate home return for 8 min duration with SBA for postural control.   Tub/shower transfer with grab bar as needed with CGA.   Functional mobility at house hold distance with RW as needed and supervision.   Pt will gather 4/5 items for ADL (provided verbally) in timely  manner and supervision for dynamic balance.   Pt will complete ADL sequencing task with 1 verbal cue.                          Time Tracking:     OT Date of Treatment: 06/07/18  OT Start Time: 1130  OT Stop Time: 1150  OT Total Time (min): 20 min    Billable Minutes:Therapeutic Activity 20    SAMMI Villanueva  6/7/2018

## 2018-06-07 NOTE — SUBJECTIVE & OBJECTIVE
History reviewed. No pertinent past medical history.    History reviewed. No pertinent surgical history.    Review of patient's allergies indicates:   Allergen Reactions    Penicillins      Tolerated cefepime May 2018       No current facility-administered medications on file prior to encounter.      No current outpatient prescriptions on file prior to encounter.     Family History     None        Social History Main Topics    Smoking status: Unknown If Ever Smoked    Smokeless tobacco: Current User     Types: Chew    Alcohol use Not on file    Drug use: Unknown    Sexual activity: Not on file     Review of Systems   Unable to perform ROS: other   patient is somewhat somnolent and disoriented    Objective:     Vital Signs (Most Recent):  Temp: 98.4 °F (36.9 °C) (06/06/18 1636)  Pulse: 80 (06/06/18 1636)  Resp: 18 (06/06/18 1530)  BP: 126/82 (06/06/18 1636)  SpO2: 96 % (06/06/18 1636) Vital Signs (24h Range):  Temp:  [97.8 °F (36.6 °C)-98.7 °F (37.1 °C)] 98.4 °F (36.9 °C)  Pulse:  [59-80] 80  Resp:  [18-20] 18  SpO2:  [96 %-100 %] 96 %  BP: (118-143)/(59-86) 126/82     Weight: 109.9 kg (242 lb 4.6 oz)  Body mass index is 36.84 kg/m².    SpO2: 96 %  O2 Device (Oxygen Therapy): room air      Intake/Output Summary (Last 24 hours) at 06/06/18 2023  Last data filed at 06/06/18 1800   Gross per 24 hour   Intake              625 ml   Output             1548 ml   Net             -923 ml       Lines/Drains/Airways     Central Venous Catheter Line                 Trialysis (Dialysis) Catheter 05/28/18 1705 right femoral 9 days         Hemodialysis Catheter 06/06/18 right internal jugular less than 1 day         Tunneled Central Line Insertion/Assessment - Double Lumen  06/06/18 0829 right internal jugular less than 1 day          Peripheral Intravenous Line                 Peripheral IV - Single Lumen 05/30/18 1700 Left Forearm 7 days                Physical Exam   Constitutional: He is oriented to person, place, and  time. He appears well-developed and well-nourished.   HENT:   Head: Normocephalic and atraumatic.   Eyes: Conjunctivae and EOM are normal. Pupils are equal, round, and reactive to light.   Neck: Normal range of motion. Neck supple. No JVD present.   Cardiovascular: Normal rate, regular rhythm and normal heart sounds.  Exam reveals no gallop and no friction rub.    No murmur heard.  Pulmonary/Chest: Effort normal and breath sounds normal. No respiratory distress. He has no wheezes. He has no rales. He exhibits no tenderness.   Abdominal: Soft. Bowel sounds are normal. He exhibits no distension. There is no tenderness.   Musculoskeletal: Normal range of motion. He exhibits no edema or tenderness.   Neurological: He is alert and oriented to person, place, and time.   Skin: Skin is warm and dry. No erythema. No pallor.       Significant Labs:   CMP   Recent Labs  Lab 06/05/18 0429 06/05/18 2159 06/06/18  0335     136 133* 137  137   K 4.1  4.1 4.5 5.0  5.0     103 101 103  103   CO2 20*  20* 18* 19*  19*   GLU 86  86 79 78  78   BUN 67*  67* 73* 78*  78*   CREATININE 9.1*  9.1* 9.8* 10.3*  10.3*   CALCIUM 9.1  9.1 9.5 9.3  9.3   PROT 7.5  --  7.7   ALBUMIN 2.8*  2.8* 3.1* 3.0*  3.0*   BILITOT 1.1*  --  1.1*   ALKPHOS 171*  --  172*   AST 54*  --  52*   ALT 47*  --  48*   ANIONGAP 13  13 14 15  15   ESTGFRAFRICA 7.1*  7.1* 6.5* 6.2*  6.2*   EGFRNONAA 6.2*  6.2* 5.7* 5.3*  5.3*   , CBC   Recent Labs  Lab 06/05/18 0429 06/06/18  0334   WBC 9.01 8.16   HGB 8.7* 9.1*   HCT 28.6* 29.7*    405*   , INR   Recent Labs  Lab 06/05/18 0429 06/06/18  0335   INR 1.1 1.1    and Lipid Panel No results for input(s): CHOL, HDL, LDLCALC, TRIG, CHOLHDL in the last 48 hours.    Significant Imaging: Echocardiogram:   2D echo with color flow doppler:   Results for orders placed or performed during the hospital encounter of 05/19/18   2D echo with color flow doppler   Result Value Ref Range    EF  10 (A) 55 - 65    Mitral Valve Regurgitation MILD     Diastolic Dysfunction Yes (A)     Est. PA Systolic Pressure 37.47     Tricuspid Valve Regurgitation MILD     and EKG: last ekg shows atrial tach, no recent ekg.

## 2018-06-07 NOTE — PLAN OF CARE
Problem: Physical Therapy Goal  Goal: Physical Therapy Goal    Goals to be met by 6/15/2018    1. Pt will perform supine to sit from both sides of the bed with supervision   2. Pt will perform sit to supine with supervision   3. Pt will perform sit to stand transfers with stand by assistance without AD   4. Pt will perform bed <> chair transfers with supervision with or without least restrictive AD.   5. Pt will perform gait x 50 feet with contact guard assistance with or without least restrictive AD.-met   Revised: pt will perform gait x 150 feet with Supervision with or without least restrictive AD  6. Pt will perform dynamic standing balance activities x  5 minutes with stand by assistance to reduce fall risk     Outcome: Ongoing (interventions implemented as appropriate)  Pt progressing towards goals.     CHEPE CAAL, PT  6/7/2018

## 2018-06-07 NOTE — PT/OT/SLP PROGRESS
Physical Therapy Treatment    Patient Name:  Los Mendez   MRN:  [id number]    Recommendations:     Discharge Recommendations:  rehabilitation facility   Discharge Equipment Recommendations: walker, rolling   Barriers to discharge: Inaccessible home and Decreased caregiver support    Assessment:     Los Mendez is a 47 y.o. male admitted with a medical diagnosis of Anoxic brain injury.  He presents with the following impairments/functional limitations:  weakness, gait instability, impaired endurance, impaired balance, decreased lower extremity function, decreased safety awareness, impaired cognition, impaired functional mobilty. Pt performed bed mobility CGA and transfers SBA with RW. Pt amb ~75ft CGA with RW vc's for upright posture and AD management x2 trials. Pt able to read room numbers while amb with decreased carlos and required min A to avoid obstacle in hallway. Pt will continue to benefit from skilled PT to improve deficits and increase overall functional mobility. Pt is highly motivated and will benefit from IP Rehab to return to PLOF and ensure safety upon d/c.     Rehab Prognosis:  Good; patient would benefit from acute skilled PT services to address these deficits and reach maximum level of function.      Recent Surgery: Procedure(s) (LRB):  PERMCATH INSERTION-TUNNELED CVC (N/A)      Plan:     During this hospitalization, patient to be seen 3 x/week to address the above listed problems via gait training, therapeutic activities, therapeutic exercises, neuromuscular re-education  · Plan of Care Expires:  06/30/18   Plan of Care Reviewed with: patient, family    Subjective     Communicated with RN prior to session.  Patient found supine in bed upon PT entry to room, agreeable to treatment.      Chief Complaint: weakness   Patient comments/goals: return to PLOF  Pain/Comfort:  Pain Rating 1: 0/10  Pain Rating Post-Intervention 1: 0/10    Patients cultural, spiritual, Nondenominational conflicts given the  current situation: no conflicts    Objective:     General Precautions: Standard, aspiration, fall, NPO   Orthopedic Precautions:N/A   Braces: N/A     Functional Mobility:  Bed Mobility:     · Supine to Sit: contact guard assistance    Transfers:     · Sit to Stand:  stand by assistance with rolling walker    Gait: ~75ft CGA with RW vc's for upright posture and AD management x2 trials   · Pt able to read room numbers while amb but decreased carlos and required min A to avoid obstacle in hallway     Stairs:  Pt ascended/descended 3 stair(s) with No Assistive Device with bilateral handrails with Minimal Assistance, stability at hips    AM-PAC 6 CLICK MOBILITY  Turning over in bed (including adjusting bedclothes, sheets and blankets)?: 3  Sitting down on and standing up from a chair with arms (e.g., wheelchair, bedside commode, etc.): 3  Moving from lying on back to sitting on the side of the bed?: 3  Moving to and from a bed to a chair (including a wheelchair)?: 3  Need to walk in hospital room?: 3  Climbing 3-5 steps with a railing?: 3  Total Score: 18       Therapeutic Activities and Exercises:  Pt educated on:  -safety with amb and transfers with RW  -benefits of Rehab vs OP with family assist  Pt agreeable to Rehab and reported goal of further progressing to full PLOF before d/c home.   Pt safe to amb to bathroom with RN staff with RW.     Patient left up in chair with all lines intact, call button in reach and RN notified..    GOALS:    Physical Therapy Goals        Problem: Physical Therapy Goal    Goal Priority Disciplines Outcome Goal Variances Interventions   Physical Therapy Goal     PT/OT, PT Ongoing (interventions implemented as appropriate)     Description:    Goals to be met by 6/15/2018    1. Pt will perform supine to sit from both sides of the bed with supervision   2. Pt will perform sit to supine with supervision   3. Pt will perform sit to stand transfers with stand by assistance without AD   4. Pt  will perform bed <> chair transfers with supervision with or without least restrictive AD.   5. Pt will perform gait x 50 feet with contact guard assistance with or without least restrictive AD.-met   Revised: pt will perform gait x 150 feet with Supervision with or without least restrictive AD  6. Pt will perform dynamic standing balance activities x  5 minutes with stand by assistance to reduce fall risk                       Time Tracking:     PT Received On: 06/07/18  PT Start Time: 1301     PT Stop Time: 1318  PT Total Time (min): 17 min     Billable Minutes: Gait Training 17    Treatment Type: Treatment  PT/PTA: PT     PTA Visit Number: 0     CHEPE CAAL, PT  06/07/2018

## 2018-06-07 NOTE — SUBJECTIVE & OBJECTIVE
Interval History:   Received HD yesterday with 1L removed. Undergoing evaluation for possible ICD placement. Rehab placement pending. Last bladder scan 6/5; negative. Patient doing okay this afternoon; taking a nap. No SOB. No UOP.     Review of patient's allergies indicates:   Allergen Reactions    Penicillins      Tolerated cefepime May 2018     Current Facility-Administered Medications   Medication Frequency    0.9%  NaCl infusion PRN    acetaminophen tablet 325 mg Q6H PRN    albuterol-ipratropium 2.5 mg-0.5 mg/3 mL nebulizer solution 3 mL Q6H PRN    ascorbic acid (vitamin C) tablet 250 mg BID    aspirin chewable tablet 81 mg Daily    atorvastatin tablet 40 mg Daily    ferrous sulfate EC tablet 325 mg BID    heparin (porcine) injection 1,000 Units PRN    heparin (porcine) injection 5,000 Units Q8H    metoprolol succinate (TOPROL-XL) 24 hr tablet 50 mg Daily    ramelteon tablet 8 mg Nightly PRN       Objective:     Vital Signs (Most Recent):  Temp: 98.3 °F (36.8 °C) (06/07/18 1512)  Pulse: 75 (06/07/18 1512)  Resp: 18 (06/07/18 1512)  BP: 136/62 (06/07/18 1512)  SpO2: (!) 93 % (06/07/18 1512)  O2 Device (Oxygen Therapy): room air (06/06/18 2000) Vital Signs (24h Range):  Temp:  [98.1 °F (36.7 °C)-98.4 °F (36.9 °C)] 98.3 °F (36.8 °C)  Pulse:  [71-84] 75  Resp:  [18-20] 18  SpO2:  [93 %-96 %] 93 %  BP: (120-136)/(62-82) 136/62     Weight: 109.9 kg (242 lb 4.6 oz) (06/05/18 0302)  Body mass index is 36.84 kg/m².  Body surface area is 2.3 meters squared.    I/O last 3 completed shifts:  In: 625 [Other:625]  Out: 1548 [Other:1548]    Physical Exam   Constitutional: He appears well-developed and well-nourished. No distress.   HENT:   Head: Normocephalic and atraumatic.   Eyes: Conjunctivae and EOM are normal.   Neck: Neck supple. No thyromegaly present.   Cardiovascular: Normal rate and regular rhythm.    Pulmonary/Chest: Effort normal and breath sounds normal.   Abdominal: Soft. Bowel sounds are normal.    Skin: Skin is warm and dry. He is not diaphoretic.       Significant Labs:  CBC:   Recent Labs  Lab 06/07/18  0356   WBC 7.00   RBC 3.59*   HGB 8.1*   HCT 26.3*   *   MCV 73*   MCH 22.6*   MCHC 30.8*     CMP:   Recent Labs  Lab 06/06/18  0335 06/07/18  0356   GLU 78  78 80   CALCIUM 9.3  9.3 9.3   ALBUMIN 3.0*  3.0* 2.9*   PROT 7.7  --      137 135*   K 5.0  5.0 4.1   CO2 19*  19* 22*     103 101   BUN 78*  78* 50*   CREATININE 10.3*  10.3* 8.6*   ALKPHOS 172*  --    ALT 48*  --    AST 52*  --    BILITOT 1.1*  --      All labs within the past 24 hours have been reviewed.     Significant Imaging:  Labs: Reviewed

## 2018-06-07 NOTE — ASSESSMENT & PLAN NOTE
Please keep patient npo past midnight for possible icd tomorrow  If patient is not deemed candidate for ICD at this time given lack of VT/VF tracings, patient will need lifevest prior to dc and 3 months of gdmt prior to re-evaluation  Please acquire medical records and try to attain traings from [facility] of vt/vf episode

## 2018-06-07 NOTE — PROGRESS NOTES
[facility]  Cardiology  Progress Note    Patient Name: Los Mendez  MRN: [id number]  Admission Date: 5/19/2018  Hospital Length of Stay: 19 days  Code Status: Full Code   Attending Physician: Moisés Villalobos MD   Primary Care Physician: Provider Notinsystem  Expected Discharge Date: 6/14/2018  Principal Problem:Anoxic brain injury    Subjective:     Hospital Course:   05/24/2018 patient off of Levo, BP WNL, propofol continued, CRRT continued, Hb 6.7 after 1u pRBC    Interval History: No acute events overnight. Patient resting comfortably in no acute distress. AVSS.    Review of Systems   All other systems reviewed and are negative.    Objective:     Vital Signs (Most Recent):  Temp: 98.4 °F (36.9 °C) (06/07/18 0738)  Pulse: 84 (06/07/18 0738)  Resp: 20 (06/07/18 0738)  BP: 121/72 (06/07/18 0738)  SpO2: 96 % (06/07/18 0738) Vital Signs (24h Range):  Temp:  [98.1 °F (36.7 °C)-98.7 °F (37.1 °C)] 98.4 °F (36.9 °C)  Pulse:  [59-84] 84  Resp:  [18-20] 20  SpO2:  [96 %] 96 %  BP: (121-143)/(59-86) 121/72     Weight: 109.9 kg (242 lb 4.6 oz)  Body mass index is 36.84 kg/m².     SpO2: 96 %  O2 Device (Oxygen Therapy): room air      Intake/Output Summary (Last 24 hours) at 06/07/18 1135  Last data filed at 06/06/18 1800   Gross per 24 hour   Intake              625 ml   Output             1548 ml   Net             -923 ml       Lines/Drains/Airways     Central Venous Catheter Line                 Trialysis (Dialysis) Catheter 05/28/18 1705 right femoral 9 days         Hemodialysis Catheter 06/06/18 right internal jugular 1 day         Tunneled Central Line Insertion/Assessment - Double Lumen  06/06/18 0829 right internal jugular 1 day          Peripheral Intravenous Line                 Peripheral IV - Single Lumen 05/30/18 1700 Left Forearm 7 days                Physical Exam   Constitutional: He is oriented to person, place, and time. He appears well-developed and well-nourished.   HENT:   Head:  Normocephalic and atraumatic.   Eyes: Conjunctivae and EOM are normal. Pupils are equal, round, and reactive to light.   Neck: Normal range of motion. Neck supple. No JVD present.   Cardiovascular: Normal rate, regular rhythm and normal heart sounds.  Exam reveals no gallop and no friction rub.    No murmur heard.  Pulmonary/Chest: Effort normal and breath sounds normal. No respiratory distress. He has no wheezes. He has no rales. He exhibits no tenderness.   Abdominal: Soft. Bowel sounds are normal. He exhibits no distension. There is no tenderness.   Musculoskeletal: Normal range of motion. He exhibits no edema or tenderness.   Neurological: He is alert and oriented to person, place, and time.   Skin: Skin is warm and dry. No erythema. No pallor.       Significant Labs:   CMP   Recent Labs  Lab 06/05/18 2159 06/06/18  0335 06/07/18  0356   * 137  137 135*   K 4.5 5.0  5.0 4.1    103  103 101   CO2 18* 19*  19* 22*   GLU 79 78  78 80   BUN 73* 78*  78* 50*   CREATININE 9.8* 10.3*  10.3* 8.6*   CALCIUM 9.5 9.3  9.3 9.3   PROT  --  7.7  --    ALBUMIN 3.1* 3.0*  3.0* 2.9*   BILITOT  --  1.1*  --    ALKPHOS  --  172*  --    AST  --  52*  --    ALT  --  48*  --    ANIONGAP 14 15  15 12   ESTGFRAFRICA 6.5* 6.2*  6.2* 7.7*   EGFRNONAA 5.7* 5.3*  5.3* 6.6*    and CBC   Recent Labs  Lab 06/06/18  0334 06/07/18  0356   WBC 8.16 7.00   HGB 9.1* 8.1*   HCT 29.7* 26.3*   * 398*         Assessment and Plan:       Acute combined systolic and diastolic ACC/AHA stage C congestive heart failure    Patient with dilated cardiomyopathy EF looks about 10-15% and LVEDD is 7.3 cm, unknown etiology at this time but could be related to heavy alcohol use as he drinks about 1 case of beer and 2/5 of vodka per day.  uptitrate toprol xl to hr 50-60 --  Increase to 75mg po daily  -Add afterload reduction  -If renal recovery is not expected at this time would add lisinopril 5mg po daily  -If renal recovery is  expected would ad hydralazine 25mg po tid and isordil 10mg po tid  -This is to maximize GDMT as he is not currently optimized on a good heart failure regimen  -Plan is for dual chamber ICD implantation once ischemic evaluation is complete.          Cardiac arrest    -Please keep patient npo past midnight for possible icd tomorrow  -If patient is not deemed candidate for ICD at this time given lack of VT/VF tracings, patient will need lifevest prior to dc and 3 months of gdmt prior to re-evaluation              VTE Risk Mitigation         Ordered     heparin (porcine) injection 1,000 Units  As needed (PRN)      06/06/18 1433     heparin (porcine) injection 5,000 Units  Every 8 hours      05/27/18 1017          Ted Duncan MD  Cardiology  [facility]

## 2018-06-07 NOTE — ASSESSMENT & PLAN NOTE
- Tsat 17 with ferritin 222  - will d/c oral iron replacement and load with IV iron. First dose today (2nd dose on Sunday)  - may need to start HIGINIO soon

## 2018-06-07 NOTE — PLAN OF CARE
EP Update    - Plan for dcICD based on results of PET stress today  - NPO past midnight, hold all heparin products     Dong Jeff MD, MPH  PGY-V  Cardiovascular Disease Fellow

## 2018-06-07 NOTE — PROGRESS NOTES
[facility]  Nephrology  Progress Note    Patient Name: Los Mendez  MRN: [id number]  Admission Date: 5/19/2018  Hospital Length of Stay: 19 days  Attending Provider: Moisés Villalobos MD   Primary Care Physician: Provider Notinsystem  Principal Problem:Anoxic brain injury    Subjective:     HPI: Lso Mendez is a 47 year old male with past medical history of HTN, heavy alcohol user and COPD. Transferred from [facility] following witnessed cardiac arrest while in the ED waiting room. Patient had presented complaining of upper respiratory track infection and shortness of breath symptoms, while in the ED waiting room he was noted to be choking, became apneic, which led to witnessed seizure like activity followed by cardiac arrest. Per discussion with ED staff, patient required about 22 minutes of ACLS/5 shocks before returning to normal sinus rhythm. During intubation a lozenge was removed from patients airway. Hypothermia protocol was initiated prior to transferring to [facility]. On arrival to [facility] Arrived with increased serum creatinine from 2.9-->5.1 (unkown baseline), BUN 35--> 52, Trop 2.4-->1.3, Lactic acidosis 6.4-->1.3, chest x ray with increased parenchymal interstitial attenuation and parenchymal opacities suggestive of pulmonary edema. On mechanical ventilation with a FiO2 50%. Currently also anuric at present moment. Per cardiology patient has a dilated cardiomyopathy EF looks about 10-15% and LVEDD is 7.3 cm.    Interval History:   Received HD yesterday with 1L removed. Undergoing evaluation for possible ICD placement. Rehab placement pending. Last bladder scan 6/5; negative. Patient doing okay this afternoon; taking a nap. No SOB. No UOP.     Review of patient's allergies indicates:   Allergen Reactions    Penicillins      Tolerated cefepime May 2018     Current Facility-Administered Medications   Medication Frequency    0.9%  NaCl infusion PRN    acetaminophen tablet 325 mg Q6H PRN     albuterol-ipratropium 2.5 mg-0.5 mg/3 mL nebulizer solution 3 mL Q6H PRN    ascorbic acid (vitamin C) tablet 250 mg BID    aspirin chewable tablet 81 mg Daily    atorvastatin tablet 40 mg Daily    ferrous sulfate EC tablet 325 mg BID    heparin (porcine) injection 1,000 Units PRN    heparin (porcine) injection 5,000 Units Q8H    metoprolol succinate (TOPROL-XL) 24 hr tablet 50 mg Daily    ramelteon tablet 8 mg Nightly PRN       Objective:     Vital Signs (Most Recent):  Temp: 98.3 °F (36.8 °C) (06/07/18 1512)  Pulse: 75 (06/07/18 1512)  Resp: 18 (06/07/18 1512)  BP: 136/62 (06/07/18 1512)  SpO2: (!) 93 % (06/07/18 1512)  O2 Device (Oxygen Therapy): room air (06/06/18 2000) Vital Signs (24h Range):  Temp:  [98.1 °F (36.7 °C)-98.4 °F (36.9 °C)] 98.3 °F (36.8 °C)  Pulse:  [71-84] 75  Resp:  [18-20] 18  SpO2:  [93 %-96 %] 93 %  BP: (120-136)/(62-82) 136/62     Weight: 109.9 kg (242 lb 4.6 oz) (06/05/18 0302)  Body mass index is 36.84 kg/m².  Body surface area is 2.3 meters squared.    I/O last 3 completed shifts:  In: 625 [Other:625]  Out: 1548 [Other:1548]    Physical Exam   Constitutional: He appears well-developed and well-nourished. No distress.   HENT:   Head: Normocephalic and atraumatic.   Eyes: Conjunctivae and EOM are normal.   Neck: Neck supple. No thyromegaly present.   Cardiovascular: Normal rate and regular rhythm.    Pulmonary/Chest: Effort normal and breath sounds normal.   Abdominal: Soft. Bowel sounds are normal.   Skin: Skin is warm and dry. He is not diaphoretic.       Significant Labs:  CBC:   Recent Labs  Lab 06/07/18  0356   WBC 7.00   RBC 3.59*   HGB 8.1*   HCT 26.3*   *   MCV 73*   MCH 22.6*   MCHC 30.8*     CMP:   Recent Labs  Lab 06/06/18  0335 06/07/18  0356   GLU 78  78 80   CALCIUM 9.3  9.3 9.3   ALBUMIN 3.0*  3.0* 2.9*   PROT 7.7  --      137 135*   K 5.0  5.0 4.1   CO2 19*  19* 22*     103 101   BUN 78*  78* 50*   CREATININE 10.3*  10.3* 8.6*   ALKPHOS  172*  --    ALT 48*  --    AST 52*  --    BILITOT 1.1*  --      All labs within the past 24 hours have been reviewed.     Significant Imaging:  Labs: Reviewed    Assessment/Plan:     Acute renal failure with tubular necrosis    - baseline renal function unknown. Renal US without report of CKD-related changes though kidneys more echogenic than liver.   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 LVEF 10-15%.   - tunneled HD catheter placed 6/6/18  - no signs of renal recovery yet; we remain hopeful for eventual recovery. May take a few weeks.   - recommend avoiding ACEi for now.   - will plan for HD tomorrow; 4 hour treatment  - continue daily bladder scans  - continue renal diet        Iron deficiency anemia due to chronic blood loss    - Tsat 17 with ferritin 222  - will d/c oral iron replacement and load with IV iron. First dose today (2nd dose on Sunday)  - may need to start HIGINIO soon            Harper Weir, PGY-5  Nephrology Fellow  [facility]  Pager: [phone]

## 2018-06-07 NOTE — SUBJECTIVE & OBJECTIVE
Interval History: No acute events overnight. Patient resting comfortably in no acute distress. AVSS.    Review of Systems   All other systems reviewed and are negative.    Objective:     Vital Signs (Most Recent):  Temp: 98.4 °F (36.9 °C) (06/07/18 0738)  Pulse: 84 (06/07/18 0738)  Resp: 20 (06/07/18 0738)  BP: 121/72 (06/07/18 0738)  SpO2: 96 % (06/07/18 0738) Vital Signs (24h Range):  Temp:  [98.1 °F (36.7 °C)-98.7 °F (37.1 °C)] 98.4 °F (36.9 °C)  Pulse:  [59-84] 84  Resp:  [18-20] 20  SpO2:  [96 %] 96 %  BP: (121-143)/(59-86) 121/72     Weight: 109.9 kg (242 lb 4.6 oz)  Body mass index is 36.84 kg/m².     SpO2: 96 %  O2 Device (Oxygen Therapy): room air      Intake/Output Summary (Last 24 hours) at 06/07/18 1135  Last data filed at 06/06/18 1800   Gross per 24 hour   Intake              625 ml   Output             1548 ml   Net             -923 ml       Lines/Drains/Airways     Central Venous Catheter Line                 Trialysis (Dialysis) Catheter 05/28/18 1705 right femoral 9 days         Hemodialysis Catheter 06/06/18 right internal jugular 1 day         Tunneled Central Line Insertion/Assessment - Double Lumen  06/06/18 0829 right internal jugular 1 day          Peripheral Intravenous Line                 Peripheral IV - Single Lumen 05/30/18 1700 Left Forearm 7 days                Physical Exam   Constitutional: He is oriented to person, place, and time. He appears well-developed and well-nourished.   HENT:   Head: Normocephalic and atraumatic.   Eyes: Conjunctivae and EOM are normal. Pupils are equal, round, and reactive to light.   Neck: Normal range of motion. Neck supple. No JVD present.   Cardiovascular: Normal rate, regular rhythm and normal heart sounds.  Exam reveals no gallop and no friction rub.    No murmur heard.  Pulmonary/Chest: Effort normal and breath sounds normal. No respiratory distress. He has no wheezes. He has no rales. He exhibits no tenderness.   Abdominal: Soft. Bowel sounds are  normal. He exhibits no distension. There is no tenderness.   Musculoskeletal: Normal range of motion. He exhibits no edema or tenderness.   Neurological: He is alert and oriented to person, place, and time.   Skin: Skin is warm and dry. No erythema. No pallor.       Significant Labs:   CMP   Recent Labs  Lab 06/05/18 2159 06/06/18 0335 06/07/18  0356   * 137  137 135*   K 4.5 5.0  5.0 4.1    103  103 101   CO2 18* 19*  19* 22*   GLU 79 78  78 80   BUN 73* 78*  78* 50*   CREATININE 9.8* 10.3*  10.3* 8.6*   CALCIUM 9.5 9.3  9.3 9.3   PROT  --  7.7  --    ALBUMIN 3.1* 3.0*  3.0* 2.9*   BILITOT  --  1.1*  --    ALKPHOS  --  172*  --    AST  --  52*  --    ALT  --  48*  --    ANIONGAP 14 15  15 12   ESTGFRAFRICA 6.5* 6.2*  6.2* 7.7*   EGFRNONAA 5.7* 5.3*  5.3* 6.6*    and CBC   Recent Labs  Lab 06/06/18 0334 06/07/18  0356   WBC 8.16 7.00   HGB 9.1* 8.1*   HCT 29.7* 26.3*   * 398*

## 2018-06-07 NOTE — SUBJECTIVE & OBJECTIVE
Interval History:     NAEON    Review of Systems   Constitutional: Negative for activity change, chills, fatigue and fever.   HENT: Negative for congestion, facial swelling, sore throat and voice change.    Eyes: Negative for pain, redness and visual disturbance.   Respiratory: Negative for cough, chest tightness and shortness of breath.    Cardiovascular: Negative for chest pain and leg swelling.   Gastrointestinal: Negative for abdominal pain, constipation, diarrhea and nausea.   Endocrine: Negative for cold intolerance and heat intolerance.   Musculoskeletal: Negative for arthralgias, back pain and neck pain.        Strength 5/5 in both upper and lower extremities.    Skin: Negative for rash and wound.   Neurological: Negative for dizziness, speech difficulty, weakness, light-headedness and headaches.   Psychiatric/Behavioral: Negative for agitation and confusion.        Slurred speech       Objective:     Vital Signs (Most Recent):  Temp: 98.1 °F (36.7 °C) (06/06/18 2000)  Pulse: 72 (06/06/18 2000)  Resp: 18 (06/06/18 2000)  BP: 126/75 (06/06/18 2000)  SpO2: 96 % (06/06/18 1636) Vital Signs (24h Range):  Temp:  [98.1 °F (36.7 °C)-98.7 °F (37.1 °C)] 98.1 °F (36.7 °C)  Pulse:  [59-80] 72  Resp:  [18-20] 18  SpO2:  [96 %-100 %] 96 %  BP: (122-143)/(59-86) 126/75     Weight: 109.9 kg (242 lb 4.6 oz)  Body mass index is 36.84 kg/m².    Intake/Output Summary (Last 24 hours) at 06/07/18 0641  Last data filed at 06/06/18 1800   Gross per 24 hour   Intake              625 ml   Output             1548 ml   Net             -923 ml      Physical Exam   Constitutional: He is oriented to person, place, and time. He appears well-developed and well-nourished. No distress.   HENT:   Head: Normocephalic and atraumatic.   Eyes: EOM are normal. Pupils are equal, round, and reactive to light.   Neck: Normal range of motion. Neck supple.   Cardiovascular: Normal rate, regular rhythm, normal heart sounds and intact distal pulses.   Exam reveals no gallop and no friction rub.    No murmur heard.  Pulmonary/Chest: Effort normal and breath sounds normal. No respiratory distress. He exhibits no tenderness.   Abdominal: Soft. Bowel sounds are normal. He exhibits no distension. There is no tenderness.   Musculoskeletal: Normal range of motion. He exhibits no edema, tenderness or deformity.   Neurological: He is alert and oriented to person, place, and time. No cranial nerve deficit. Coordination normal.   Skin: Skin is warm and dry. No rash noted. He is not diaphoretic. No erythema. No pallor.   Psychiatric: He has a normal mood and affect. His behavior is normal. Judgment and thought content normal.   Nursing note and vitals reviewed.    Significant Labs:   BMP:   Recent Labs  Lab 06/06/18  0335 06/07/18  0356   GLU 78  78 80     137 135*   K 5.0  5.0 4.1     103 101   CO2 19*  19* 22*   BUN 78*  78* 50*   CREATININE 10.3*  10.3* 8.6*   CALCIUM 9.3  9.3 9.3   MG 2.6  --      CBC:   Recent Labs  Lab 06/06/18  0334 06/07/18  0356   WBC 8.16 7.00   HGB 9.1* 8.1*   HCT 29.7* 26.3*   * 398*

## 2018-06-07 NOTE — ASSESSMENT & PLAN NOTE
S/p cardiac arrest on 5/19  LVEF 10-15%  - ACEi held in setting of ARF  - metoprolol succinate 50mg daily  - will evaluate and start with hydralazine 10mg tid and isordil 10mg tid for afterload reduction if BP can tolerate while on HD  - aldosterone antagonist held in the setting of poor renal function  - start high intensity statin therapy with atorvastatin 40mg  - transfuse for goal Hb >8  - Cardiology consulted. Will be evaluated for an ICD placement later today.

## 2018-06-07 NOTE — CONSULTS
[facility]  Cardiology  Consult Note    Patient Name: Los Mendez  MRN: [id number]  Admission Date: 5/19/2018  Hospital Length of Stay: 19 days  Code Status: Full Code   Attending Provider: Moisés Villalobos MD   Consulting Provider: Deja Ruiz MD  Primary Care Physician: Provider Notinsystem  Principal Problem:Anoxic brain injury    Patient information was obtained from patient, relative(s) and past medical records.     Inpatient consult to Electrophysiology  Consult performed by: DEJA RUIZ  Consult ordered by: DEJA RUIZ.        Subjective:     Chief Complaint:  ICD     HPI:   Pt is a 47 year old gentleman who we are consulted for possible cardiomyopathy particularly an ischemic evaluation and possible icd placement.     He has a hx of hypertension, significant drinking hx and doesn't really follow with a doctor however over the past few weeks per his aunt he has not felt well as he has been very short of breath, fatigued, and overall weak so he was urged to present to the ER at [facility]. While there in the waiting room he had a choking episode had a witnessed possible seizure activity followed by cardiac arrest. He required 22 minutes of ACLS/5 shocks for VT/VF before ROSC.  He was intubated and a lozenge was removed from airway. Unfortunately, we do not have records of these tracings.  He has not had any further events during his hospital stay.  Hyperthermia protocol was initiated and he was transferred to [facility].  His course has been complicated by renal failure now persistently on HD and anoxic brain injury from which he has made some recovery.  He is AAOx1 on our conversation but his family member in his room says he is intermittently ore lucid.  He has not eaten all day.      He drinks about 1 case of beer and 2/5 of vodka per day.     No new subjective & objective note has been filed under this hospital service since the last note was generated.    Assessment and Plan:                Cardiac arrest    Please keep patient npo past midnight for possible icd tomorrow  If patient is not deemed candidate for ICD at this time given lack of VT/VF tracings, patient will need lifevest prior to dc and 3 months of gdmt prior to re-evaluation  Please acquire medical records and try to attain traings from [facility] of vt/vf episode            VTE Risk Mitigation         Ordered     heparin (porcine) injection 1,000 Units  As needed (PRN)      06/06/18 1433     heparin (porcine) injection 5,000 Units  Every 8 hours      05/27/18 1017          Thank you for your consult. I will follow-up with patient. Please contact us if you have any additional questions.    Cooper Ruiz MD  Cardiology   [facility]

## 2018-06-07 NOTE — ASSESSMENT & PLAN NOTE
-Please keep patient npo past midnight for possible icd tomorrow  -If patient is not deemed candidate for ICD at this time given lack of VT/VF tracings, patient will need lifevest prior to dc and 3 months of gdmt prior to re-evaluation

## 2018-06-07 NOTE — CONSULTS
[facility]  Cardiology  Consult Note    Patient Name: Los Mendez  MRN: [id number]  Admission Date: 5/19/2018  Hospital Length of Stay: 18 days  Code Status: Full Code   Attending Provider: Moisés Villalobos MD   Consulting Provider: Cooper Black MD  Primary Care Physician: Provider Notinsystem  Principal Problem:Anoxic brain injury    Patient information was obtained from patient, relative(s) and past medical records.     Inpatient consult to Cardiology  Consult performed by: COOPER BLACK  Consult ordered by: FISH DOUGHERTY        Subjective:     Chief Complaint:  ICD and ischemic eval     HPI:   Pt is a 47 year old gentleman who we are consulted for possible cardiomyopathy particularly an ischemic evaluation and possible icd placement.     He has a hx of hypertension, significant drinking hx and doesn't really follow with a doctor however over the past few weeks per his aunt he has not felt well as he has been very short of breath, fatigued, and overall weak so he was urged to present to the ER at [facility]. While there in the waiting room he had a choking episode had a witnessed possible seizure activity followed by cardiac arrest. He required 22 minutes of ACLS/5 shocks for VT/VF before ROSC.  He was intubated and a lozenge was removed from airway. Unfortunately, we do not have records of these tracings.  He has not had any further events during his hospital stay.  Hyperthermia protocol was initiated and [place] [facility]  His course has been complicated by renal failure now persistently on HD and anoxic brain injury from which he has made some recovery.  He is AAOx1 on our conversation but his family member in his room says he is intermittently ore lucid.  He has not eaten all day.      He drinks about 1 case of beer and 2/5 of vodka per day.     History reviewed. No pertinent past medical history.    History reviewed. No pertinent surgical history.    Review of patient's  allergies indicates:   Allergen Reactions    Penicillins      Tolerated cefepime May 2018       No current facility-administered medications on file prior to encounter.      No current outpatient prescriptions on file prior to encounter.     Family History     None        Social History Main Topics    Smoking status: Unknown If Ever Smoked    Smokeless tobacco: Current User     Types: Chew    Alcohol use Not on file    Drug use: Unknown    Sexual activity: Not on file     Review of Systems   Unable to perform ROS: other   patient is somewhat somnolent and disoriented    Objective:     Vital Signs (Most Recent):  Temp: 98.4 °F (36.9 °C) (06/06/18 1636)  Pulse: 80 (06/06/18 1636)  Resp: 18 (06/06/18 1530)  BP: 126/82 (06/06/18 1636)  SpO2: 96 % (06/06/18 1636) Vital Signs (24h Range):  Temp:  [97.8 °F (36.6 °C)-98.7 °F (37.1 °C)] 98.4 °F (36.9 °C)  Pulse:  [59-80] 80  Resp:  [18-20] 18  SpO2:  [96 %-100 %] 96 %  BP: (118-143)/(59-86) 126/82     Weight: 109.9 kg (242 lb 4.6 oz)  Body mass index is 36.84 kg/m².    SpO2: 96 %  O2 Device (Oxygen Therapy): room air      Intake/Output Summary (Last 24 hours) at 06/06/18 2023  Last data filed at 06/06/18 1800   Gross per 24 hour   Intake              625 ml   Output             1548 ml   Net             -923 ml       Lines/Drains/Airways     Central Venous Catheter Line                 Trialysis (Dialysis) Catheter 05/28/18 1705 right femoral 9 days         Hemodialysis Catheter 06/06/18 right internal jugular less than 1 day         Tunneled Central Line Insertion/Assessment - Double Lumen  06/06/18 0829 right internal jugular less than 1 day          Peripheral Intravenous Line                 Peripheral IV - Single Lumen 05/30/18 1700 Left Forearm 7 days                Physical Exam   Constitutional: He is oriented to person, place, and time. He appears well-developed and well-nourished.   HENT:   Head: Normocephalic and atraumatic.   Eyes: Conjunctivae and EOM are  normal. Pupils are equal, round, and reactive to light.   Neck: Normal range of motion. Neck supple. No JVD present.   Cardiovascular: Normal rate, regular rhythm and normal heart sounds.  Exam reveals no gallop and no friction rub.    No murmur heard.  Pulmonary/Chest: Effort normal and breath sounds normal. No respiratory distress. He has no wheezes. He has no rales. He exhibits no tenderness.   Abdominal: Soft. Bowel sounds are normal. He exhibits no distension. There is no tenderness.   Musculoskeletal: Normal range of motion. He exhibits no edema or tenderness.   Neurological: He is alert and oriented to person, place, and time.   Skin: Skin is warm and dry. No erythema. No pallor.       Significant Labs:   CMP   Recent Labs  Lab 06/05/18 0429 06/05/18 2159 06/06/18 0335     136 133* 137  137   K 4.1  4.1 4.5 5.0  5.0     103 101 103  103   CO2 20*  20* 18* 19*  19*   GLU 86  86 79 78  78   BUN 67*  67* 73* 78*  78*   CREATININE 9.1*  9.1* 9.8* 10.3*  10.3*   CALCIUM 9.1  9.1 9.5 9.3  9.3   PROT 7.5  --  7.7   ALBUMIN 2.8*  2.8* 3.1* 3.0*  3.0*   BILITOT 1.1*  --  1.1*   ALKPHOS 171*  --  172*   AST 54*  --  52*   ALT 47*  --  48*   ANIONGAP 13  13 14 15  15   ESTGFRAFRICA 7.1*  7.1* 6.5* 6.2*  6.2*   EGFRNONAA 6.2*  6.2* 5.7* 5.3*  5.3*   , CBC   Recent Labs  Lab 06/05/18 0429 06/06/18  0334   WBC 9.01 8.16   HGB 8.7* 9.1*   HCT 28.6* 29.7*    405*   , INR   Recent Labs  Lab 06/05/18 0429 06/06/18  0335   INR 1.1 1.1    and Lipid Panel No results for input(s): CHOL, HDL, LDLCALC, TRIG, CHOLHDL in the last 48 hours.    Significant Imaging: Echocardiogram:   2D echo with color flow doppler:   Results for orders placed or performed during the hospital encounter of 05/19/18   2D echo with color flow doppler   Result Value Ref Range    EF 10 (A) 55 - 65    Mitral Valve Regurgitation MILD     Diastolic Dysfunction Yes (A)     Est. PA Systolic Pressure 37.47      Tricuspid Valve Regurgitation MILD     and EKG: last ekg shows atrial tach, no recent ekg.    Assessment and Plan:     Acute combined systolic and diastolic ACC/AHA stage C congestive heart failure    Patient with dilated cardiomyopathy EF looks about 10-15% and LVEDD is 7.3 cm, unknown etiology at this time but could be related to heavy alcohol use as he drinks about 1 case of beer and 2/5 of vodka per day.  uptitrate toprol xl to hr 50-60 --  Increase to 75mg po daily  Add afterload reduction  If renal recovery is not expected at this time would add lisinopril 5mg po daily  If renal recovery is expected would ad hydralazine 25mg po tid and isordil 10mg po tid  This is to maximize GDMT as he is not currently optimized on a good heart failure regimen  Please order PET stress for ischemic evaluation          Cardiac arrest    Please keep patient npo past midnight for possible icd tomorrow  If patient is not deemed candidate for ICD at this time given lack of VT/VF tracings, patient will need lifevest prior to dc and 3 months of gdmt prior to re-evaluation  Please acquire medical records and try to attain traings from [facility] of vt/vf episode            VTE Risk Mitigation         Ordered     heparin (porcine) injection 1,000 Units  As needed (PRN)      06/06/18 1433     heparin (porcine) injection 5,000 Units  Every 8 hours      05/27/18 1017          Thank you for your consult. I will follow-up with patient. Please contact us if you have any additional questions.    Cooper Ruiz MD  Cardiology   [facility]

## 2018-06-07 NOTE — PLAN OF CARE
Problem: Occupational Therapy Goal  Goal: Occupational Therapy Goal  Goals to be met by: 6/14/2018 (revised and extended 6/7 due to pt's progress)  Patient will increase functional independence with ADLs by performing:    Dynamic standing task to simulate home return for 8 min duration with SBA for postural control.   Tub/shower transfer with grab bar as needed with CGA.   Functional mobility at house hold distance with RW as needed and supervision.   Pt will gather 4/5 items for ADL (provided verbally) in timely manner and supervision for dynamic balance.   Pt will complete ADL sequencing task with 1 verbal cue.        Outcome: Ongoing (interventions implemented as appropriate)  Goals updated 2/2 pt's progress. SAMMI Villanueva 6/7/2018

## 2018-06-07 NOTE — ASSESSMENT & PLAN NOTE
-cardiac arrest PTA  -05/24/18 2D Echo notable for EF 10-15%  -troponin down trended  -EP consulted for possible ICD placement vs. Life vest

## 2018-06-08 ENCOUNTER — CLINICAL SUPPORT (OUTPATIENT)
Dept: CARDIOLOGY | Facility: CLINIC | Age: 48
DRG: 981 | End: 2018-06-08
Attending: PSYCHIATRY & NEUROLOGY
Payer: MEDICAID

## 2018-06-08 ENCOUNTER — ANESTHESIA EVENT (OUTPATIENT)
Dept: MEDSURG UNIT | Facility: HOSPITAL | Age: 48
DRG: 981 | End: 2018-06-08
Payer: MEDICAID

## 2018-06-08 ENCOUNTER — ANESTHESIA (OUTPATIENT)
Dept: MEDSURG UNIT | Facility: HOSPITAL | Age: 48
DRG: 981 | End: 2018-06-08
Payer: MEDICAID

## 2018-06-08 LAB
ALBUMIN SERPL BCP-MCNC: 2.9 G/DL
ANION GAP SERPL CALC-SCNC: 14 MMOL/L
BASOPHILS # BLD AUTO: 0.1 K/UL
BASOPHILS NFR BLD: 1.4 %
BUN SERPL-MCNC: 55 MG/DL
CALCIUM SERPL-MCNC: 9.6 MG/DL
CHLORIDE SERPL-SCNC: 103 MMOL/L
CO2 SERPL-SCNC: 19 MMOL/L
CREAT SERPL-MCNC: 9.8 MG/DL
DIASTOLIC DYSFUNCTION: NO
DIFFERENTIAL METHOD: ABNORMAL
EOSINOPHIL # BLD AUTO: 0.1 K/UL
EOSINOPHIL NFR BLD: 1.8 %
ERYTHROCYTE [DISTWIDTH] IN BLOOD BY AUTOMATED COUNT: 29.6 %
EST. GFR  (AFRICAN AMERICAN): 6.5 ML/MIN/1.73 M^2
EST. GFR  (NON AFRICAN AMERICAN): 5.7 ML/MIN/1.73 M^2
GLUCOSE SERPL-MCNC: 79 MG/DL
HCT VFR BLD AUTO: 26.1 %
HGB BLD-MCNC: 8 G/DL
IMM GRANULOCYTES # BLD AUTO: 0.03 K/UL
IMM GRANULOCYTES NFR BLD AUTO: 0.4 %
LYMPHOCYTES # BLD AUTO: 1.6 K/UL
LYMPHOCYTES NFR BLD: 22.2 %
MCH RBC QN AUTO: 23 PG
MCHC RBC AUTO-ENTMCNC: 30.7 G/DL
MCV RBC AUTO: 75 FL
MONOCYTES # BLD AUTO: 1.5 K/UL
MONOCYTES NFR BLD: 19.8 %
NEUTROPHILS # BLD AUTO: 4 K/UL
NEUTROPHILS NFR BLD: 54.4 %
NRBC BLD-RTO: 0 /100 WBC
PHOSPHATE SERPL-MCNC: 4.7 MG/DL
PLATELET # BLD AUTO: 427 K/UL
PMV BLD AUTO: 10.8 FL
POTASSIUM SERPL-SCNC: 4.4 MMOL/L
RBC # BLD AUTO: 3.48 M/UL
SODIUM SERPL-SCNC: 136 MMOL/L
TB INDURATION 48 - 72 HR READ: 0 MM
WBC # BLD AUTO: 7.38 K/UL

## 2018-06-08 PROCEDURE — 25000003 PHARM REV CODE 250: Performed by: STUDENT IN AN ORGANIZED HEALTH CARE EDUCATION/TRAINING PROGRAM

## 2018-06-08 PROCEDURE — 78492 MYOCRD IMG PET MLT RST&STRS: CPT | Mod: ,,, | Performed by: INTERNAL MEDICINE

## 2018-06-08 PROCEDURE — 36415 COLL VENOUS BLD VENIPUNCTURE: CPT

## 2018-06-08 PROCEDURE — 99232 SBSQ HOSP IP/OBS MODERATE 35: CPT | Mod: ,,, | Performed by: INTERNAL MEDICINE

## 2018-06-08 PROCEDURE — 90935 HEMODIALYSIS ONE EVALUATION: CPT | Mod: ,,, | Performed by: INTERNAL MEDICINE

## 2018-06-08 PROCEDURE — 25000003 PHARM REV CODE 250: Performed by: INTERNAL MEDICINE

## 2018-06-08 PROCEDURE — 63600175 PHARM REV CODE 636 W HCPCS: Mod: JG | Performed by: INTERNAL MEDICINE

## 2018-06-08 PROCEDURE — 93018 CV STRESS TEST I&R ONLY: CPT | Mod: ,,, | Performed by: INTERNAL MEDICINE

## 2018-06-08 PROCEDURE — 63600175 PHARM REV CODE 636 W HCPCS: Performed by: STUDENT IN AN ORGANIZED HEALTH CARE EDUCATION/TRAINING PROGRAM

## 2018-06-08 PROCEDURE — 80069 RENAL FUNCTION PANEL: CPT

## 2018-06-08 PROCEDURE — 11000001 HC ACUTE MED/SURG PRIVATE ROOM

## 2018-06-08 PROCEDURE — 90935 HEMODIALYSIS ONE EVALUATION: CPT

## 2018-06-08 PROCEDURE — 93016 CV STRESS TEST SUPVJ ONLY: CPT | Mod: ,,, | Performed by: INTERNAL MEDICINE

## 2018-06-08 PROCEDURE — 85025 COMPLETE CBC W/AUTO DIFF WBC: CPT

## 2018-06-08 PROCEDURE — 63600175 PHARM REV CODE 636 W HCPCS: Performed by: INTERNAL MEDICINE

## 2018-06-08 RX ORDER — HEPARIN SODIUM 5000 [USP'U]/ML
5000 INJECTION, SOLUTION INTRAVENOUS; SUBCUTANEOUS EVERY 8 HOURS
Status: DISCONTINUED | OUTPATIENT
Start: 2018-06-08 | End: 2018-06-10

## 2018-06-08 RX ADMIN — Medication 250 MG: at 09:06

## 2018-06-08 RX ADMIN — FERUMOXYTOL 510 MG: 510 INJECTION INTRAVENOUS at 10:06

## 2018-06-08 RX ADMIN — HEPARIN SODIUM 1000 UNITS: 1000 INJECTION, SOLUTION INTRAVENOUS; SUBCUTANEOUS at 01:06

## 2018-06-08 RX ADMIN — Medication 250 MG: at 03:06

## 2018-06-08 RX ADMIN — HEPARIN SODIUM 5000 UNITS: 5000 INJECTION, SOLUTION INTRAVENOUS; SUBCUTANEOUS at 10:06

## 2018-06-08 RX ADMIN — ASPIRIN 81 MG CHEWABLE TABLET 81 MG: 81 TABLET CHEWABLE at 03:06

## 2018-06-08 RX ADMIN — METOPROLOL SUCCINATE 50 MG: 50 TABLET, EXTENDED RELEASE ORAL at 03:06

## 2018-06-08 RX ADMIN — SODIUM CHLORIDE: 9 INJECTION, SOLUTION INTRAVENOUS at 08:06

## 2018-06-08 RX ADMIN — ATORVASTATIN CALCIUM 40 MG: 20 TABLET, FILM COATED ORAL at 03:06

## 2018-06-08 NOTE — ASSESSMENT & PLAN NOTE
- baseline renal function unknown. Renal US without report of CKD-related changes though kidneys more echogenic than liver.   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 LVEF 10-15%.   - tunneled HD catheter placed 6/6/18  - patient reports some UOP this morning; no improvement in creatinine yet  - continue strict I/Os and daily bladder scans  - HD today x 4 hours; 1L UF as tolerated  - recommend avoiding ACEi for now.   - next HD tentatively planned for Monday

## 2018-06-08 NOTE — ASSESSMENT & PLAN NOTE
- Tsat 17 with ferritin 222  - treating with IV iron: first dose today; 2nd on Monday  - may need to start HIGINIO soon

## 2018-06-08 NOTE — PROGRESS NOTES
HD treatment complete. Duration of treatment 4 hours and 1 L removed. Treatment was tolerated well and no complications with access to right chest wall catheter. Catheter flushed with NS and locked with heparin. Capped and taped.

## 2018-06-08 NOTE — PROGRESS NOTES
HD treatment started. No complications with access to right chest wall catheter. Line secured and telemetry in place. No complaints of discomfort at this time.

## 2018-06-08 NOTE — PROGRESS NOTES
[facility]  Cardiac Electrophysiology  Progress Note    Admission Date: 5/19/2018  Code Status: Full Code   Attending Physician: Moisés Villalobos MD   Expected Discharge Date: 6/14/2018  Principal Problem:Anoxic brain injury    Subjective:   Patient without acute event overnight      History reviewed. No pertinent past medical history.    History reviewed. No pertinent surgical history.    Review of patient's allergies indicates:   Allergen Reactions    Penicillins      Tolerated cefepime May 2018       No current facility-administered medications on file prior to encounter.      No current outpatient prescriptions on file prior to encounter.     Family History     None        Social History Main Topics    Smoking status: Unknown If Ever Smoked    Smokeless tobacco: Current User     Types: Chew    Alcohol use Not on file    Drug use: Unknown    Sexual activity: Not on file     Review of Systems   Constitution: Negative.   HENT: Negative.    Eyes: Negative.    Cardiovascular: Negative.    Respiratory: Negative.    Endocrine: Negative.    Skin: Negative.    Musculoskeletal: Negative.    Gastrointestinal: Negative.    Genitourinary: Negative.    Neurological: Negative.      Objective:     Vital Signs (Most Recent):  Temp: 98.4 °F (36.9 °C) (06/06/18 1636)  Pulse: 80 (06/06/18 1636)  Resp: 18 (06/06/18 1530)  BP: 126/82 (06/06/18 1636)  SpO2: 96 % (06/06/18 1636) Vital Signs (24h Range):  Temp:  [97.8 °F (36.6 °C)-98.7 °F (37.1 °C)] 98.4 °F (36.9 °C)  Pulse:  [59-80] 80  Resp:  [18-20] 18  SpO2:  [96 %-100 %] 96 %  BP: (118-143)/(59-86) 126/82     Weight: 109.9 kg (242 lb 4.6 oz)  Body mass index is 36.84 kg/m².    SpO2: 96 %  O2 Device (Oxygen Therapy): room air    Physical Exam   Constitutional: He is oriented to person, place, and time. He appears well-developed and well-nourished.   HENT:   Head: Normocephalic and atraumatic.   Eyes: Conjunctivae and EOM are normal. Pupils are equal, round,  and reactive to light.   Neck: Normal range of motion. Neck supple. No JVD present.   Cardiovascular: Normal rate, regular rhythm and normal heart sounds.  Exam reveals no gallop and no friction rub.    No murmur heard.  Pulmonary/Chest: Effort normal and breath sounds normal. No respiratory distress. He has no wheezes. He has no rales. He exhibits no tenderness.   Abdominal: Soft. Bowel sounds are normal. He exhibits no distension. There is no tenderness.   Musculoskeletal: Normal range of motion. He exhibits no edema or tenderness.   Neurological: He is alert and oriented to person, place, and time.   Skin: Skin is warm and dry. No erythema. No pallor.       Significant Labs:   EP:     Recent Labs  Lab 06/07/18  0356 06/07/18  1715 06/08/18  0324   *  --  136   K 4.1  --  4.4     --  103   CO2 22*  --  19*   GLU 80  --  79   BUN 50*  --  55*   CREATININE 8.6*  --  9.8*   CALCIUM 9.3  --  9.6   ALBUMIN 2.9*  --  2.9*   ANIONGAP 12  --  14   ESTGFRAFRICA 7.7*  --  6.5*   EGFRNONAA 6.6*  --  5.7*   WBC 7.00  --  7.38   HGB 8.1*  --  8.0*   HCT 26.3*  --  26.1*   *  --  427*   INR  --  1.1  --        Significant Imaging: Echocardiogram:   2D echo with color flow doppler:   Results for orders placed or performed during the hospital encounter of 05/19/18   2D echo with color flow doppler   Result Value Ref Range    EF 10 (A) 55 - 65    Mitral Valve Regurgitation MILD     Diastolic Dysfunction Yes (A)     Est. PA Systolic Pressure 37.47     Tricuspid Valve Regurgitation MILD     and EKG: nothing current    Assessment and Plan:   Pt is a 47 year old gentleman who we are consulted for possible cardiomyopathy particularly an ischemic evaluation and possible icd placement.      He has a hx of hypertension, significant drinking hx and doesn't really follow with a doctor however over the past few weeks per his aunt he has not felt well as he has been very short of breath, fatigued, and overall weak so he was  urged to present to the ER at [facility]. While there in the waiting room he had a choking episode had a witnessed possible seizure activity followed by cardiac arrest. He required 22 minutes of ACLS/5 shocks for VT/VF before ROSC.  He was intubated and a lozenge was removed from airway. Unfortunately, we do not have records of these tracings.  He has not had any further events during his hospital stay.  Hyperthermia protocol was initiated and [place] was [place] [facility]  His course has been complicated by renal failure now persistently on HD and anoxic brain injury from which he has made some recovery.      Cardiac arrest     Awaiting PET stress  Depending upon results we will further delineate plan  If non-ischemic, will implant ICD prior to dc  If ischemic, will pursue revascularization and GDMT with lifevest x 3 months and then reassess EF  Repeat 2d echo to assess ef in the interim                 Cooper Ruiz MD  Cardiac Electrophysiology  [facility]

## 2018-06-08 NOTE — ANESTHESIA PREPROCEDURE EVALUATION
06/08/2018  Los Mendez is a 47 y.o., male  c PMHx HTN, COPD? Transferred from [facility] following witnessed cardiac arrest while in the ED waiting room. Patient had presented complaining of URI/SOB symptoms, while in the ED waiting room he was noted to be choking, became apneic, which led to witnessed seizure like activity followed by cardiac arrest. Per discussion with ED staff, patient required about 22 minutes of ACLS/5 shocks before returning to normal sinus rhythm. Now with known axonic brain injury, HD.  S/p 1U PRBC 05/28/18.  Now participating in rehab.  PET stress showed non-ischemic cardiomyopathy.    Pre-operative evaluation for Procedure(s) (LRB):  ICD DC new (Left)    IV Access:  Trialysis RF  Single LF 22g  Double Lumen RIJ    Oxygen/Ventilatory Requirements:  RA    Infusions:          Last Airway:    None on file    Patient Active Problem List   Diagnosis    Anoxic brain injury    Cardiac arrest    Class 3 severe obesity due to excess calories with serious comorbidity and body mass index (BMI) of 40.0 to 44.9 in adult    Acute pulmonary edema    Essential hypertension    Acute renal failure with tubular necrosis    Cytotoxic cerebral edema    Acute combined systolic and diastolic ACC/AHA stage C congestive heart failure    Acute bilat watershed infarction    Iron deficiency anemia due to chronic blood loss    Hemodialysis status    Debility    Acute superficial gastritis with hemorrhage    Cardiomyopathy    Atrial tachycardia    Acute decompensated heart failure    MARY (acute kidney injury)       Review of patient's allergies indicates:   Allergen Reactions    Penicillins      Tolerated cefepime May 2018       No current facility-administered medications on file prior to encounter.      No current outpatient prescriptions on file prior to encounter.       History reviewed.  No pertinent surgical history.    Social History     Social History    Marital status:      Spouse name: N/A    Number of children: N/A    Years of education: N/A     Occupational History    Not on file.     Social History Main Topics    Smoking status: Unknown If Ever Smoked    Smokeless tobacco: Current User     Types: Chew    Alcohol use Not on file    Drug use: Unknown    Sexual activity: Not on file     Other Topics Concern    Not on file     Social History Narrative    No narrative on file         Vital Signs Range (Last 24H):  Temp:  [36.8 °C (98.2 °F)-37.3 °C (99.1 °F)]   Pulse:  [62-93]   Resp:  [18]   BP: (102-139)/(50-78)   SpO2:  [92 %-96 %]       CBC:   Recent Labs      06/07/18   0356  06/08/18   0324   WBC  7.00  7.38   RBC  3.59*  3.48*   HGB  8.1*  8.0*   HCT  26.3*  26.1*   PLT  398*  427*   MCV  73*  75*   MCH  22.6*  23.0*   MCHC  30.8*  30.7*       CMP:   Recent Labs      06/05/18 2159 06/06/18 0335 06/07/18 0356  06/08/18   0324   NA  133*  137  137  135*  136   K  4.5  5.0  5.0  4.1  4.4   CL  101  103  103  101  103   CO2  18*  19*  19*  22*  19*   BUN  73*  78*  78*  50*  55*   CREATININE  9.8*  10.3*  10.3*  8.6*  9.8*   GLU  79  78  78  80  79   MG  2.9*  2.6   --    --    PHOS  3.6  3.6  3.8  3.8  3.9  4.7*   CALCIUM  9.5  9.3  9.3  9.3  9.6   ALBUMIN  3.1*  3.0*  3.0*  2.9*  2.9*   PROT   --   7.7   --    --    ALKPHOS   --   172*   --    --    ALT   --   48*   --    --    AST   --   52*   --    --    BILITOT   --   1.1*   --    --        INR  Recent Labs      06/06/18   0335  06/07/18   1715   INR  1.1  1.1           Diagnostic Studies:      EKG:  Normal sinus rhythm  Nonspecific T wave abnormality  Abnormal ECG  When compared with ECG of 29-MAY-2018 01:00,  Vent. rate has decreased BY  53 BPM  SVT No longer present  Confirmed by CARRIE NICOLE MD ([id number]) on 6/7/2018 12:01:21 PM    Referred By: UNKNOWN REFERRING           Confirmed By:CARRIE NICOLE  MD    2D Echo:      CONCLUSIONS     1 - Eccentric hypertrophy.     2 - Severely depressed left ventricular systolic function (EF 10-15%).     3 - Biatrial enlargement.     4 - Impaired LV relaxation, normal LAP (grade 1 diastolic dysfunction).     5 - Right ventricular enlargement with moderately depressed systolic function.     6 - The estimated PA systolic pressure is 37 mmHg.     7 - Mild mitral regurgitation.     8 - Mild tricuspid regurgitation.             This document has been electronically    SIGNED BY: Nae Gibbs MD On: 05/24/2018 14:59    Anesthesia Evaluation    I have reviewed the Patient Summary Reports.    I have reviewed the Nursing Notes.   I have reviewed the Medications.     Review of Systems  Anesthesia Hx:  No problems with previous Anesthesia  Neg history of prior surgery. Denies Family Hx of Anesthesia complications.   Denies Personal Hx of Anesthesia complications.   Social:  Former Smoker    Hematology/Oncology:     Oncology Normal    -- Anemia:   EENT/Dental:EENT/Dental Normal   Cardiovascular:   Exercise tolerance: poor Hypertension CHF  Congestive Heart Failure (CHF) , Chronic Congestive Heart Failure  Cardiomyopathy, Non-Ischemic Cardiomyopathy (EF 10-15%)  Hypertension    Pulmonary:   Denies Shortness of breath.  Denies Recent URI.  Denies Sleep Apnea.  Pulmonary Hypertension Echocardiographic Estimated Pulmonary Artery Systolic Pressure >=35 - 45    Renal/:   Chronic Renal Disease, Dialysis    Neurological:  Neurology Normal    Endocrine:  Endocrine Normal        Physical Exam  General:  Well nourished    Airway/Jaw/Neck:  Airway Findings:           Mental Status:  Mental Status Findings:  Cooperative, Alert and Oriented         Anesthesia Plan  Type of Anesthesia, risks & benefits discussed:  Anesthesia Type:  general, MAC  Patient's Preference:   Intra-op Monitoring Plan: standard ASA monitors  Intra-op Monitoring Plan Comments:   Post Op Pain Control Plan:   Post Op Pain  Control Plan Comments:   Induction:    Beta Blocker:  Patient is on a Beta-Blocker and has received one dose within the past 24 hours (No further documentation required).       Informed Consent: Patient understands risks and agrees with Anesthesia plan.  Questions answered. Anesthesia consent signed with patient.  ASA Score: 3     Day of Surgery Review of History & Physical:    H&P update referred to the surgeon.         Ready For Surgery From Anesthesia Perspective.

## 2018-06-08 NOTE — PLAN OF CARE
Problem: Patient Care Overview  Goal: Plan of Care Review  Outcome: Ongoing (interventions implemented as appropriate)  Plan of care reviewed. Pt to have ICD placed on Monday dt schedule conflict. Pt diet resumed. No distress noted

## 2018-06-08 NOTE — SUBJECTIVE & OBJECTIVE
Interval History:   No events overnight. Patient seen on HD this morning. Tolerating well. No muscle cramps. Reports UOP with BMs this morning. Currently on RA. Placement pending.     Review of patient's allergies indicates:   Allergen Reactions    Penicillins      Tolerated cefepime May 2018     Current Facility-Administered Medications   Medication Frequency    0.9%  NaCl infusion PRN    acetaminophen tablet 325 mg Q6H PRN    albuterol-ipratropium 2.5 mg-0.5 mg/3 mL nebulizer solution 3 mL Q6H PRN    ascorbic acid (vitamin C) tablet 250 mg BID    aspirin chewable tablet 81 mg Daily    atorvastatin tablet 40 mg Daily    heparin (porcine) injection 1,000 Units PRN    heparin (porcine) injection 5,000 Units Q8H    metoprolol succinate (TOPROL-XL) 24 hr tablet 50 mg Daily    ramelteon tablet 8 mg Nightly PRN       Objective:     Vital Signs (Most Recent):  Temp: 98 °F (36.7 °C) (06/08/18 1300)  Pulse: 81 (06/08/18 1300)  Resp: 18 (06/08/18 1300)  BP: 119/67 (06/08/18 1300)  SpO2: (!) 92 % (06/08/18 0747)  O2 Device (Oxygen Therapy): room air (06/07/18 2000) Vital Signs (24h Range):  Temp:  [98 °F (36.7 °C)-99.1 °F (37.3 °C)] 98 °F (36.7 °C)  Pulse:  [62-93] 81  Resp:  [18] 18  SpO2:  [92 %-96 %] 92 %  BP: (102-139)/(50-78) 119/67     Weight: 109.9 kg (242 lb 4.6 oz) (06/07/18 2000)  Body mass index is 36.84 kg/m².  Body surface area is 2.3 meters squared.    No intake/output data recorded.    Physical Exam   Constitutional: He appears well-developed and well-nourished. No distress.   HENT:   Head: Normocephalic and atraumatic.   Eyes: Conjunctivae and EOM are normal.   Cardiovascular: Normal rate and regular rhythm.    Pulmonary/Chest: Effort normal and breath sounds normal.   Abdominal: Soft. Bowel sounds are normal.   Musculoskeletal: He exhibits no edema or deformity.   Skin: Skin is warm and dry. He is not diaphoretic.       Significant Labs:  CBC:   Recent Labs  Lab 06/08/18  0324   WBC 7.38   RBC  3.48*   HGB 8.0*   HCT 26.1*   *   MCV 75*   MCH 23.0*   MCHC 30.7*     CMP:   Recent Labs  Lab 06/06/18  0335  06/08/18  0324   GLU 78  78  < > 79   CALCIUM 9.3  9.3  < > 9.6   ALBUMIN 3.0*  3.0*  < > 2.9*   PROT 7.7  --   --      137  < > 136   K 5.0  5.0  < > 4.4   CO2 19*  19*  < > 19*     103  < > 103   BUN 78*  78*  < > 55*   CREATININE 10.3*  10.3*  < > 9.8*   ALKPHOS 172*  --   --    ALT 48*  --   --    AST 52*  --   --    BILITOT 1.1*  --   --    < > = values in this interval not displayed.  All labs within the past 24 hours have been reviewed.     Significant Imaging:  Labs: Reviewed

## 2018-06-08 NOTE — PLAN OF CARE
Problem: Patient Care Overview  Goal: Plan of Care Review  Outcome: Ongoing (interventions implemented as appropriate)   06/08/18 0221   Coping/Psychosocial   Plan Of Care Reviewed With patient;spouse       Problem: Infection, Risk/Actual (Adult)  Goal: Infection Prevention/Resolution  Patient will demonstrate the desired outcomes by discharge/transition of care.   Outcome: Ongoing (interventions implemented as appropriate)   06/08/18 0221   Infection, Risk/Actual (Adult)   Infection Prevention/Resolution making progress toward outcome       Problem: Fall Risk (Adult)  Goal: Absence of Falls  Patient will demonstrate the desired outcomes by discharge/transition of care.   Outcome: Ongoing (interventions implemented as appropriate)   06/08/18 0221   Fall Risk (Adult)   Absence of Falls making progress toward outcome

## 2018-06-08 NOTE — PROGRESS NOTES
[facility] Hospital Medicine  Progress Note    Patient Name: Los Mendez  MRN: [id number]  Patient Class: IP- Inpatient   Admission Date: 5/19/2018  Length of Stay: 20 days  Attending Physician: Moisés Villalobos MD  Primary Care Provider: Provider [facility] Medicine Team: [facility] HOSP MED 4 Amos Roche MD    Subjective:     Principal Problem:Anoxic brain injury    HPI:  48 y/o man w/ hx of HTN, COPD? Transferred from [facility] following witnessed cardiac arrest while in the ED waiting room. Patient had presented complaining of URI/SOB symptoms, while in the ED waiting room he was noted to be choking, became apneic, which led to witnessed seizure like activity followed by cardiac arrest. Per discussion with ED staff, patient required about 22 minutes of ACLS/5 shocks before returning to normal sinus rhythm. During intubation a lozenge was removed from patients airway. Hyperthermia protocol was initiated prior to transferring to [facility] for [facility] care. Per ED records, patient was acidotic with ph of 7.1 this morning. At the time of arrival to NICU, patient was on paralytics, however pupillary reflex was present. Will continue hypothermia protocol for additional 24 h.    Hospital Course:  05/19/18:  Arrived intubated, sedated, artic sun blanket  05/20/18:  Pressor requirment going up, NO DIURESIS, continue TTM, nimbex off, LFT improving, trop improving, family updated.  05/21/18:  Reach normothermia, MRI today, remove EEG, place HD line, consult nephrology, LFTs trending down, family updated.  05/22/18:  Anoxic brain injury. Pt responding. Last night placed on propofol  MRI --L head of caudate and cerebellar small infarctions.  05/23/18:  Anoxic brain injury.  On CRRT. BNP 1046. Back on propofol, pressors. Still agitated. Started on Buspar. Trop I normalizing.  05/25/18:  CRRT today. Amiodarone dc'd, metoprolol started. CBC z1g--mr H/H continues to drop will consult GI. Vascular surgery  consulted concerning  Possible arterial occlusion. RUE cold. Arterial line dcd. US RUE pending. Abdominal distention, KUB showed gas pattern. Bladder pressures q4h. Initial 21.  05/28/18:  H/H 6.8/23, 1 unit PRBC transfused. Keep Hgb greater than or equal to 8. CRRT stopped, line clotted. Changed trialysis catheter changed. Wean sedation today. Once off sedation begin weaning ventilator settings to CPAP.  Weaned vent to CPAP and able to Extubated in afternoon without s/s stridor or difficulty breathing. HR elevated 140s - 160s fentanyl x2 prn for pain/agitation. Metoprolol 5mg IV x3 given remains ST 140s.  Restless, will start precedex.  05/30/18:  Leukocytosis, pan cx; kelley discontinued; remove IJ, speech eval for diet   05/31/18:  No acute events, still with gastric secretions coming out of NG tube will start reglan; remains encephalopathic  06/01/18:  Will hold NGT suction, NPO per SLP.  Continue metoclopramide.  Hgb stable at 8.3 g/dL.  6/2 Continue NG tube for now. Will advance to pureed diet as recommended by SLP and evaluate intake before removing NG.. WBC remain elevated but afebrile, cultures negative and ISA/Diamond 5/24 showed no DVT only thrombophlebitis of LUE cephalic vein. Continue to monitor daily CBC. Nephrology following will hold HD today and possibly tomorrow depending on labs  6/4: patient fell from bed en route to bathroom (unassisted), CT no ICH, patient neuro stable, step down  6/5: Stepped down to hospital medicine  6/6: Permacath placed by HUGO. Underwent HD. Consult placed to PM&R for rehab placement. CM pursuing outpatient HD chair.  6/7: No acute events. Patient participating with Rehab. Pending placement in rehab.   6/8: ICD placement today.     Interval History:     NAEON. Patient NPO overnight for ICD placement.     Review of Systems   Constitutional: Negative for activity change, chills, fatigue and fever.   HENT: Negative for congestion, facial swelling, sore throat and voice change.     Eyes: Negative for pain, redness and visual disturbance.   Respiratory: Negative for cough, chest tightness and shortness of breath.    Cardiovascular: Negative for chest pain and leg swelling.   Gastrointestinal: Negative for abdominal pain, constipation, diarrhea and nausea.   Endocrine: Negative for cold intolerance and heat intolerance.   Musculoskeletal: Negative for arthralgias, back pain and neck pain.        Strength 5/5 in both upper and lower extremities.    Skin: Negative for rash and wound.   Neurological: Negative for dizziness, speech difficulty, weakness, light-headedness and headaches.   Psychiatric/Behavioral: Negative for agitation and confusion.        Slurred speech       Objective:     Vital Signs (Most Recent):  Temp: 99 °F (37.2 °C) (06/08/18 0345)  Pulse: 80 (06/08/18 0345)  Resp: 18 (06/08/18 0345)  BP: 106/60 (06/08/18 0345)  SpO2: (!) 92 % (06/08/18 0345) Vital Signs (24h Range):  Temp:  [98.2 °F (36.8 °C)-99 °F (37.2 °C)] 99 °F (37.2 °C)  Pulse:  [71-92] 80  Resp:  [18-20] 18  SpO2:  [92 %-96 %] 92 %  BP: (106-136)/(59-74) 106/60     Weight: 109.9 kg (242 lb 4.6 oz)  Body mass index is 36.84 kg/m².  No intake or output data in the 24 hours ending 06/08/18 0712   Physical Exam   Constitutional: He is oriented to person, place, and time. He appears well-developed and well-nourished. No distress.   HENT:   Head: Normocephalic and atraumatic.   Eyes: EOM are normal. Pupils are equal, round, and reactive to light.   Neck: Normal range of motion. Neck supple.   Cardiovascular: Normal rate, regular rhythm, normal heart sounds and intact distal pulses.  Exam reveals no gallop and no friction rub.    No murmur heard.  Pulmonary/Chest: Effort normal and breath sounds normal. No respiratory distress. He exhibits no tenderness.   Abdominal: Soft. Bowel sounds are normal. He exhibits no distension. There is no tenderness.   Musculoskeletal: Normal range of motion. He exhibits no edema, tenderness or  deformity.   Neurological: He is alert and oriented to person, place, and time. No cranial nerve deficit. Coordination normal.   Skin: Skin is warm and dry. No rash noted. He is not diaphoretic. No erythema. No pallor.   Psychiatric: He has a normal mood and affect. His behavior is normal. Judgment and thought content normal.   Nursing note and vitals reviewed.    Significant Labs:   BMP:   Recent Labs  Lab 06/08/18  0324   GLU 79      K 4.4      CO2 19*   BUN 55*   CREATININE 9.8*   CALCIUM 9.6     CBC:   Recent Labs  Lab 06/07/18  0356 06/08/18  0324   WBC 7.00 7.38   HGB 8.1* 8.0*   HCT 26.3* 26.1*   * 427*     Assessment/Plan:      * Anoxic brain injury    -Cardiac arrest ~ 22 minutes at OSH.  Pt doing relatively well.  Extubated 5/28.  -05/21/18 MRI w/o evidence of anoxic brain injury, but several small strokes; pt encephalopathic  -Encephalopathy likely multifactorial--strokes, metabolic (kidney/liver injury), delirium        Acute superficial gastritis with hemorrhage    Coffee ground material aspirated via NG on 5/23  Concurrent with acute drop in Hg  Hg has since uptrended.   No recurrent signs of upper GI bleed.              Debility    PT/OT  Consult placed to PM&R for rehab placement        Iron deficiency anemia due to chronic blood loss              Acute bilat watershed infarction    - Incidental finding on MRI.   - Continues to have slurred speech but participating in rehab.        Acute combined systolic and diastolic ACC/AHA stage C congestive heart failure    S/p cardiac arrest on 5/19  LVEF 10-15%  - ACEi held in setting of ARF  - metoprolol succinate 50mg daily  - will evaluate and start with hydralazine 10mg tid and isordil 10mg tid for afterload reduction if BP can tolerate while on HD  - aldosterone antagonist held in the setting of poor renal function  - start high intensity statin therapy with atorvastatin 40mg  - transfuse for goal Hb >8  - Cardiology consulted. Will be  evaluated for an ICD placement later today.         Cytotoxic cerebral edema    - secondary to anoxic brain injury, 22 minutes PEA-->vfib  - MRI brain 5/21 for anoxic assessment.  see anoxic brain injury        Acute renal failure with tubular necrosis    - unknown baseline renal function, not previously dialysis dependent  - ischemic ATN 2/2 cardiac arrest  - Remains oliguric; starting to make small volumes of urine but not clearing volume or electrolytes effectively  - Receiving HD PRN  - Underwent permacath placement 6/6  - Renal diet  - History of iron deficiency anemia; plan for EPO if counts do not recover promptly  - Followed by Nephrology  - Had HD on 6/6.           Essential hypertension      See acute decompensated heart failure        Acute pulmonary edema    - Managing volume status with HD given ARF        Class 3 severe obesity due to excess calories with serious comorbidity and body mass index (BMI) of 40.0 to 44.9 in adult    S/p nutrition consult; counseled on lifestyle modifications  Body mass index is 36.84 kg/m².          Cardiac arrest      See acute decompensated heart failure          VTE Risk Mitigation         Ordered     heparin (porcine) injection 1,000 Units  As needed (PRN)      06/06/18 5334        Amos Roche MD  Department of Hospital Medicine   [facility]

## 2018-06-08 NOTE — PLAN OF CARE
1:50 PM  SW went to Pt's bedside and he was at dialysis, then he goes to have ICD placed per ESTHER Benton [id number].  Updated ESTHER Benton on Pt's d/c plan.    12:33 PM  SW spoke w/rep Danelle you at [facility] and faxed over referral packet.      SW following for DC needs.  LOBITO in communication with MIGUEL Betancourt, DOMINGO  [facility]

## 2018-06-08 NOTE — NURSING
Attempted to restart  IV. 2 unsuccessful sticks. #22 to left arm remains per pt request. Reported to oncoming shift to attempt IV change. Redressed current IV.

## 2018-06-08 NOTE — SUBJECTIVE & OBJECTIVE
Interval History:     NAEON. Patient NPO overnight for ICD placement.     Review of Systems   Constitutional: Negative for activity change, chills, fatigue and fever.   HENT: Negative for congestion, facial swelling, sore throat and voice change.    Eyes: Negative for pain, redness and visual disturbance.   Respiratory: Negative for cough, chest tightness and shortness of breath.    Cardiovascular: Negative for chest pain and leg swelling.   Gastrointestinal: Negative for abdominal pain, constipation, diarrhea and nausea.   Endocrine: Negative for cold intolerance and heat intolerance.   Musculoskeletal: Negative for arthralgias, back pain and neck pain.        Strength 5/5 in both upper and lower extremities.    Skin: Negative for rash and wound.   Neurological: Negative for dizziness, speech difficulty, weakness, light-headedness and headaches.   Psychiatric/Behavioral: Negative for agitation and confusion.        Slurred speech       Objective:     Vital Signs (Most Recent):  Temp: 99 °F (37.2 °C) (06/08/18 0345)  Pulse: 80 (06/08/18 0345)  Resp: 18 (06/08/18 0345)  BP: 106/60 (06/08/18 0345)  SpO2: (!) 92 % (06/08/18 0345) Vital Signs (24h Range):  Temp:  [98.2 °F (36.8 °C)-99 °F (37.2 °C)] 99 °F (37.2 °C)  Pulse:  [71-92] 80  Resp:  [18-20] 18  SpO2:  [92 %-96 %] 92 %  BP: (106-136)/(59-74) 106/60     Weight: 109.9 kg (242 lb 4.6 oz)  Body mass index is 36.84 kg/m².  No intake or output data in the 24 hours ending 06/08/18 0712   Physical Exam   Constitutional: He is oriented to person, place, and time. He appears well-developed and well-nourished. No distress.   HENT:   Head: Normocephalic and atraumatic.   Eyes: EOM are normal. Pupils are equal, round, and reactive to light.   Neck: Normal range of motion. Neck supple.   Cardiovascular: Normal rate, regular rhythm, normal heart sounds and intact distal pulses.  Exam reveals no gallop and no friction rub.    No murmur heard.  Pulmonary/Chest: Effort normal and  breath sounds normal. No respiratory distress. He exhibits no tenderness.   Abdominal: Soft. Bowel sounds are normal. He exhibits no distension. There is no tenderness.   Musculoskeletal: Normal range of motion. He exhibits no edema, tenderness or deformity.   Neurological: He is alert and oriented to person, place, and time. No cranial nerve deficit. Coordination normal.   Skin: Skin is warm and dry. No rash noted. He is not diaphoretic. No erythema. No pallor.   Psychiatric: He has a normal mood and affect. His behavior is normal. Judgment and thought content normal.   Nursing note and vitals reviewed.    Significant Labs:   BMP:   Recent Labs  Lab 06/08/18  0324   GLU 79      K 4.4      CO2 19*   BUN 55*   CREATININE 9.8*   CALCIUM 9.6     CBC:   Recent Labs  Lab 06/07/18  0356 06/08/18  0324   WBC 7.00 7.38   HGB 8.1* 8.0*   HCT 26.3* 26.1*   * 427*

## 2018-06-08 NOTE — PROGRESS NOTES
[facility]  Nephrology  Progress Note    Patient Name: Los Mendez  MRN: [id number]  Admission Date: 5/19/2018  Hospital Length of Stay: 20 days  Attending Provider: Moisés Villlaobos MD   Primary Care Physician: Provider [employer]  Principal Problem:Anoxic brain injury    Subjective:     HPI: Los Mendez is a 47 year old male with past medical history of HTN, heavy alcohol user and COPD. Transferred from [facility] following witnessed cardiac arrest while in the ED waiting room. Patient had presented complaining of upper respiratory track infection and shortness of breath symptoms, while in the ED waiting room he was noted to be choking, became apneic, which led to witnessed seizure like activity followed by cardiac arrest. Per discussion with ED staff, patient required about 22 minutes of ACLS/5 shocks before returning to normal sinus rhythm. During intubation a lozenge was removed from patients airway. Hypothermia protocol was initiated prior to transferring to [facility]. On arrival to [facility] Arrived with increased serum creatinine from 2.9-->5.1 (unkown baseline), BUN 35--> 52, Trop 2.4-->1.3, Lactic acidosis 6.4-->1.3, chest x ray with increased parenchymal interstitial attenuation and parenchymal opacities suggestive of pulmonary edema. On mechanical ventilation with a FiO2 50%. Currently also anuric at present moment. Per cardiology patient has a dilated cardiomyopathy EF looks about 10-15% and LVEDD is 7.3 cm.    Interval History:   No events overnight. Patient seen on HD this morning. Tolerating well. No muscle cramps. Reports UOP with BMs this morning. Currently on RA. Placement pending.     Review of patient's allergies indicates:   Allergen Reactions    Penicillins      Tolerated cefepime May 2018     Current Facility-Administered Medications   Medication Frequency    0.9%  NaCl infusion PRN    acetaminophen tablet 325 mg Q6H PRN    albuterol-ipratropium 2.5 mg-0.5 mg/3 mL nebulizer  solution 3 mL Q6H PRN    ascorbic acid (vitamin C) tablet 250 mg BID    aspirin chewable tablet 81 mg Daily    atorvastatin tablet 40 mg Daily    heparin (porcine) injection 1,000 Units PRN    heparin (porcine) injection 5,000 Units Q8H    metoprolol succinate (TOPROL-XL) 24 hr tablet 50 mg Daily    ramelteon tablet 8 mg Nightly PRN       Objective:     Vital Signs (Most Recent):  Temp: 98 °F (36.7 °C) (06/08/18 1300)  Pulse: 81 (06/08/18 1300)  Resp: 18 (06/08/18 1300)  BP: 119/67 (06/08/18 1300)  SpO2: (!) 92 % (06/08/18 0747)  O2 Device (Oxygen Therapy): room air (06/07/18 2000) Vital Signs (24h Range):  Temp:  [98 °F (36.7 °C)-99.1 °F (37.3 °C)] 98 °F (36.7 °C)  Pulse:  [62-93] 81  Resp:  [18] 18  SpO2:  [92 %-96 %] 92 %  BP: (102-139)/(50-78) 119/67     Weight: 109.9 kg (242 lb 4.6 oz) (06/07/18 2000)  Body mass index is 36.84 kg/m².  Body surface area is 2.3 meters squared.    No intake/output data recorded.    Physical Exam   Constitutional: He appears well-developed and well-nourished. No distress.   HENT:   Head: Normocephalic and atraumatic.   Eyes: Conjunctivae and EOM are normal.   Cardiovascular: Normal rate and regular rhythm.    Pulmonary/Chest: Effort normal and breath sounds normal.   Abdominal: Soft. Bowel sounds are normal.   Musculoskeletal: He exhibits no edema or deformity.   Skin: Skin is warm and dry. He is not diaphoretic.       Significant Labs:  CBC:   Recent Labs  Lab 06/08/18  0324   WBC 7.38   RBC 3.48*   HGB 8.0*   HCT 26.1*   *   MCV 75*   MCH 23.0*   MCHC 30.7*     CMP:   Recent Labs  Lab 06/06/18  0335  06/08/18  0324   GLU 78  78  < > 79   CALCIUM 9.3  9.3  < > 9.6   ALBUMIN 3.0*  3.0*  < > 2.9*   PROT 7.7  --   --      137  < > 136   K 5.0  5.0  < > 4.4   CO2 19*  19*  < > 19*     103  < > 103   BUN 78*  78*  < > 55*   CREATININE 10.3*  10.3*  < > 9.8*   ALKPHOS 172*  --   --    ALT 48*  --   --    AST 52*  --   --    BILITOT 1.1*  --   --    < >  = values in this interval not displayed.  All labs within the past 24 hours have been reviewed.     Significant Imaging:  Labs: Reviewed    Assessment/Plan:     Acute renal failure with tubular necrosis    - baseline renal function unknown. Renal US without report of CKD-related changes though kidneys more echogenic than liver.   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 LVEF 10-15%.   - tunneled HD catheter placed 6/6/18  - patient reports some UOP this morning; no improvement in creatinine yet  - continue strict I/Os and daily bladder scans  - HD today x 4 hours; 1L UF as tolerated  - recommend avoiding ACEi for now.   - next HD tentatively planned for Monday         Iron deficiency anemia due to chronic blood loss    - Tsat 17 with ferritin 222  - treating with IV iron: first dose today; 2nd on Monday  - may need to start HIGINIO soon            Harper Weir, PGY-5  Nephrology Fellow  [facility]  Pager: [phone]

## 2018-06-08 NOTE — PT/OT/SLP PROGRESS
Speech Language Pathology  Pt not seen      Los Mendez  MRN: [id number]    Patient not seen today secondary to pt DURAN for dialysis in AM and PM,and going for PET scan/stress test  . SLP will follow up.     Shari Waggoner CCC-SLP   6/8/2018

## 2018-06-09 LAB
ALBUMIN SERPL BCP-MCNC: 3 G/DL
ANION GAP SERPL CALC-SCNC: 10 MMOL/L
BUN SERPL-MCNC: 25 MG/DL
CALCIUM SERPL-MCNC: 9.2 MG/DL
CHLORIDE SERPL-SCNC: 98 MMOL/L
CO2 SERPL-SCNC: 25 MMOL/L
CREAT SERPL-MCNC: 6.2 MG/DL
EST. GFR  (AFRICAN AMERICAN): 11.4 ML/MIN/1.73 M^2
EST. GFR  (NON AFRICAN AMERICAN): 9.8 ML/MIN/1.73 M^2
GLUCOSE SERPL-MCNC: 84 MG/DL
PHOSPHATE SERPL-MCNC: 3.8 MG/DL
POTASSIUM SERPL-SCNC: 3.9 MMOL/L
SODIUM SERPL-SCNC: 133 MMOL/L

## 2018-06-09 PROCEDURE — 63600175 PHARM REV CODE 636 W HCPCS: Performed by: STUDENT IN AN ORGANIZED HEALTH CARE EDUCATION/TRAINING PROGRAM

## 2018-06-09 PROCEDURE — 25000003 PHARM REV CODE 250: Performed by: STUDENT IN AN ORGANIZED HEALTH CARE EDUCATION/TRAINING PROGRAM

## 2018-06-09 PROCEDURE — 25000003 PHARM REV CODE 250: Performed by: INTERNAL MEDICINE

## 2018-06-09 PROCEDURE — 11000001 HC ACUTE MED/SURG PRIVATE ROOM

## 2018-06-09 PROCEDURE — 99232 SBSQ HOSP IP/OBS MODERATE 35: CPT | Mod: ,,, | Performed by: INTERNAL MEDICINE

## 2018-06-09 PROCEDURE — 36415 COLL VENOUS BLD VENIPUNCTURE: CPT

## 2018-06-09 PROCEDURE — 80069 RENAL FUNCTION PANEL: CPT

## 2018-06-09 RX ADMIN — Medication 250 MG: at 10:06

## 2018-06-09 RX ADMIN — METOPROLOL SUCCINATE 50 MG: 50 TABLET, EXTENDED RELEASE ORAL at 09:06

## 2018-06-09 RX ADMIN — HEPARIN SODIUM 5000 UNITS: 5000 INJECTION, SOLUTION INTRAVENOUS; SUBCUTANEOUS at 10:06

## 2018-06-09 RX ADMIN — ATORVASTATIN CALCIUM 40 MG: 20 TABLET, FILM COATED ORAL at 09:06

## 2018-06-09 RX ADMIN — Medication 250 MG: at 09:06

## 2018-06-09 RX ADMIN — ASPIRIN 81 MG CHEWABLE TABLET 81 MG: 81 TABLET CHEWABLE at 09:06

## 2018-06-09 RX ADMIN — HEPARIN SODIUM 5000 UNITS: 5000 INJECTION, SOLUTION INTRAVENOUS; SUBCUTANEOUS at 02:06

## 2018-06-09 RX ADMIN — HEPARIN SODIUM 5000 UNITS: 5000 INJECTION, SOLUTION INTRAVENOUS; SUBCUTANEOUS at 06:06

## 2018-06-09 NOTE — NURSING
Report received from Serenity, patient has no needs at this time, SO in room with him, ate his dinner tray, 75%, my name and number on Communication Board, call bell in reach, will monitor.

## 2018-06-09 NOTE — PROGRESS NOTES
[facility] Hospital Medicine  Progress Note    Patient Name: Los Mendez  MRN: [id number]  Patient Class: IP- Inpatient   Admission Date: 5/19/2018  Length of Stay: 21 days  Attending Physician: Moisés Villalobos MD  Primary Care Provider: Provider [facility] Medicine Team: [facility] HOSP MED 4 Amos Roche MD    Subjective:     Principal Problem:Anoxic brain injury    HPI:  48 y/o man w/ hx of HTN, COPD? Transferred from [facility] following witnessed cardiac arrest while in the ED waiting room. Patient had presented complaining of URI/SOB symptoms, while in the ED waiting room he was noted to be choking, became apneic, which led to witnessed seizure like activity followed by cardiac arrest. Per discussion with ED staff, patient required about 22 minutes of ACLS/5 shocks before returning to normal sinus rhythm. During intubation a lozenge was removed from patients airway. Hyperthermia protocol was initiated prior to transferring to [facility] for [facility] care. Per ED records, patient was acidotic with ph of 7.1 this morning. At the time of arrival to NICU, patient was on paralytics, however pupillary reflex was present. Will continue hypothermia protocol for additional 24 h.    Hospital Course:  05/19/18:  Arrived intubated, sedated, artic sun blanket  05/20/18:  Pressor requirment going up, NO DIURESIS, continue TTM, nimbex off, LFT improving, trop improving, family updated.  05/21/18:  Reach normothermia, MRI today, remove EEG, place HD line, consult nephrology, LFTs trending down, family updated.  05/22/18:  Anoxic brain injury. Pt responding. Last night placed on propofol  MRI --L head of caudate and cerebellar small infarctions.  05/23/18:  Anoxic brain injury.  On CRRT. BNP 1046. Back on propofol, pressors. Still agitated. Started on Buspar. Trop I normalizing.  05/25/18:  CRRT today. Amiodarone dc'd, metoprolol started. CBC z0g--kd H/H continues to drop will consult GI. Vascular surgery  consulted concerning  Possible arterial occlusion. RUE cold. Arterial line dcd. US RUE pending. Abdominal distention, KUB showed gas pattern. Bladder pressures q4h. Initial 21.  05/28/18:  H/H 6.8/23, 1 unit PRBC transfused. Keep Hgb greater than or equal to 8. CRRT stopped, line clotted. Changed trialysis catheter changed. Wean sedation today. Once off sedation begin weaning ventilator settings to CPAP.  Weaned vent to CPAP and able to Extubated in afternoon without s/s stridor or difficulty breathing. HR elevated 140s - 160s fentanyl x2 prn for pain/agitation. Metoprolol 5mg IV x3 given remains ST 140s.  Restless, will start precedex.  05/30/18:  Leukocytosis, pan cx; kelley discontinued; remove IJ, speech eval for diet   05/31/18:  No acute events, still with gastric secretions coming out of NG tube will start reglan; remains encephalopathic  06/01/18:  Will hold NGT suction, NPO per SLP.  Continue metoclopramide.  Hgb stable at 8.3 g/dL.  6/2 Continue NG tube for now. Will advance to pureed diet as recommended by SLP and evaluate intake before removing NG.. WBC remain elevated but afebrile, cultures negative and ISA/Diamond 5/24 showed no DVT only thrombophlebitis of LUE cephalic vein. Continue to monitor daily CBC. Nephrology following will hold HD today and possibly tomorrow depending on labs  6/4: patient fell from bed en route to bathroom (unassisted), CT no ICH, patient neuro stable, step down  6/5: Stepped down to hospital medicine  6/6: Permacath placed by HUGO. Underwent HD. Consult placed to PM&R for rehab placement. CM pursuing outpatient HD chair.  6/7: No acute events. Patient participating with Rehab. Pending placement in rehab.   6/8: ICD will be placed Monday. NPO Sunday night. NAEON.     Interval History:     NAEON.     Review of Systems   Constitutional: Negative for activity change, chills, fatigue and fever.   HENT: Negative for congestion, facial swelling, sore throat and voice change.    Eyes:  Negative for pain, redness and visual disturbance.   Respiratory: Negative for cough, chest tightness and shortness of breath.    Cardiovascular: Negative for chest pain and leg swelling.   Gastrointestinal: Negative for abdominal pain, constipation, diarrhea and nausea.   Endocrine: Negative for cold intolerance and heat intolerance.   Musculoskeletal: Negative for arthralgias, back pain and neck pain.        Strength 5/5 in both upper and lower extremities.    Skin: Negative for rash and wound.   Neurological: Negative for dizziness, speech difficulty, weakness, light-headedness and headaches.   Psychiatric/Behavioral: Negative for agitation and confusion.        Slurred speech       Objective:     Vital Signs (Most Recent):  Temp: 97.2 °F (36.2 °C) (06/09/18 0926)  Pulse: 65 (06/09/18 0926)  Resp: 20 (06/09/18 0926)  BP: 113/62 (06/09/18 0926)  SpO2: 95 % (06/09/18 0926) Vital Signs (24h Range):  Temp:  [97.2 °F (36.2 °C)-100 °F (37.8 °C)] 97.2 °F (36.2 °C)  Pulse:  [65-94] 65  Resp:  [16-20] 20  SpO2:  [90 %-95 %] 95 %  BP: (103-122)/(53-74) 113/62     Weight: 109.9 kg (242 lb 4.6 oz)  Body mass index is 36.84 kg/m².    Intake/Output Summary (Last 24 hours) at 06/09/18 1150  Last data filed at 06/08/18 1300   Gross per 24 hour   Intake              600 ml   Output             1602 ml   Net            -1002 ml      Physical Exam   Constitutional: He is oriented to person, place, and time. He appears well-developed and well-nourished. No distress.   HENT:   Head: Normocephalic and atraumatic.   Eyes: EOM are normal. Pupils are equal, round, and reactive to light.   Neck: Normal range of motion. Neck supple.   Cardiovascular: Normal rate, regular rhythm, normal heart sounds and intact distal pulses.  Exam reveals no gallop and no friction rub.    No murmur heard.  Pulmonary/Chest: Effort normal and breath sounds normal. No respiratory distress. He exhibits no tenderness.   Abdominal: Soft. Bowel sounds are normal.  He exhibits no distension. There is no tenderness.   Musculoskeletal: Normal range of motion. He exhibits no edema, tenderness or deformity.   Neurological: He is alert and oriented to person, place, and time. No cranial nerve deficit. Coordination normal.   Skin: Skin is warm and dry. No rash noted. He is not diaphoretic. No erythema. No pallor.   Psychiatric: He has a normal mood and affect. His behavior is normal. Judgment and thought content normal.   Nursing note and vitals reviewed.    Significant Labs:   BMP:   Recent Labs  Lab 06/09/18  0517   GLU 84   *   K 3.9   CL 98   CO2 25   BUN 25*   CREATININE 6.2*   CALCIUM 9.2     CBC:   Recent Labs  Lab 06/08/18  0324   WBC 7.38   HGB 8.0*   HCT 26.1*   *     Assessment/Plan:      * Anoxic brain injury    -Cardiac arrest ~ 22 minutes at OSH.  Pt doing relatively well.  Extubated 5/28.  -05/21/18 MRI w/o evidence of anoxic brain injury, but several small strokes; pt encephalopathic  -Encephalopathy likely multifactorial--strokes, metabolic (kidney/liver injury), delirium        Acute superficial gastritis with hemorrhage    Coffee ground material aspirated via NG on 5/23  Concurrent with acute drop in Hg  Hg has since uptrended.   No recurrent signs of upper GI bleed.              Debility    PT/OT  Consult placed to PM&R for rehab placement        Iron deficiency anemia due to chronic blood loss              Acute bilat watershed infarction    - Incidental finding on MRI.   - Continues to have slurred speech but participating in rehab.        Acute combined systolic and diastolic ACC/AHA stage C congestive heart failure    S/p cardiac arrest on 5/19  LVEF 10-15%  - ACEi held in setting of ARF  - metoprolol succinate 50mg daily  - will evaluate and start with hydralazine 10mg tid and isordil 10mg tid for afterload reduction if BP can tolerate while on HD  - aldosterone antagonist held in the setting of poor renal function  - start high intensity statin  therapy with atorvastatin 40mg  - transfuse for goal Hb >8  - Cardiology consulted. Will be evaluated for an ICD placement later today.         Cytotoxic cerebral edema    - secondary to anoxic brain injury, 22 minutes PEA-->vfib  - MRI brain 5/21 for anoxic assessment.  see anoxic brain injury        Acute renal failure with tubular necrosis    - unknown baseline renal function, not previously dialysis dependent  - ischemic ATN 2/2 cardiac arrest  - Remains oliguric; starting to make small volumes of urine but not clearing volume or electrolytes effectively  - Receiving HD PRN  - Underwent permacath placement 6/6  - Renal diet  - History of iron deficiency anemia; plan for EPO if counts do not recover promptly  - Followed by Nephrology  - Had HD on 6/6.           Essential hypertension      See acute decompensated heart failure        Acute pulmonary edema    - Managing volume status with HD given ARF        Class 3 severe obesity due to excess calories with serious comorbidity and body mass index (BMI) of 40.0 to 44.9 in adult    S/p nutrition consult; counseled on lifestyle modifications  Body mass index is 36.84 kg/m².          Cardiac arrest      See acute decompensated heart failure          VTE Risk Mitigation         Ordered     heparin (porcine) injection 5,000 Units  Every 8 hours      06/08/18 0945     IP VTE HIGH RISK PATIENT  Once      06/08/18 0945     heparin (porcine) injection 1,000 Units  As needed (PRN)      06/06/18 8699        Amos Roche MD  Department of Hospital Medicine   [facility]

## 2018-06-09 NOTE — SUBJECTIVE & OBJECTIVE
Interval History:     NAEON.     Review of Systems   Constitutional: Negative for activity change, chills, fatigue and fever.   HENT: Negative for congestion, facial swelling, sore throat and voice change.    Eyes: Negative for pain, redness and visual disturbance.   Respiratory: Negative for cough, chest tightness and shortness of breath.    Cardiovascular: Negative for chest pain and leg swelling.   Gastrointestinal: Negative for abdominal pain, constipation, diarrhea and nausea.   Endocrine: Negative for cold intolerance and heat intolerance.   Musculoskeletal: Negative for arthralgias, back pain and neck pain.        Strength 5/5 in both upper and lower extremities.    Skin: Negative for rash and wound.   Neurological: Negative for dizziness, speech difficulty, weakness, light-headedness and headaches.   Psychiatric/Behavioral: Negative for agitation and confusion.        Slurred speech       Objective:     Vital Signs (Most Recent):  Temp: 97.2 °F (36.2 °C) (06/09/18 0926)  Pulse: 65 (06/09/18 0926)  Resp: 20 (06/09/18 0926)  BP: 113/62 (06/09/18 0926)  SpO2: 95 % (06/09/18 0926) Vital Signs (24h Range):  Temp:  [97.2 °F (36.2 °C)-100 °F (37.8 °C)] 97.2 °F (36.2 °C)  Pulse:  [65-94] 65  Resp:  [16-20] 20  SpO2:  [90 %-95 %] 95 %  BP: (103-122)/(53-74) 113/62     Weight: 109.9 kg (242 lb 4.6 oz)  Body mass index is 36.84 kg/m².    Intake/Output Summary (Last 24 hours) at 06/09/18 1150  Last data filed at 06/08/18 1300   Gross per 24 hour   Intake              600 ml   Output             1602 ml   Net            -1002 ml      Physical Exam   Constitutional: He is oriented to person, place, and time. He appears well-developed and well-nourished. No distress.   HENT:   Head: Normocephalic and atraumatic.   Eyes: EOM are normal. Pupils are equal, round, and reactive to light.   Neck: Normal range of motion. Neck supple.   Cardiovascular: Normal rate, regular rhythm, normal heart sounds and intact distal pulses.   Exam reveals no gallop and no friction rub.    No murmur heard.  Pulmonary/Chest: Effort normal and breath sounds normal. No respiratory distress. He exhibits no tenderness.   Abdominal: Soft. Bowel sounds are normal. He exhibits no distension. There is no tenderness.   Musculoskeletal: Normal range of motion. He exhibits no edema, tenderness or deformity.   Neurological: He is alert and oriented to person, place, and time. No cranial nerve deficit. Coordination normal.   Skin: Skin is warm and dry. No rash noted. He is not diaphoretic. No erythema. No pallor.   Psychiatric: He has a normal mood and affect. His behavior is normal. Judgment and thought content normal.   Nursing note and vitals reviewed.    Significant Labs:   BMP:   Recent Labs  Lab 06/09/18  0517   GLU 84   *   K 3.9   CL 98   CO2 25   BUN 25*   CREATININE 6.2*   CALCIUM 9.2     CBC:   Recent Labs  Lab 06/08/18  0324   WBC 7.38   HGB 8.0*   HCT 26.1*   *

## 2018-06-09 NOTE — PLAN OF CARE
Problem: Fall Risk (Adult)  Goal: Identify Related Risk Factors and Signs and Symptoms  Related risk factors and signs and symptoms are identified upon initiation of Human Response Clinical Practice Guideline (CPG)   Outcome: Ongoing (interventions implemented as appropriate)   06/09/18 1650   Fall Risk   Related Risk Factors (Fall Risk) sleep pattern alteration;slipper/uneven surfaces;environment unfamiliar   Signs and Symptoms (Fall Risk) presence of risk factors

## 2018-06-09 NOTE — PLAN OF CARE
Problem: Pressure Ulcer Risk (Heladio Scale) (Adult,Obstetrics,Pediatric)  Goal: Identify Related Risk Factors and Signs and Symptoms  Related risk factors and signs and symptoms are identified upon initiation of Human Response Clinical Practice Guideline (CPG)   Outcome: Ongoing (interventions implemented as appropriate)   06/09/18 1650   Pressure Ulcer Risk (Heladio Scale)   Related Risk Factors (Pressure Ulcer Risk (Heladio Scale)) fluid intake inadequate;infection;nutritional deficiencies

## 2018-06-09 NOTE — PLAN OF CARE
Problem: Patient Care Overview  Goal: Plan of Care Review  Outcome: Ongoing (interventions implemented as appropriate)   06/09/18 8611   Coping/Psychosocial   Plan Of Care Reviewed With patient;spouse

## 2018-06-09 NOTE — PLAN OF CARE
PET reviewed. No evidence of ischemia on PET scan. Plan for ICD on Monday. NPO past midnight, order placed. Hold all heparin products 24 hours prior to implantation.    Dong Jeff MD, MPH  PGY-V  Cardiovascular Disease Fellow

## 2018-06-09 NOTE — PLAN OF CARE
Problem: Infection, Risk/Actual (Adult)  Goal: Identify Related Risk Factors and Signs and Symptoms  Related risk factors and signs and symptoms are identified upon initiation of Human Response Clinical Practice Guideline (CPG)    06/09/18 1649   Infection, Risk/Actual   Related Risk Factors (Infection, Risk/Actual) immunization status;intimate contact;surgery/procedure;treatment plan   Signs and Symptoms (Infection, Risk/Actual) heart rate increase;malaise

## 2018-06-10 LAB
ALBUMIN SERPL BCP-MCNC: 2.9 G/DL
ANION GAP SERPL CALC-SCNC: 13 MMOL/L
BUN SERPL-MCNC: 30 MG/DL
CALCIUM SERPL-MCNC: 9.2 MG/DL
CHLORIDE SERPL-SCNC: 101 MMOL/L
CO2 SERPL-SCNC: 23 MMOL/L
CREAT SERPL-MCNC: 7.5 MG/DL
EST. GFR  (AFRICAN AMERICAN): 9 ML/MIN/1.73 M^2
EST. GFR  (NON AFRICAN AMERICAN): 7.8 ML/MIN/1.73 M^2
GLUCOSE SERPL-MCNC: 78 MG/DL
PHOSPHATE SERPL-MCNC: 4.9 MG/DL
POTASSIUM SERPL-SCNC: 3.9 MMOL/L
SODIUM SERPL-SCNC: 137 MMOL/L
URATE SERPL-MCNC: 6.9 MG/DL

## 2018-06-10 PROCEDURE — 80069 RENAL FUNCTION PANEL: CPT

## 2018-06-10 PROCEDURE — 11000001 HC ACUTE MED/SURG PRIVATE ROOM

## 2018-06-10 PROCEDURE — 84550 ASSAY OF BLOOD/URIC ACID: CPT

## 2018-06-10 PROCEDURE — 36415 COLL VENOUS BLD VENIPUNCTURE: CPT

## 2018-06-10 PROCEDURE — 25000003 PHARM REV CODE 250: Performed by: INTERNAL MEDICINE

## 2018-06-10 PROCEDURE — 99231 SBSQ HOSP IP/OBS SF/LOW 25: CPT | Mod: ,,, | Performed by: INTERNAL MEDICINE

## 2018-06-10 PROCEDURE — 25000003 PHARM REV CODE 250: Performed by: STUDENT IN AN ORGANIZED HEALTH CARE EDUCATION/TRAINING PROGRAM

## 2018-06-10 PROCEDURE — 99233 SBSQ HOSP IP/OBS HIGH 50: CPT | Mod: ,,, | Performed by: INTERNAL MEDICINE

## 2018-06-10 RX ORDER — ACETAMINOPHEN 325 MG/1
325 TABLET ORAL EVERY 6 HOURS PRN
Status: DISCONTINUED | OUTPATIENT
Start: 2018-06-10 | End: 2018-06-15 | Stop reason: HOSPADM

## 2018-06-10 RX ADMIN — METOPROLOL SUCCINATE 50 MG: 50 TABLET, EXTENDED RELEASE ORAL at 09:06

## 2018-06-10 RX ADMIN — Medication 250 MG: at 09:06

## 2018-06-10 RX ADMIN — ATORVASTATIN CALCIUM 40 MG: 20 TABLET, FILM COATED ORAL at 09:06

## 2018-06-10 RX ADMIN — ASPIRIN 81 MG CHEWABLE TABLET 81 MG: 81 TABLET CHEWABLE at 09:06

## 2018-06-10 NOTE — PROGRESS NOTES
[facility] Hospital Medicine  Progress Note    Patient Name: Los Mendez  MRN: [id number]  Patient Class: IP- Inpatient   Admission Date: 5/19/2018  Length of Stay: 22 days  Attending Physician: Moisés Villalobos MD  Primary Care Provider: Provider [facility] Medicine Team: [facility] MED 4 Amos Roche MD    Subjective:     Principal Problem:Anoxic brain injury    HPI:  48 y/o man w/ hx of HTN, COPD? Transferred from [facility] following witnessed cardiac arrest while in the ED waiting room. Patient had presented complaining of URI/SOB symptoms, while in the ED waiting room he was noted to be choking, became apneic, which led to witnessed seizure like activity followed by cardiac arrest. Per discussion with ED staff, patient required about 22 minutes of ACLS/5 shocks before returning to normal sinus rhythm. During intubation a lozenge was removed from patients airway. Hyperthermia protocol was initiated prior to transferring to [facility] for [facility] care. Per ED records, patient was acidotic with ph of 7.1 this morning. At the time of arrival to NICU, patient was on paralytics, however pupillary reflex was present. Will continue hypothermia protocol for additional 24 h.    Hospital Course:  05/19/18:  Arrived intubated, sedated, artic sun blanket  05/20/18:  Pressor requirment going up, NO DIURESIS, continue TTM, nimbex off, LFT improving, trop improving, family updated.  05/21/18:  Reach normothermia, MRI today, remove EEG, place HD line, consult nephrology, LFTs trending down, family updated.  05/22/18:  Anoxic brain injury. Pt responding. Last night placed on propofol  MRI --L head of caudate and cerebellar small infarctions.  05/23/18:  Anoxic brain injury.  On CRRT. BNP 1046. Back on propofol, pressors. Still agitated. Started on Buspar. Trop I normalizing.  05/25/18:  CRRT today. Amiodarone dc'd, metoprolol started. CBC w8t--ja H/H continues to drop will consult GI. Vascular surgery  consulted concerning  Possible arterial occlusion. RUE cold. Arterial line dcd. US RUE pending. Abdominal distention, KUB showed gas pattern. Bladder pressures q4h. Initial 21.  05/28/18:  H/H 6.8/23, 1 unit PRBC transfused. Keep Hgb greater than or equal to 8. CRRT stopped, line clotted. Changed trialysis catheter changed. Wean sedation today. Once off sedation begin weaning ventilator settings to CPAP.  Weaned vent to CPAP and able to Extubated in afternoon without s/s stridor or difficulty breathing. HR elevated 140s - 160s fentanyl x2 prn for pain/agitation. Metoprolol 5mg IV x3 given remains ST 140s.  Restless, will start precedex.  05/30/18:  Leukocytosis, pan cx; kelley discontinued; remove IJ, speech eval for diet   05/31/18:  No acute events, still with gastric secretions coming out of NG tube will start reglan; remains encephalopathic  06/01/18:  Will hold NGT suction, NPO per SLP.  Continue metoclopramide.  Hgb stable at 8.3 g/dL.  6/2 Continue NG tube for now. Will advance to pureed diet as recommended by SLP and evaluate intake before removing NG.. WBC remain elevated but afebrile, cultures negative and ISA/Diamond 5/24 showed no DVT only thrombophlebitis of LUE cephalic vein. Continue to monitor daily CBC. Nephrology following will hold HD today and possibly tomorrow depending on labs  6/4: patient fell from bed en route to bathroom (unassisted), CT no ICH, patient neuro stable, step down  6/5: Stepped down to hospital medicine  6/6: Permacath placed by [facility]. Underwent HD. Consult placed to PM&R for rehab placement. CM pursuing outpatient HD chair.  6/7: No acute events. Patient participating with Rehab. Pending placement in rehab.   6/8-6/10: ICD will be placed Monday. NPO Sunday night. NAEON.     Interval History:     NAEON     Review of Systems   Constitutional: Negative for activity change, chills, fatigue and fever.   HENT: Negative for congestion, facial swelling, sore throat and voice change.     Eyes: Negative for pain, redness and visual disturbance.   Respiratory: Negative for cough, chest tightness and shortness of breath.    Cardiovascular: Negative for chest pain and leg swelling.   Gastrointestinal: Negative for abdominal pain, constipation, diarrhea and nausea.   Endocrine: Negative for cold intolerance and heat intolerance.   Musculoskeletal: Negative for arthralgias, back pain and neck pain.        Strength 5/5 in both upper and lower extremities.    Skin: Negative for rash and wound.   Neurological: Negative for dizziness, speech difficulty, weakness, light-headedness and headaches.   Psychiatric/Behavioral: Negative for agitation and confusion.        Slurred speech       Objective:     Vital Signs (Most Recent):  Temp: 98 °F (36.7 °C) (06/10/18 0746)  Pulse: 81 (06/10/18 0746)  Resp: 20 (06/10/18 0746)  BP: (!) 127/59 (06/10/18 0746)  SpO2: 96 % (06/10/18 0746) Vital Signs (24h Range):  Temp:  [98 °F (36.7 °C)-98.9 °F (37.2 °C)] 98 °F (36.7 °C)  Pulse:  [] 81  Resp:  [16-20] 20  SpO2:  [92 %-100 %] 96 %  BP: (100-128)/(51-76) 127/59     Weight: 109.9 kg (242 lb 4.6 oz)  Body mass index is 36.84 kg/m².    Intake/Output Summary (Last 24 hours) at 06/10/18 1024  Last data filed at 06/09/18 1400   Gross per 24 hour   Intake              125 ml   Output                0 ml   Net              125 ml      Physical Exam   Constitutional: He is oriented to person, place, and time. He appears well-developed and well-nourished. No distress.   HENT:   Head: Normocephalic and atraumatic.   Eyes: EOM are normal. Pupils are equal, round, and reactive to light.   Neck: Normal range of motion. Neck supple.   Cardiovascular: Normal rate, regular rhythm, normal heart sounds and intact distal pulses.  Exam reveals no gallop and no friction rub.    No murmur heard.  Pulmonary/Chest: Effort normal and breath sounds normal. No respiratory distress. He exhibits no tenderness.   Abdominal: Soft. Bowel sounds are  normal. He exhibits no distension. There is no tenderness.   Musculoskeletal: Normal range of motion. He exhibits no edema, tenderness or deformity.   Neurological: He is alert and oriented to person, place, and time. No cranial nerve deficit. Coordination normal.   Skin: Skin is warm and dry. No rash noted. He is not diaphoretic. No erythema. No pallor.   Psychiatric: He has a normal mood and affect. His behavior is normal. Judgment and thought content normal.   Nursing note and vitals reviewed.    Significant Labs:   BMP:   Recent Labs  Lab 06/10/18  0522   GLU 78      K 3.9      CO2 23   BUN 30*   CREATININE 7.5*   CALCIUM 9.2     Assessment/Plan:      * Anoxic brain injury    -Cardiac arrest ~ 22 minutes at OSH.  Pt doing relatively well.  Extubated 5/28.  -05/21/18 MRI w/o evidence of anoxic brain injury, but several small strokes; pt encephalopathic  -Encephalopathy likely multifactorial--strokes, metabolic (kidney/liver injury), delirium        Acute superficial gastritis with hemorrhage    Coffee ground material aspirated via NG on 5/23  Concurrent with acute drop in Hg  Hg has since uptrended.   No recurrent signs of upper GI bleed.              Debility    PT/OT  Consult placed to PM&R for rehab placement        Iron deficiency anemia due to chronic blood loss              Acute bilat watershed infarction    - Incidental finding on MRI.   - Continues to have slurred speech but participating in rehab.        Acute combined systolic and diastolic ACC/AHA stage C congestive heart failure    S/p cardiac arrest on 5/19  LVEF 10-15%  - ACEi held in setting of ARF  - metoprolol succinate 50mg daily  - will evaluate and start with hydralazine 10mg tid and isordil 10mg tid for afterload reduction if BP can tolerate while on HD  - aldosterone antagonist held in the setting of poor renal function  - start high intensity statin therapy with atorvastatin 40mg  - transfuse for goal Hb >8  - Cardiology  consulted. Will be evaluated for an ICD placement later today.         Cytotoxic cerebral edema    - secondary to anoxic brain injury, 22 minutes PEA-->vfib  - MRI brain 5/21 for anoxic assessment.  see anoxic brain injury        Acute renal failure with tubular necrosis    - unknown baseline renal function, not previously dialysis dependent  - ischemic ATN 2/2 cardiac arrest  - Remains oliguric; starting to make small volumes of urine but not clearing volume or electrolytes effectively  - Receiving HD PRN  - Underwent permacath placement 6/6  - Renal diet  - History of iron deficiency anemia; plan for EPO if counts do not recover promptly  - Followed by Nephrology  - Had HD on 6/6.           Essential hypertension      See acute decompensated heart failure        Acute pulmonary edema    - Managing volume status with HD given ARF        Class 3 severe obesity due to excess calories with serious comorbidity and body mass index (BMI) of 40.0 to 44.9 in adult    S/p nutrition consult; counseled on lifestyle modifications  Body mass index is 36.84 kg/m².          Cardiac arrest      See acute decompensated heart failure          VTE Risk Mitigation         Ordered     IP VTE HIGH RISK PATIENT  Once      06/08/18 0945     heparin (porcine) injection 1,000 Units  As needed (PRN)      06/06/18 9477              Amos Roche MD  Department of Hospital Medicine   [facility]

## 2018-06-10 NOTE — NURSING
Late Entry: patient received in my care at change of shift, no needs at this time, call bell in reach, my name and number on Communication Board, will monitor.

## 2018-06-10 NOTE — PLAN OF CARE
Problem: Patient Care Overview  Goal: Plan of Care Review  Outcome: Ongoing (interventions implemented as appropriate)   06/10/18 1016   Coping/Psychosocial   Plan Of Care Reviewed With patient

## 2018-06-10 NOTE — PLAN OF CARE
Problem: Infection, Risk/Actual (Adult)  Goal: Identify Related Risk Factors and Signs and Symptoms  Related risk factors and signs and symptoms are identified upon initiation of Human Response Clinical Practice Guideline (CPG)   Outcome: Ongoing (interventions implemented as appropriate)   06/09/18 5993   Infection, Risk/Actual   Related Risk Factors (Infection, Risk/Actual) immunization status;intimate contact;surgery/procedure;treatment plan   Signs and Symptoms (Infection, Risk/Actual) heart rate increase;malaise

## 2018-06-10 NOTE — NURSING
Walking in [place] with walker, reports his pain is located in his left ankle.  Pt reports a history of gout and he is not on medication for this.  MD notified.  Uric Acid in progress, awaiting further orders, will monitor

## 2018-06-10 NOTE — SUBJECTIVE & OBJECTIVE
Interval History:     NAEON     Review of Systems   Constitutional: Negative for activity change, chills, fatigue and fever.   HENT: Negative for congestion, facial swelling, sore throat and voice change.    Eyes: Negative for pain, redness and visual disturbance.   Respiratory: Negative for cough, chest tightness and shortness of breath.    Cardiovascular: Negative for chest pain and leg swelling.   Gastrointestinal: Negative for abdominal pain, constipation, diarrhea and nausea.   Endocrine: Negative for cold intolerance and heat intolerance.   Musculoskeletal: Negative for arthralgias, back pain and neck pain.        Strength 5/5 in both upper and lower extremities.    Skin: Negative for rash and wound.   Neurological: Negative for dizziness, speech difficulty, weakness, light-headedness and headaches.   Psychiatric/Behavioral: Negative for agitation and confusion.        Slurred speech       Objective:     Vital Signs (Most Recent):  Temp: 98 °F (36.7 °C) (06/10/18 0746)  Pulse: 81 (06/10/18 0746)  Resp: 20 (06/10/18 0746)  BP: (!) 127/59 (06/10/18 0746)  SpO2: 96 % (06/10/18 0746) Vital Signs (24h Range):  Temp:  [98 °F (36.7 °C)-98.9 °F (37.2 °C)] 98 °F (36.7 °C)  Pulse:  [] 81  Resp:  [16-20] 20  SpO2:  [92 %-100 %] 96 %  BP: (100-128)/(51-76) 127/59     Weight: 109.9 kg (242 lb 4.6 oz)  Body mass index is 36.84 kg/m².    Intake/Output Summary (Last 24 hours) at 06/10/18 1024  Last data filed at 06/09/18 1400   Gross per 24 hour   Intake              125 ml   Output                0 ml   Net              125 ml      Physical Exam   Constitutional: He is oriented to person, place, and time. He appears well-developed and well-nourished. No distress.   HENT:   Head: Normocephalic and atraumatic.   Eyes: EOM are normal. Pupils are equal, round, and reactive to light.   Neck: Normal range of motion. Neck supple.   Cardiovascular: Normal rate, regular rhythm, normal heart sounds and intact distal pulses.   Exam reveals no gallop and no friction rub.    No murmur heard.  Pulmonary/Chest: Effort normal and breath sounds normal. No respiratory distress. He exhibits no tenderness.   Abdominal: Soft. Bowel sounds are normal. He exhibits no distension. There is no tenderness.   Musculoskeletal: Normal range of motion. He exhibits no edema, tenderness or deformity.   Neurological: He is alert and oriented to person, place, and time. No cranial nerve deficit. Coordination normal.   Skin: Skin is warm and dry. No rash noted. He is not diaphoretic. No erythema. No pallor.   Psychiatric: He has a normal mood and affect. His behavior is normal. Judgment and thought content normal.   Nursing note and vitals reviewed.    Significant Labs:   BMP:   Recent Labs  Lab 06/10/18  0522   GLU 78      K 3.9      CO2 23   BUN 30*   CREATININE 7.5*   CALCIUM 9.2

## 2018-06-11 LAB
ALBUMIN SERPL BCP-MCNC: 3 G/DL
ANION GAP SERPL CALC-SCNC: 13 MMOL/L
ANISOCYTOSIS BLD QL SMEAR: SLIGHT
BASOPHILS # BLD AUTO: 0.1 K/UL
BASOPHILS NFR BLD: 1.3 %
BUN SERPL-MCNC: 36 MG/DL
CALCIUM SERPL-MCNC: 9.5 MG/DL
CHLORIDE SERPL-SCNC: 100 MMOL/L
CO2 SERPL-SCNC: 23 MMOL/L
CREAT SERPL-MCNC: 7.8 MG/DL
DIFFERENTIAL METHOD: ABNORMAL
EOSINOPHIL # BLD AUTO: 0.2 K/UL
EOSINOPHIL NFR BLD: 2.9 %
ERYTHROCYTE [DISTWIDTH] IN BLOOD BY AUTOMATED COUNT: ABNORMAL %
EST. GFR  (AFRICAN AMERICAN): 8.6 ML/MIN/1.73 M^2
EST. GFR  (NON AFRICAN AMERICAN): 7.4 ML/MIN/1.73 M^2
GLUCOSE SERPL-MCNC: 84 MG/DL
HCT VFR BLD AUTO: 25.3 %
HGB BLD-MCNC: 7.8 G/DL
HYPOCHROMIA BLD QL SMEAR: ABNORMAL
IMM GRANULOCYTES # BLD AUTO: 0.02 K/UL
IMM GRANULOCYTES NFR BLD AUTO: 0.3 %
INR PPP: 1
LYMPHOCYTES # BLD AUTO: 1.5 K/UL
LYMPHOCYTES NFR BLD: 20.2 %
MCH RBC QN AUTO: 23.1 PG
MCHC RBC AUTO-ENTMCNC: 30.8 G/DL
MCV RBC AUTO: 75 FL
MONOCYTES # BLD AUTO: 1.1 K/UL
MONOCYTES NFR BLD: 15.1 %
NEUTROPHILS # BLD AUTO: 4.6 K/UL
NEUTROPHILS NFR BLD: 60.2 %
NRBC BLD-RTO: 0 /100 WBC
OVALOCYTES BLD QL SMEAR: ABNORMAL
PHOSPHATE SERPL-MCNC: 5 MG/DL
PLATELET # BLD AUTO: 455 K/UL
PMV BLD AUTO: 10.2 FL
POIKILOCYTOSIS BLD QL SMEAR: SLIGHT
POLYCHROMASIA BLD QL SMEAR: ABNORMAL
POTASSIUM SERPL-SCNC: 4.1 MMOL/L
PROTHROMBIN TIME: 10.9 SEC
RBC # BLD AUTO: 3.37 M/UL
SODIUM SERPL-SCNC: 136 MMOL/L
TARGETS BLD QL SMEAR: ABNORMAL
WBC # BLD AUTO: 7.56 K/UL

## 2018-06-11 PROCEDURE — 25000003 PHARM REV CODE 250: Performed by: NURSE ANESTHETIST, CERTIFIED REGISTERED

## 2018-06-11 PROCEDURE — 11000001 HC ACUTE MED/SURG PRIVATE ROOM

## 2018-06-11 PROCEDURE — 02HK3KZ INSERTION OF DEFIBRILLATOR LEAD INTO RIGHT VENTRICLE, PERCUTANEOUS APPROACH: ICD-10-PCS | Performed by: INTERNAL MEDICINE

## 2018-06-11 PROCEDURE — 93010 ELECTROCARDIOGRAM REPORT: CPT | Mod: ,,, | Performed by: INTERNAL MEDICINE

## 2018-06-11 PROCEDURE — 85610 PROTHROMBIN TIME: CPT

## 2018-06-11 PROCEDURE — 33249 INSJ/RPLCMT DEFIB W/LEAD(S): CPT | Mod: ,,, | Performed by: INTERNAL MEDICINE

## 2018-06-11 PROCEDURE — 63600175 PHARM REV CODE 636 W HCPCS

## 2018-06-11 PROCEDURE — 90935 HEMODIALYSIS ONE EVALUATION: CPT | Mod: ,,, | Performed by: INTERNAL MEDICINE

## 2018-06-11 PROCEDURE — 63600175 PHARM REV CODE 636 W HCPCS: Performed by: INTERNAL MEDICINE

## 2018-06-11 PROCEDURE — A4216 STERILE WATER/SALINE, 10 ML: HCPCS | Performed by: NURSE ANESTHETIST, CERTIFIED REGISTERED

## 2018-06-11 PROCEDURE — 02H63KZ INSERTION OF DEFIBRILLATOR LEAD INTO RIGHT ATRIUM, PERCUTANEOUS APPROACH: ICD-10-PCS | Performed by: INTERNAL MEDICINE

## 2018-06-11 PROCEDURE — C1898 LEAD, PMKR, OTHER THAN TRANS: HCPCS

## 2018-06-11 PROCEDURE — 25000003 PHARM REV CODE 250: Performed by: INTERNAL MEDICINE

## 2018-06-11 PROCEDURE — 90935 HEMODIALYSIS ONE EVALUATION: CPT

## 2018-06-11 PROCEDURE — 99233 SBSQ HOSP IP/OBS HIGH 50: CPT | Mod: ,,, | Performed by: INTERNAL MEDICINE

## 2018-06-11 PROCEDURE — 36415 COLL VENOUS BLD VENIPUNCTURE: CPT

## 2018-06-11 PROCEDURE — D9220A PRA ANESTHESIA: Mod: ANES,,, | Performed by: ANESTHESIOLOGY

## 2018-06-11 PROCEDURE — 99232 SBSQ HOSP IP/OBS MODERATE 35: CPT | Mod: ,,, | Performed by: INTERNAL MEDICINE

## 2018-06-11 PROCEDURE — 93005 ELECTROCARDIOGRAM TRACING: CPT

## 2018-06-11 PROCEDURE — 63600175 PHARM REV CODE 636 W HCPCS: Performed by: NURSE ANESTHETIST, CERTIFIED REGISTERED

## 2018-06-11 PROCEDURE — 37000009 HC ANESTHESIA EA ADD 15 MINS: Performed by: INTERNAL MEDICINE

## 2018-06-11 PROCEDURE — 25000003 PHARM REV CODE 250: Performed by: HOSPITALIST

## 2018-06-11 PROCEDURE — 25000003 PHARM REV CODE 250

## 2018-06-11 PROCEDURE — 25000003 PHARM REV CODE 250: Performed by: STUDENT IN AN ORGANIZED HEALTH CARE EDUCATION/TRAINING PROGRAM

## 2018-06-11 PROCEDURE — 85025 COMPLETE CBC W/AUTO DIFF WBC: CPT

## 2018-06-11 PROCEDURE — 0JH608Z INSERTION OF DEFIBRILLATOR GENERATOR INTO CHEST SUBCUTANEOUS TISSUE AND FASCIA, OPEN APPROACH: ICD-10-PCS | Performed by: INTERNAL MEDICINE

## 2018-06-11 PROCEDURE — 27200651 HC AIRWAY, LMA: Performed by: NURSE ANESTHETIST, CERTIFIED REGISTERED

## 2018-06-11 PROCEDURE — 80069 RENAL FUNCTION PANEL: CPT

## 2018-06-11 PROCEDURE — 37000008 HC ANESTHESIA 1ST 15 MINUTES: Performed by: INTERNAL MEDICINE

## 2018-06-11 PROCEDURE — D9220A PRA ANESTHESIA: Mod: CRNA,,, | Performed by: NURSE ANESTHETIST, CERTIFIED REGISTERED

## 2018-06-11 RX ORDER — VANCOMYCIN HYDROCHLORIDE
1500
Status: DISCONTINUED | OUTPATIENT
Start: 2018-06-11 | End: 2018-06-12

## 2018-06-11 RX ORDER — HEPARIN SODIUM 1000 [USP'U]/ML
2000 INJECTION, SOLUTION INTRAVENOUS; SUBCUTANEOUS
Status: DISCONTINUED | OUTPATIENT
Start: 2018-06-11 | End: 2018-06-15 | Stop reason: HOSPADM

## 2018-06-11 RX ORDER — CLARITHROMYCIN 250 MG/1
250 TABLET, FILM COATED ORAL EVERY 12 HOURS
Status: DISCONTINUED | OUTPATIENT
Start: 2018-06-12 | End: 2018-06-12

## 2018-06-11 RX ORDER — SODIUM CHLORIDE 0.9 % (FLUSH) 0.9 %
3 SYRINGE (ML) INJECTION
Status: DISCONTINUED | OUTPATIENT
Start: 2018-06-11 | End: 2018-06-15 | Stop reason: HOSPADM

## 2018-06-11 RX ORDER — FENTANYL CITRATE 50 UG/ML
25 INJECTION, SOLUTION INTRAMUSCULAR; INTRAVENOUS EVERY 5 MIN PRN
Status: DISCONTINUED | OUTPATIENT
Start: 2018-06-11 | End: 2018-06-12

## 2018-06-11 RX ORDER — FENTANYL CITRATE 50 UG/ML
INJECTION, SOLUTION INTRAMUSCULAR; INTRAVENOUS
Status: DISCONTINUED | OUTPATIENT
Start: 2018-06-11 | End: 2018-06-11

## 2018-06-11 RX ORDER — KETAMINE HYDROCHLORIDE 10 MG/ML
INJECTION, SOLUTION INTRAMUSCULAR; INTRAVENOUS
Status: DISCONTINUED | OUTPATIENT
Start: 2018-06-11 | End: 2018-06-11

## 2018-06-11 RX ORDER — MIDAZOLAM HYDROCHLORIDE 1 MG/ML
INJECTION, SOLUTION INTRAMUSCULAR; INTRAVENOUS
Status: DISCONTINUED | OUTPATIENT
Start: 2018-06-11 | End: 2018-06-11

## 2018-06-11 RX ADMIN — HEPARIN SODIUM 1000 UNITS: 1000 INJECTION, SOLUTION INTRAVENOUS; SUBCUTANEOUS at 11:06

## 2018-06-11 RX ADMIN — DEXMEDETOMIDINE HYDROCHLORIDE 109 MCG: 100 INJECTION, SOLUTION, CONCENTRATE INTRAVENOUS at 02:06

## 2018-06-11 RX ADMIN — KETAMINE HYDROCHLORIDE 10 MG: 10 INJECTION, SOLUTION INTRAMUSCULAR; INTRAVENOUS at 02:06

## 2018-06-11 RX ADMIN — HEPARIN SODIUM 2000 UNITS: 1000 INJECTION, SOLUTION INTRAVENOUS; SUBCUTANEOUS at 09:06

## 2018-06-11 RX ADMIN — MIDAZOLAM 2 MG: 1 INJECTION INTRAMUSCULAR; INTRAVENOUS at 01:06

## 2018-06-11 RX ADMIN — ACETAMINOPHEN 325 MG: 325 TABLET ORAL at 09:06

## 2018-06-11 RX ADMIN — RAMELTEON 8 MG: 8 TABLET, FILM COATED ORAL at 09:06

## 2018-06-11 RX ADMIN — Medication 250 MG: at 09:06

## 2018-06-11 RX ADMIN — FENTANYL CITRATE 25 MCG: 50 INJECTION, SOLUTION INTRAMUSCULAR; INTRAVENOUS at 02:06

## 2018-06-11 RX ADMIN — EPINEPHRINE 0.02 MCG/KG/MIN: 1 INJECTION INTRAMUSCULAR; INTRAVENOUS; SUBCUTANEOUS at 02:06

## 2018-06-11 RX ADMIN — SODIUM CHLORIDE: 9 INJECTION, SOLUTION INTRAVENOUS at 01:06

## 2018-06-11 RX ADMIN — DEXMEDETOMIDINE HYDROCHLORIDE 0.5 MCG/KG/HR: 100 INJECTION, SOLUTION, CONCENTRATE INTRAVENOUS at 02:06

## 2018-06-11 RX ADMIN — SODIUM CHLORIDE: 9 INJECTION, SOLUTION INTRAVENOUS at 07:06

## 2018-06-11 RX ADMIN — VANCOMYCIN HYDROCHLORIDE 1.5 G: 1 INJECTION, POWDER, LYOPHILIZED, FOR SOLUTION INTRAVENOUS at 02:06

## 2018-06-11 NOTE — PROGRESS NOTES
Patient admitted to recovery see [facility] for complete assessment pacu bcg's maintained safety measures verified patient instructed on pain scale and patient verbalized understanding. Also called for chest xray and ekg also pressure dressing applied per Romelia batres rn and ekg done and placed in patient's chart. Called for chest xray. Also called patient's wife and updated on patient location with the permission of patient.

## 2018-06-11 NOTE — NURSING
Late Entry: Patient had been NPO since MN last night, had uneventful night, was brought to Dialysis early this am. Report given to ESTHER Scott, Day Nurse.

## 2018-06-11 NOTE — SUBJECTIVE & OBJECTIVE
Interval History:     NAEON     Review of Systems   Constitutional: Negative for activity change, chills, fatigue and fever.   HENT: Negative for congestion, facial swelling, sore throat and voice change.    Eyes: Negative for pain, redness and visual disturbance.   Respiratory: Negative for cough, chest tightness and shortness of breath.    Cardiovascular: Negative for chest pain and leg swelling.   Gastrointestinal: Negative for abdominal pain, constipation, diarrhea and nausea.   Endocrine: Negative for cold intolerance and heat intolerance.   Musculoskeletal: Negative for arthralgias, back pain and neck pain.        Strength 5/5 in both upper and lower extremities.    Skin: Negative for rash and wound.   Neurological: Negative for dizziness, speech difficulty, weakness, light-headedness and headaches.   Psychiatric/Behavioral: Negative for agitation and confusion.        Slurred speech       Objective:     Vital Signs (Most Recent):  Temp: 98.1 °F (36.7 °C) (06/11/18 1229)  Pulse: 84 (06/11/18 1229)  Resp: 20 (06/11/18 1229)  BP: 134/80 (06/11/18 1229)  SpO2: 96 % (06/11/18 1229) Vital Signs (24h Range):  Temp:  [97.6 °F (36.4 °C)-99.3 °F (37.4 °C)] 98.1 °F (36.7 °C)  Pulse:  [65-92] 84  Resp:  [16-20] 20  SpO2:  [94 %-96 %] 96 %  BP: (100-135)/(51-80) 134/80     Weight: 109.9 kg (242 lb 4.6 oz)  Body mass index is 36.84 kg/m².    Intake/Output Summary (Last 24 hours) at 06/11/18 1416  Last data filed at 06/11/18 1130   Gross per 24 hour   Intake              700 ml   Output             1109 ml   Net             -409 ml      Physical Exam   Constitutional: He is oriented to person, place, and time. He appears well-developed and well-nourished. No distress.   HENT:   Head: Normocephalic and atraumatic.   Eyes: EOM are normal. Pupils are equal, round, and reactive to light.   Neck: Normal range of motion. Neck supple.   Cardiovascular: Normal rate, regular rhythm, normal heart sounds and intact distal pulses.   Exam reveals no gallop and no friction rub.    No murmur heard.  Pulmonary/Chest: Effort normal and breath sounds normal. No respiratory distress. He exhibits no tenderness.   Abdominal: Soft. Bowel sounds are normal. He exhibits no distension. There is no tenderness.   Musculoskeletal: Normal range of motion. He exhibits no edema, tenderness or deformity.   Neurological: He is alert and oriented to person, place, and time. No cranial nerve deficit. Coordination normal.   Skin: Skin is warm and dry. No rash noted. He is not diaphoretic. No erythema. No pallor.   Psychiatric: He has a normal mood and affect. His behavior is normal. Judgment and thought content normal.   Nursing note and vitals reviewed.    Significant Labs:   BMP:     Recent Labs  Lab 06/11/18  0402   GLU 84      K 4.1      CO2 23   BUN 36*   CREATININE 7.8*   CALCIUM 9.5

## 2018-06-11 NOTE — SUBJECTIVE & OBJECTIVE
History reviewed. No pertinent past medical history.    History reviewed. No pertinent surgical history.    Review of patient's allergies indicates:   Allergen Reactions    Penicillins      Tolerated cefepime May 2018       No current facility-administered medications on file prior to encounter.      No current outpatient prescriptions on file prior to encounter.     Family History     None        Social History Main Topics    Smoking status: Unknown If Ever Smoked    Smokeless tobacco: Current User     Types: Chew    Alcohol use Not on file    Drug use: Unknown    Sexual activity: Not on file     Review of Systems   Constitution: Negative.   HENT: Negative.    Eyes: Negative.    Cardiovascular: Negative.    Respiratory: Negative.    Endocrine: Negative.    Skin: Negative.    Musculoskeletal: Negative.    Gastrointestinal: Negative.    Genitourinary: Negative.    Neurological: Negative.      Objective:     Vital Signs (Most Recent):  Temp: 99.3 °F (37.4 °C) (06/11/18 0357)  Pulse: 82 (06/11/18 0357)  Resp: 16 (06/11/18 0357)  BP: 112/63 (06/11/18 0357)  SpO2: (!) 94 % (06/11/18 0357) Vital Signs (24h Range):  Temp:  [97.6 °F (36.4 °C)-99.3 °F (37.4 °C)] 99.3 °F (37.4 °C)  Pulse:  [78-84] 82  Resp:  [16-20] 16  SpO2:  [94 %-100 %] 94 %  BP: (100-127)/(51-76) 112/63     Weight: 109.9 kg (242 lb 4.6 oz)  Body mass index is 36.84 kg/m².    SpO2: (!) 94 %  O2 Device (Oxygen Therapy): room air    Physical Exam   Constitutional: He is oriented to person, place, and time. He appears well-developed and well-nourished.   HENT:   Head: Normocephalic and atraumatic.   Eyes: Conjunctivae and EOM are normal. Pupils are equal, round, and reactive to light.   Neck: Normal range of motion. Neck supple. No JVD present.   Cardiovascular: Normal rate, regular rhythm and normal heart sounds.  Exam reveals no gallop and no friction rub.    No murmur heard.  Pulmonary/Chest: Effort normal and breath sounds normal. No respiratory  distress. He has no wheezes. He has no rales. He exhibits no tenderness.   Abdominal: Soft. Bowel sounds are normal. He exhibits no distension. There is no tenderness.   Musculoskeletal: Normal range of motion. He exhibits no edema or tenderness.   Neurological: He is alert and oriented to person, place, and time.   Skin: Skin is warm and dry. No erythema. No pallor.     History reviewed. No pertinent past medical history.    History reviewed. No pertinent surgical history.    Review of patient's allergies indicates:   Allergen Reactions    Penicillins      Tolerated cefepime May 2018       No current facility-administered medications on file prior to encounter.      No current outpatient prescriptions on file prior to encounter.     Family History     None        Social History Main Topics    Smoking status: Unknown If Ever Smoked    Smokeless tobacco: Current User     Types: Chew    Alcohol use Not on file    Drug use: Unknown    Sexual activity: Not on file     Review of Systems   Constitution: Negative.   HENT: Negative.    Eyes: Negative.    Cardiovascular: Negative.    Respiratory: Negative.    Endocrine: Negative.    Skin: Negative.    Musculoskeletal: Negative.    Gastrointestinal: Negative.    Genitourinary: Negative.    Neurological: Negative.      Objective:     Vital Signs (Most Recent):  Temp: 99.3 °F (37.4 °C) (06/11/18 0357)  Pulse: 82 (06/11/18 0357)  Resp: 16 (06/11/18 0357)  BP: 112/63 (06/11/18 0357)  SpO2: (!) 94 % (06/11/18 0357) Vital Signs (24h Range):  Temp:  [97.6 °F (36.4 °C)-99.3 °F (37.4 °C)] 99.3 °F (37.4 °C)  Pulse:  [78-84] 82  Resp:  [16-20] 16  SpO2:  [94 %-100 %] 94 %  BP: (100-127)/(51-76) 112/63     Weight: 109.9 kg (242 lb 4.6 oz)  Body mass index is 36.84 kg/m².    SpO2: (!) 94 %  O2 Device (Oxygen Therapy): room air    Physical Exam   Constitutional: He is oriented to person, place, and time. He appears well-developed and well-nourished.   HENT:   Head: Normocephalic and  atraumatic.   Eyes: Conjunctivae and EOM are normal. Pupils are equal, round, and reactive to light.   Neck: Normal range of motion. Neck supple. No JVD present.   Cardiovascular: Normal rate, regular rhythm and normal heart sounds.  Exam reveals no gallop and no friction rub.    No murmur heard.  Pulmonary/Chest: Effort normal and breath sounds normal. No respiratory distress. He has no wheezes. He has no rales. He exhibits no tenderness.   Abdominal: Soft. Bowel sounds are normal. He exhibits no distension. There is no tenderness.   Musculoskeletal: Normal range of motion. He exhibits no edema or tenderness.   Neurological: He is alert and oriented to person, place, and time.   Skin: Skin is warm and dry. No erythema. No pallor.       Significant Labs:   EP:     Recent Labs  Lab 06/10/18  0522 06/11/18  0402    136   K 3.9 4.1    100   CO2 23 23   GLU 78 84   BUN 30* 36*   CREATININE 7.5* 7.8*   CALCIUM 9.2 9.5   ALBUMIN 2.9* 3.0*   ANIONGAP 13 13   ESTGFRAFRICA 9.0* 8.6*   EGFRNONAA 7.8* 7.4*   WBC  --  7.56   HGB  --  7.8*   HCT  --  25.3*   PLT  --  455*   INR  --  1.0       Significant Imaging: Echocardiogram:   2D echo with color flow doppler:   Results for orders placed or performed during the hospital encounter of 05/19/18   2D echo with color flow doppler   Result Value Ref Range    EF 10 (A) 55 - 65    Mitral Valve Regurgitation MILD     Diastolic Dysfunction Yes (A)     Est. PA Systolic Pressure 37.47     Tricuspid Valve Regurgitation MILD     and EKG: nothing current    Significant Labs:   EP:     Recent Labs  Lab 06/10/18  0522 06/11/18  0402    136   K 3.9 4.1    100   CO2 23 23   GLU 78 84   BUN 30* 36*   CREATININE 7.5* 7.8*   CALCIUM 9.2 9.5   ALBUMIN 2.9* 3.0*   ANIONGAP 13 13   ESTGFRAFRICA 9.0* 8.6*   EGFRNONAA 7.8* 7.4*   WBC  --  7.56   HGB  --  7.8*   HCT  --  25.3*   PLT  --  455*   INR  --  1.0       Significant Imaging: Echocardiogram:   2D echo with color flow  doppler:   Results for orders placed or performed during the hospital encounter of 05/19/18   2D echo with color flow doppler   Result Value Ref Range    EF 10 (A) 55 - 65    Mitral Valve Regurgitation MILD     Diastolic Dysfunction Yes (A)     Est. PA Systolic Pressure 37.47     Tricuspid Valve Regurgitation MILD

## 2018-06-11 NOTE — NURSING
Report received from Karen. No needs at this time, patient knows to call if assistance is needed. Blankets provided for warmth. My name and number on Communication Board, will monitor.

## 2018-06-11 NOTE — ASSESSMENT & PLAN NOTE
- unknown baseline renal function, not previously dialysis dependent  - ischemic ATN 2/2 cardiac arrest  - Remains oliguric; starting to make small volumes of urine but not clearing volume or electrolytes effectively  - Receiving HD PRN  - Underwent permacath placement 6/6  - Renal diet  - History of iron deficiency anemia; plan for EPO if counts do not recover promptly  - Followed by Nephrology  - Had HD on 6/11

## 2018-06-11 NOTE — PROGRESS NOTES
[facility] Hospital Medicine  Progress Note    Patient Name: Los Mendez  MRN: [id number]  Patient Class: IP- Inpatient   Admission Date: 5/19/2018  Length of Stay: 23 days  Attending Physician: Moisés Villalobos MD  Primary Care Provider: Provider [facility] Medicine Team: [facility] 4 Donovan Fontana MD    Subjective:     Principal Problem:Anoxic brain injury    HPI:  46 y/o man w/ hx of HTN, COPD? Transferred from [facility] following witnessed cardiac arrest while in the ED waiting room. Patient had presented complaining of URI/SOB symptoms, while in the ED waiting room he was noted to be choking, became apneic, which led to witnessed seizure like activity followed by cardiac arrest. Per discussion with ED staff, patient required about 22 minutes of ACLS/5 shocks before returning to normal sinus rhythm. During intubation a lozenge was removed from patients airway. Hyperthermia protocol was initiated prior to transferring to [facility] care. Per ED records, patient was acidotic with ph of 7.1 this morning. At the time of arrival to NICU, patient was on paralytics, however pupillary reflex was present. Will continue hypothermia protocol for additional 24 h.    Hospital Course:  05/19/18:  Arrived intubated, sedated, artic sun blanket  05/20/18:  Pressor requirment going up, NO DIURESIS, continue TTM, nimbex off, LFT improving, trop improving, family updated.  05/21/18:  Reach normothermia, MRI today, remove EEG, place HD line, consult nephrology, LFTs trending down, family updated.  05/22/18:  Anoxic brain injury. Pt responding. Last night placed on propofol  MRI --L head of caudate and cerebellar small infarctions.  05/23/18:  Anoxic brain injury.  On CRRT. BNP 1046. Back on propofol, pressors. Still agitated. Started on Buspar. Trop I normalizing.  05/25/18:  CRRT today. Amiodarone dc'd, metoprolol started. CBC b5f--ll H/H continues to drop will consult GI. Vascular surgery  consulted concerning  Possible arterial occlusion. RUE cold. Arterial line dcd. US RUE pending. Abdominal distention, KUB showed gas pattern. Bladder pressures q4h. Initial 21.  05/28/18:  H/H 6.8/23, 1 unit PRBC transfused. Keep Hgb greater than or equal to 8. CRRT stopped, line clotted. Changed trialysis catheter changed. Wean sedation today. Once off sedation begin weaning ventilator settings to CPAP.  Weaned vent to CPAP and able to Extubated in afternoon without s/s stridor or difficulty breathing. HR elevated 140s - 160s fentanyl x2 prn for pain/agitation. Metoprolol 5mg IV x3 given remains ST 140s.  Restless, will start precedex.  05/30/18:  Leukocytosis, pan cx; kelley discontinued; remove IJ, speech eval for diet   05/31/18:  No acute events, still with gastric secretions coming out of NG tube will start reglan; remains encephalopathic  06/01/18:  Will hold NGT suction, NPO per SLP.  Continue metoclopramide.  Hgb stable at 8.3 g/dL.  6/2 Continue NG tube for now. Will advance to pureed diet as recommended by SLP and evaluate intake before removing NG.. WBC remain elevated but afebrile, cultures negative and ISA/Diamond 5/24 showed no DVT only thrombophlebitis of LUE cephalic vein. Continue to monitor daily CBC. Nephrology following will hold HD today and possibly tomorrow depending on labs  6/4: patient fell from bed en route to bathroom (unassisted), CT no ICH, patient neuro stable, step down  6/5: Stepped down to hospital medicine  6/6: Permacath placed by HUGO. Underwent HD. Consult placed to PM&R for rehab placement. CM pursuing outpatient HD chair.  6/7: No acute events. Patient participating with Rehab. Pending placement in rehab.   6/8-6/10: ICD will be placed Monday. NPO Sunday night. NAEON. .  6/11: dcICD placed. Medicaid information provided to CM.    Interval History:     NAEON     Review of Systems   Constitutional: Negative for activity change, chills, fatigue and fever.   HENT: Negative for  congestion, facial swelling, sore throat and voice change.    Eyes: Negative for pain, redness and visual disturbance.   Respiratory: Negative for cough, chest tightness and shortness of breath.    Cardiovascular: Negative for chest pain and leg swelling.   Gastrointestinal: Negative for abdominal pain, constipation, diarrhea and nausea.   Endocrine: Negative for cold intolerance and heat intolerance.   Musculoskeletal: Negative for arthralgias, back pain and neck pain.        Strength 5/5 in both upper and lower extremities.    Skin: Negative for rash and wound.   Neurological: Negative for dizziness, speech difficulty, weakness, light-headedness and headaches.   Psychiatric/Behavioral: Negative for agitation and confusion.        Slurred speech       Objective:     Vital Signs (Most Recent):  Temp: 98.1 °F (36.7 °C) (06/11/18 1229)  Pulse: 84 (06/11/18 1229)  Resp: 20 (06/11/18 1229)  BP: 134/80 (06/11/18 1229)  SpO2: 96 % (06/11/18 1229) Vital Signs (24h Range):  Temp:  [97.6 °F (36.4 °C)-99.3 °F (37.4 °C)] 98.1 °F (36.7 °C)  Pulse:  [65-92] 84  Resp:  [16-20] 20  SpO2:  [94 %-96 %] 96 %  BP: (100-135)/(51-80) 134/80     Weight: 109.9 kg (242 lb 4.6 oz)  Body mass index is 36.84 kg/m².    Intake/Output Summary (Last 24 hours) at 06/11/18 1416  Last data filed at 06/11/18 1130   Gross per 24 hour   Intake              700 ml   Output             1109 ml   Net             -409 ml      Physical Exam   Constitutional: He is oriented to person, place, and time. He appears well-developed and well-nourished. No distress.   HENT:   Head: Normocephalic and atraumatic.   Eyes: EOM are normal. Pupils are equal, round, and reactive to light.   Neck: Normal range of motion. Neck supple.   Cardiovascular: Normal rate, regular rhythm, normal heart sounds and intact distal pulses.  Exam reveals no gallop and no friction rub.    No murmur heard.  Pulmonary/Chest: Effort normal and breath sounds normal. No respiratory distress. He  exhibits no tenderness.   Abdominal: Soft. Bowel sounds are normal. He exhibits no distension. There is no tenderness.   Musculoskeletal: Normal range of motion. He exhibits no edema, tenderness or deformity.   Neurological: He is alert and oriented to person, place, and time. No cranial nerve deficit. Coordination normal.   Skin: Skin is warm and dry. No rash noted. He is not diaphoretic. No erythema. No pallor.   Psychiatric: He has a normal mood and affect. His behavior is normal. Judgment and thought content normal.   Nursing note and vitals reviewed.    Significant Labs:   BMP:     Recent Labs  Lab 06/11/18  0402   GLU 84      K 4.1      CO2 23   BUN 36*   CREATININE 7.8*   CALCIUM 9.5     Assessment/Plan:      * Anoxic brain injury    -Cardiac arrest ~ 22 minutes at OSH.  Pt doing relatively well.  Extubated 5/28.  -05/21/18 MRI w/o evidence of anoxic brain injury, but several small strokes; pt encephalopathic  -Encephalopathy likely multifactorial--strokes, metabolic (kidney/liver injury), delirium        Acute superficial gastritis with hemorrhage    Coffee ground material aspirated via NG on 5/23  Concurrent with acute drop in Hg  Hg has since uptrended.   No recurrent signs of upper GI bleed.              Debility    PT/OT  Consult placed to PM&R for rehab placement        Iron deficiency anemia due to chronic blood loss              Acute bilat watershed infarction    - Incidental finding on MRI.   - Continues to have slurred speech but participating in rehab.        Acute combined systolic and diastolic ACC/AHA stage C congestive heart failure    S/p cardiac arrest on 5/19  LVEF 10-15%  - ACEi held in setting of ARF  - metoprolol succinate 50mg daily  - will evaluate and start with hydralazine 10mg tid and isordil 10mg tid for afterload reduction if BP can tolerate while on HD  - aldosterone antagonist held in the setting of poor renal function  - continue high intensity statin therapy with  atorvastatin 40mg  - transfuse for goal Hb >8  - 6/11 dcICD placed        Cytotoxic cerebral edema    - secondary to anoxic brain injury, 22 minutes PEA-->vfib  - MRI brain 5/21 for anoxic assessment.  see anoxic brain injury        Acute renal failure with tubular necrosis    - unknown baseline renal function, not previously dialysis dependent  - ischemic ATN 2/2 cardiac arrest  - Remains oliguric; starting to make small volumes of urine but not clearing volume or electrolytes effectively  - Receiving HD PRN  - Underwent permacath placement 6/6  - Renal diet  - History of iron deficiency anemia; plan for EPO if counts do not recover promptly  - Followed by Nephrology  - Had HD on 6/11           Essential hypertension      See acute decompensated heart failure        Acute pulmonary edema    - Managing volume status with HD given ARF        Class 3 severe obesity due to excess calories with serious comorbidity and body mass index (BMI) of 40.0 to 44.9 in adult    S/p nutrition consult; counseled on lifestyle modifications  Body mass index is 36.84 kg/m².          Cardiac arrest      See acute decompensated heart failure          VTE Risk Mitigation         Ordered     heparin (porcine) injection 2,000 Units  As needed (PRN)      06/11/18 0839     IP VTE HIGH RISK PATIENT  Once      06/08/18 0945     heparin (porcine) injection 1,000 Units  As needed (PRN)      06/06/18 5958              Donovan Fontana MD  Department of Hospital Medicine   [facility]

## 2018-06-11 NOTE — PT/OT/SLP PROGRESS
Speech Language Pathology      Los Mendez  MRN: [id number]    Patient not seen today secondary to pt DURAN for dialysis in AM and for ICP placement in PM  .     Shari Waggoner CCC-SLP   6/11/2018

## 2018-06-11 NOTE — ASSESSMENT & PLAN NOTE
S/p cardiac arrest on 5/19  LVEF 10-15%  - ACEi held in setting of ARF  - metoprolol succinate 50mg daily  - will evaluate and start with hydralazine 10mg tid and isordil 10mg tid for afterload reduction if BP can tolerate while on HD  - aldosterone antagonist held in the setting of poor renal function  - continue high intensity statin therapy with atorvastatin 40mg  - transfuse for goal Hb >8  - 6/11 dcICD placed

## 2018-06-11 NOTE — PROGRESS NOTES
Venous chamber clotted after 1.5  Hours of treatment. Unable to return venous blood , however arterial blood was rinsed back. System was restarted.

## 2018-06-11 NOTE — PT/OT/SLP PROGRESS
Occupational Therapy      Patient Name:  Los Mendez   MRN:  [id number]    Patient not seen today secondary to DURAN for Dialysis in AM and ICP placement in PM.  Will follow up at later time as appropriate.     SAMMI Villanueva  6/11/2018

## 2018-06-11 NOTE — PLAN OF CARE
06/11/18 1547   Discharge Reassessment   Assessment Type Discharge Planning Reassessment   Discharge Plan A Rehab  (near his home with new dialysis seat )   Discharge Plan B Long-term acute care facility (LTAC)     Patient recently [place] medicaid that can now assist with discharge disposition and needs.    Payor: MEDICAID / Plan: [facility] ([place] MEDICAID) / Product Type: Managed Medicaid /

## 2018-06-11 NOTE — PROGRESS NOTES
[facility]  Cardiac Electrophysiology  Progress Note    Admission Date: 5/19/2018  Code Status: Full Code   Attending Physician: Moisés Villalobos MD   Expected Discharge Date: 6/14/2018  Principal Problem:Anoxic brain injury    Subjective:   NAEON    History reviewed. No pertinent past medical history.    History reviewed. No pertinent surgical history.    Review of patient's allergies indicates:   Allergen Reactions    Penicillins      Tolerated cefepime May 2018       No current facility-administered medications on file prior to encounter.      No current outpatient prescriptions on file prior to encounter.     Family History     None        Social History Main Topics    Smoking status: Unknown If Ever Smoked    Smokeless tobacco: Current User     Types: Chew    Alcohol use Not on file    Drug use: Unknown    Sexual activity: Not on file     Review of Systems   Constitution: Negative.   HENT: Negative.    Eyes: Negative.    Cardiovascular: Negative.    Respiratory: Negative.    Endocrine: Negative.    Skin: Negative.    Musculoskeletal: Negative.    Gastrointestinal: Negative.    Genitourinary: Negative.    Neurological: Negative.      Objective:     Vital Signs (Most Recent):  Temp: 99.3 °F (37.4 °C) (06/11/18 0357)  Pulse: 82 (06/11/18 0357)  Resp: 16 (06/11/18 0357)  BP: 112/63 (06/11/18 0357)  SpO2: (!) 94 % (06/11/18 0357) Vital Signs (24h Range):  Temp:  [97.6 °F (36.4 °C)-99.3 °F (37.4 °C)] 99.3 °F (37.4 °C)  Pulse:  [78-84] 82  Resp:  [16-20] 16  SpO2:  [94 %-100 %] 94 %  BP: (100-127)/(51-76) 112/63     Weight: 109.9 kg (242 lb 4.6 oz)  Body mass index is 36.84 kg/m².    SpO2: (!) 94 %  O2 Device (Oxygen Therapy): room air    Physical Exam   Constitutional: He is oriented to person, place, and time. He appears well-developed and well-nourished.   HENT:   Head: Normocephalic and atraumatic.   Eyes: Conjunctivae and EOM are normal. Pupils are equal, round, and reactive to light.    Neck: Normal range of motion. Neck supple. No JVD present.   Cardiovascular: Normal rate, regular rhythm and normal heart sounds.  Exam reveals no gallop and no friction rub.    No murmur heard.  Pulmonary/Chest: Effort normal and breath sounds normal. No respiratory distress. He has no wheezes. He has no rales. He exhibits no tenderness.   Abdominal: Soft. Bowel sounds are normal. He exhibits no distension. There is no tenderness.   Musculoskeletal: Normal range of motion. He exhibits no edema or tenderness.   Neurological: He is alert and oriented to person, place, and time.   Skin: Skin is warm and dry. No erythema. No pallor.     History reviewed. No pertinent past medical history.    History reviewed. No pertinent surgical history.    Review of patient's allergies indicates:   Allergen Reactions    Penicillins      Tolerated cefepime May 2018       No current facility-administered medications on file prior to encounter.      No current outpatient prescriptions on file prior to encounter.     Family History     None        Social History Main Topics    Smoking status: Unknown If Ever Smoked    Smokeless tobacco: Current User     Types: Chew    Alcohol use Not on file    Drug use: Unknown    Sexual activity: Not on file     Review of Systems   Constitution: Negative.   HENT: Negative.    Eyes: Negative.    Cardiovascular: Negative.    Respiratory: Negative.    Endocrine: Negative.    Skin: Negative.    Musculoskeletal: Negative.    Gastrointestinal: Negative.    Genitourinary: Negative.    Neurological: Negative.      Objective:     Vital Signs (Most Recent):  Temp: 99.3 °F (37.4 °C) (06/11/18 0357)  Pulse: 82 (06/11/18 0357)  Resp: 16 (06/11/18 0357)  BP: 112/63 (06/11/18 0357)  SpO2: (!) 94 % (06/11/18 0357) Vital Signs (24h Range):  Temp:  [97.6 °F (36.4 °C)-99.3 °F (37.4 °C)] 99.3 °F (37.4 °C)  Pulse:  [78-84] 82  Resp:  [16-20] 16  SpO2:  [94 %-100 %] 94 %  BP: (100-127)/(51-76) 112/63     Weight:  109.9 kg (242 lb 4.6 oz)  Body mass index is 36.84 kg/m².    SpO2: (!) 94 %  O2 Device (Oxygen Therapy): room air    Physical Exam   Constitutional: He is oriented to person, place, and time. He appears well-developed and well-nourished.   HENT:   Head: Normocephalic and atraumatic.   Eyes: Conjunctivae and EOM are normal. Pupils are equal, round, and reactive to light.   Neck: Normal range of motion. Neck supple. No JVD present.   Cardiovascular: Normal rate, regular rhythm and normal heart sounds.  Exam reveals no gallop and no friction rub.    No murmur heard.  Pulmonary/Chest: Effort normal and breath sounds normal. No respiratory distress. He has no wheezes. He has no rales. He exhibits no tenderness.   Abdominal: Soft. Bowel sounds are normal. He exhibits no distension. There is no tenderness.   Musculoskeletal: Normal range of motion. He exhibits no edema or tenderness.   Neurological: He is alert and oriented to person, place, and time.   Skin: Skin is warm and dry. No erythema. No pallor.       Significant Labs:   EP:     Recent Labs  Lab 06/10/18  0522 06/11/18  0402    136   K 3.9 4.1    100   CO2 23 23   GLU 78 84   BUN 30* 36*   CREATININE 7.5* 7.8*   CALCIUM 9.2 9.5   ALBUMIN 2.9* 3.0*   ANIONGAP 13 13   ESTGFRAFRICA 9.0* 8.6*   EGFRNONAA 7.8* 7.4*   WBC  --  7.56   HGB  --  7.8*   HCT  --  25.3*   PLT  --  455*   INR  --  1.0       Significant Imaging: Echocardiogram:   2D echo with color flow doppler:   Results for orders placed or performed during the hospital encounter of 05/19/18   2D echo with color flow doppler   Result Value Ref Range    EF 10 (A) 55 - 65    Mitral Valve Regurgitation MILD     Diastolic Dysfunction Yes (A)     Est. PA Systolic Pressure 37.47     Tricuspid Valve Regurgitation MILD     and EKG: nothing current    Significant Labs:   EP:     Recent Labs  Lab 06/10/18  0522 06/11/18  0402    136   K 3.9 4.1    100   CO2 23 23   GLU 78 84   BUN 30* 36*    CREATININE 7.5* 7.8*   CALCIUM 9.2 9.5   ALBUMIN 2.9* 3.0*   ANIONGAP 13 13   ESTGFRAFRICA 9.0* 8.6*   EGFRNONAA 7.8* 7.4*   WBC  --  7.56   HGB  --  7.8*   HCT  --  25.3*   PLT  --  455*   INR  --  1.0       Significant Imaging: Echocardiogram:   2D echo with color flow doppler:   Results for orders placed or performed during the hospital encounter of 05/19/18   2D echo with color flow doppler   Result Value Ref Range    EF 10 (A) 55 - 65    Mitral Valve Regurgitation MILD     Diastolic Dysfunction Yes (A)     Est. PA Systolic Pressure 37.47     Tricuspid Valve Regurgitation MILD         Assessment and Plan:     Cardiac arrest     PET stress negative for ischemia  NPO past midnight  Hold heparin products 24 hours prior to procedure  dcICD placement on 6/11                 Dong Jeff MD  Cardiac Electrophysiology  [facility]

## 2018-06-11 NOTE — PLAN OF CARE
Problem: Patient Care Overview  Goal: Plan of Care Review  Outcome: Ongoing (interventions implemented as appropriate)  POC reviewed with pt & spouse. AAO x4. Pt went for HD today- 0.5L fluid removed. Pt went for ICD placement.   Pt remained free from falls. Questions and concerns addressed. Pt progressing towards goals. Will continue to monitor. See flow sheets for full assessment and VS

## 2018-06-11 NOTE — PT/OT/SLP PROGRESS
Physical Therapy      Patient Name:  Los Mendez   MRN:  [id number]    Patient not seen today secondary to Patient out on Dialysis in AM and ICP placement in PM. Will follow-up on next scheduled visit.    Elio Rose PTA

## 2018-06-11 NOTE — PROGRESS NOTES
Reported to floor rn. Also placed patient on telemetry monitor and called telemetry tech. Ticket to ride in chart  Patient no complaints no distress noted.

## 2018-06-11 NOTE — SUBJECTIVE & OBJECTIVE
Interval History: NAEON    Review of Systems   Constitution: Negative.   HENT: Negative.    Eyes: Negative.    Cardiovascular: Negative.    Respiratory: Negative.    Endocrine: Negative.    Skin: Negative.    Musculoskeletal: Negative.    Gastrointestinal: Negative.    Genitourinary: Negative.    Neurological: Negative.      Objective:     Vital Signs (Most Recent):  Temp: 98.2 °F (36.8 °C) (06/11/18 0645)  Pulse: 85 (06/11/18 0745)  Resp: 18 (06/11/18 0745)  BP: 119/75 (06/11/18 0745)  SpO2: (!) 94 % (06/11/18 0357) Vital Signs (24h Range):  Temp:  [97.6 °F (36.4 °C)-99.3 °F (37.4 °C)] 98.2 °F (36.8 °C)  Pulse:  [78-92] 85  Resp:  [16-20] 18  SpO2:  [94 %-100 %] 94 %  BP: (100-135)/(51-76) 119/75     Weight: 109.9 kg (242 lb 4.6 oz)  Body mass index is 36.84 kg/m².     SpO2: (!) 94 %  O2 Device (Oxygen Therapy): room air    Physical Exam   Constitutional: He is oriented to person, place, and time. He appears well-developed and well-nourished.   HENT:   Head: Normocephalic and atraumatic.   Eyes: Conjunctivae and EOM are normal. Pupils are equal, round, and reactive to light.   Neck: Normal range of motion. Neck supple. No JVD present.   Cardiovascular: Normal rate, regular rhythm and normal heart sounds.  Exam reveals no gallop and no friction rub.    No murmur heard.  Pulmonary/Chest: Effort normal and breath sounds normal. No respiratory distress. He has no wheezes. He has no rales. He exhibits no tenderness.   Abdominal: Soft. Bowel sounds are normal. He exhibits no distension. There is no tenderness.   Musculoskeletal: Normal range of motion. He exhibits no edema or tenderness.   Neurological: He is alert and oriented to person, place, and time.   Skin: Skin is warm and dry. No erythema. No pallor.       Significant Labs:   EP:   Recent Labs  Lab 06/10/18  0522 06/11/18  0402    136   K 3.9 4.1    100   CO2 23 23   GLU 78 84   BUN 30* 36*   CREATININE 7.5* 7.8*   CALCIUM 9.2 9.5   ALBUMIN 2.9* 3.0*    ANIONGAP 13 13   ESTGFRAFRICA 9.0* 8.6*   EGFRNONAA 7.8* 7.4*   WBC  --  7.56   HGB  --  7.8*   HCT  --  25.3*   PLT  --  455*   INR  --  1.0       Significant Imaging: Echocardiogram:   2D echo with color flow doppler:   Results for orders placed or performed during the hospital encounter of 05/19/18   2D echo with color flow doppler   Result Value Ref Range    EF 10 (A) 55 - 65    Mitral Valve Regurgitation MILD     Diastolic Dysfunction Yes (A)     Est. PA Systolic Pressure 37.47     Tricuspid Valve Regurgitation MILD     and EKG: diffuse twi

## 2018-06-11 NOTE — PROGRESS NOTES
[facility]  Cardiac Electrophysiology  Progress Note    Admission Date: 5/19/2018  Code Status: Full Code   Attending Physician: Moisés Villalobos MD   Expected Discharge Date: 6/14/2018  Principal Problem:Anoxic brain injury    Subjective:     Interval History: NAEON    Review of Systems   Constitution: Negative.   HENT: Negative.    Eyes: Negative.    Cardiovascular: Negative.    Respiratory: Negative.    Endocrine: Negative.    Skin: Negative.    Musculoskeletal: Negative.    Gastrointestinal: Negative.    Genitourinary: Negative.    Neurological: Negative.      Objective:     Vital Signs (Most Recent):  Temp: 98.2 °F (36.8 °C) (06/11/18 0645)  Pulse: 85 (06/11/18 0745)  Resp: 18 (06/11/18 0745)  BP: 119/75 (06/11/18 0745)  SpO2: (!) 94 % (06/11/18 0357) Vital Signs (24h Range):  Temp:  [97.6 °F (36.4 °C)-99.3 °F (37.4 °C)] 98.2 °F (36.8 °C)  Pulse:  [78-92] 85  Resp:  [16-20] 18  SpO2:  [94 %-100 %] 94 %  BP: (100-135)/(51-76) 119/75     Weight: 109.9 kg (242 lb 4.6 oz)  Body mass index is 36.84 kg/m².     SpO2: (!) 94 %  O2 Device (Oxygen Therapy): room air    Physical Exam   Constitutional: He is oriented to person, place, and time. He appears well-developed and well-nourished.   HENT:   Head: Normocephalic and atraumatic.   Eyes: Conjunctivae and EOM are normal. Pupils are equal, round, and reactive to light.   Neck: Normal range of motion. Neck supple. No JVD present.   Cardiovascular: Normal rate, regular rhythm and normal heart sounds.  Exam reveals no gallop and no friction rub.    No murmur heard.  Pulmonary/Chest: Effort normal and breath sounds normal. No respiratory distress. He has no wheezes. He has no rales. He exhibits no tenderness.   Abdominal: Soft. Bowel sounds are normal. He exhibits no distension. There is no tenderness.   Musculoskeletal: Normal range of motion. He exhibits no edema or tenderness.   Neurological: He is alert and oriented to person, place, and time.   Skin:  Skin is warm and dry. No erythema. No pallor.       Significant Labs:   EP:   Recent Labs  Lab 06/10/18  0522 06/11/18  0402    136   K 3.9 4.1    100   CO2 23 23   GLU 78 84   BUN 30* 36*   CREATININE 7.5* 7.8*   CALCIUM 9.2 9.5   ALBUMIN 2.9* 3.0*   ANIONGAP 13 13   ESTGFRAFRICA 9.0* 8.6*   EGFRNONAA 7.8* 7.4*   WBC  --  7.56   HGB  --  7.8*   HCT  --  25.3*   PLT  --  455*   INR  --  1.0       Significant Imaging: Echocardiogram:   2D echo with color flow doppler:   Results for orders placed or performed during the hospital encounter of 05/19/18   2D echo with color flow doppler   Result Value Ref Range    EF 10 (A) 55 - 65    Mitral Valve Regurgitation MILD     Diastolic Dysfunction Yes (A)     Est. PA Systolic Pressure 37.47     Tricuspid Valve Regurgitation MILD     and EKG: diffuse twi    Assessment and Plan:     Cardiac arrest     PET stress negative for ischemia  NPO past midnight  Hold heparin products 24 hours prior to procedure  dcICD placement today  EF improved on PET, consider repeat 2decho                 Cooper Ruiz MD  Cardiac Electrophysiology  [facility]

## 2018-06-11 NOTE — PROGRESS NOTES
[facility] HEMODIALYSIS NOTE     Patient currently on hemodialysis for removal of uremic toxins and volume.    Patient seen and evaluated on hemodialysis, tolerating treatment, see HD flowsheet for vitals and assessments.      Ultrafiltration goal is 1-2 L as toelrated     Labs have been reviewed and the dialysate bath has been adjusted.     Assessment/Plan:     HD today for removal of uremic toxins and volume.    Serum creatinine still rising , UO undocumented    Anemia; single dose venofer given 6/6 will order 6 more doses at HD    Vit d 11 would treat accordingly  Hyperphosphatemia; suggest sevelamer 800 mg tid w meals    Norma Crawford MD  Nephrology

## 2018-06-11 NOTE — ANESTHESIA POSTPROCEDURE EVALUATION
"Anesthesia Post Evaluation    Patient: Los Mendez    Procedure(s) Performed: Procedure(s) (LRB):  ICD DC new (Left)    Final Anesthesia Type: general  Patient location during evaluation: PACU  Patient participation: Yes- Able to Participate  Level of consciousness: awake and alert and oriented  Post-procedure vital signs: reviewed and stable  Pain management: adequate  Airway patency: patent  PONV status at discharge: No PONV  Anesthetic complications: no      Cardiovascular status: hemodynamically stable  Respiratory status: unassisted, spontaneous ventilation and room air  Hydration status: euvolemic  Follow-up not needed.        Visit Vitals  BP 93/61 (BP Location: Right arm, Patient Position: Lying)   Pulse 97   Temp 36.8 °C (98.3 °F) (Oral)   Resp 20   Ht 5' 8" (1.727 m)   Wt 109.9 kg (242 lb 4.6 oz)   SpO2 97%   BMI 36.84 kg/m²       Pain/Nan Score: Pain Assessment Performed: Yes (6/11/2018  5:30 PM)  Presence of Pain: denies (6/11/2018  5:15 PM)  Nan Score: 10 (6/11/2018  5:15 PM)      "

## 2018-06-11 NOTE — ASSESSMENT & PLAN NOTE
PET stress negative for ischemia  NPO past midnight  Hold heparin products 24 hours prior to procedure  dcICD placement on 6/11

## 2018-06-11 NOTE — NURSING TRANSFER
Nursing Transfer Note      6/11/2018     Transfer To: 930a    Transfer via stretcher    Transfer with cardiac monitoring    Transported by escort    Medicines sent: none    Chart send with patient: Yes    Notified: spouse    Patient reassessed at: see epic ticket to ride in chart (date, time)

## 2018-06-11 NOTE — ASSESSMENT & PLAN NOTE
PET stress negative for ischemia  NPO past midnight  Hold heparin products 24 hours prior to procedure  dcICD placement today  EF improved on PET, consider repeat 2decho

## 2018-06-11 NOTE — PROGRESS NOTES
HD treatment complete. Duration of treatment 4 hours and 500cc removed. Treatment was tolerated well. System clotted with 2.5 hours left of treatment. Treatment was restarted and heparin bolus given. Treatment was complete and catheter flushed with NS and locked with heparin. Capped and taped.

## 2018-06-12 DIAGNOSIS — I42.9 CARDIOMYOPATHY, UNSPECIFIED TYPE: ICD-10-CM

## 2018-06-12 DIAGNOSIS — Z95.810 ICD (IMPLANTABLE CARDIOVERTER-DEFIBRILLATOR) IN PLACE: Primary | ICD-10-CM

## 2018-06-12 LAB
ALBUMIN SERPL BCP-MCNC: 3 G/DL
ALBUMIN SERPL BCP-MCNC: 3.1 G/DL
ANION GAP SERPL CALC-SCNC: 12 MMOL/L
ANION GAP SERPL CALC-SCNC: 13 MMOL/L
BASOPHILS # BLD AUTO: 0.08 K/UL
BASOPHILS NFR BLD: 1.1 %
BUN SERPL-MCNC: 17 MG/DL
BUN SERPL-MCNC: 18 MG/DL
CALCIUM SERPL-MCNC: 9.3 MG/DL
CALCIUM SERPL-MCNC: 9.5 MG/DL
CHLORIDE SERPL-SCNC: 104 MMOL/L
CHLORIDE SERPL-SCNC: 104 MMOL/L
CO2 SERPL-SCNC: 20 MMOL/L
CO2 SERPL-SCNC: 22 MMOL/L
CREAT SERPL-MCNC: 4.7 MG/DL
CREAT SERPL-MCNC: 5 MG/DL
DIFFERENTIAL METHOD: ABNORMAL
EOSINOPHIL # BLD AUTO: 0.3 K/UL
EOSINOPHIL NFR BLD: 3.6 %
ERYTHROCYTE [DISTWIDTH] IN BLOOD BY AUTOMATED COUNT: ABNORMAL %
EST. GFR  (AFRICAN AMERICAN): 14.7 ML/MIN/1.73 M^2
EST. GFR  (AFRICAN AMERICAN): 15.9 ML/MIN/1.73 M^2
EST. GFR  (NON AFRICAN AMERICAN): 12.7 ML/MIN/1.73 M^2
EST. GFR  (NON AFRICAN AMERICAN): 13.7 ML/MIN/1.73 M^2
GLUCOSE SERPL-MCNC: 83 MG/DL
GLUCOSE SERPL-MCNC: 85 MG/DL
HCT VFR BLD AUTO: 26.6 %
HGB BLD-MCNC: 8 G/DL
IMM GRANULOCYTES # BLD AUTO: 0.02 K/UL
IMM GRANULOCYTES NFR BLD AUTO: 0.3 %
LYMPHOCYTES # BLD AUTO: 1.2 K/UL
LYMPHOCYTES NFR BLD: 16.2 %
MCH RBC QN AUTO: 23.6 PG
MCHC RBC AUTO-ENTMCNC: 30.1 G/DL
MCV RBC AUTO: 79 FL
MONOCYTES # BLD AUTO: 1.1 K/UL
MONOCYTES NFR BLD: 16 %
NEUTROPHILS # BLD AUTO: 4.5 K/UL
NEUTROPHILS NFR BLD: 62.8 %
NRBC BLD-RTO: 0 /100 WBC
PHOSPHATE SERPL-MCNC: 4.5 MG/DL
PHOSPHATE SERPL-MCNC: 4.7 MG/DL
PLATELET # BLD AUTO: 398 K/UL
PMV BLD AUTO: 10.4 FL
POTASSIUM SERPL-SCNC: 4.6 MMOL/L
POTASSIUM SERPL-SCNC: 4.8 MMOL/L
RBC # BLD AUTO: 3.39 M/UL
SODIUM SERPL-SCNC: 137 MMOL/L
SODIUM SERPL-SCNC: 138 MMOL/L
WBC # BLD AUTO: 7.14 K/UL

## 2018-06-12 PROCEDURE — 63700000 PHARM REV CODE 250 ALT 637 W/O HCPCS: Performed by: HOSPITALIST

## 2018-06-12 PROCEDURE — G8998 SWALLOW D/C STATUS: HCPCS | Mod: CH

## 2018-06-12 PROCEDURE — 99231 SBSQ HOSP IP/OBS SF/LOW 25: CPT | Mod: ,,, | Performed by: HOSPITALIST

## 2018-06-12 PROCEDURE — 80069 RENAL FUNCTION PANEL: CPT

## 2018-06-12 PROCEDURE — 85025 COMPLETE CBC W/AUTO DIFF WBC: CPT

## 2018-06-12 PROCEDURE — 97535 SELF CARE MNGMENT TRAINING: CPT

## 2018-06-12 PROCEDURE — 25000003 PHARM REV CODE 250: Performed by: STUDENT IN AN ORGANIZED HEALTH CARE EDUCATION/TRAINING PROGRAM

## 2018-06-12 PROCEDURE — G8997 SWALLOW GOAL STATUS: HCPCS | Mod: CK

## 2018-06-12 PROCEDURE — 92507 TX SP LANG VOICE COMM INDIV: CPT

## 2018-06-12 PROCEDURE — 25000003 PHARM REV CODE 250: Performed by: INTERNAL MEDICINE

## 2018-06-12 PROCEDURE — 99232 SBSQ HOSP IP/OBS MODERATE 35: CPT | Mod: ,,, | Performed by: NURSE PRACTITIONER

## 2018-06-12 PROCEDURE — 36415 COLL VENOUS BLD VENIPUNCTURE: CPT

## 2018-06-12 PROCEDURE — 11000001 HC ACUTE MED/SURG PRIVATE ROOM

## 2018-06-12 PROCEDURE — 92526 ORAL FUNCTION THERAPY: CPT

## 2018-06-12 PROCEDURE — 80069 RENAL FUNCTION PANEL: CPT | Mod: 91

## 2018-06-12 PROCEDURE — 99024 POSTOP FOLLOW-UP VISIT: CPT | Mod: ,,, | Performed by: INTERNAL MEDICINE

## 2018-06-12 PROCEDURE — 97530 THERAPEUTIC ACTIVITIES: CPT

## 2018-06-12 RX ORDER — SODIUM CHLORIDE 9 MG/ML
INJECTION, SOLUTION INTRAVENOUS
Status: DISCONTINUED | OUTPATIENT
Start: 2018-06-12 | End: 2018-06-15 | Stop reason: HOSPADM

## 2018-06-12 RX ORDER — CLARITHROMYCIN 250 MG/5ML
250 FOR SUSPENSION ORAL EVERY 12 HOURS
Status: DISCONTINUED | OUTPATIENT
Start: 2018-06-12 | End: 2018-06-15 | Stop reason: HOSPADM

## 2018-06-12 RX ORDER — SODIUM CHLORIDE 9 MG/ML
INJECTION, SOLUTION INTRAVENOUS ONCE
Status: COMPLETED | OUTPATIENT
Start: 2018-06-13 | End: 2018-06-13

## 2018-06-12 RX ADMIN — CLARITHROMYCIN 250 MG: 250 FOR SUSPENSION ORAL at 08:06

## 2018-06-12 RX ADMIN — CLARITHROMYCIN 250 MG: 250 FOR SUSPENSION ORAL at 02:06

## 2018-06-12 RX ADMIN — Medication 250 MG: at 08:06

## 2018-06-12 RX ADMIN — METOPROLOL SUCCINATE 50 MG: 50 TABLET, EXTENDED RELEASE ORAL at 08:06

## 2018-06-12 RX ADMIN — ASPIRIN 81 MG CHEWABLE TABLET 81 MG: 81 TABLET CHEWABLE at 08:06

## 2018-06-12 RX ADMIN — ATORVASTATIN CALCIUM 40 MG: 20 TABLET, FILM COATED ORAL at 08:06

## 2018-06-12 NOTE — PLAN OF CARE
1:53 PM  LOBITO f/u w/rep to inform her Pt no longer needs InPt rehab.    1:34 PM   SW received phone call from rep Noemi [phone]  w/Dmitriy Spencer of [facility] [phone].  Reported she will stop and see Pt today at approx 3-3:30PM to assess.      Shamika Betancourt, DOMINGO  [facility]

## 2018-06-12 NOTE — ASSESSMENT & PLAN NOTE
S/p cardiac arrest on 5/19  LVEF 10-15%  - ACEi held in setting of ARF  - metoprolol succinate 50mg daily  - will evaluate and start with hydralazine 10mg tid and isordil 10mg tid for afterload reduction if BP can tolerate while on HD  - aldosterone antagonist held in the setting of poor renal function  - continue high intensity statin therapy with atorvastatin 40mg  - transfuse for goal Hb >8  - 6/11 dcICD placed  - No post surgical complaints.

## 2018-06-12 NOTE — PT/OT/SLP PROGRESS
"Occupational Therapy   Treatment    Name: Los Mendez  MRN: [id number]  Admitting Diagnosis:  Anoxic brain injury  1 Day Post-Op L ICD placement    Recommendations:     Discharge Recommendations: outpatient OT  Discharge Equipment Recommendations:  walker, rolling  Barriers to discharge:  Inaccessible home environment    Subjective     Communicated with: RN prior to session. Pt's spouse at bedside throughout.   Pt reported "This place has the worst communication. Everyone tells me something different"    Pain/Comfort:  · Pain Rating 1: 0/10  · Pain Rating Post-Intervention 1: 0/10    Patients cultural, spiritual, Mu-ism conflicts given the current situation: none reported    Objective:     Patient found with: peripheral IV (L ICD)    General Precautions: Standard, fall, aspiration, dental soft (pacemaker)  *cleared to use RW for mobility  Orthopedic Precautions:N/A   Braces: N/A     Occupational Performance:    Bed Mobility:    · Patient completed Supine to Sit with modified independence     Functional Mobility/Transfers:  · Patient completed Sit <> Stand Transfer with modified independence  with  rolling walker   · Patient completed Bed <> Chair Transfer using Stand Pivot and Step Transfer technique with supervision with rolling walker  · Functional Mobility: Supervision at household distances with and without RW    Activities of Daily Living:  · Feeding:  modified independence    · Grooming: supervision standing at sink  · UB Dressing: supervision and set up to don gown as jacket  · LB Dressing: minimum assistance while seated EOB    Patient left seated EOB with all lines intact, call button in reach, case mgmt notified and wife present (Attempted to call IM team- busy each trial; attempted to call listed case mgmt in treatment team- not available)    [facility] 6 Click:  [facility] Total Score: 20    Treatment & Education:  -Pt alert and oriented; agreeable to OT session  -completed dynamic standing task to sim home " mgmt return: Reaching in closet for various items on top shelf and placement on lower shelves for various weight shifts x5 trials; completed with supervision  -completed standing high knees cross crawls with 1 LOB- able to self correct; completed x10 trials with supervision level assistance  -Pt able to ID 5/5 items presented for task; able to hold conversation; cont to demo poor divided attention   -Communication board updated; questions/concerns addressed within OT scope of practice    Education:    Assessment:     Los Mendez is a 47 y.o. male with a medical diagnosis of Anoxic brain injury.  He presents with great improvements towards OT goals. Pt has progressed to level of outpatient therapy needs at this time. Discussed with wife at bedside whom reported she will be able to be his transportation and that he will have 24 hour supervision upon d/c home.  Performance deficits affecting function are impaired endurance, impaired functional mobilty, gait instability, impaired balance, impaired self care skills, impaired cardiopulmonary response to activity, impaired cognition, decreased safety awareness.      Rehab Prognosis:  Good; patient would benefit from acute skilled OT services to address these deficits and reach maximum level of function.       Plan:     Patient to be seen 3 x/week to address the above listed problems via self-care/home management, therapeutic activities, therapeutic exercises, neuromuscular re-education, cognitive retraining  · Plan of Care Expires: 06/29/18  · Plan of Care Reviewed with: patient, spouse    This Plan of care has been discussed with the patient who was involved in its development and understands and is in agreement with the identified goals and treatment plan    GOALS:    Occupational Therapy Goals        Problem: Occupational Therapy Goal    Goal Priority Disciplines Outcome Interventions   Occupational Therapy Goal     OT, PT/OT Ongoing (interventions implemented as  appropriate)    Description:  Goals to be met by: 6/14/2018 (revised and extended 6/7 due to pt's progress)  Patient will increase functional independence with ADLs by performing:    Dynamic standing task to simulate home return for 8 min duration with SBA for postural control. MET  Tub/shower transfer with grab bar as needed with CGA.   Functional mobility at house hold distance with RW as needed and supervision. MET  Pt will gather 4/5 items for ADL (provided verbally) in timely manner and supervision for dynamic balance. MET  Pt will complete ADL sequencing task with 1 verbal cue. MET                          Time Tracking:     OT Date of Treatment: 06/12/18  OT Start Time: 1304  OT Stop Time: 1324  OT Total Time (min): 20 min    Billable Minutes:Self Care/Home Management 20    SAMMI Villanueva  6/12/2018

## 2018-06-12 NOTE — PLAN OF CARE
Problem: Occupational Therapy Goal  Goal: Occupational Therapy Goal  Goals to be met by: 6/14/2018 (revised and extended 6/7 due to pt's progress)  Patient will increase functional independence with ADLs by performing:    Dynamic standing task to simulate home return for 8 min duration with SBA for postural control. MET  Tub/shower transfer with grab bar as needed with CGA.   Functional mobility at house hold distance with RW as needed and supervision. MET  Pt will gather 4/5 items for ADL (provided verbally) in timely manner and supervision for dynamic balance. MET  Pt will complete ADL sequencing task with 1 verbal cue. MET        Outcome: Ongoing (interventions implemented as appropriate)  Rec changed to outpatient on this date. Discussed home safety with family. SAMMI Villanueva 6/12/2018

## 2018-06-12 NOTE — ASSESSMENT & PLAN NOTE
-cardiac arrest at [facility] ED ~22 mins and intubated  -5/22-imaging consistent with anoxic brain injury?  -now extubated  -hospital course complicated by encephalopathy

## 2018-06-12 NOTE — PLAN OF CARE
Problem: Fall Risk (Adult)  Intervention: Safety Precautions  Pt AAOx3, VS per flow sheet, Dressings monitored for bleeding, No significant overnight events.

## 2018-06-12 NOTE — PROGRESS NOTES
[facility] Hospital Medicine  Progress Note    Patient Name: Los Mendez  MRN: [id number]  Patient Class: IP- Inpatient   Admission Date: 5/19/2018  Length of Stay: 24 days  Attending Physician: Ramses Mcfarland MD  Primary Care Provider: Provider [facility] Medicine Team: [facility] HOSP MED 4 Amos Roche MD    Subjective:     Principal Problem:Anoxic brain injury    HPI:  48 y/o man w/ hx of HTN, COPD? Transferred from [facility] following witnessed cardiac arrest while in the ED waiting room. Patient had presented complaining of URI/SOB symptoms, while in the ED waiting room he was noted to be choking, became apneic, which led to witnessed seizure like activity followed by cardiac arrest. Per discussion with ED staff, patient required about 22 minutes of ACLS/5 shocks before returning to normal sinus rhythm. During intubation a lozenge was removed from patients airway. Hyperthermia protocol was initiated prior to transferring to [facility] for [facility] care. Per ED records, patient was acidotic with ph of 7.1 this morning. At the time of arrival to NICU, patient was on paralytics, however pupillary reflex was present. Will continue hypothermia protocol for additional 24 h.    Hospital Course:  05/19/18:  Arrived intubated, sedated, artic sun blanket  05/20/18:  Pressor requirment going up, NO DIURESIS, continue TTM, nimbex off, LFT improving, trop improving, family updated.  05/21/18:  Reach normothermia, MRI today, remove EEG, place HD line, consult nephrology, LFTs trending down, family updated.  05/22/18:  Anoxic brain injury. Pt responding. Last night placed on propofol  MRI --L head of caudate and cerebellar small infarctions.  05/23/18:  Anoxic brain injury.  On CRRT. BNP 1046. Back on propofol, pressors. Still agitated. Started on Buspar. Trop I normalizing.  05/25/18:  CRRT today. Amiodarone dc'd, metoprolol started. CBC t4v--tv H/H continues to drop will consult GI. Vascular surgery  consulted concerning  Possible arterial occlusion. RUE cold. Arterial line dcd. US RUE pending. Abdominal distention, KUB showed gas pattern. Bladder pressures q4h. Initial 21.  05/28/18:  H/H 6.8/23, 1 unit PRBC transfused. Keep Hgb greater than or equal to 8. CRRT stopped, line clotted. Changed trialysis catheter changed. Wean sedation today. Once off sedation begin weaning ventilator settings to CPAP.  Weaned vent to CPAP and able to Extubated in afternoon without s/s stridor or difficulty breathing. HR elevated 140s - 160s fentanyl x2 prn for pain/agitation. Metoprolol 5mg IV x3 given remains ST 140s.  Restless, will start precedex.  05/30/18:  Leukocytosis, pan cx; kelley discontinued; remove IJ, speech eval for diet   05/31/18:  No acute events, still with gastric secretions coming out of NG tube will start reglan; remains encephalopathic  06/01/18:  Will hold NGT suction, NPO per SLP.  Continue metoclopramide.  Hgb stable at 8.3 g/dL.  6/2 Continue NG tube for now. Will advance to pureed diet as recommended by SLP and evaluate intake before removing NG.. WBC remain elevated but afebrile, cultures negative and ISA/Diamond 5/24 showed no DVT only thrombophlebitis of LUE cephalic vein. Continue to monitor daily CBC. Nephrology following will hold HD today and possibly tomorrow depending on labs  6/4: patient fell from bed en route to bathroom (unassisted), CT no ICH, patient neuro stable, step down  6/5: Stepped down to hospital medicine  6/6: Permacath placed by HUGO. Underwent HD. Consult placed to PM&R for rehab placement. CM pursuing outpatient HD chair.  6/7: No acute events. Patient participating with Rehab. Pending placement in rehab.   6/8-6/10: ICD will be placed Monday. NPO Sunday night. NAEON. .  6/11: dcICD placed. Medicaid information provided to CM.    Interval History:     NAEON. Received ICD yesterday. Denies complications or pain on surgical site.     Review of Systems   Constitutional: Negative for  activity change, chills, fatigue and fever.   HENT: Negative for congestion, facial swelling, sore throat and voice change.    Eyes: Negative for pain, redness and visual disturbance.   Respiratory: Negative for cough, chest tightness and shortness of breath.    Cardiovascular: Negative for chest pain and leg swelling.   Gastrointestinal: Negative for abdominal pain, constipation, diarrhea and nausea.   Endocrine: Negative for cold intolerance and heat intolerance.   Musculoskeletal: Negative for arthralgias, back pain and neck pain.        Strength 5/5 in both upper and lower extremities.    Skin: Negative for rash and wound.   Neurological: Negative for dizziness, speech difficulty, weakness, light-headedness and headaches.   Psychiatric/Behavioral: Negative for agitation and confusion.     Objective:     Vital Signs (Most Recent):  Temp: 99 °F (37.2 °C) (06/12/18 0747)  Pulse: 94 (06/12/18 0747)  Resp: 20 (06/12/18 0747)  BP: 119/78 (06/12/18 0747)  SpO2: 98 % (06/12/18 0747) Vital Signs (24h Range):  Temp:  [97.7 °F (36.5 °C)-99 °F (37.2 °C)] 99 °F (37.2 °C)  Pulse:  [] 94  Resp:  [15-20] 20  SpO2:  [90 %-100 %] 98 %  BP: ()/(49-80) 119/78     Weight: 109.9 kg (242 lb 4.6 oz)  Body mass index is 36.84 kg/m².    Intake/Output Summary (Last 24 hours) at 06/12/18 0850  Last data filed at 06/12/18 0600   Gross per 24 hour   Intake             1150 ml   Output             1109 ml   Net               41 ml      Physical Exam   Constitutional: He is oriented to person, place, and time. He appears well-developed and well-nourished. No distress.   HENT:   Head: Normocephalic and atraumatic.   Eyes: EOM are normal. Pupils are equal, round, and reactive to light.   Neck: Normal range of motion. Neck supple.   Cardiovascular: Normal rate, regular rhythm, normal heart sounds and intact distal pulses.  Exam reveals no gallop and no friction rub.    No murmur heard.  Pulmonary/Chest: Effort normal and breath sounds  normal. No respiratory distress. He exhibits no tenderness.   Abdominal: Soft. Bowel sounds are normal. He exhibits no distension. There is no tenderness.   Musculoskeletal: Normal range of motion. He exhibits no edema, tenderness or deformity.   Neurological: He is alert and oriented to person, place, and time. No cranial nerve deficit. Coordination normal.   Skin: Skin is warm and dry. No rash noted. He is not diaphoretic. No erythema. No pallor.   Psychiatric: He has a normal mood and affect. His behavior is normal. Judgment and thought content normal.   Nursing note and vitals reviewed.    Significant Labs:   BMP:   Recent Labs  Lab 06/12/18  0504   GLU 85      K 4.8      CO2 20*   BUN 17   CREATININE 4.7*   CALCIUM 9.3     CBC:   Recent Labs  Lab 06/11/18  0402   WBC 7.56   HGB 7.8*   HCT 25.3*   *     Assessment/Plan:      * Anoxic brain injury    -Cardiac arrest ~ 22 minutes at OSH.  Pt doing relatively well.  Extubated 5/28.  -05/21/18 MRI w/o evidence of anoxic brain injury, but several small strokes; pt encephalopathic  -Encephalopathy likely multifactorial--strokes, metabolic (kidney/liver injury), delirium.         Acute decompensated heart failure              Atrial tachycardia              Cardiomyopathy              Acute superficial gastritis with hemorrhage    Coffee ground material aspirated via NG on 5/23  Concurrent with acute drop in Hg  Hg has since uptrended.   No recurrent signs of upper GI bleed.              Debility    PT/OT  Consult placed to PM&R for rehab placement        Hemodialysis status              Iron deficiency anemia due to chronic blood loss              Acute bilat watershed infarction    - Incidental finding on MRI.   - Continues to have slurred speech but participating in rehab.        Acute combined systolic and diastolic ACC/AHA stage C congestive heart failure    S/p cardiac arrest on 5/19  LVEF 10-15%  - ACEi held in setting of ARF  - metoprolol  succinate 50mg daily  - will evaluate and start with hydralazine 10mg tid and isordil 10mg tid for afterload reduction if BP can tolerate while on HD  - aldosterone antagonist held in the setting of poor renal function  - continue high intensity statin therapy with atorvastatin 40mg  - transfuse for goal Hb >8  - 6/11 dcICD placed  - No post surgical complaints.         Cytotoxic cerebral edema    - secondary to anoxic brain injury, 22 minutes PEA-->vfib  - MRI brain 5/21 for anoxic assessment.  see anoxic brain injury        Acute renal failure with tubular necrosis    - unknown baseline renal function, not previously dialysis dependent  - ischemic ATN 2/2 cardiac arrest  - Remains oliguric; starting to make small volumes of urine but not clearing volume or electrolytes effectively  - Receiving HD PRN  - Underwent permacath placement 6/6  - Renal diet  - History of iron deficiency anemia; plan for EPO if counts do not recover promptly  - Followed by Nephrology  - Had HD on 6/11           Essential hypertension      See acute decompensated heart failure        Acute pulmonary edema    - Managing volume status with HD given ARF        Class 3 severe obesity due to excess calories with serious comorbidity and body mass index (BMI) of 40.0 to 44.9 in adult    S/p nutrition consult; counseled on lifestyle modifications  Body mass index is 36.84 kg/m².          Cardiac arrest      See acute decompensated heart failure          VTE Risk Mitigation         Ordered     heparin (porcine) injection 2,000 Units  As needed (PRN)      06/11/18 0839     IP VTE HIGH RISK PATIENT  Once      06/08/18 0945     heparin (porcine) injection 1,000 Units  As needed (PRN)      06/06/18 9534        Amos Roche MD  Department of Hospital Medicine   [facility]

## 2018-06-12 NOTE — ASSESSMENT & PLAN NOTE
-Cardiac arrest ~ 22 minutes at OSH.  Pt doing relatively well.  Extubated 5/28.  -05/21/18 MRI w/o evidence of anoxic brain injury, but several small strokes; pt encephalopathic  -Encephalopathy likely multifactorial--strokes, metabolic (kidney/liver injury), delirium.

## 2018-06-12 NOTE — PLAN OF CARE
Problem: Physical Therapy Goal  Goal: Physical Therapy Goal    Goals to be met by 6/18/2018    1. Pt will perform supine to sit from both sides of the bed with supervision   2. Pt will perform sit to supine with supervision   3. Pt will perform sit to stand transfers with stand by assistance without AD -- MET 6/12          Revised:  Pt will perform sit to stand transfers with independence   4. Pt will perform bed <> chair transfers with supervision with or without least restrictive AD.   5. Pt will perform gait x 50 feet with contact guard assistance with or without least restrictive AD.-met   Revised: pt will perform gait x 150 feet with Supervision with or without least restrictive AD  6. Pt will perform dynamic standing balance activities x  5 minutes with stand by assistance to reduce fall risk      Outcome: Ongoing (interventions implemented as appropriate)  Pt progressing well; continue current POC.     Bushra Cannon PT, DPT   6/12/2018  Pager: [phone]

## 2018-06-12 NOTE — SUBJECTIVE & OBJECTIVE
History reviewed. No pertinent past medical history.  History reviewed. No pertinent surgical history.  Review of patient's allergies indicates:   Allergen Reactions    Penicillins      Tolerated cefepime May 2018       Scheduled Medications:    aspirin  81 mg Oral Daily    atorvastatin  40 mg Oral Daily    clarithromycin  250 mg Oral Q12H    iron sucrose (VENOFER) IVPB  100 mg Intravenous Once per day on Mon Wed Fri    metoprolol succinate  50 mg Oral Daily       PRN Medications: sodium chloride 0.9%, acetaminophen, albuterol-ipratropium, heparin (porcine), heparin (porcine), ramelteon, sodium chloride 0.9%    Family History     Unknown per patient.         Social History Main Topics    Smoking status: Unknown If Ever Smoked    Smokeless tobacco: Current User     Types: Chew    Alcohol use Not on file    Drug use: Unknown    Sexual activity: Not on file     Review of Systems   Constitutional: Negative for chills, fatigue and fever.   HENT: Positive for trouble swallowing. Negative for voice change.    Eyes: Negative for photophobia and visual disturbance.   Respiratory: Negative for cough, shortness of breath and wheezing.    Cardiovascular: Negative for chest pain and palpitations.   Gastrointestinal: Negative for abdominal distention, nausea and vomiting.   Genitourinary: Negative for difficulty urinating and flank pain.   Musculoskeletal: Positive for gait problem. Negative for arthralgias and myalgias.   Skin: Negative for color change and rash.   Neurological: Positive for speech difficulty and weakness. Negative for numbness and headaches.   Psychiatric/Behavioral: Positive for confusion. Negative for agitation.     Objective:     Vital Signs (Most Recent):  Temp: 99 °F (37.2 °C) (06/12/18 0747)  Pulse: 85 (06/12/18 1100)  Resp: 20 (06/12/18 0747)  BP: 119/78 (06/12/18 0747)  SpO2: 98 % (06/12/18 0747)    Vital Signs (24h Range):  Temp:  [97.7 °F (36.5 °C)-99 °F (37.2 °C)] 99 °F (37.2 °C)  Pulse:   [] 85  Resp:  [15-20] 20  SpO2:  [90 %-100 %] 98 %  BP: ()/(49-80) 119/78     Body mass index is 36.84 kg/m².    Physical Exam   Constitutional: He appears well-developed and well-nourished.   Wife at bedside   HENT:   Head: Normocephalic and atraumatic.   Eyes: EOM are normal. Pupils are equal, round, and reactive to light.   Neck: Normal range of motion.   Cardiovascular: Normal rate and regular rhythm.    Pulmonary/Chest: Effort normal. No respiratory distress.   Abdominal: Soft. There is no tenderness.   Musculoskeletal: Normal range of motion. He exhibits no deformity.   Neurological: He is alert. He exhibits normal muscle tone.   +oriented to self, place   + dysarthria + cognitive linguistic impairments  RUE: 5/5, 5/5 .  LUE: 5/5, 5/5 .  RLE: 5/5, DF 5/5, PF 5/5.  LLE: 5/5, DF 5/5, PF 5/5.     Skin: Skin is warm and dry.   Psychiatric: His speech is slurred. Cognition and memory are impaired.     NEUROLOGICAL EXAMINATION:     MENTAL STATUS   Speech: slurred     CRANIAL NERVES     CN III, IV, VI   Pupils are equal, round, and reactive to light.  Extraocular motions are normal.       Diagnostic Results: Labs: Reviewed

## 2018-06-12 NOTE — PROGRESS NOTES
[facility]  Physical Medicine & Rehab  Progress Note    Patient Name: Los Mendez  MRN: [id number]  Admission Date: 5/19/2018  Length of Stay: 24 days  Attending Physician: Ramses Mcfarland MD    Subjective:     Principal Problem:Anoxic brain injury    Hospital Course:   5/31/-6/1/18: Evaluated by therapy.  Bed mobility totalA x 2 ppl.  No sit to stand.  Sat EOB 10 mins MaxA.  UBD and LBD totalA.  6/5/18: Participated with therapy.   Bed mobility CGA.  Sit to stand CGA-SBA & RW.  Ambulated 15 ft Brittaney & RW.  6/4 grooming Brittaney.  6/6/18: No therapy 2/2 dialysis.   6/12/18: Passed for regular and thin. SLP diagnosis of Cognitive-Linguistic Impairment.     History reviewed. No pertinent past medical history.  History reviewed. No pertinent surgical history.  Review of patient's allergies indicates:   Allergen Reactions    Penicillins      Tolerated cefepime May 2018       Scheduled Medications:    aspirin  81 mg Oral Daily    atorvastatin  40 mg Oral Daily    clarithromycin  250 mg Oral Q12H    iron sucrose (VENOFER) IVPB  100 mg Intravenous Once per day on Mon Wed Fri    metoprolol succinate  50 mg Oral Daily       PRN Medications: sodium chloride 0.9%, acetaminophen, albuterol-ipratropium, heparin (porcine), heparin (porcine), ramelteon, sodium chloride 0.9%    Family History     Unknown per patient.         Social History Main Topics    Smoking status: Unknown If Ever Smoked    Smokeless tobacco: Current User     Types: Chew    Alcohol use Not on file    Drug use: Unknown    Sexual activity: Not on file     Review of Systems   Constitutional: Negative for chills, fatigue and fever.   HENT: Positive for trouble swallowing. Negative for voice change.    Eyes: Negative for photophobia and visual disturbance.   Respiratory: Negative for cough, shortness of breath and wheezing.    Cardiovascular: Negative for chest pain and palpitations.   Gastrointestinal: Negative for abdominal  distention, nausea and vomiting.   Genitourinary: Negative for difficulty urinating and flank pain.   Musculoskeletal: Positive for gait problem. Negative for arthralgias and myalgias.   Skin: Negative for color change and rash.   Neurological: Positive for speech difficulty and weakness. Negative for numbness and headaches.   Psychiatric/Behavioral: Positive for confusion. Negative for agitation.     Objective:     Vital Signs (Most Recent):  Temp: 99 °F (37.2 °C) (06/12/18 0747)  Pulse: 85 (06/12/18 1100)  Resp: 20 (06/12/18 0747)  BP: 119/78 (06/12/18 0747)  SpO2: 98 % (06/12/18 0747)    Vital Signs (24h Range):  Temp:  [97.7 °F (36.5 °C)-99 °F (37.2 °C)] 99 °F (37.2 °C)  Pulse:  [] 85  Resp:  [15-20] 20  SpO2:  [90 %-100 %] 98 %  BP: ()/(49-80) 119/78     Body mass index is 36.84 kg/m².    Physical Exam   Constitutional: He appears well-developed and well-nourished.   Wife at bedside   HENT:   Head: Normocephalic and atraumatic.   Eyes: EOM are normal. Pupils are equal, round, and reactive to light.   Neck: Normal range of motion.   Cardiovascular: Normal rate and regular rhythm.    Pulmonary/Chest: Effort normal. No respiratory distress.   Abdominal: Soft. There is no tenderness.   Musculoskeletal: Normal range of motion. He exhibits no deformity.   Neurological: He is alert. He exhibits normal muscle tone.   +oriented to self, place   + dysarthria + cognitive linguistic impairments  RUE: 5/5, 5/5 .  LUE: 5/5, 5/5 .  RLE: 5/5, DF 5/5, PF 5/5.  LLE: 5/5, DF 5/5, PF 5/5.  Skin: Skin is warm and dry.   Psychiatric: His speech is slurred. Cognition and memory are impaired.      Diagnostic Results: Labs: Reviewed      Assessment/Plan:      * Anoxic brain injury    -cardiac arrest at [facility] ED ~22 mins and intubated  -5/22-imaging consistent with anoxic brain injury?  -now extubated  -hospital course complicated by encephalopathy          Debility    -2/2 cardiac arrest with imaging suggestive  of anoxic brain injury with complicated hospital course  -much improved with PT/OT    See hospital course for functional, cognitive/speech/language, and nutrition/swallow status.      Recommendations  -  Encourage mobility, OOB in chair at least 3 hours per day, and early ambulation as appropriate  -  PT/OT evaluate and treat  -  Pain management  -  Monitor for and prevent skin breakdown and pressure ulcers  · Early mobility, repositioning/weight shifting every 20-30 minutes when sitting, turn patient every 2 hours, proper mattress/overlay and chair cushioning, pressure relief/heel protector boots  -  DVT prophylaxis:  [facility]  -  Reviewed discharge options (IP rehab, SNF, HH therapy, and OP therapy)          Iron deficiency anemia due to chronic blood loss    -s/p transfusions  -stable         Acute bilat watershed infarction    -per Vascular Neurology, MRI w/ incidental find of what appear to be bilateral cerebellar watershed infarcts in the setting of cardiac arrest x ~22 minutes w/ several defibrillation attempts.    See hospital course for functional, cognitive/speech/language, and nutrition/swallow status.      Recommendations  -  Encourage mobility, OOB in chair at least 3 hours per day, and early ambulation as appropriate   -  PT/OT evaluate and treat  -  SLP speech and cognitive evaluate and treat  -  Monitor sleep disturbances and establish consistent sleep-wake cycle  -  Monitor for bowel and bladder dysfunction  -  Monitor for shoulder pain and subluxation  -  Monitor for spasticity  -  Monitor for and prevent skin breakdown and pressure ulcers  · Early mobility, repositioning/weight shifting every 20-30 minutes when sitting, turn patient every 2 hours, proper mattress/overlay and chair cushioning, pressure relief/heel protector boots  -  DVT prophylaxis:  [facility]  -  Reviewed discharge options (IP rehab, SNF, HH therapy, and OP therapy)          Acute renal failure with tubular necrosis    -initially required  CRRT now on HD  -permacath place yesterday         Cardiac arrest    -cardiac arrest PTA  -05/24/18 2D Echo notable for EF 10-15%  -troponin down trended  -EP consulted-ICD placed 6/11        Recommend Inpatient Rehab.  IRF preference per patient/[facility].  Barriers to IRF admission:  Orehab not in network with patients insurance. Will sign off.  Please call with questions/concerns or re-consult if situation changes.      Yana Dela Cruz NP  Department of Physical Medicine & Rehab   [facility]

## 2018-06-12 NOTE — PLAN OF CARE
1:40 PM  SW received phone call from Re navarro/JUSTINO.  She reported Pt now only needs Out Pt PT/OT/Speech to go home.  States at d/c Pt will need rolling walker.      Shamika Betancourt, DOMINGO  [facility]

## 2018-06-12 NOTE — PLAN OF CARE
Problem: Patient Care Overview  Goal: Plan of Care Review  Outcome: Ongoing (interventions implemented as appropriate)  POC reviewed with pt & spouse. AAO x4. Telemetry monitoring. Pt remained free from falls. Questions and concerns addressed. Pt progressing towards goals. Will continue to monitor. See flow sheets for full assessment and VS

## 2018-06-12 NOTE — PROGRESS NOTES
[facility]  Cardiac Electrophysiology  Progress Note    Admission Date: 5/19/2018  Code Status: Full Code   Attending Physician: Ramses Mcfarland MD   Expected Discharge Date: 6/14/2018  Principal Problem:Anoxic brain injury    Subjective:     Interval History: NAEON  Tele NSR    Review of Systems   Constitution: Negative.   HENT: Negative.    Eyes: Negative.    Cardiovascular: Negative.    Respiratory: Negative.    Endocrine: Negative.    Skin: Negative.    Musculoskeletal: Negative.    Gastrointestinal: Negative.    Genitourinary: Negative.    Neurological: Negative.      Objective:     Vital Signs (Most Recent):  Temp: 99 °F (37.2 °C) (06/12/18 0747)  Pulse: 94 (06/12/18 0747)  Resp: 20 (06/12/18 0747)  BP: 119/78 (06/12/18 0747)  SpO2: 98 % (06/12/18 0747) Vital Signs (24h Range):  Temp:  [97.7 °F (36.5 °C)-99 °F (37.2 °C)] 99 °F (37.2 °C)  Pulse:  [] 94  Resp:  [15-20] 20  SpO2:  [90 %-100 %] 98 %  BP: ()/(49-80) 119/78     Weight: 109.9 kg (242 lb 4.6 oz)  Body mass index is 36.84 kg/m².     SpO2: 98 %  O2 Device (Oxygen Therapy): room air    Physical Exam   Constitutional: He is oriented to person, place, and time. He appears well-developed and well-nourished.   HENT:   Head: Normocephalic and atraumatic.   Eyes: Conjunctivae and EOM are normal. Pupils are equal, round, and reactive to light.   Neck: Normal range of motion. Neck supple. No JVD present.   Cardiovascular: Normal rate, regular rhythm and normal heart sounds.  Exam reveals no gallop and no friction rub.    No murmur heard.  Pulmonary/Chest: Effort normal and breath sounds normal. No respiratory distress. He has no wheezes. He has no rales. He exhibits no tenderness.   Abdominal: Soft. Bowel sounds are normal. He exhibits no distension. There is no tenderness.   Musculoskeletal: Normal range of motion. He exhibits no edema or tenderness.   Neurological: He is alert and oriented to person, place, and time.   Skin:  Skin is warm and dry. No erythema. No pallor.   Left chest wall insertion site with bruising along suture line, otherwise unremarkable       Significant Labs:   EP:   Recent Labs  Lab 06/11/18  0402 06/12/18  0504    137   K 4.1 4.8    104   CO2 23 20*   GLU 84 85   BUN 36* 17   CREATININE 7.8* 4.7*   CALCIUM 9.5 9.3   ALBUMIN 3.0* 3.0*   ANIONGAP 13 13   ESTGFRAFRICA 8.6* 15.9*   EGFRNONAA 7.4* 13.7*   WBC 7.56  --    HGB 7.8*  --    HCT 25.3*  --    *  --    INR 1.0  --        Significant Imaging: Echocardiogram:   2D echo with color flow doppler:   Results for orders placed or performed during the hospital encounter of 05/19/18   2D echo with color flow doppler   Result Value Ref Range    EF 10 (A) 55 - 65    Mitral Valve Regurgitation MILD     Diastolic Dysfunction Yes (A)     Est. PA Systolic Pressure 37.47     Tricuspid Valve Regurgitation MILD      Assessment and Plan:     Cardiac arrest     PET stress negative for ischemia  S/p ICD yesterday, RA lead, RV coil  EF improved on PET, consider repeat 2decho  ICD:  Not tender, no drainage, no swelling  Follow up with wound care in ep clinic 1-2 weeks, Dr Smith in clinic in 3 months  Arm in sling x 48 hours straight then nightly x 2 weeks  No lifting more than 5 lbs in left hand x 4 weeks, no drastic movements at shoulder (throwing a fishing line, folding a sheet)  No raising arm above shoulder length x 4 weeks  Do not let water hit wound directly once dressing removed x 48 hours  No driving x 1 week, no turns with left arm for 4 weeks  PO antibiotics as prescribed for 5 days post implant in total.                 EP to sign off.  Please recall us as needed.    Cooper Ruiz MD  Cardiac Electrophysiology  [facility]

## 2018-06-12 NOTE — ASSESSMENT & PLAN NOTE
-2/2 cardiac arrest with imaging suggestive of anoxic brain injury with complicated hospital course  -much improved with PT/OT    See hospital course for functional, cognitive/speech/language, and nutrition/swallow status.      Recommendations  -  Encourage mobility, OOB in chair at least 3 hours per day, and early ambulation as appropriate  -  PT/OT evaluate and treat  -  Pain management  -  Monitor for and prevent skin breakdown and pressure ulcers  · Early mobility, repositioning/weight shifting every 20-30 minutes when sitting, turn patient every 2 hours, proper mattress/overlay and chair cushioning, pressure relief/heel protector boots  -  DVT prophylaxis:  [facility]  -  Reviewed discharge options (IP rehab, SNF, HH therapy, and OP therapy)

## 2018-06-12 NOTE — SUBJECTIVE & OBJECTIVE
Interval History: NAEON  Tele NSR    Review of Systems   Constitution: Negative.   HENT: Negative.    Eyes: Negative.    Cardiovascular: Negative.    Respiratory: Negative.    Endocrine: Negative.    Skin: Negative.    Musculoskeletal: Negative.    Gastrointestinal: Negative.    Genitourinary: Negative.    Neurological: Negative.      Objective:     Vital Signs (Most Recent):  Temp: 99 °F (37.2 °C) (06/12/18 0747)  Pulse: 94 (06/12/18 0747)  Resp: 20 (06/12/18 0747)  BP: 119/78 (06/12/18 0747)  SpO2: 98 % (06/12/18 0747) Vital Signs (24h Range):  Temp:  [97.7 °F (36.5 °C)-99 °F (37.2 °C)] 99 °F (37.2 °C)  Pulse:  [] 94  Resp:  [15-20] 20  SpO2:  [90 %-100 %] 98 %  BP: ()/(49-80) 119/78     Weight: 109.9 kg (242 lb 4.6 oz)  Body mass index is 36.84 kg/m².     SpO2: 98 %  O2 Device (Oxygen Therapy): room air    Physical Exam   Constitutional: He is oriented to person, place, and time. He appears well-developed and well-nourished.   HENT:   Head: Normocephalic and atraumatic.   Eyes: Conjunctivae and EOM are normal. Pupils are equal, round, and reactive to light.   Neck: Normal range of motion. Neck supple. No JVD present.   Cardiovascular: Normal rate, regular rhythm and normal heart sounds.  Exam reveals no gallop and no friction rub.    No murmur heard.  Pulmonary/Chest: Effort normal and breath sounds normal. No respiratory distress. He has no wheezes. He has no rales. He exhibits no tenderness.   Abdominal: Soft. Bowel sounds are normal. He exhibits no distension. There is no tenderness.   Musculoskeletal: Normal range of motion. He exhibits no edema or tenderness.   Neurological: He is alert and oriented to person, place, and time.   Skin: Skin is warm and dry. No erythema. No pallor.   Left chest wall insertion site with bruising along suture line, otherwise unremarkable       Significant Labs:   EP:   Recent Labs  Lab 06/11/18  0402 06/12/18  0504    137   K 4.1 4.8    104   CO2 23 20*    GLU 84 85   BUN 36* 17   CREATININE 7.8* 4.7*   CALCIUM 9.5 9.3   ALBUMIN 3.0* 3.0*   ANIONGAP 13 13   ESTGFRAFRICA 8.6* 15.9*   EGFRNONAA 7.4* 13.7*   WBC 7.56  --    HGB 7.8*  --    HCT 25.3*  --    *  --    INR 1.0  --        Significant Imaging: Echocardiogram:   2D echo with color flow doppler:   Results for orders placed or performed during the hospital encounter of 05/19/18   2D echo with color flow doppler   Result Value Ref Range    EF 10 (A) 55 - 65    Mitral Valve Regurgitation MILD     Diastolic Dysfunction Yes (A)     Est. PA Systolic Pressure 37.47     Tricuspid Valve Regurgitation MILD

## 2018-06-12 NOTE — PT/OT/SLP PROGRESS
Physical Therapy Treatment    Patient Name:  Los Mendez   MRN:  [id number]    Recommendations:     Discharge Recommendations:  outpatient OT, outpatient PT   Discharge Equipment Recommendations: walker, rolling   Barriers to discharge: Inaccessible home    Assessment:     Los Mendez is a 47 y.o. male admitted with a medical diagnosis of Anoxic brain injury, s/p ICD placement 6/11/18.  He presents with the following impairments/functional limitations:  impaired endurance, impaired balance, gait instability, impaired functional mobilty, decreased safety awareness. Pt progressing well with functional mobility, demonstrating increased independence with bed mobility, transfers and gait. However, pt continues to display gait instability and impaired safety awareness, placing pt at risk for falls. Pt is eager to return home and motivated to regain independence. Pt's discharge recommendations modified to home with OP therapy services 2/2 progress with mobility and ADLs.     Rehab Prognosis:  good; patient would benefit from acute skilled PT services to address these deficits and reach maximum level of function.      Recent Surgery: Procedure(s) (LRB):  ICD DC new (Left) 1 Day Post-Op    Plan:     During this hospitalization, patient to be seen 3 x/week to address the above listed problems via gait training, therapeutic activities, therapeutic exercises, neuromuscular re-education  · Plan of Care Expires:  06/30/18   Plan of Care Reviewed with: patient, spouse    Subjective     Communicated with RN prior to session.  Patient found supine upon PT entry to room, agreeable to treatment.  Pt is ~24 hours s/p pacemaker placement- precautions include wearing LUE sling and swathe for 48 hours post surgery. However, pt's sling off and sitting in bedside chair. Pt and spouse educated on donning/doffing sling and swathe and pacemaker precautions. Pt cleared per MD Reynaga) to utilize RW following 48 hour window, but to limit  weight bearing through LUE.     Patient comments/goals: discharge home   Pain/Comfort:  · Pain Rating 1: 0/10  · Pain Rating Post-Intervention 1: 0/10    Patients cultural, spiritual, Taoist conflicts given the current situation: no conflicts    Objective:     Patient found with: telemetry     General Precautions: Standard, pacemaker, fall, aspiration   Orthopedic Precautions:N/A   Braces:  (L sling and swathe s/p pacemaker )     Functional Mobility:       Bed Mobility     · Supine to sit: supervision with HOB elevated  · Sit to supine: supervision with HOB elevated      Transfers · Sit <> Stand: SBA from EOB x 1 trial with no AD        Gait  · Distance: ~100 ft   · Assistance level: no AD, CGA-SBA for safety and balance    · Gait Deviations: increased postural sway, decreased step length, decreased carlos           AM-PAC 6 CLICK MOBILITY  Turning over in bed (including adjusting bedclothes, sheets and blankets)?: 4  Sitting down on and standing up from a chair with arms (e.g., wheelchair, bedside commode, etc.): 3  Moving from lying on back to sitting on the side of the bed?: 3  Moving to and from a bed to a chair (including a wheelchair)?: 3  Need to walk in hospital room?: 3  Climbing 3-5 steps with a railing?: 3  Total Score: 19       Therapeutic Activities and Exercises:  Pt and spouse educated on:   - donning/doffing LUE sling & swathe (pt required total A to jean prior to activity)   - pacemaker precautions   - therapy recommendations     Pt participated in gait trial with no AD, with CGA-SBA provided for safety. Cueing provided for balance and to increase step length bilaterally. Pt with no overt LOB, but increased sway noted. Would benefit from continued CGA from family/nursing staff during 48 hour window s/p PPM. Following 48 hr. Window, pt would likely benefit from resuming RW use during gait.     Patient left HOB elevated with all lines intact, call button in reach, RN notified and wife  present.    GOALS:    Physical Therapy Goals        Problem: Physical Therapy Goal    Goal Priority Disciplines Outcome Goal Variances Interventions   Physical Therapy Goal     PT/OT, PT Ongoing (interventions implemented as appropriate)     Description:    Goals to be met by 6/18/2018    1. Pt will perform supine to sit from both sides of the bed with supervision   2. Pt will perform sit to supine with supervision   3. Pt will perform sit to stand transfers with stand by assistance without AD -- MET 6/12          Revised:  Pt will perform sit to stand transfers with independence   4. Pt will perform bed <> chair transfers with supervision with or without least restrictive AD.   5. Pt will perform gait x 50 feet with contact guard assistance with or without least restrictive AD.-met   Revised: pt will perform gait x 150 feet with Supervision with or without least restrictive AD  6. Pt will perform dynamic standing balance activities x  5 minutes with stand by assistance to reduce fall risk                        Time Tracking:     PT Received On: 06/12/18  PT Start Time: 1403     PT Stop Time: 1420  PT Total Time (min): 17 min     Billable Minutes: Therapeutic Activity 17 min    Treatment Type: Treatment  PT/PTA: PT     PTA Visit Number: 0     Bushra Cannon PT, DPT   6/12/2018  Pager: [phone]

## 2018-06-12 NOTE — ASSESSMENT & PLAN NOTE
-cardiac arrest PTA  -05/24/18 2D Echo notable for EF 10-15%  -troponin down trended  -EP consulted-ICD placed 6/11

## 2018-06-12 NOTE — SUBJECTIVE & OBJECTIVE
Interval History:     NAEON. Received ICD yesterday. Denies complications or pain on surgical site.     Review of Systems   Constitutional: Negative for activity change, chills, fatigue and fever.   HENT: Negative for congestion, facial swelling, sore throat and voice change.    Eyes: Negative for pain, redness and visual disturbance.   Respiratory: Negative for cough, chest tightness and shortness of breath.    Cardiovascular: Negative for chest pain and leg swelling.   Gastrointestinal: Negative for abdominal pain, constipation, diarrhea and nausea.   Endocrine: Negative for cold intolerance and heat intolerance.   Musculoskeletal: Negative for arthralgias, back pain and neck pain.        Strength 5/5 in both upper and lower extremities.    Skin: Negative for rash and wound.   Neurological: Negative for dizziness, speech difficulty, weakness, light-headedness and headaches.   Psychiatric/Behavioral: Negative for agitation and confusion.     Objective:     Vital Signs (Most Recent):  Temp: 99 °F (37.2 °C) (06/12/18 0747)  Pulse: 94 (06/12/18 0747)  Resp: 20 (06/12/18 0747)  BP: 119/78 (06/12/18 0747)  SpO2: 98 % (06/12/18 0747) Vital Signs (24h Range):  Temp:  [97.7 °F (36.5 °C)-99 °F (37.2 °C)] 99 °F (37.2 °C)  Pulse:  [] 94  Resp:  [15-20] 20  SpO2:  [90 %-100 %] 98 %  BP: ()/(49-80) 119/78     Weight: 109.9 kg (242 lb 4.6 oz)  Body mass index is 36.84 kg/m².    Intake/Output Summary (Last 24 hours) at 06/12/18 0850  Last data filed at 06/12/18 0600   Gross per 24 hour   Intake             1150 ml   Output             1109 ml   Net               41 ml      Physical Exam   Constitutional: He is oriented to person, place, and time. He appears well-developed and well-nourished. No distress.   HENT:   Head: Normocephalic and atraumatic.   Eyes: EOM are normal. Pupils are equal, round, and reactive to light.   Neck: Normal range of motion. Neck supple.   Cardiovascular: Normal rate, regular rhythm, normal  heart sounds and intact distal pulses.  Exam reveals no gallop and no friction rub.    No murmur heard.  Pulmonary/Chest: Effort normal and breath sounds normal. No respiratory distress. He exhibits no tenderness.   Abdominal: Soft. Bowel sounds are normal. He exhibits no distension. There is no tenderness.   Musculoskeletal: Normal range of motion. He exhibits no edema, tenderness or deformity.   Neurological: He is alert and oriented to person, place, and time. No cranial nerve deficit. Coordination normal.   Skin: Skin is warm and dry. No rash noted. He is not diaphoretic. No erythema. No pallor.   Psychiatric: He has a normal mood and affect. His behavior is normal. Judgment and thought content normal.   Nursing note and vitals reviewed.    Significant Labs:   BMP:   Recent Labs  Lab 06/12/18  0504   GLU 85      K 4.8      CO2 20*   BUN 17   CREATININE 4.7*   CALCIUM 9.3     CBC:   Recent Labs  Lab 06/11/18  0402   WBC 7.56   HGB 7.8*   HCT 25.3*   *

## 2018-06-12 NOTE — PLAN OF CARE
1:47 PM  Informed rep Tia that Pt will not need InPt rehab and Pt now only needs Out Pt PT/OT/Speech to go home.  Tia reported they have an out Pt rehab near the Pt's residence.  Spoke w/Mary w/OT Pt rehab she reported all she will need is the OT Pt referral on Pt.      1:12 PM   SW spoke w/rep Tia [phone], at [facility].  She reported they declined Pt prior due to pending Medicaid status.    States not the Pt has Medicaid, they will look at Pt again.    Faxed referral to [phone], will f/u once fax is completed.      Shamika Betancourt, DOMINGO  [facility]

## 2018-06-12 NOTE — PT/OT/SLP PROGRESS
"Speech Language Pathology Treatment    Patient Name:  Los Mendez   MRN:  [id number]  Admitting Diagnosis: Anoxic brain injury    Recommendations:                 General Recommendations:  Cognitive-linguistic therapy  Diet recommendations:  Regular, Liquid Diet Level: Thin   Aspiration Precautions: Standard aspiration precautions   General Precautions: Standard, fall  Communication strategies:  none    Subjective     Patient's wife states, "He is still getting confused with some things."  Patient states, "I should be leaving today."    Pain/Comfort:  Pain Rating 1: 0/10    Objective:     Has the patient been evaluated by SLP for swallowing?   Yes  Keep patient NPO? No   Current Respiratory Status: room air      Pt in bed upon entry with wife at bedside. Patient assessed with bites of a sandwich and sips of water via straw. Patient with no overt s/s of aspiration and adequate oral clearance. No noted difficulties with solids. Patient states, "I've been eating deyanira crackers since I got here. I'm not having any problems with swallowing." SLP reviewed safe swallowing precaution, s/s and risks of aspiration, and recommendation for diet upgrade to regular solids. Patient's swallowing noted to be WFL.     Word fluency task completed. Pt named 3 sports no cues and 7 sports given moderate cues. He reported he likes basketball. Patient having difficulty recalling basketball team names (including his favorite teams). Patient benefits from phonemic cues. Patient completed mental manipulation tasks requiring multiple repetition of words. Patient answered simple money/time management questions with <50% acc given cues, utilizing clock, and given repetition of instructions. Patient and wife reporting impaired short term memory from baseline. Patient recalled 1/3 words after a 3 minute delay given cues. Pt was 100% intelligible throughout spontaneous conversation. He answered functional problem solving questions with 100% acc. " TV on throughout session. He attended to all therapy tasks with no cues or redirection required.     Assessment:     Los Mendez is a 47 y.o. male with an SLP diagnosis of Cognitive-Linguistic Impairment.  Patient tolerating regular solids and thin liquids. ST will continue to follow for cognitive-linguistic impairments.     Goals:    SLP Goals        Problem: SLP Goal    Goal Priority Disciplines Outcome   SLP Goal     SLP Ongoing (interventions implemented as appropriate)   Description:  Updated Speech Therapy Short Term Goals  Goal expected to be met by 6/19  1. Patient will answer general information questions with 90% acc min cues.   2. Patient will complete simple money/time management tasks with 90% acc min cues.   3. Patient will recall information after a 2+ minute delay utilizing memory strategies with 90% acc no cues  4. Patient will generate 10 items per category to enhance organizations skills given no time constraints.   5. Patient will complete 4 word mental manipulation tasks with 90% acc given 1 repetition.     Short Term Goals expected to be met by 6/11:  1. Pt will tolerate diet of soft solids and thin liquids without overt clinical signs of aspiration -met  2. Pt will complete problem solving skills w/75 % acc ind to enhance safety awareness -met  3 Pt will generate 10 items per category to enhance organizations skills -ongoing  4 Pt will demonstrate sustained attention to task across 5 consecutive minutes w/ min cues to enhance attention skills -met  5. Pt will participate in ongoing assessment of speech language and cognitive linguistic skills to help rule out deficits and determine therapeutic plan of care- ongoing                          Plan:     · Patient to be seen:  3 x/week   · Plan of Care reviewed with:  patient, spouse   · SLP Follow-Up:  Yes       Discharge recommendations:  rehabilitation facility       Time Tracking:     SLP Treatment Date:   06/12/18  Speech Start Time:   0858  Speech Stop Time:  0918     Speech Total Time (min):  20 min    Billable Minutes: Speech Therapy Individual 10 and Treatment Swallowing Dysfunction 10    NGOC Quiroga, CCC-SLP   Pager: [phone]  06/12/2018

## 2018-06-12 NOTE — ASSESSMENT & PLAN NOTE
PET stress negative for ischemia  S/p ICD yesterday, RA lead, RV coil  EF improved on PET, consider repeat 2decho  ICD:  Not tender, no drainage, no swelling  Follow up with wound care in ep clinic 1-2 weeks, Dr Smith in clinic in 3 months  Arm in sling x 48 hours straight then nightly x 2 weeks  No lifting more than 5 lbs in left hand x 4 weeks, no drastic movements at shoulder (throwing a fishing line, folding a sheet)  No raising arm above shoulder length x 4 weeks  Do not let water hit wound directly once dressing removed x 48 hours  No driving x 1 week, no turns with left arm for 4 weeks

## 2018-06-12 NOTE — PLAN OF CARE
1:46 PM  LOBITO f/u w/rep to inform her Pt no longer needs InPt rehab.    1:19 PM  LOBITO f/u w/Justine at [facility] [phone].  She reported they do not have any beds today but anticipate d/c'es in the morning.  Reported she will phone [facility] back and that she is interested in accepting Pt.  Reported they will look over referral today and get back w/[facility] tomorrow.      Shamika Betancourt, MSW  [facility]

## 2018-06-12 NOTE — PLAN OF CARE
Problem: SLP Goal  Goal: SLP Goal  Updated Speech Therapy Short Term Goals  Goal expected to be met by 6/19  1. Patient will answer general information questions with 90% acc min cues.   2. Patient will complete simple money/time management tasks with 90% acc min cues.   3. Patient will recall information after a 2+ minute delay utilizing memory strategies with 90% acc no cues  4. Patient will generate 10 items per category to enhance organizations skills given no time constraints.   5. Patient will complete 4 word mental manipulation tasks with 90% acc given 1 repetition.     Short Term Goals expected to be met by 6/11:  1. Pt will tolerate diet of soft solids and thin liquids without overt clinical signs of aspiration -met  2. Pt will complete problem solving skills w/75 % acc ind to enhance safety awareness -met  3 Pt will generate 10 items per category to enhance organizations skills -ongoing  4 Pt will demonstrate sustained attention to task across 5 consecutive minutes w/ min cues to enhance attention skills -met  5. Pt will participate in ongoing assessment of speech language and cognitive linguistic skills to help rule out deficits and determine therapeutic plan of care- ongoing        Outcome: Ongoing (interventions implemented as appropriate)  Goals updated. Patient tolerating regular solids and thin liquids with no overt s/s of aspiration. ST will continue to follow for high level cognitive-linguistic therapy.   BRIAN Miller., CCC-SLP  Pager: [phone]  06/12/2018

## 2018-06-12 NOTE — PLAN OF CARE
Notified SW to follow up on rehab referrals and get additional if needed, will transfer HD seat from [facility] to a facility closer to rehab that patient is accepted into, will update as needed.

## 2018-06-13 PROBLEM — I42.9 CARDIOMYOPATHY: Status: RESOLVED | Noted: 2018-06-07 | Resolved: 2018-06-13

## 2018-06-13 LAB
ALBUMIN SERPL BCP-MCNC: 3.2 G/DL
ANION GAP SERPL CALC-SCNC: 10 MMOL/L
BASOPHILS # BLD AUTO: 0.06 K/UL
BASOPHILS NFR BLD: 0.9 %
BUN SERPL-MCNC: 22 MG/DL
CALCIUM SERPL-MCNC: 9.8 MG/DL
CHLORIDE SERPL-SCNC: 100 MMOL/L
CHOLEST SERPL-MCNC: 96 MG/DL
CHOLEST/HDLC SERPL: 3.7 {RATIO}
CO2 SERPL-SCNC: 24 MMOL/L
CREAT SERPL-MCNC: 5.2 MG/DL
DIFFERENTIAL METHOD: ABNORMAL
EOSINOPHIL # BLD AUTO: 0.4 K/UL
EOSINOPHIL NFR BLD: 5.3 %
ERYTHROCYTE [DISTWIDTH] IN BLOOD BY AUTOMATED COUNT: ABNORMAL %
EST. GFR  (AFRICAN AMERICAN): 14.1 ML/MIN/1.73 M^2
EST. GFR  (NON AFRICAN AMERICAN): 12.2 ML/MIN/1.73 M^2
GLUCOSE SERPL-MCNC: 90 MG/DL
HCT VFR BLD AUTO: 25.6 %
HDLC SERPL-MCNC: 26 MG/DL
HDLC SERPL: 27.1 %
HGB BLD-MCNC: 7.8 G/DL
IMM GRANULOCYTES # BLD AUTO: 0.02 K/UL
IMM GRANULOCYTES NFR BLD AUTO: 0.3 %
LDLC SERPL CALC-MCNC: 49.2 MG/DL
LYMPHOCYTES # BLD AUTO: 1.3 K/UL
LYMPHOCYTES NFR BLD: 19.3 %
MCH RBC QN AUTO: 23.4 PG
MCHC RBC AUTO-ENTMCNC: 30.5 G/DL
MCV RBC AUTO: 77 FL
MONOCYTES # BLD AUTO: 1 K/UL
MONOCYTES NFR BLD: 15.3 %
NEUTROPHILS # BLD AUTO: 4 K/UL
NEUTROPHILS NFR BLD: 58.9 %
NONHDLC SERPL-MCNC: 70 MG/DL
NRBC BLD-RTO: 0 /100 WBC
PHOSPHATE SERPL-MCNC: 5.5 MG/DL
PLATELET # BLD AUTO: 371 K/UL
PMV BLD AUTO: 9.7 FL
POTASSIUM SERPL-SCNC: 4.2 MMOL/L
RBC # BLD AUTO: 3.33 M/UL
SODIUM SERPL-SCNC: 134 MMOL/L
TRIGL SERPL-MCNC: 104 MG/DL
WBC # BLD AUTO: 6.75 K/UL

## 2018-06-13 PROCEDURE — 80069 RENAL FUNCTION PANEL: CPT

## 2018-06-13 PROCEDURE — 63700000 PHARM REV CODE 250 ALT 637 W/O HCPCS: Performed by: HOSPITALIST

## 2018-06-13 PROCEDURE — 25000003 PHARM REV CODE 250: Performed by: INTERNAL MEDICINE

## 2018-06-13 PROCEDURE — 25000003 PHARM REV CODE 250: Performed by: NURSE PRACTITIONER

## 2018-06-13 PROCEDURE — 85025 COMPLETE CBC W/AUTO DIFF WBC: CPT

## 2018-06-13 PROCEDURE — 25000003 PHARM REV CODE 250: Performed by: STUDENT IN AN ORGANIZED HEALTH CARE EDUCATION/TRAINING PROGRAM

## 2018-06-13 PROCEDURE — 63600175 PHARM REV CODE 636 W HCPCS: Performed by: INTERNAL MEDICINE

## 2018-06-13 PROCEDURE — 11000001 HC ACUTE MED/SURG PRIVATE ROOM

## 2018-06-13 PROCEDURE — 36415 COLL VENOUS BLD VENIPUNCTURE: CPT

## 2018-06-13 PROCEDURE — 90935 HEMODIALYSIS ONE EVALUATION: CPT | Mod: ,,, | Performed by: NURSE PRACTITIONER

## 2018-06-13 PROCEDURE — 90935 HEMODIALYSIS ONE EVALUATION: CPT

## 2018-06-13 PROCEDURE — 80061 LIPID PANEL: CPT

## 2018-06-13 RX ADMIN — CLARITHROMYCIN 250 MG: 250 FOR SUSPENSION ORAL at 09:06

## 2018-06-13 RX ADMIN — ASPIRIN 81 MG CHEWABLE TABLET 81 MG: 81 TABLET CHEWABLE at 09:06

## 2018-06-13 RX ADMIN — SODIUM CHLORIDE: 0.9 INJECTION, SOLUTION INTRAVENOUS at 01:06

## 2018-06-13 RX ADMIN — HEPARIN SODIUM 1000 UNITS: 1000 INJECTION, SOLUTION INTRAVENOUS; SUBCUTANEOUS at 05:06

## 2018-06-13 RX ADMIN — METOPROLOL SUCCINATE 50 MG: 50 TABLET, EXTENDED RELEASE ORAL at 09:06

## 2018-06-13 RX ADMIN — ATORVASTATIN CALCIUM 40 MG: 20 TABLET, FILM COATED ORAL at 09:06

## 2018-06-13 RX ADMIN — HEPARIN SODIUM 2000 UNITS: 1000 INJECTION, SOLUTION INTRAVENOUS; SUBCUTANEOUS at 01:06

## 2018-06-13 RX ADMIN — CLARITHROMYCIN 250 MG: 250 FOR SUSPENSION ORAL at 08:06

## 2018-06-13 RX ADMIN — IRON SUCROSE 100 MG: 20 INJECTION, SOLUTION INTRAVENOUS at 09:06

## 2018-06-13 NOTE — ASSESSMENT & PLAN NOTE
Contributing Nutrition Diagnosis  Excessive energy intake    Related to (etiology):   Non compliance to  Cardiac diet    Signs and Symptoms (as evidenced by):   Pt BMI  36.9    Interventions/Recommendations (treatment strategy):  1. Recommend adjusting diet order to Cardiac/Renal diet with texture per SLP.   2. Recommend providing Novasource Renal ONS TID.  3. RD to monitor.     Nutrition Diagnosis Status:   New

## 2018-06-13 NOTE — PT/OT/SLP PROGRESS
Occupational Therapy      Patient Name:  Los Mendez   MRN:  [id number]    Patient not seen today secondary to DURAN at .    SAMMI Villanueva  6/13/2018

## 2018-06-13 NOTE — PT/OT/SLP PROGRESS
Physical Therapy      Patient Name:  Los Mendez   MRN:  [id number]    Patient not seen today secondary to dialysis. Will follow up at next scheduled visit.     Bushra Cannon PT, DPT   6/13/2018  Pager: [phone]

## 2018-06-13 NOTE — PLAN OF CARE
Problem: Fall Risk (Adult)  Goal: Identify Related Risk Factors and Signs and Symptoms  Related risk factors and signs and symptoms are identified upon initiation of Human Response Clinical Practice Guideline (CPG)   Outcome: Ongoing (interventions implemented as appropriate)   06/13/18 1232   Fall Risk   Related Risk Factors (Fall Risk) fatigue/slow reaction;polypharmacy

## 2018-06-13 NOTE — SUBJECTIVE & OBJECTIVE
Interval History:     NAEON. Awaiting placement in a dialysis unit. PT/OT reports he can go home with Home health. No complaints.     Review of Systems   Constitutional: Negative for activity change, chills, fatigue and fever.   HENT: Negative for congestion, facial swelling, sore throat and voice change.    Eyes: Negative for pain, redness and visual disturbance.   Respiratory: Negative for cough, chest tightness and shortness of breath.    Cardiovascular: Negative for chest pain and leg swelling.   Gastrointestinal: Negative for abdominal pain, constipation, diarrhea and nausea.   Endocrine: Negative for cold intolerance and heat intolerance.   Musculoskeletal: Negative for arthralgias, back pain and neck pain.        Strength 5/5 in both upper and lower extremities.    Skin: Negative for rash and wound.   Neurological: Negative for dizziness, speech difficulty, weakness, light-headedness and headaches.   Psychiatric/Behavioral: Negative for agitation and confusion.     Objective:     Vital Signs (Most Recent):  Temp: 96.7 °F (35.9 °C) (06/13/18 0800)  Pulse: 92 (06/13/18 0800)  Resp: 16 (06/13/18 0800)  BP: 120/77 (06/13/18 0800)  SpO2: 98 % (06/13/18 0800) Vital Signs (24h Range):  Temp:  [96.7 °F (35.9 °C)-98.6 °F (37 °C)] 96.7 °F (35.9 °C)  Pulse:  [75-92] 92  Resp:  [16-20] 16  SpO2:  [96 %-99 %] 98 %  BP: (113-123)/(64-78) 120/77     Weight: 109.9 kg (242 lb 4.6 oz)  Body mass index is 36.84 kg/m².  No intake or output data in the 24 hours ending 06/13/18 0819   Physical Exam   Constitutional: He is oriented to person, place, and time. He appears well-developed and well-nourished. No distress.   HENT:   Head: Normocephalic and atraumatic.   Eyes: EOM are normal. Pupils are equal, round, and reactive to light.   Neck: Normal range of motion. Neck supple.   Cardiovascular: Normal rate, regular rhythm, normal heart sounds and intact distal pulses.  Exam reveals no gallop and no friction rub.    No murmur  heard.  Pulmonary/Chest: Effort normal and breath sounds normal. No respiratory distress. He exhibits no tenderness.   Abdominal: Soft. Bowel sounds are normal. He exhibits no distension. There is no tenderness.   Musculoskeletal: Normal range of motion. He exhibits no edema, tenderness or deformity.   Neurological: He is alert and oriented to person, place, and time. No cranial nerve deficit. Coordination normal.   Skin: Skin is warm and dry. No rash noted. He is not diaphoretic. No erythema. No pallor.   Psychiatric: He has a normal mood and affect. His behavior is normal. Judgment and thought content normal.   Nursing note and vitals reviewed.    Significant Labs:   BMP:   Recent Labs  Lab 06/12/18  0852   GLU 83      K 4.6      CO2 22*   BUN 18   CREATININE 5.0*   CALCIUM 9.5     CBC:   Recent Labs  Lab 06/12/18  0852 06/13/18  0751   WBC 7.14 6.75   HGB 8.0* 7.8*   HCT 26.6* 25.6*   * 371*

## 2018-06-13 NOTE — PLAN OF CARE
Problem: Hemodialysis (Adult)  Intervention: Protect/Monitor Dialysis Access Site  Right chest wall a/v access with good blood flow.  No s/s infection

## 2018-06-13 NOTE — PROGRESS NOTES
Dialysis complete.  Blood returned.     Right chest wall CVC flushed * 2 with     Saline and heparin   Locked with cap and tape.  No s/s infection at cvc site.   Pt tolerated hemodialysis without diff.  Pt ran 3.5   Hrs on hemodialysis machine.   Took off 2000  Ml net volume.      Used F-160 dialyzer.

## 2018-06-13 NOTE — PLAN OF CARE
Covering sw informed that pt's HD chair will need to be changed from [place] to an [facility] in [place]. SW called [facility] intake ([facility]) and informed them of change in location. [facility] intake will forward referral for review to [place] clinic.    Samantha Long, hospitalsW [id number]

## 2018-06-13 NOTE — PLAN OF CARE
Problem: Patient Care Overview  Goal: Plan of Care Review  Outcome: Ongoing (interventions implemented as appropriate)  A/o up ad trudy in the room v/s stable see flow sheet left cw incision at site of AICD  Dressing dry and intact in sling and swath , denies any discomfort no injury  voided 2x this am skin intact free of falls

## 2018-06-13 NOTE — PROGRESS NOTES
" [facility]  Adult Nutrition  Progress Note    SUMMARY       Recommendations    Recommendation/Intervention: Recommend adjusting diet order to Renal diet with texture per SLP. If PO intake <50% for more than two meals, recommend providing Novasource Renal ONS TID. RD to monitor.   Goals: PO intake to meet % of EEN/EPN  Nutrition Goal Status: new  Communication of RD Recs: reviewed with RN    Reason for Assessment    Reason for Assessment: RD follow-up  Diagnosis: other (see comments) (anoxic brain injury)  Relevant Medical History: HTN, COPD, obesity  Interdisciplinary Rounds: attended  General Information Comments: Admitted with a medical diagnosis of Anoxic brain injury, s/p ICD placement intubated upon admit now extubated c/o trouble swallowing speech difficulty and weakness. Cognition and memory are impaired, difficult to complete oral assessment. Pt appears obese, per CM note Will d/c to rehab in the AM. Pt completes HD MWF, Awaiting HD Chair placement.  Nutrition Discharge Planning: PO intake to meet % of EEN/EPN.     Nutrition Risk Screen    Nutrition Risk Screen: dysphagia or difficulty swallowing    Nutrition/Diet History    Typical Food/Fluid Intake: LEVI intake PTA. Pt TF held for extubation.  Food Preferences: LEVI Anabaptist/cultural food preferences at this time  Do you have any cultural, spiritual, Anabaptist conflicts, given your current situation?: no conflicts  Factors Affecting Nutritional Intake: difficulty/impaired swallowing    Anthropometrics    Temp: 98.5 °F (36.9 °C)  Height Method: Stated  Height: 5' 8" (172.7 cm)  Height (inches): 68 in  Weight Method: Bed Scale  Weight: 109.9 kg (242 lb 4.6 oz)  Weight (lb): 242.29 lb  Ideal Body Weight (IBW), Male: 154 lb  % Ideal Body Weight, Male (lb): 157.33 lb  BMI (Calculated): 36.9  BMI Grade: 35 - 39.9 - obesity - grade II       Lab/Procedures/Meds    Pertinent Labs Reviewed: reviewed  Pertinent Labs Comments: CO2 22, " Cr 4.76, Alb 3.0  Pertinent Medications Reviewed: reviewed  Pertinent Medications Comments: Furosemide, Heparin, metoprolol, tartrate    Physical Findings/Assessment    Overall Physical Appearance: obese, nourished  Tubes: nasogastric tube  Skin: intact    Estimated/Assessed Needs    Weight Used For Calorie Calculations: 112.3 kg (247 lb 9.2 oz)  Energy Calorie Requirements (kcal): 2216 kcal/day  Energy Need Method: [place]-St Jeor (x 1.25- 250 kcal for slow wt loss )  Protein Requirements: 90-135g/ day (0.8-1.2 g/kg)  Weight Used For Protein Calculations: 112.3 kg (247 lb 9.2 oz)  Fluid Requirements (mL): 1mL/kcal or per MD  Fluid Need Method: RDA Method  RDA Method (mL): 2216         Nutrition Prescription Ordered    Current Diet Order: Dysphagia Pureed   Nutrition Order Comments: Pt report tolerating well with good intake  Current Nutrition Support Formula Ordered: Novasource Renal  Current Nutrition Support Rate Ordered: 10 (ml) (goal of 40)  Current Nutrition Support Frequency Ordered: mL/hr    Evaluation of Received Nutrient/Fluid Intake    Enteral Calories (kcal): 480  Enteral Protein (gm): 22  Lipid Calories (kcals): 0 kcals  % Kcal Needs: 30  % Protein Needs: 12.6  I/O: +I/O, LBM 6/4  Comments: At goal of 40mL/hr would meet EEN/EPN  % Intake of Estimated Energy Needs: 0 - 25 %  % Meal Intake: 0 - 25 %    Nutrition Risk    Level of Risk/Frequency of Follow-up: low - moderate (F/U 1x weekly)     Assessment and Plan    Cardiac arrest    Contributing Nutrition Diagnosis  Excessive energy intake    Related to (etiology):   Non compliance to  Cardiac diet    Signs and Symptoms (as evidenced by):   Pt BMI  36.9    Interventions/Recommendations (treatment strategy):  1. Recommend adjusting diet order to Cardiac/Renal diet with texture per SLP.   2. Recommend providing Novasource Renal ONS TID.  3. RD to monitor.     Nutrition Diagnosis Status:   New               Monitor and Evaluation    Food and Nutrient Intake:  energy intake, food and beverage intake, enteral nutrition intake  Food and Nutrient Adminstration: diet order, enteral and parenteral nutrition administration  Anthropometric Measurements: weight, weight change, body mass index  Biochemical Data, Medical Tests and Procedures: electrolyte and renal panel, gastrointestinal profile, glucose/endocrine profile  Nutrition-Focused Physical Findings: overall appearance, skin     Nutrition Follow-Up    RD Follow-up?: Yes

## 2018-06-13 NOTE — ASSESSMENT & PLAN NOTE
- Working with PT/OT who now recommends home health with PT/OT due to significant improvement in function.

## 2018-06-13 NOTE — PLAN OF CARE
Problem: Fall Risk (Adult)  Intervention: Safety Precautions  Pt AAOx3, No complaints of pain or discomfort, Incision site appears WDL, educated on use of sling and mobility of RUE, No significant overnight events, will continue to monitor.

## 2018-06-13 NOTE — ASSESSMENT & PLAN NOTE
- unknown baseline renal function, not previously dialysis dependent  - ischemic ATN 2/2 cardiac arrest  - Remains oliguric; starting to make small volumes of urine but not clearing volume or electrolytes effectively  - Receiving HD PRN  - Underwent permacath placement 6/6  - Renal diet  - History of iron deficiency anemia; plan for EPO if counts do not recover promptly  - Followed by Nephrology  - Awaiting placement for dialysis chair outpatient.

## 2018-06-13 NOTE — PROGRESS NOTES
[facility] Hospital Medicine  Progress Note    Patient Name: Los Mendez  MRN: [id number]  Patient Class: IP- Inpatient   Admission Date: 5/19/2018  Length of Stay: 25 days  Attending Physician: Ramses Mcfarland MD  Primary Care Provider: Provider [facility] Medicine Team: [facility] 4 Amos Roche MD    Subjective:     Principal Problem:Anoxic brain injury    HPI:  46 y/o man w/ hx of HTN, COPD? Transferred from [facility] following witnessed cardiac arrest while in the ED waiting room. Patient had presented complaining of URI/SOB symptoms, while in the ED waiting room he was noted to be choking, became apneic, which led to witnessed seizure like activity followed by cardiac arrest. Per discussion with ED staff, patient required about 22 minutes of ACLS/5 shocks before returning to normal sinus rhythm. During intubation a lozenge was removed from patients airway. Hyperthermia protocol was initiated prior to [place] [facility] [place] [facility] [place]. Per ED records, patient was acidotic with ph of 7.1 this morning. At the time of arrival to NICU, patient was on paralytics, however pupillary reflex was present. Will continue hypothermia protocol for additional 24 h.    Hospital Course:  05/19/18:  Arrived intubated, sedated, artic sun blanket  05/20/18:  Pressor requirment going up, NO DIURESIS, continue TTM, nimbex off, LFT improving, trop improving, family updated.  05/21/18:  Reach normothermia, MRI today, remove EEG, place HD line, consult nephrology, LFTs trending down, family updated.  05/22/18:  Anoxic brain injury. Pt responding. Last night placed on propofol  MRI --L head of caudate and cerebellar small infarctions.  05/23/18:  Anoxic brain injury.  On CRRT. BNP 1046. Back on propofol, pressors. Still agitated. Started on Buspar. Trop I normalizing.  05/25/18:  CRRT today. Amiodarone dc'd, metoprolol started. CBC h6w--qk H/H continues to drop will consult GI. Vascular surgery  consulted concerning  Possible arterial occlusion. RUE cold. Arterial line dcd. US RUE pending. Abdominal distention, KUB showed gas pattern. Bladder pressures q4h. Initial 21.  05/28/18:  H/H 6.8/23, 1 unit PRBC transfused. Keep Hgb greater than or equal to 8. CRRT stopped, line clotted. Changed trialysis catheter changed. Wean sedation today. Once off sedation begin weaning ventilator settings to CPAP.  Weaned vent to CPAP and able to Extubated in afternoon without s/s stridor or difficulty breathing. HR elevated 140s - 160s fentanyl x2 prn for pain/agitation. Metoprolol 5mg IV x3 given remains ST 140s.  Restless, will start precedex.  05/30/18:  Leukocytosis, pan cx; kelley discontinued; remove IJ, speech eval for diet   05/31/18:  No acute events, still with gastric secretions coming out of NG tube will start reglan; remains encephalopathic  06/01/18:  Will hold NGT suction, NPO per SLP.  Continue metoclopramide.  Hgb stable at 8.3 g/dL.  6/2 Continue NG tube for now. Will advance to pureed diet as recommended by SLP and evaluate intake before removing NG.. WBC remain elevated but afebrile, cultures negative and ISA/Diamond 5/24 showed no DVT only thrombophlebitis of LUE cephalic vein. Continue to monitor daily CBC. Nephrology following will hold HD today and possibly tomorrow depending on labs  6/4: patient fell from bed en route to bathroom (unassisted), CT no ICH, patient neuro stable, step down  6/5: Stepped down to hospital medicine  6/6: Permacath placed by [facility]. Underwent HD. Consult placed to PM&R for rehab placement. CM pursuing outpatient HD chair.  6/7: No acute events. Patient participating with Rehab. Pending placement in rehab.   6/8-6/10: ICD will be placed Monday. NPO Sunday night. NAEON. .  6/11: dcICD placed. Medicaid information provided to CM.    Interval History:     NAEON. Awaiting placement in a dialysis unit. PT/OT reports he can go home with Home health. No complaints.     Review of Systems    Constitutional: Negative for activity change, chills, fatigue and fever.   HENT: Negative for congestion, facial swelling, sore throat and voice change.    Eyes: Negative for pain, redness and visual disturbance.   Respiratory: Negative for cough, chest tightness and shortness of breath.    Cardiovascular: Negative for chest pain and leg swelling.   Gastrointestinal: Negative for abdominal pain, constipation, diarrhea and nausea.   Endocrine: Negative for cold intolerance and heat intolerance.   Musculoskeletal: Negative for arthralgias, back pain and neck pain.        Strength 5/5 in both upper and lower extremities.    Skin: Negative for rash and wound.   Neurological: Negative for dizziness, speech difficulty, weakness, light-headedness and headaches.   Psychiatric/Behavioral: Negative for agitation and confusion.     Objective:     Vital Signs (Most Recent):  Temp: 96.7 °F (35.9 °C) (06/13/18 0800)  Pulse: 92 (06/13/18 0800)  Resp: 16 (06/13/18 0800)  BP: 120/77 (06/13/18 0800)  SpO2: 98 % (06/13/18 0800) Vital Signs (24h Range):  Temp:  [96.7 °F (35.9 °C)-98.6 °F (37 °C)] 96.7 °F (35.9 °C)  Pulse:  [75-92] 92  Resp:  [16-20] 16  SpO2:  [96 %-99 %] 98 %  BP: (113-123)/(64-78) 120/77     Weight: 109.9 kg (242 lb 4.6 oz)  Body mass index is 36.84 kg/m².  No intake or output data in the 24 hours ending 06/13/18 0819   Physical Exam   Constitutional: He is oriented to person, place, and time. He appears well-developed and well-nourished. No distress.   HENT:   Head: Normocephalic and atraumatic.   Eyes: EOM are normal. Pupils are equal, round, and reactive to light.   Neck: Normal range of motion. Neck supple.   Cardiovascular: Normal rate, regular rhythm, normal heart sounds and intact distal pulses.  Exam reveals no gallop and no friction rub.    No murmur heard.  Pulmonary/Chest: Effort normal and breath sounds normal. No respiratory distress. He exhibits no tenderness.   Abdominal: Soft. Bowel sounds are  normal. He exhibits no distension. There is no tenderness.   Musculoskeletal: Normal range of motion. He exhibits no edema, tenderness or deformity.   Neurological: He is alert and oriented to person, place, and time. No cranial nerve deficit. Coordination normal.   Skin: Skin is warm and dry. No rash noted. He is not diaphoretic. No erythema. No pallor.   Psychiatric: He has a normal mood and affect. His behavior is normal. Judgment and thought content normal.   Nursing note and vitals reviewed.    Significant Labs:   BMP:   Recent Labs  Lab 06/12/18  0852   GLU 83      K 4.6      CO2 22*   BUN 18   CREATININE 5.0*   CALCIUM 9.5     CBC:   Recent Labs  Lab 06/12/18  0852 06/13/18  0751   WBC 7.14 6.75   HGB 8.0* 7.8*   HCT 26.6* 25.6*   * 371*     Assessment/Plan:      * Anoxic brain injury    -Cardiac arrest ~ 22 minutes at OSH.  Pt doing relatively well.  Extubated 5/28.  -05/21/18 MRI w/o evidence of anoxic brain injury, but several small strokes; pt encephalopathic  -Encephalopathy likely multifactorial--strokes, metabolic (kidney/liver injury), delirium.         Acute superficial gastritis with hemorrhage    Coffee ground material aspirated via NG on 5/23  Concurrent with acute drop in Hg  Hg has since uptrended.   No recurrent signs of upper GI bleed.              Debility    - Working with PT/OT who now recommends home health with PT/OT due to significant improvement in function.         Hemodialysis status    - Awaiting placement for dialysis chair in [place]        Iron deficiency anemia due to chronic blood loss              Acute bilat watershed infarction    - Incidental finding on MRI.   - Continues to have slurred speech but participating in rehab.        Acute combined systolic and diastolic ACC/AHA stage C congestive heart failure    S/p cardiac arrest on 5/19  LVEF 10-15%  - ACEi held in setting of ARF  - metoprolol succinate 50mg daily  - will evaluate and start with  hydralazine 10mg tid and isordil 10mg tid for afterload reduction if BP can tolerate while on HD  - aldosterone antagonist held in the setting of poor renal function  - continue high intensity statin therapy with atorvastatin 40mg  - transfuse for goal Hb >8  - 6/11 dcICD placed  - No post surgical complaints.         Cytotoxic cerebral edema    - secondary to anoxic brain injury, 22 minutes PEA-->vfib  - MRI brain 5/21 for anoxic assessment.  see anoxic brain injury        Acute renal failure with tubular necrosis    - unknown baseline renal function, not previously dialysis dependent  - ischemic ATN 2/2 cardiac arrest  - Remains oliguric; starting to make small volumes of urine but not clearing volume or electrolytes effectively  - Receiving HD PRN  - Underwent permacath placement 6/6  - Renal diet  - History of iron deficiency anemia; plan for EPO if counts do not recover promptly  - Followed by Nephrology  - Awaiting placement for dialysis chair outpatient.           Essential hypertension      See acute decompensated heart failure        Acute pulmonary edema    - Managing volume status with HD given ARF        Class 3 severe obesity due to excess calories with serious comorbidity and body mass index (BMI) of 40.0 to 44.9 in adult    S/p nutrition consult; counseled on lifestyle modifications  Body mass index is 36.84 kg/m².          Cardiac arrest      See acute decompensated heart failure        Dictation #1  MRN:[id number]  CSN:[id number]    VTE Risk Mitigation         Ordered     heparin (porcine) injection 2,000 Units  As needed (PRN)      06/11/18 0839     IP VTE HIGH RISK PATIENT  Once      06/08/18 0945     heparin (porcine) injection 1,000 Units  As needed (PRN)      06/06/18 1433        Amos Roche MD  Department of Hospital Medicine   [facility]

## 2018-06-13 NOTE — PROGRESS NOTES
Pt arrived via stretcher.  Placed on cardiac monitor and b/p check every 15 minutes.  Right chest wall Dialysis access prep with prevantic swab. maintance  Hemodialysis started per orders.

## 2018-06-13 NOTE — CONSULTS
Unable to place PIV due to high patient consult volume.  Re-consult if unable to obtain IV access overnight.

## 2018-06-14 LAB
ALBUMIN SERPL BCP-MCNC: 3.3 G/DL
ANION GAP SERPL CALC-SCNC: 10 MMOL/L
BUN SERPL-MCNC: 14 MG/DL
CALCIUM SERPL-MCNC: 9.7 MG/DL
CHLORIDE SERPL-SCNC: 102 MMOL/L
CO2 SERPL-SCNC: 26 MMOL/L
CREAT SERPL-MCNC: 3.7 MG/DL
EST. GFR  (AFRICAN AMERICAN): 21.2 ML/MIN/1.73 M^2
EST. GFR  (NON AFRICAN AMERICAN): 18.3 ML/MIN/1.73 M^2
GLUCOSE SERPL-MCNC: 92 MG/DL
PHOSPHATE SERPL-MCNC: 4.3 MG/DL
POTASSIUM SERPL-SCNC: 4.1 MMOL/L
SODIUM SERPL-SCNC: 138 MMOL/L

## 2018-06-14 PROCEDURE — 99232 SBSQ HOSP IP/OBS MODERATE 35: CPT | Mod: ,,, | Performed by: HOSPITALIST

## 2018-06-14 PROCEDURE — 25000003 PHARM REV CODE 250: Performed by: STUDENT IN AN ORGANIZED HEALTH CARE EDUCATION/TRAINING PROGRAM

## 2018-06-14 PROCEDURE — 11000001 HC ACUTE MED/SURG PRIVATE ROOM

## 2018-06-14 PROCEDURE — 63700000 PHARM REV CODE 250 ALT 637 W/O HCPCS: Performed by: HOSPITALIST

## 2018-06-14 PROCEDURE — 97116 GAIT TRAINING THERAPY: CPT

## 2018-06-14 PROCEDURE — 25000003 PHARM REV CODE 250: Performed by: INTERNAL MEDICINE

## 2018-06-14 PROCEDURE — 80069 RENAL FUNCTION PANEL: CPT

## 2018-06-14 PROCEDURE — 36415 COLL VENOUS BLD VENIPUNCTURE: CPT

## 2018-06-14 RX ORDER — METOPROLOL TARTRATE 1 MG/ML
5 INJECTION, SOLUTION INTRAVENOUS EVERY 5 MIN PRN
Status: DISCONTINUED | OUTPATIENT
Start: 2018-06-15 | End: 2018-06-15 | Stop reason: HOSPADM

## 2018-06-14 RX ADMIN — CLARITHROMYCIN 250 MG: 250 FOR SUSPENSION ORAL at 09:06

## 2018-06-14 RX ADMIN — ASPIRIN 81 MG CHEWABLE TABLET 81 MG: 81 TABLET CHEWABLE at 09:06

## 2018-06-14 RX ADMIN — METOPROLOL SUCCINATE 50 MG: 50 TABLET, EXTENDED RELEASE ORAL at 09:06

## 2018-06-14 RX ADMIN — ATORVASTATIN CALCIUM 40 MG: 20 TABLET, FILM COATED ORAL at 09:06

## 2018-06-14 NOTE — PT/OT/SLP PROGRESS
Physical Therapy Treatment & Discharge    Patient Name:  Los Mendez   MRN:  [id number]    Recommendations:     Discharge Recommendations:  outpatient PT, outpatient OT   Discharge Equipment Recommendations: walker, rolling   Barriers to discharge: stairs to enter home    Assessment:     Los Mendez is a 47 y.o. male admitted with a medical diagnosis of Anoxic brain injury. Pt has progressed well with functional mobility during therapy course, reaching satisfactory goal achievement. Pt discharged from acute PT services 2/2 no further needs. Recommending pt discharge home with OP therapy referral for higher level balance and strength training.     Recent Surgery: Procedure(s) (LRB):  ICD DC new (Left) 3 Days Post-Op    Plan:   Pt discharged from PT services.     Subjective     Communicated with RN prior to session.  Patient found supine upon PT entry to room, agreeable to treatment.  Pt expressed frustration in regards to extended hospital stay and is hoping to go home today.      Patient comments/goals: return home   Pain/Comfort:  · Pain Rating 1: 0/10  · Pain Rating Post-Intervention 1: 0/10    Patients cultural, spiritual, Adventism conflicts given the current situation: no conflicts    Objective:     Patient found with: telemetry     General Precautions: Standard, pacemaker, aspiration, fall   Orthopedic Precautions:N/A   Braces:  (LUE sling & swathe)     Functional Mobility:       Bed Mobility    · Supine to sit: modified independence   · Sit to supine: modified independence       Transfers · Sit <> Stand: supervision with no AD      Gait  · Distance: 250 ft.   · Assistance level: no AD, supervision for safety    · Gait Deviations: increased lateral sway, decreased step length      Stairs Pt ascended/descended 4 stairs with R HR and SBA for safety; no LOB. Pt demonstrated alternating foot pattern.      Therapeutic Activities and Exercises:  Pt and spouse educated on:   - progress with therapy and discharge  from acute PT   - safety with mobility and tips to reduce fall risk  - activity recommendations  - stairs navigation     Therapist facilitated progression of gait training to improve gait stability, endurance, and independence with functional ambulation. Pt performed gait trial without AD; able to perform gait with horizontal head turns with reading of room numbers with supervision- no LOB. Stairs training performed as described above.     Patient left up in chair with all lines intact, call button in reach, RN notified and wife present.    AM-PAC 6 CLICK MOBILITY  Turning over in bed (including adjusting bedclothes, sheets and blankets)?: 4  Sitting down on and standing up from a chair with arms (e.g., wheelchair, bedside commode, etc.): 4  Moving from lying on back to sitting on the side of the bed?: 4  Moving to and from a bed to a chair (including a wheelchair)?: 4  Need to walk in hospital room?: 3  Climbing 3-5 steps with a railing?: 3  Total Score: 22     GOALS:    Physical Therapy Goals     Not on file          Multidisciplinary Problems (Resolved)        Problem: Physical Therapy Goal    Goal Priority Disciplines Outcome Goal Variances Interventions   Physical Therapy Goal   (Resolved)     PT/OT, PT Outcome(s) achieved     Description:    Goals to be met by 6/18/2018    1. Pt will perform supine to sit from both sides of the bed with supervision -- MET 6/14  2. Pt will perform sit to supine with supervision  -- MET 6/14  3. Pt will perform sit to stand transfers with stand by assistance without AD -- MET 6/12          Revised:  Pt will perform sit to stand transfers with independence  -- MET 6/14  4. Pt will perform bed <> chair transfers with supervision with or without least restrictive AD.  -- MET 6/14  5. Pt will perform gait x 50 feet with contact guard assistance with or without least restrictive AD.-met   Revised: pt will perform gait x 150 feet with Supervision with or without least restrictive AD   -- MET 6/14  6. Pt will perform dynamic standing balance activities x  5 minutes with stand by assistance to reduce fall risk       -- MET 6/14                    Time Tracking:     PT Received On: 06/14/18  PT Start Time: 0924     PT Stop Time: 0938  PT Total Time (min): 14 min     Billable Minutes: Gait Training 14 min    Treatment Type: Treatment  PT/PTA: PT     PTA Visit Number: 0     Bushra Cannon PT, DPT   6/14/2018  Pager: [phone]

## 2018-06-14 NOTE — ASSESSMENT & PLAN NOTE
MARY 2/2 ischemic ATN from cardiac arrest.   - baseline renal function unknown.   - Renal US without report of CKD-related changes though kidneys more echogenic than liver.   -5/24 LVEF10% with diastolic dysfunction sp ICD placement on 6/11.   -6/8 Cardiac Pet Scan Stress showed improvement in LVEF 51%.    - TDC placement by [facility]GO 6/6/18  - Continue to avoidACEi for now.   - Seen in [facility] tolerating 2 L net UF.  Continue iHD MWF while inpatient.   - SW assisting with outpt HD chair [facility].

## 2018-06-14 NOTE — PT/OT/SLP DISCHARGE
Occupational Therapy Discharge Summary    Los Mendez  MRN: [id number]   Principal Problem: Anoxic brain injury      Patient Discharged from acute Occupational Therapy on 6/14/2018.  Please refer to prior OT note dated 6/12/2018 for functional status.    Assessment:      Goals partially met.    Objective:     GOALS:    Occupational Therapy Goals     Not on file          Multidisciplinary Problems (Resolved)        Problem: Occupational Therapy Goal    Goal Priority Disciplines Outcome Interventions   Occupational Therapy Goal   (Resolved)     OT, PT/OT Outcome(s) achieved    Description:  Goals to be met by: 6/14/2018 (revised and extended 6/7 due to pt's progress)  Patient will increase functional independence with ADLs by performing:    Dynamic standing task to simulate home return for 8 min duration with SBA for postural control. MET  Tub/shower transfer with grab bar as needed with CGA. Not met  Functional mobility at house hold distance with RW as needed and supervision. MET  Pt will gather 4/5 items for ADL (provided verbally) in timely manner and supervision for dynamic balance. MET  Pt will complete ADL sequencing task with 1 verbal cue. MET                           Reasons for Discontinuation of Therapy Services  Pt safe to be up with nursing staff and family for ADLs/self care/functional mobility.     Plan:     Patient best d/c plan: Outpatient Therapy Services at time of d/c for higher level cognition and balance for best functional outcomes.     SAMMI Villanueva  6/14/2018

## 2018-06-14 NOTE — PLAN OF CARE
Problem: SLP Goal  Goal: SLP Goal  Updated Speech Therapy Short Term Goals  Goal expected to be met by 6/19  1. Patient will answer general information questions with 90% acc min cues.   2. Patient will complete simple money/time management tasks with 90% acc min cues.   3. Patient will recall information after a 2+ minute delay utilizing memory strategies with 90% acc no cues  4. Patient will generate 10 items per category to enhance organizations skills given no time constraints.   5. Patient will complete 4 word mental manipulation tasks with 90% acc given 1 repetition.     Outcome: Outcome(s) achieved Date Met: 06/14/18      Pt appearing at or near baseline no further skilled speech services warranted.     Deann Rosado MS, CCC-SLP  Speech Language Pathologist  Pager: [phone]  Date 6/14/2018

## 2018-06-14 NOTE — PT/OT/SLP PROGRESS
"Speech Language Pathology Treatment/ Discharge Summary     Patient Name:  Los Mendez   MRN:  [id number]  Admitting Diagnosis: Anoxic brain injury    Recommendations:                 General Recommendations:  Cognitive-linguistic therapy as an out patient   Diet recommendations:  Regular, Liquid Diet Level: Thin   Aspiration Precautions: Standard aspiration precautions   General Precautions: Standard, fall  Communication strategies:  none    Subjective     Pt awake alert and upright in chair, spouse at the bedside       Pain/Comfort:  · Pain Rating 1: 0/10  · Pain Rating Post-Intervention 1: 0/10    Objective:     Has the patient been evaluated by SLP for swallowing?   Yes  Keep patient NPO? No   Current Respiratory Status: room air      Pt intermittently expressing aggravation re: discharge planning. Pt completed abstract category naming tasks this visit. Pt able to provide 8 items independently depending on familiarity with category. Pt verbal responses remain rigid and egocentric. Pt completed  Compare/contrasts tasks with 90% acc independently. Pt wife reporting she feels Pt at/ or near baseline and she only notices his memory is "slightly" off. Discussed with spouse and Pt to use calendars and planners to keep track of appointments etc. No further skilled speech services warranted in the acute care setting at this time.       Assessment:     Los Mendez is a 47 y.o. male with an SLP diagnosis of Cognitive-Linguistic Impairment appearing at/near baseline would benefit for ongoing out patient follow up for high level cognitive skills.      Goals:    SLP Goals     Not on file          Multidisciplinary Problems (Resolved)        Problem: SLP Goal    Goal Priority Disciplines Outcome   SLP Goal   (Resolved)     SLP Outcome(s) achieved   Description:  Updated Speech Therapy Short Term Goals  Goal expected to be met by 6/19  1. Patient will answer general information questions with 90% acc min cues.   2. Patient " will complete simple money/time management tasks with 90% acc min cues.   3. Patient will recall information after a 2+ minute delay utilizing memory strategies with 90% acc no cues  4. Patient will generate 10 items per category to enhance organizations skills given no time constraints.   5. Patient will complete 4 word mental manipulation tasks with 90% acc given 1 repetition.                       Plan:     · Patient to be seen:  3 x/week   · Plan of Care expires:     · Plan of Care reviewed with:  patient, spouse   · SLP Follow-Up:  No       Discharge recommendations:  outpatient speech therapy   Barriers to Discharge:  None    Time Tracking:     SLP Treatment Date:   06/14/18  Speech Start Time:  1457  Speech Stop Time:  1506     Speech Total Time (min):  9 min    Billable Minutes: Speech Therapy Individual 9    Deann Rosado CCC-SLP  06/14/2018

## 2018-06-14 NOTE — PLAN OF CARE
Problem: Physical Therapy Goal  Goal: Physical Therapy Goal    Goals to be met by 6/18/2018    1. Pt will perform supine to sit from both sides of the bed with supervision -- MET 6/14  2. Pt will perform sit to supine with supervision  -- MET 6/14  3. Pt will perform sit to stand transfers with stand by assistance without AD -- MET 6/12          Revised:  Pt will perform sit to stand transfers with independence  -- MET 6/14  4. Pt will perform bed <> chair transfers with supervision with or without least restrictive AD.  -- MET 6/14  5. Pt will perform gait x 50 feet with contact guard assistance with or without least restrictive AD.-met   Revised: pt will perform gait x 150 feet with Supervision with or without least restrictive AD  -- MET 6/14  6. Pt will perform dynamic standing balance activities x  5 minutes with stand by assistance to reduce fall risk       -- MET 6/14  Outcome: Outcome(s) achieved Date Met: 06/14/18  Pt has progressed well with functional mobility during therapy course, reaching satisfactory goal achievement. Pt discharged from acute PT services 2/2 no further needs.     Bushra Cannon PT, DPT   6/14/2018  Pager: [phone]

## 2018-06-14 NOTE — SUBJECTIVE & OBJECTIVE
Interval History: Report unable to measure urine due to void while having BM-diarrhea yesterday. Diarrhea resolved as of this am. Patient seen in [facility] Tolerating 2 L net UF last post wt 108 kg. Today weight 112 kg? Wants to home.     Review of patient's allergies indicates:   Allergen Reactions    Penicillins      Tolerated cefepime May 2018     Current Facility-Administered Medications   Medication Frequency    0.9%  NaCl infusion PRN    acetaminophen tablet 325 mg Q6H PRN    albuterol-ipratropium 2.5 mg-0.5 mg/3 mL nebulizer solution 3 mL Q6H PRN    aspirin chewable tablet 81 mg Daily    atorvastatin tablet 40 mg Daily    clarithromycin 250 mg/5 mL suspension 250 mg Q12H    heparin (porcine) injection 1,000 Units PRN    heparin (porcine) injection 2,000 Units PRN    iron sucrose (VENOFER) 100 mg in sodium chloride 0.9% 100 mL IVPB Once per day on Mon Wed Fri    metoprolol succinate (TOPROL-XL) 24 hr tablet 50 mg Daily    ramelteon tablet 8 mg Nightly PRN    sodium chloride 0.9% flush 3 mL PRN       Objective:     Vital Signs (Most Recent):  Temp: 98.5 °F (36.9 °C) (06/13/18 1804)  Pulse: 97 (06/13/18 1900)  Resp: 20 (06/13/18 1804)  BP: 102/61 (06/13/18 1804)  SpO2: 100 % (06/13/18 1804)  O2 Device (Oxygen Therapy): room air (06/13/18 1804) Vital Signs (24h Range):  Temp:  [96.7 °F (35.9 °C)-98.9 °F (37.2 °C)] 98.5 °F (36.9 °C)  Pulse:  [75-98] 97  Resp:  [16-20] 20  SpO2:  [97 %-100 %] 100 %  BP: ()/(53-78) 102/61     Weight: 109.9 kg (242 lb 4.6 oz) (06/12/18 2100)  Body mass index is 36.84 kg/m².  Body surface area is 2.3 meters squared.    I/O last 3 completed shifts:  In: 1120 [P.O.:420; I.V.:100; Other:600]  Out: 2600 [Other:2600]    Physical Exam   Constitutional: He appears well-developed and well-nourished. No distress.   HENT:   Head: Normocephalic and atraumatic.   Eyes: Conjunctivae and EOM are normal.   Cardiovascular: Normal rate and regular rhythm.    Pulmonary/Chest: Effort normal  and breath sounds normal.   Abdominal: Soft. Bowel sounds are normal.   Musculoskeletal: He exhibits no edema or deformity.   Skin: Skin is warm and dry. He is not diaphoretic.   L CW incision/ICD placement6/11. Left arm in sling.        Significant Labs:  All labs within the past 24 hours have been reviewed.     Significant Imaging:  Labs: Reviewed

## 2018-06-14 NOTE — PROGRESS NOTES
[facility]  Nephrology  Progress Note    Patient Name: Los Mendez  MRN: [id number]  Admission Date: 5/19/2018  Hospital Length of Stay: 25 days  Attending Provider: Ramses Mcfarland MD   Primary Care Physician: Provider [facility]  Principal Problem:Anoxic brain injury    Subjective:     HPI: Los Mendez is a 47 year old male with past medical history of HTN, heavy alcohol user and COPD. Transferred from [facility] following witnessed cardiac arrest while in the ED waiting room. Patient had presented complaining of upper respiratory track infection and shortness of breath symptoms, while in the ED waiting room he was noted to be choking, became apneic, which led to witnessed seizure like activity followed by cardiac arrest. Per discussion with ED staff, patient required about 22 minutes of ACLS/5 shocks before returning to normal sinus rhythm. During intubation a lozenge was removed from patients airway. Hypothermia protocol was initiated prior to transferring to [facility]. On arrival to [facility] Arrived with increased serum creatinine from 2.9-->5.1 (unkown baseline), BUN 35--> 52, Trop 2.4-->1.3, Lactic acidosis 6.4-->1.3, chest x ray with increased parenchymal interstitial attenuation and parenchymal opacities suggestive of pulmonary edema. On mechanical ventilation with a FiO2 50%. Currently also anuric at present moment. Per cardiology patient has a dilated cardiomyopathy EF looks about 10-15% and LVEDD is 7.3 cm.    Interval History: Report unable to measure urine due to void while having BM-diarrhea yesterday. Diarrhea resolved as of this am. Patient seen in [facility] Tolerating 2 L net UF last post wt 108 kg. Today weight 112 kg? Wants to home.     Review of patient's allergies indicates:   Allergen Reactions    Penicillins      Tolerated cefepime May 2018     Current Facility-Administered Medications   Medication Frequency    0.9%  NaCl infusion PRN    acetaminophen tablet 325 mg Q6H PRN     albuterol-ipratropium 2.5 mg-0.5 mg/3 mL nebulizer solution 3 mL Q6H PRN    aspirin chewable tablet 81 mg Daily    atorvastatin tablet 40 mg Daily    clarithromycin 250 mg/5 mL suspension 250 mg Q12H    heparin (porcine) injection 1,000 Units PRN    heparin (porcine) injection 2,000 Units PRN    iron sucrose (VENOFER) 100 mg in sodium chloride 0.9% 100 mL IVPB Once per day on Mon Wed Fri    metoprolol succinate (TOPROL-XL) 24 hr tablet 50 mg Daily    ramelteon tablet 8 mg Nightly PRN    sodium chloride 0.9% flush 3 mL PRN       Objective:     Vital Signs (Most Recent):  Temp: 98.5 °F (36.9 °C) (06/13/18 1804)  Pulse: 97 (06/13/18 1900)  Resp: 20 (06/13/18 1804)  BP: 102/61 (06/13/18 1804)  SpO2: 100 % (06/13/18 1804)  O2 Device (Oxygen Therapy): room air (06/13/18 1804) Vital Signs (24h Range):  Temp:  [96.7 °F (35.9 °C)-98.9 °F (37.2 °C)] 98.5 °F (36.9 °C)  Pulse:  [75-98] 97  Resp:  [16-20] 20  SpO2:  [97 %-100 %] 100 %  BP: ()/(53-78) 102/61     Weight: 109.9 kg (242 lb 4.6 oz) (06/12/18 2100)  Body mass index is 36.84 kg/m².  Body surface area is 2.3 meters squared.    I/O last 3 completed shifts:  In: 1120 [P.O.:420; I.V.:100; Other:600]  Out: 2600 [Other:2600]    Physical Exam   Constitutional: He appears well-developed and well-nourished. No distress.   HENT:   Head: Normocephalic and atraumatic.   Eyes: Conjunctivae and EOM are normal.   Cardiovascular: Normal rate and regular rhythm.    Pulmonary/Chest: Effort normal and breath sounds normal.   Abdominal: Soft. Bowel sounds are normal.   Musculoskeletal: He exhibits no edema or deformity.   Skin: Skin is warm and dry. He is not diaphoretic.   L CW incision/ICD placement6/11. Left arm in sling.        Significant Labs:  All labs within the past 24 hours have been reviewed.     Significant Imaging:  Labs: Reviewed    Assessment/Plan:     Acute renal failure with tubular necrosis    MARY 2/2 ischemic ATN from cardiac arrest.   - baseline renal  function unknown.   - Renal US without report of CKD-related changes though kidneys more echogenic than liver.   -5/24 LVEF10% with diastolic dysfunction sp ICD placement on 6/11.   -6/8 Cardiac Pet Scan Stress showed improvement in LVEF 51%.    - TDC placement by [facility] 6/6/18  - Continue to avoidACEi for now.   - Seen in [facility] tolerating 2 L net UF.  Continue iHD MWF while inpatient.   - SW assisting with outpt HD chair [facility].             Thank you for your consult. I will follow-up with patient. Please contact us if you have any additional questions.    Kimberly Cr NP  Nephrology  [facility]

## 2018-06-14 NOTE — PLAN OF CARE
contacted [facility] [place] at [phone] regarding pt changing dialysis clinic from [facility] to [facility].  Intake states they are awaiting financial approval and pending medical approval from [facility].  Will follow.

## 2018-06-15 VITALS
HEIGHT: 68 IN | HEART RATE: 85 BPM | BODY MASS INDEX: 36.72 KG/M2 | DIASTOLIC BLOOD PRESSURE: 65 MMHG | RESPIRATION RATE: 18 BRPM | TEMPERATURE: 99 F | OXYGEN SATURATION: 95 % | SYSTOLIC BLOOD PRESSURE: 116 MMHG | WEIGHT: 242.31 LBS

## 2018-06-15 PROBLEM — I46.9 CARDIAC ARREST: Status: RESOLVED | Noted: 2018-05-19 | Resolved: 2018-06-15

## 2018-06-15 PROBLEM — Z99.2 HEMODIALYSIS STATUS: Status: RESOLVED | Noted: 2018-06-06 | Resolved: 2018-06-15

## 2018-06-15 LAB
ANION GAP SERPL CALC-SCNC: 10 MMOL/L
ANION GAP SERPL CALC-SCNC: 9 MMOL/L
BASOPHILS # BLD AUTO: 0.1 K/UL
BASOPHILS NFR BLD: 1.7 %
BUN SERPL-MCNC: 20 MG/DL
BUN SERPL-MCNC: 21 MG/DL
CALCIUM SERPL-MCNC: 9.3 MG/DL
CALCIUM SERPL-MCNC: 9.5 MG/DL
CHLORIDE SERPL-SCNC: 101 MMOL/L
CHLORIDE SERPL-SCNC: 102 MMOL/L
CO2 SERPL-SCNC: 24 MMOL/L
CO2 SERPL-SCNC: 24 MMOL/L
CREAT SERPL-MCNC: 3.8 MG/DL
CREAT SERPL-MCNC: 3.8 MG/DL
DIFFERENTIAL METHOD: ABNORMAL
EOSINOPHIL # BLD AUTO: 0.4 K/UL
EOSINOPHIL NFR BLD: 6.3 %
ERYTHROCYTE [DISTWIDTH] IN BLOOD BY AUTOMATED COUNT: ABNORMAL %
EST. GFR  (AFRICAN AMERICAN): 20.5 ML/MIN/1.73 M^2
EST. GFR  (AFRICAN AMERICAN): 20.5 ML/MIN/1.73 M^2
EST. GFR  (NON AFRICAN AMERICAN): 17.8 ML/MIN/1.73 M^2
EST. GFR  (NON AFRICAN AMERICAN): 17.8 ML/MIN/1.73 M^2
GIANT PLATELETS BLD QL SMEAR: PRESENT
GLUCOSE SERPL-MCNC: 85 MG/DL
GLUCOSE SERPL-MCNC: 87 MG/DL
HCT VFR BLD AUTO: 24 %
HGB BLD-MCNC: 7.5 G/DL
IMM GRANULOCYTES # BLD AUTO: 0.03 K/UL
IMM GRANULOCYTES NFR BLD AUTO: 0.5 %
LYMPHOCYTES # BLD AUTO: 1.3 K/UL
LYMPHOCYTES NFR BLD: 21.3 %
MAGNESIUM SERPL-MCNC: 1.5 MG/DL
MCH RBC QN AUTO: 24 PG
MCHC RBC AUTO-ENTMCNC: 31.3 G/DL
MCV RBC AUTO: 77 FL
MONOCYTES # BLD AUTO: 1.3 K/UL
MONOCYTES NFR BLD: 21.3 %
NEUTROPHILS # BLD AUTO: 2.9 K/UL
NEUTROPHILS NFR BLD: 48.9 %
NRBC BLD-RTO: 0 /100 WBC
PHOSPHATE SERPL-MCNC: 4.7 MG/DL
PLATELET # BLD AUTO: 347 K/UL
PLATELET BLD QL SMEAR: ABNORMAL
PMV BLD AUTO: 9.7 FL
POTASSIUM SERPL-SCNC: 4 MMOL/L
POTASSIUM SERPL-SCNC: 4.1 MMOL/L
RBC # BLD AUTO: 3.12 M/UL
SODIUM SERPL-SCNC: 134 MMOL/L
SODIUM SERPL-SCNC: 136 MMOL/L
WBC # BLD AUTO: 5.88 K/UL

## 2018-06-15 PROCEDURE — 93005 ELECTROCARDIOGRAM TRACING: CPT

## 2018-06-15 PROCEDURE — 83735 ASSAY OF MAGNESIUM: CPT

## 2018-06-15 PROCEDURE — 80048 BASIC METABOLIC PNL TOTAL CA: CPT

## 2018-06-15 PROCEDURE — 85025 COMPLETE CBC W/AUTO DIFF WBC: CPT

## 2018-06-15 PROCEDURE — 99233 SBSQ HOSP IP/OBS HIGH 50: CPT | Mod: ,,, | Performed by: NURSE PRACTITIONER

## 2018-06-15 PROCEDURE — 63700000 PHARM REV CODE 250 ALT 637 W/O HCPCS: Performed by: HOSPITALIST

## 2018-06-15 PROCEDURE — 36415 COLL VENOUS BLD VENIPUNCTURE: CPT

## 2018-06-15 PROCEDURE — 25000003 PHARM REV CODE 250: Performed by: INTERNAL MEDICINE

## 2018-06-15 PROCEDURE — 25000003 PHARM REV CODE 250: Performed by: STUDENT IN AN ORGANIZED HEALTH CARE EDUCATION/TRAINING PROGRAM

## 2018-06-15 PROCEDURE — 99239 HOSP IP/OBS DSCHRG MGMT >30: CPT | Mod: ,,, | Performed by: HOSPITALIST

## 2018-06-15 PROCEDURE — 63600175 PHARM REV CODE 636 W HCPCS: Performed by: HOSPITALIST

## 2018-06-15 PROCEDURE — 63600175 PHARM REV CODE 636 W HCPCS: Performed by: INTERNAL MEDICINE

## 2018-06-15 PROCEDURE — 25000003 PHARM REV CODE 250: Performed by: PHYSICIAN ASSISTANT

## 2018-06-15 PROCEDURE — 80048 BASIC METABOLIC PNL TOTAL CA: CPT | Mod: 91

## 2018-06-15 PROCEDURE — 84100 ASSAY OF PHOSPHORUS: CPT

## 2018-06-15 PROCEDURE — 93010 ELECTROCARDIOGRAM REPORT: CPT | Mod: ,,, | Performed by: INTERNAL MEDICINE

## 2018-06-15 RX ORDER — MAGNESIUM SULFATE HEPTAHYDRATE 40 MG/ML
2 INJECTION, SOLUTION INTRAVENOUS ONCE
Status: COMPLETED | OUTPATIENT
Start: 2018-06-15 | End: 2018-06-15

## 2018-06-15 RX ORDER — ATORVASTATIN CALCIUM 40 MG/1
40 TABLET, FILM COATED ORAL DAILY
Qty: 90 TABLET | Refills: 3 | Status: SHIPPED | OUTPATIENT
Start: 2018-06-16 | End: 2018-07-11

## 2018-06-15 RX ORDER — METOPROLOL SUCCINATE 50 MG/1
50 TABLET, EXTENDED RELEASE ORAL DAILY
Qty: 30 TABLET | Refills: 11 | Status: SHIPPED | OUTPATIENT
Start: 2018-06-16 | End: 2018-07-02

## 2018-06-15 RX ORDER — NAPROXEN SODIUM 220 MG/1
81 TABLET, FILM COATED ORAL DAILY
Refills: 0 | Status: ON HOLD | COMMUNITY
Start: 2018-06-16 | End: 2020-05-19 | Stop reason: HOSPADM

## 2018-06-15 RX ORDER — CLARITHROMYCIN 250 MG/1
250 TABLET, FILM COATED ORAL EVERY 12 HOURS
Qty: 3 TABLET | Refills: 0 | Status: SHIPPED | OUTPATIENT
Start: 2018-06-15 | End: 2018-06-17

## 2018-06-15 RX ORDER — CLARITHROMYCIN 250 MG/5ML
250 FOR SUSPENSION ORAL EVERY 12 HOURS
Qty: 50 ML | Refills: 0 | Status: SHIPPED | OUTPATIENT
Start: 2018-06-15 | End: 2018-06-15 | Stop reason: HOSPADM

## 2018-06-15 RX ADMIN — CLARITHROMYCIN 250 MG: 250 FOR SUSPENSION ORAL at 08:06

## 2018-06-15 RX ADMIN — METOPROLOL TARTRATE 5 MG: 5 INJECTION, SOLUTION INTRAVENOUS at 12:06

## 2018-06-15 RX ADMIN — ASPIRIN 81 MG CHEWABLE TABLET 81 MG: 81 TABLET CHEWABLE at 08:06

## 2018-06-15 RX ADMIN — MAGNESIUM SULFATE IN WATER 2 G: 40 INJECTION, SOLUTION INTRAVENOUS at 01:06

## 2018-06-15 RX ADMIN — ATORVASTATIN CALCIUM 40 MG: 20 TABLET, FILM COATED ORAL at 08:06

## 2018-06-15 RX ADMIN — IRON SUCROSE 100 MG: 20 INJECTION, SOLUTION INTRAVENOUS at 08:06

## 2018-06-15 RX ADMIN — METOPROLOL SUCCINATE 50 MG: 50 TABLET, EXTENDED RELEASE ORAL at 08:06

## 2018-06-15 NOTE — NURSING
Pt .  Notified Provider Aly Rondon at [facility].  Pt denies chestpain, resting comfortably, other vitals within normal limits.  Provider to review chart, awaiting orders.  Will continue to monitor patient.

## 2018-06-15 NOTE — CARE UPDATE
RN Proactive Rounding Note  Time of Visit: 0001    Admit Date: 2018  LOS: 27  Code Status: Full Code   Date of Visit: 06/15/2018  : 1970  Age: 47 y.o.  Sex: male  Bed: 0/[facility] A:   MRN: [id number]  Was the patient discharged from an ICU this admission? Yes  Was the patient discharged from a PACU within last 24 hours? No  Did the patient receive conscious sedation/general anesthesia in last 24 hours? No  Was the patient in the ED within the past 24 hours? No  Was the patient started on NIPPV within the past 24 hours? No  Attending Physician: Lyubov Gonzales MD  Primary Service: [facility] MED       ASSESSMENT:     Abnormal Vital Signs: -160  Clinical Issues: Dysrythmia     INTERVENTIONS/ RECOMMENDATIONS:     Called by bedside RN with concern for tachycardia. Awaiting EKG and lab draw for BMP/Mag. Upon arrival to room, patient sitting up in bed watching tv with family at bedside, denies pain, denies discomfort, denies SOB. Vitals: HR 150s. /58, SpO2 97% on room air. Primary already called by bedside RN with orders for Lopressor 5 mg IV. Patient placed on bedside monitor.  0011: Lopressor 5 mg IV given. HR 140s. /60.   0014: EKG completed - SVT shown.  0019: 2nd dose Lopressor 5 mg IV given. 's, /55.  0023: , /59. Call placed to LUTHER Sosa for further recommendations.  0036: Sheila with return call. Order to give 250 ml bolus slowly due to patient's history.     Update: 0056: Charge RN called, patient HR converted back to 70s. Bolus held.     Update: 0141: Alerted bedside RN that patient's Mag 1.5. Please call primary for replacement orders.       Discussed plan of care with RNArina, and charge RNAngie ESCALATION:     Yes/No Yes    Orders received and case discussed with LUTHER Grissom    Disposition: remain on floor    FOLLOW-UP/CONTINGENCY:       Call back the Rapid Response Nurse at x [phone]  for additional questions or concerns

## 2018-06-15 NOTE — PLAN OF CARE
Discharge Summary  Hospital Medicine    Patient Name: Los Mendez  MRN:  [id number]   Provider on Discharge: Lyubov Gonzales MD  [facility] Medicine Team: [facility] MED [facility]  Date of Admission:  5/19/2018     Date of Discharge:  6/15/2017  Code Status: Full Code       HPI / Hospital Course: 46 y/o man w/ hx of HTN, COPD? Transferred from [facility] following witnessed cardiac arrest while in the ED waiting room. Patient had presented complaining of URI/SOB symptoms, while in the ED waiting room he was noted to be choking, became apneic, which led to witnessed seizure like activity followed by cardiac arrest. Per discussion with ED staff, patient required about 22 minutes of ACLS/5 shocks before returning to normal sinus rhythm. During intubation a lozenge was removed from patients airway. Hyperthermia protocol was initiated prior to transferring to [facility] for NCC care. Per ED records, patient was acidotic with ph of 7.1 this morning. At the time of arrival to NICU, patient was on paralytics, however pupillary reflex was present. Will continue hypothermia protocol for additional 24 h.     05/19/18:  Arrived intubated, sedated, artic sun blanket  05/20/18:  Pressor requirment going up, NO DIURESIS, continue TTM, nimbex off, LFT improving, trop improving, family updated.  05/21/18:  Reach normothermia, MRI today, remove EEG, place HD line, consult nephrology, LFTs trending down, family updated.  05/22/18:  Anoxic brain injury. Pt responding. Last night placed on propofol  MRI --L head of caudate and cerebellar small infarctions.  05/23/18:  Anoxic brain injury.  On CRRT. BNP 1046. Back on propofol, pressors. Still agitated. Started on Buspar. Trop I normalizing.  05/25/18:  CRRT today. Amiodarone dc'd, metoprolol started. CBC s3p--ax H/H continues to drop will consult GI. Vascular surgery consulted concerning  Possible arterial occlusion. RUE cold. Arterial line dcd. US RUE pending. Abdominal distention, KUB showed  gas pattern. Bladder pressures q4h. Initial 21.  05/28/18:  H/H 6.8/23, 1 unit PRBC transfused. Keep Hgb greater than or equal to 8. CRRT stopped, line clotted. Changed trialysis catheter changed. Wean sedation today. Once off sedation begin weaning ventilator settings to CPAP.  Weaned vent to CPAP and able to Extubated in afternoon without s/s stridor or difficulty breathing. HR elevated 140s - 160s fentanyl x2 prn for pain/agitation. Metoprolol 5mg IV x3 given remains ST 140s.  Restless, will start precedex.  05/30/18:  Leukocytosis, pan cx; kelley discontinued; remove IJ, speech eval for diet   05/31/18:  No acute events, still with gastric secretions coming out of NG tube will start reglan; remains encephalopathic  06/01/18:  Will hold NGT suction, NPO per SLP.  Continue metoclopramide.  Hgb stable at 8.3 g/dL.  6/2 Continue NG tube for now. Will advance to pureed diet as recommended by SLP and evaluate intake before removing NG.. WBC remain elevated but afebrile, cultures negative and USUE/Diamond 5/24 showed no DVT only thrombophlebitis of LUE cephalic vein. Continue to monitor daily CBC. Nephrology following will hold HD today and possibly tomorrow depending on labs  6/4: patient fell from bed en route to bathroom (unassisted), CT no ICH, patient neuro stable, step down  6/5: Stepped down to hospital medicine  6/6: Permacath placed by HUGO. Underwent HD. Consult placed to PM&R for rehab placement. CM pursuing outpatient HD chair.  6/7: No acute events. Patient participating with Rehab. Pending placement in rehab.   6/8-6/10: ICD will be placed Monday. NPO Sunday night. NAEON. .  6/11: dcICD placed. Medicaid information provided to CM.    * Anoxic brain injury     -Cardiac arrest ~ 22 minutes at OSH.  Pt doing relatively well.  Extubated 5/28.  -05/21/18 MRI w/o evidence of anoxic brain injury, but several small strokes; pt encephalopathic  -Encephalopathy likely multifactorial--strokes, metabolic (kidney/liver  injury), delirium.           Acute superficial gastritis with hemorrhage - Resolved     Coffee ground material aspirated via NG on 5/23  Concurrent with acute drop in Hg  Hg has since uptrended.   No recurrent signs of upper GI bleed.        Debility     - Working with PT/OT who now recommends home health with PT/OT due to significant improvement in function.       Hemodialysis status     - Awaiting placement for dialysis chair in [place]       Iron deficiency anemia due to chronic blood loss      - stable        Acute bilat watershed infarction     - Incidental finding on MRI.   - Continues to have slurred speech but participating in rehab.       Acute combined systolic and diastolic ACC/AHA stage C congestive heart failure with Essential hypertension     S/p cardiac arrest on 5/19  LVEF 10-15%  - ACEi held in setting of ARF  - metoprolol succinate 50mg daily  - will evaluate and start with hydralazine 10mg tid and isordil 10mg tid for afterload reduction if BP can tolerate while on HD  - aldosterone antagonist held in the setting of poor renal function  - continue high intensity statin therapy with atorvastatin 40mg  - transfuse for goal Hb >8  - 6/11 dcICD placed  - No post surgical complaints.        Cytotoxic cerebral edema     - secondary to anoxic brain injury, 22 minutes PEA-->vfib  - MRI brain 5/21 for anoxic assessment.  see anoxic brain injury       Acute renal failure with tubular necrosis     - unknown baseline renal function, not previously dialysis dependent  - ischemic ATN 2/2 cardiac arrest  - Receiving HD PRN  - Underwent permacath placement 6/6  - Had started making arrangements for outpatient dialysis placement, the patient's renal function began to stabilize and urine output improved.  Will be discharged home with PermCath in place, but without dialysis arranged as renal function is stable at this time.  Nephrology plans to follow labs on Monday and if they continue to be stable, will arrange  for PermCath to be removed.                     Acute pulmonary edema     - Managing volume status with HD given ARF       Class 3 severe obesity due to excess calories with serious comorbidity and body mass index (BMI) of 40.0 to 44.9 in adult     S/p nutrition consult; counseled on lifestyle modifications  Body mass index is 36.84 kg/m².                              Active Hospital Problems    Diagnosis  POA    *Anoxic brain injury [G93.1]  Yes    Debility [R53.81]  Yes    Acute superficial gastritis with hemorrhage [K29.01]  Yes    Iron deficiency anemia due to chronic blood loss [D50.0]  No    Acute bilat watershed infarction [I63.8]  Yes    Class 3 severe obesity due to excess calories with serious comorbidity and body mass index (BMI) of 40.0 to 44.9 in adult [E66.01, Z68.41]  Not Applicable    Acute pulmonary edema [J81.0]  Yes    Essential hypertension [I10]  Yes    Acute renal failure with tubular necrosis [N17.0]  Yes    Cytotoxic cerebral edema [G93.6]  Yes    Acute combined systolic and diastolic ACC/AHA stage C congestive heart failure [I50.41]  Yes     5-2018    1 - Eccentric hypertrophy.     2 - Severely depressed left ventricular systolic function (EF 10-15%).     3 - Biatrial enlargement.     4 - Impaired LV relaxation, normal LAP (grade 1 diastolic dysfunction).     5 - Right ventricular enlargement with moderately depressed systolic function.     6 - The estimated PA systolic pressure is 37 mmHg.     7 - Mild mitral regurgitation.     8 - Mild tricuspid regurgitation.         Resolved Hospital Problems    Diagnosis Date Resolved POA    Cardiomyopathy [I42.9] 06/13/2018 Yes    Atrial tachycardia [I47.1] 06/13/2018 Yes    Acute decompensated heart failure [I50.9] 06/13/2018 Unknown    MARY (acute kidney injury) [N17.9] 06/12/2018 Unknown    Hemodialysis status [Z99.2] 06/15/2018 Not Applicable    Cold hands [R20.9] 06/01/2018 No    Respiratory arrest [R09.2] 06/01/2018 Yes    Acute  ischemic vertebrobasilar artery cerebellar stroke [I63.29] 05/30/2018 Yes    Acute ischemic left MCA stroke [I63.512] 05/30/2018 Yes    Acute respiratory failure with hypoxia [J96.01] 06/05/2018 Yes    Cardiac arrest [I46.9] 06/15/2018 Yes    Seizure [R56.9] 05/30/2018 Yes    Cardiogenic shock [R57.0] 05/30/2018 Yes    Metabolic acidosis [E87.2] 05/30/2018 Yes    Hypothermia [T68.XXXA] 05/30/2018 Yes    Elevated INR [R79.1] 05/30/2018 Yes       Current Discharge Medication List      START taking these medications    Details   aspirin 81 MG Chew Take 1 tablet (81 mg total) by mouth once daily.  Refills: 0      atorvastatin (LIPITOR) 40 MG tablet Take 1 tablet (40 mg total) by mouth once daily.  Qty: 90 tablet, Refills: 3      clarithromycin (BIAXIN) 250 MG tablet Take 1 tablet (250 mg total) by mouth every 12 (twelve) hours.  Qty: 3 tablet, Refills: 0      metoprolol succinate (TOPROL-XL) 50 MG 24 hr tablet Take 1 tablet (50 mg total) by mouth once daily.  Qty: 30 tablet, Refills: 11             Consultants:  Nephrology, Cardiology, vascular surgery, vascular Neurology    Discharge Diet:renal diet and 2 gram sodium diet with Normal Fluid intake of 1500 - 2000 mL per day    Activity: ambulate in house with assistance    Discharge Condition: Stable    Disposition: Home-Health Care Svc    Tests pending at the time of discharge: none      Time spent  on the discharge of the patient including review of hospital course with the patient, reviewing discharge medications and arranging follow-up care:  45 min    Discharge examination Patient was seen and examined on the date of discharge and determined to be suitable for discharge.    Discharge plan and follow up:  Future Appointments  Date Time Provider Department Center   6/20/2018 3:00 PM PACEMAKER, ICD [facility] Bienvenido Ernestine Gonzales MD

## 2018-06-15 NOTE — PLAN OF CARE
[facility]    HOME HEALTH ORDERS  FACE TO FACE ENCOUNTER    Patient Name: Los Mendez  YOB: 1970    PCP: Andres Odonnell   PCP Address: None  PCP Phone Number: None  PCP Fax: None    Encounter Date: 06/15/2018    Admit to Home Health    Diagnoses:  Active Hospital Problems    Diagnosis  POA    *Anoxic brain injury [G93.1]  Yes    Debility [R53.81]  Yes    Acute superficial gastritis with hemorrhage [K29.01]  Yes    Iron deficiency anemia due to chronic blood loss [D50.0]  No    Acute bilat watershed infarction [I63.8]  Yes    Class 3 severe obesity due to excess calories with serious comorbidity and body mass index (BMI) of 40.0 to 44.9 in adult [E66.01, Z68.41]  Not Applicable    Acute pulmonary edema [J81.0]  Yes    Essential hypertension [I10]  Yes    Acute renal failure with tubular necrosis [N17.0]  Yes    Cytotoxic cerebral edema [G93.6]  Yes    Acute combined systolic and diastolic ACC/AHA stage C congestive heart failure [I50.41]  Yes     5-2018    1 - Eccentric hypertrophy.     2 - Severely depressed left ventricular systolic function (EF 10-15%).     3 - Biatrial enlargement.     4 - Impaired LV relaxation, normal LAP (grade 1 diastolic dysfunction).     5 - Right ventricular enlargement with moderately depressed systolic function.     6 - The estimated PA systolic pressure is 37 mmHg.     7 - Mild mitral regurgitation.     8 - Mild tricuspid regurgitation.         Resolved Hospital Problems    Diagnosis Date Resolved POA    Cardiomyopathy [I42.9] 06/13/2018 Yes    Atrial tachycardia [I47.1] 06/13/2018 Yes    Acute decompensated heart failure [I50.9] 06/13/2018 Unknown    MARY (acute kidney injury) [N17.9] 06/12/2018 Unknown    Hemodialysis status [Z99.2] 06/15/2018 Not Applicable    Cold hands [R20.9] 06/01/2018 No    Respiratory arrest [R09.2] 06/01/2018 Yes    Acute ischemic vertebrobasilar artery cerebellar stroke [I63.29] 05/30/2018 Yes     Acute ischemic left MCA stroke [I63.512] 05/30/2018 Yes    Acute respiratory failure with hypoxia [J96.01] 06/05/2018 Yes    Cardiac arrest [I46.9] 06/15/2018 Yes    Seizure [R56.9] 05/30/2018 Yes    Cardiogenic shock [R57.0] 05/30/2018 Yes    Metabolic acidosis [E87.2] 05/30/2018 Yes    Hypothermia [T68.XXXA] 05/30/2018 Yes    Elevated INR [R79.1] 05/30/2018 Yes       Future Appointments  Date Time Provider Department Center   6/20/2018 3:00 PM PACEMAKER, ICD [facility] Bienvenido Ernestine           I have seen and examined this patient face to face today. My clinical findings that support the need for the home health skilled services and home bound status are the following:  Weakness/numbness causing balance and gait disturbance due to Heart Failure making it taxing to leave home.    Allergies:  Review of patient's allergies indicates:   Allergen Reactions    Penicillins      Tolerated cefepime May 2018       Diet: renal diet and 2 gram sodium diet    Activities: ambulate in house with assistance  Arm in sling nightly x 2 weeks  No lifting more than 5 lbs in left hand x 4 weeks, no drastic movements at shoulder (throwing a fishing line, folding a sheet)  No raising arm above shoulder length x 4 weeks  Do not let water hit wound directly once dressing removed x 48 hours  No driving x 1 week, no turns with left arm for 4 weeks    Nursing:   SN to complete comprehensive assessment including routine vital signs. Instruct on disease process and s/s of complications to report to MD. Review/verify medication list sent home with the patient at time of discharge  and instruct patient/caregiver as needed. Frequency may be adjusted depending on start of care date.    Notify MD if SBP > 160 or < 90; DBP > 90 or < 50; HR > 120 or < 50; Temp > 101    CONSULTS:    Physical Therapy to evaluate and treat. Evaluate for home safety and equipment needs; Establish/upgrade home exercise program. Perform / instruct on therapeutic  exercises, gait training, transfer training, and Range of Motion.  Occupational Therapy to evaluate and treat. Evaluate home environment for safety and equipment needs. Perform/Instruct on transfers, ADL training, ROM, and therapeutic exercises.   to evaluate for community resources/long-range planning.    MISCELLANEOUS CARE: Please draw a CBC, BMP, and Phos on Monday June 18th. Fax results to [phone] AND call Kimberly Cr at [phone]      Medications: Review discharge medications with patient and family and provide education.      Current Discharge Medication List      START taking these medications    Details   aspirin 81 MG Chew Take 1 tablet (81 mg total) by mouth once daily.  Refills: 0      atorvastatin (LIPITOR) 40 MG tablet Take 1 tablet (40 mg total) by mouth once daily.  Qty: 90 tablet, Refills: 3      clarithromycin (BIAXIN) 250 mg/5 mL suspension Take 5 mLs (250 mg total) by mouth every 12 (twelve) hours.  Qty: 15 mL, Refills: 0      metoprolol succinate (TOPROL-XL) 50 MG 24 hr tablet Take 1 tablet (50 mg total) by mouth once daily.  Qty: 30 tablet, Refills: 11             I certify that this patient is confined to his home and needs intermittent skilled nursing care, physical therapy and occupational therapy.

## 2018-06-15 NOTE — SUBJECTIVE & OBJECTIVE
Interval History: No acute events overnight. Cr 3.8 from 3.7. UOP approximately 500 ml yesterday ? Not recorded. Patient made 425 this am already. Denies any shortness of breath.     Review of patient's allergies indicates:   Allergen Reactions    Penicillins      Tolerated cefepime May 2018     Current Facility-Administered Medications   Medication Frequency    0.9%  NaCl infusion PRN    acetaminophen tablet 325 mg Q6H PRN    albuterol-ipratropium 2.5 mg-0.5 mg/3 mL nebulizer solution 3 mL Q6H PRN    aspirin chewable tablet 81 mg Daily    atorvastatin tablet 40 mg Daily    clarithromycin 250 mg/5 mL suspension 250 mg Q12H    heparin (porcine) injection 1,000 Units PRN    heparin (porcine) injection 2,000 Units PRN    iron sucrose (VENOFER) 100 mg in sodium chloride 0.9% 100 mL IVPB Once per day on Mon Wed Fri    metoprolol injection 5 mg Q5 Min PRN    metoprolol succinate (TOPROL-XL) 24 hr tablet 50 mg Daily    ramelteon tablet 8 mg Nightly PRN    sodium chloride 0.9% flush 3 mL PRN       Objective:     Vital Signs (Most Recent):  Temp: 98.4 °F (36.9 °C) (06/15/18 0758)  Pulse: 76 (06/15/18 0800)  Resp: 20 (06/15/18 0758)  BP: 112/62 (06/15/18 0758)  SpO2: 98 % (06/15/18 0758)  O2 Device (Oxygen Therapy): room air (06/15/18 0758) Vital Signs (24h Range):  Temp:  [98.1 °F (36.7 °C)-98.9 °F (37.2 °C)] 98.4 °F (36.9 °C)  Pulse:  [] 76  Resp:  [13-20] 20  SpO2:  [94 %-98 %] 98 %  BP: (101-118)/(55-69) 112/62     Weight: 109.9 kg (242 lb 4.6 oz) (06/13/18 2000)  Body mass index is 36.84 kg/m².  Body surface area is 2.3 meters squared.    I/O last 3 completed shifts:  In: 660 [P.O.:660]  Out: -     Physical Exam   Constitutional: He appears well-developed and well-nourished. No distress.   HENT:   Head: Normocephalic and atraumatic.   Eyes: Conjunctivae and EOM are normal.   Cardiovascular: Normal rate and regular rhythm.    Pulmonary/Chest: Effort normal and breath sounds normal.   Abdominal: Soft.  Bowel sounds are normal.   Musculoskeletal: He exhibits no edema or deformity.   Skin: Skin is warm and dry. He is not diaphoretic.   L CW incision/ICD placement6/11. Left arm in sling.   R IJ permacath       Significant Labs:  All labs within the past 24 hours have been reviewed.     Significant Imaging:  Labs: Reviewed

## 2018-06-15 NOTE — PLAN OF CARE
Problem: Patient Care Overview  Goal: Plan of Care Review  Outcome: Ongoing (interventions implemented as appropriate)  Patient went into sinus tach at approx. 0000.  HR 140s-150s.  Notified Provider Sheila at [facility] and MARY Heller. Patient calm, alert, reported no shortness of breath, exhibited no disorientation or other symptoms.  Administered 10mg Metoprolol IV in 2 doses over approx. 20 mins.  HR dropped to 130s.  Fluid challenge ordered.  Administered 15mL of 250mL bolus.  HR dropped to 80s in approx. 15 mins.  Discontinued fluids.  HR remained stable through rest of the night.         Patient

## 2018-06-15 NOTE — NURSING
D/c instruction given to pt and spouse verbalized understanding saline lock removed at rt fore arm applied 2x2 gauze secured with tape no bleeding noted d/c home with HH and with spouse with permacath at rt chest wall dressing dry and intact awaiting hospital transport for w/c

## 2018-06-15 NOTE — PROGRESS NOTES
Hospital Medicine  Progress Note    Patient Name: Los Mendez  MRN:  [id number]  Hospital Medicine Team: [facility] [place] Lyubov Gonzales MD  Date of Admission:  5/19/2018     Length of Stay:  LOS: 26 days   Expected Discharge Date: 6/15/2018  Principal Problem:  Anoxic brain injury  Code status: Full Code      Interval History / Subjective:  Patient doing well today.  Very anxious to leave the hospital.  Denies pain.    Review of Systems:  Pain Scale:  0 /10  Respiratory: no cough or shortness of breath  Cardiovascular: no chest pain or palpitations  Gastrointestinal: no nausea or vomiting, no abdominal pain or change in bowel habits  Behavioral/Psych: no depression or anxiety      Physical Exam:  Temp:  [98.1 °F (36.7 °C)-99.3 °F (37.4 °C)]   Pulse:  [78-98]   Resp:  [16-20]   BP: (102-124)/(60-80)   SpO2:  [94 %-98 %]     Intake/Output Summary (Last 24 hours) at 06/14/18 1923  Last data filed at 06/14/18 1111   Gross per 24 hour   Intake              540 ml   Output                0 ml   Net              540 ml     Body mass index is 36.84 kg/m².    General Appearance: no acute distress , left arm in sling.  Heart: regular rate and rhythm  Respiratory: Normal respiratory effort, no crackles   Abdomen: Soft, non-tender; bowel sounds active  Skin: intact. IV sites ok  Neurologic:  No focal numbness or weakness  Mental status: Alert, oriented x 4, affect appropriate       Scheduled Meds:   aspirin  81 mg Oral Daily    atorvastatin  40 mg Oral Daily    clarithromycin  250 mg Oral Q12H    iron sucrose (VENOFER) IVPB  100 mg Intravenous Once per day on Mon Wed Fri    metoprolol succinate  50 mg Oral Daily     Continuous Infusions:  As Needed:  sodium chloride 0.9%, acetaminophen, albuterol-ipratropium, heparin (porcine), heparin (porcine), ramelteon, sodium chloride 0.9%    Active Hospital Problems    Diagnosis  POA    *Anoxic brain injury [G93.1]  Yes    Hemodialysis status [Z99.2]  Not Applicable    Debility  [R53.81]  Yes    Acute superficial gastritis with hemorrhage [K29.01]  Yes    Iron deficiency anemia due to chronic blood loss [D50.0]  No    Acute bilat watershed infarction [I63.8]  Yes    Cardiac arrest [I46.9]  Yes    Class 3 severe obesity due to excess calories with serious comorbidity and body mass index (BMI) of 40.0 to 44.9 in adult [E66.01, Z68.41]  Not Applicable    Acute pulmonary edema [J81.0]  Yes    Essential hypertension [I10]  Yes    Acute renal failure with tubular necrosis [N17.0]  Yes    Cytotoxic cerebral edema [G93.6]  Yes    Acute combined systolic and diastolic ACC/AHA stage C congestive heart failure [I50.41]  Yes     5-2018    1 - Eccentric hypertrophy.     2 - Severely depressed left ventricular systolic function (EF 10-15%).     3 - Biatrial enlargement.     4 - Impaired LV relaxation, normal LAP (grade 1 diastolic dysfunction).     5 - Right ventricular enlargement with moderately depressed systolic function.     6 - The estimated PA systolic pressure is 37 mmHg.     7 - Mild mitral regurgitation.     8 - Mild tricuspid regurgitation.         Resolved Hospital Problems    Diagnosis Date Resolved POA    Cardiomyopathy [I42.9] 06/13/2018 Yes    Atrial tachycardia [I47.1] 06/13/2018 Yes    Acute decompensated heart failure [I50.9] 06/13/2018 Unknown    MARY (acute kidney injury) [N17.9] 06/12/2018 Unknown    Cold hands [R20.9] 06/01/2018 No    Respiratory arrest [R09.2] 06/01/2018 Yes    Acute ischemic vertebrobasilar artery cerebellar stroke [I63.29] 05/30/2018 Yes    Acute ischemic left MCA stroke [I63.512] 05/30/2018 Yes    Acute respiratory failure with hypoxia [J96.01] 06/05/2018 Yes    Seizure [R56.9] 05/30/2018 Yes    Cardiogenic shock [R57.0] 05/30/2018 Yes    Metabolic acidosis [E87.2] 05/30/2018 Yes    Hypothermia [T68.XXXA] 05/30/2018 Yes    Elevated INR [R79.1] 05/30/2018 Yes       Overview/ Assessment: 46 y/o man w/ hx of HTN, COPD? Transferred from  [facility] following witnessed cardiac arrest while in the ED waiting room. Patient had presented complaining of URI/SOB symptoms, while in the ED waiting room he was noted to be choking, became apneic, which led to witnessed seizure like activity followed by cardiac arrest. Per discussion with ED staff, patient required about 22 minutes of ACLS/5 shocks before returning to normal sinus rhythm. During intubation a lozenge was removed from patients airway. Hyperthermia protocol was initiated prior to transferring to [facility] for [place]CC care. Per ED records, patient was acidotic with ph of 7.1 this morning. At the time of arrival to [facility], patient was on paralytics, however pupillary reflex was present. Will continue hypothermia protocol for additional 24 h.    05/19/18:  Arrived intubated, sedated, artic sun blanket  05/20/18:  Pressor requirment going up, NO DIURESIS, continue TTM, nimbex off, LFT improving, trop improving, family updated.  05/21/18:  Reach normothermia, MRI today, remove EEG, place HD line, consult nephrology, LFTs trending down, family updated.  05/22/18:  Anoxic brain injury. Pt responding. Last night placed on propofol  MRI --L head of caudate and cerebellar small infarctions.  05/23/18:  Anoxic brain injury.  On CRRT. BNP 1046. Back on propofol, pressors. Still agitated. Started on Buspar. Trop I normalizing.  05/25/18:  CRRT today. Amiodarone dc'd, metoprolol started. CBC m3h--mg H/H continues to drop will consult GI. Vascular surgery consulted concerning  Possible arterial occlusion. RUE cold. Arterial line dcd. US RUE pending. Abdominal distention, KUB showed gas pattern. Bladder pressures q4h. Initial 21.  05/28/18:  H/H 6.8/23, 1 unit PRBC transfused. Keep Hgb greater than or equal to 8. CRRT stopped, line clotted. Changed trialysis catheter changed. Wean sedation today. Once off sedation begin weaning ventilator settings to CPAP.  Weaned vent to CPAP and able to Extubated in afternoon without s/s  stridor or difficulty breathing. HR elevated 140s - 160s fentanyl x2 prn for pain/agitation. Metoprolol 5mg IV x3 given remains ST 140s.  Restless, will start precedex.  05/30/18:  Leukocytosis, pan cx; kelley discontinued; remove IJ, speech eval for diet   05/31/18:  No acute events, still with gastric secretions coming out of NG tube will start reglan; remains encephalopathic  06/01/18:  Will hold NGT suction, NPO per SLP.  Continue metoclopramide.  Hgb stable at 8.3 g/dL.  6/2 Continue NG tube for now. Will advance to pureed diet as recommended by SLP and evaluate intake before removing NG.. WBC remain elevated but afebrile, cultures negative and ISA/Diamond 5/24 showed no DVT only thrombophlebitis of LUE cephalic vein. Continue to monitor daily CBC. Nephrology following will hold HD today and possibly tomorrow depending on labs  6/4: patient fell from bed en route to bathroom (unassisted), CT no ICH, patient neuro stable, step down  6/5: Stepped down to hospital medicine  6/6: Permacath placed by HUGO. Underwent HD. Consult placed to PM&R for rehab placement. CM pursuing outpatient HD chair.  6/7: No acute events. Patient participating with Rehab. Pending placement in rehab.   6/8-6/10: ICD will be placed Monday. NPO Sunday night. NAEON. .  6/11: dcICD placed. Medicaid information provided to CM.    Plan for Problems addressed today:    * Anoxic brain injury     -Cardiac arrest ~ 22 minutes at OSH.  Pt doing relatively well.  Extubated 5/28.  -05/21/18 MRI w/o evidence of anoxic brain injury, but several small strokes; pt encephalopathic  -Encephalopathy likely multifactorial--strokes, metabolic (kidney/liver injury), delirium.           Acute superficial gastritis with hemorrhage - Resolved     Coffee ground material aspirated via NG on 5/23  Concurrent with acute drop in Hg  Hg has since uptrended.   No recurrent signs of upper GI bleed.        Debility     - Working with PT/OT who now recommends home health with  PT/OT due to significant improvement in function.       Hemodialysis status     - Awaiting placement for dialysis chair in [employer]       Iron deficiency anemia due to chronic blood loss      - stable        Acute bilat watershed infarction     - Incidental finding on MRI.   - Continues to have slurred speech but participating in rehab.       Acute combined systolic and diastolic ACC/AHA stage C congestive heart failure with Essential hypertension     S/p cardiac arrest on 5/19  LVEF 10-15%  - ACEi held in setting of ARF  - metoprolol succinate 50mg daily  - will evaluate and start with hydralazine 10mg tid and isordil 10mg tid for afterload reduction if BP can tolerate while on HD  - aldosterone antagonist held in the setting of poor renal function  - continue high intensity statin therapy with atorvastatin 40mg  - transfuse for goal Hb >8  - 6/11 dcICD placed  - No post surgical complaints.        Cytotoxic cerebral edema     - secondary to anoxic brain injury, 22 minutes PEA-->vfib  - MRI brain 5/21 for anoxic assessment.  see anoxic brain injury       Acute renal failure with tubular necrosis     - unknown baseline renal function, not previously dialysis dependent  - ischemic ATN 2/2 cardiac arrest  - Remains oliguric; starting to make small volumes of urine but not clearing volume or electrolytes effectively  - Receiving HD PRN  - Underwent permacath placement 6/6  - Renal diet  - History of iron deficiency anemia; plan for EPO if counts do not recover promptly  - Followed by Nephrology  - Awaiting placement for dialysis chair outpatient.                    Acute pulmonary edema     - Managing volume status with HD given ARF       Class 3 severe obesity due to excess calories with serious comorbidity and body mass index (BMI) of 40.0 to 44.9 in adult     S/p nutrition consult; counseled on lifestyle modifications  Body mass index is 36.84 kg/m².                 Discharge plan and follow up: home with home  health once HD chair arranged      Lyubov Gonzales MD  Pager: [phone]

## 2018-06-15 NOTE — ASSESSMENT & PLAN NOTE
MARY 2/2 ischemic ATN from cardiac arrest.   - baseline renal function unknown.   - Renal US without report of CKD-related changes though kidneys more echogenic than liver.   -5/24 LVEF10% with diastolic dysfunction sp ICD placement on 6/11.   -6/8 Cardiac Pet Scan Stress showed improvement in LVEF 51%.    - TDC placement by HUGO 6/6/18  - Continue to avoidACEi for now.   - 6/13 NARCISO tolerating 2 L net UF.   - delta sCR decreasing between HD. Making more urine. 425 ml this am.   - Plan to hold HD today and continue to monitor for renal recovery. Will follow up on 11 am labs.   - SW assisting with outpt HD chair [facility].

## 2018-06-16 NOTE — DISCHARGE SUMMARY
Discharge Summary  Hospital Medicine     Patient Name: Los Mendez  MRN:  [id number]   Provider on Discharge: Lyubov Gonzales MD  [facility] Medicine Team: [facility] MED Z  Date of Admission:  5/19/2018     Date of Discharge:  6/15/2017  Code Status: Full Code        HPI / Hospital Course: 48 y/o man w/ hx of HTN, COPD? Transferred from [facility] following witnessed cardiac arrest while in the ED waiting room. Patient had presented complaining of URI/SOB symptoms, while in the ED waiting room he was noted to be choking, became apneic, which led to witnessed seizure like activity followed by cardiac arrest. Per discussion with ED staff, patient required about 22 minutes of ACLS/5 shocks before returning to normal sinus rhythm. During intubation a lozenge was removed from patients airway. Hyperthermia protocol was initiated prior to transferring to [facility] for NCC care. Per ED records, patient was acidotic with ph of 7.1 this morning. At the time of arrival to NICU, patient was on paralytics, however pupillary reflex was present. Will continue hypothermia protocol for additional 24 h.     05/19/18:  Arrived intubated, sedated, artic sun blanket  05/20/18:  Pressor requirment going up, NO DIURESIS, continue TTM, nimbex off, LFT improving, trop improving, family updated.  05/21/18:  Reach normothermia, MRI today, remove EEG, place HD line, consult nephrology, LFTs trending down, family updated.  05/22/18:  Anoxic brain injury. Pt responding. Last night placed on propofol  MRI --L head of caudate and cerebellar small infarctions.  05/23/18:  Anoxic brain injury.  On CRRT. BNP 1046. Back on propofol, pressors. Still agitated. Started on Buspar. Trop I normalizing.  05/25/18:  CRRT today. Amiodarone dc'd, metoprolol started. CBC n0e--sd H/H continues to drop will consult GI. Vascular surgery consulted concerning  Possible arterial occlusion. RUE cold. Arterial line dcd. US RUE pending. Abdominal distention, KUB showed  gas pattern. Bladder pressures q4h. Initial 21.  05/28/18:  H/H 6.8/23, 1 unit PRBC transfused. Keep Hgb greater than or equal to 8. CRRT stopped, line clotted. Changed trialysis catheter changed. Wean sedation today. Once off sedation begin weaning ventilator settings to CPAP.  Weaned vent to CPAP and able to Extubated in afternoon without s/s stridor or difficulty breathing. HR elevated 140s - 160s fentanyl x2 prn for pain/agitation. Metoprolol 5mg IV x3 given remains ST 140s.  Restless, will start precedex.  05/30/18:  Leukocytosis, pan cx; kelley discontinued; remove IJ, speech eval for diet   05/31/18:  No acute events, still with gastric secretions coming out of NG tube will start reglan; remains encephalopathic  06/01/18:  Will hold NGT suction, NPO per SLP.  Continue metoclopramide.  Hgb stable at 8.3 g/dL.  6/2 Continue NG tube for now. Will advance to pureed diet as recommended by SLP and evaluate intake before removing NG.. WBC remain elevated but afebrile, cultures negative and USUE/Diamond 5/24 showed no DVT only thrombophlebitis of LUE cephalic vein. Continue to monitor daily CBC. Nephrology following will hold HD today and possibly tomorrow depending on labs  6/4: patient fell from bed en route to bathroom (unassisted), CT no ICH, patient neuro stable, step down  6/5: Stepped down to hospital medicine  6/6: Permacath placed by HUGO. Underwent HD. Consult placed to PM&R for rehab placement. CM pursuing outpatient HD chair.  6/7: No acute events. Patient participating with Rehab. Pending placement in rehab.   6/8-6/10: ICD will be placed Monday. NPO Sunday night. NAEON. .  6/11: dcICD placed. Medicaid information provided to CM.         * Anoxic brain injury     -Cardiac arrest ~ 22 minutes at OSH.  Pt doing relatively well.  Extubated 5/28.  -05/21/18 MRI w/o evidence of anoxic brain injury, but several small strokes; pt encephalopathic  -Encephalopathy likely multifactorial--strokes, metabolic  (kidney/liver injury), delirium.           Acute superficial gastritis with hemorrhage - Resolved     Coffee ground material aspirated via NG on 5/23  Concurrent with acute drop in Hg  Hg has since uptrended.   No recurrent signs of upper GI bleed.        Debility     - Working with PT/OT who now recommends home health with PT/OT due to significant improvement in function.       Hemodialysis status     - Awaiting placement for dialysis chair in [place]       Iron deficiency anemia due to chronic blood loss      - stable        Acute bilat watershed infarction     - Incidental finding on MRI.   - Continues to have slurred speech but participating in rehab.       Acute combined systolic and diastolic ACC/AHA stage C congestive heart failure with Essential hypertension     S/p cardiac arrest on 5/19  LVEF 10-15%  - ACEi held in setting of ARF  - metoprolol succinate 50mg daily  - will evaluate and start with hydralazine 10mg tid and isordil 10mg tid for afterload reduction if BP can tolerate while on HD  - aldosterone antagonist held in the setting of poor renal function  - continue high intensity statin therapy with atorvastatin 40mg  - transfuse for goal Hb >8  - 6/11 dcICD placed  - No post surgical complaints.        Cytotoxic cerebral edema     - secondary to anoxic brain injury, 22 minutes PEA-->vfib  - MRI brain 5/21 for anoxic assessment.  see anoxic brain injury       Acute renal failure with tubular necrosis     - unknown baseline renal function, not previously dialysis dependent  - ischemic ATN 2/2 cardiac arrest  - Receiving HD PRN  - Underwent permacath placement 6/6  - Had started making arrangements for outpatient dialysis placement, the patient's renal function began to stabilize and urine output improved.  Will be discharged home with PermCath in place, but without dialysis arranged as renal function is stable at this time.  Nephrology plans to follow labs on Monday and if they continue to be stable,  will arrange for PermCath to be removed.                     Acute pulmonary edema     - Managing volume status with HD given ARF       Class 3 severe obesity due to excess calories with serious comorbidity and body mass index (BMI) of 40.0 to 44.9 in adult     S/p nutrition consult; counseled on lifestyle modifications  Body mass index is 36.84 kg/m².                                       Active Hospital Problems     Diagnosis   POA    *Anoxic brain injury [G93.1]   Yes    Debility [R53.81]   Yes    Acute superficial gastritis with hemorrhage [K29.01]   Yes    Iron deficiency anemia due to chronic blood loss [D50.0]   No    Acute bilat watershed infarction [I63.8]   Yes    Class 3 severe obesity due to excess calories with serious comorbidity and body mass index (BMI) of 40.0 to 44.9 in adult [E66.01, Z68.41]   Not Applicable    Acute pulmonary edema [J81.0]   Yes    Essential hypertension [I10]   Yes    Acute renal failure with tubular necrosis [N17.0]   Yes    Cytotoxic cerebral edema [G93.6]   Yes    Acute combined systolic and diastolic ACC/AHA stage C congestive heart failure [I50.41]   Yes       5-2018    1 - Eccentric hypertrophy.     2 - Severely depressed left ventricular systolic function (EF 10-15%).     3 - Biatrial enlargement.     4 - Impaired LV relaxation, normal LAP (grade 1 diastolic dysfunction).     5 - Right ventricular enlargement with moderately depressed systolic function.     6 - The estimated PA systolic pressure is 37 mmHg.     7 - Mild mitral regurgitation.     8 - Mild tricuspid regurgitation.           Resolved Hospital Problems     Diagnosis Date Resolved POA    Cardiomyopathy [I42.9] 06/13/2018 Yes    Atrial tachycardia [I47.1] 06/13/2018 Yes    Acute decompensated heart failure [I50.9] 06/13/2018 Unknown    MARY (acute kidney injury) [N17.9] 06/12/2018 Unknown    Hemodialysis status [Z99.2] 06/15/2018 Not Applicable    Cold hands [R20.9] 06/01/2018 No    Respiratory  arrest [R09.2] 06/01/2018 Yes    Acute ischemic vertebrobasilar artery cerebellar stroke [I63.29] 05/30/2018 Yes    Acute ischemic left MCA stroke [I63.512] 05/30/2018 Yes    Acute respiratory failure with hypoxia [J96.01] 06/05/2018 Yes    Cardiac arrest [I46.9] 06/15/2018 Yes    Seizure [R56.9] 05/30/2018 Yes    Cardiogenic shock [R57.0] 05/30/2018 Yes    Metabolic acidosis [E87.2] 05/30/2018 Yes    Hypothermia [T68.XXXA] 05/30/2018 Yes    Elevated INR [R79.1] 05/30/2018 Yes             Current Discharge Medication List       START taking these medications     Details   aspirin 81 MG Chew Take 1 tablet (81 mg total) by mouth once daily.  Refills: 0       atorvastatin (LIPITOR) 40 MG tablet Take 1 tablet (40 mg total) by mouth once daily.  Qty: 90 tablet, Refills: 3       clarithromycin (BIAXIN) 250 MG tablet Take 1 tablet (250 mg total) by mouth every 12 (twelve) hours.  Qty: 3 tablet, Refills: 0       metoprolol succinate (TOPROL-XL) 50 MG 24 hr tablet Take 1 tablet (50 mg total) by mouth once daily.  Qty: 30 tablet, Refills: 11                 Consultants:  Nephrology, Cardiology, vascular surgery, vascular Neurology     Discharge Diet:renal diet and 2 gram sodium diet with Normal Fluid intake of 1500 - 2000 mL per day     Activity: ambulate in house with assistance     Discharge Condition: Stable     Disposition: Home-Health Care Svc     Tests pending at the time of discharge: none      Time spent  on the discharge of the patient including review of hospital course with the patient, reviewing discharge medications and arranging follow-up care:  45 min     Discharge examination Patient was seen and examined on the date of discharge and determined to be suitable for discharge.     Discharge plan and follow up:  Future Appointments  Date Time Provider Department Center   6/20/2018 3:00 PM PACEMAKER, ICD [facility] Bienvenido Ernestine Gonzales MD

## 2018-06-20 ENCOUNTER — TELEPHONE (OUTPATIENT)
Dept: ELECTROPHYSIOLOGY | Facility: CLINIC | Age: 48
End: 2018-06-20

## 2018-06-20 NOTE — TELEPHONE ENCOUNTER
Arrhythmia clinic  Pt did not show for wound appt.  Called and spoke to pt's wife who states they would like to establish care with cardiology in [place]. They tried to call and cancel appt. Advised her once pt is established to call [facility] and let us know. Then we can see if Betty from [facility] can ship Merlin monitor to pt. Instruced pt's wife on importance of having ICD checked and incision assessment.  She verbalizes understanding.

## 2018-06-21 ENCOUNTER — TELEPHONE (OUTPATIENT)
Dept: NEPHROLOGY | Facility: HOSPITAL | Age: 48
End: 2018-06-21

## 2018-06-21 NOTE — TELEPHONE ENCOUNTER
Spoke with Mrs. Draper on Tuesday 6/19 and Wednesday 6/20 the importance of repeated labs to monitor renal function. Mrs. Draper states she will reschedule but not sure when. Will follow up.      Kimberly Cr NP   Nephrology

## 2018-06-22 PROBLEM — J81.0 ACUTE PULMONARY EDEMA: Status: RESOLVED | Noted: 2018-05-19 | Resolved: 2018-06-22

## 2018-06-22 PROBLEM — G93.6 CYTOTOXIC CEREBRAL EDEMA: Status: RESOLVED | Noted: 2018-05-19 | Resolved: 2018-06-22

## 2018-06-22 PROBLEM — G93.1 ANOXIC BRAIN INJURY: Status: RESOLVED | Noted: 2018-05-19 | Resolved: 2018-06-22

## 2018-06-26 ENCOUNTER — HOSPITAL ENCOUNTER (OUTPATIENT)
Facility: HOSPITAL | Age: 48
Discharge: HOME | End: 2018-06-26
Attending: INTERNAL MEDICINE | Admitting: INTERNAL MEDICINE
Payer: MEDICAID

## 2018-06-26 DIAGNOSIS — N17.0 ACUTE RENAL FAILURE WITH TUBULAR NECROSIS: Primary | ICD-10-CM

## 2018-06-26 PROCEDURE — 25000003 PHARM REV CODE 250

## 2018-06-26 PROCEDURE — 36589 REMOVAL TUNNELED CV CATH: CPT | Mod: ,,, | Performed by: INTERNAL MEDICINE

## 2018-06-26 PROCEDURE — 36589 REMOVAL TUNNELED CV CATH: CPT

## 2018-06-26 NOTE — PROCEDURES
DATE OF PROCEDURE: 6/26/18    PROCEDURE TYPE: Removal of right Internal Jugular Vein tunneled dialysis catheter.     INDICATION: renal recovery    PROCEDURE NOTE: After informed consent was obtained, the area of Mr. Mendez's catheter and right chest/neck were sterilely prepped and draped in usual fashion. Anesthesia was obtained with 2% lidocaine with epinephrine, and the subcutaneous cuff was blunt dissected free. The catheter was then removed in total, and a compression dressing was applied to the exit site. Mr. Mendez tolerated the procedure well. There were no immediate complications. Estimated blood loss was less than 1 mL. No sedation was given.

## 2018-07-02 PROBLEM — I34.0 MILD MITRAL REGURGITATION: Status: ACTIVE | Noted: 2018-07-02

## 2018-07-02 PROBLEM — Z95.810 AICD (AUTOMATIC CARDIOVERTER/DEFIBRILLATOR) PRESENT: Status: ACTIVE | Noted: 2018-07-02

## 2018-07-02 PROBLEM — I07.1 MILD TRICUSPID REGURGITATION: Status: ACTIVE | Noted: 2018-07-02

## 2018-07-02 PROBLEM — E66.01 CLASS 3 SEVERE OBESITY DUE TO EXCESS CALORIES WITH SERIOUS COMORBIDITY AND BODY MASS INDEX (BMI) OF 40.0 TO 44.9 IN ADULT: Status: RESOLVED | Noted: 2018-05-19 | Resolved: 2018-07-02

## 2018-07-02 PROBLEM — I50.20 HEART FAILURE WITH REDUCED EJECTION FRACTION, NYHA CLASS I: Status: ACTIVE | Noted: 2018-07-02

## 2018-07-02 PROBLEM — I27.20 PULMONARY HTN: Status: ACTIVE | Noted: 2018-07-02

## 2018-07-31 ENCOUNTER — TELEPHONE (OUTPATIENT)
Dept: ELECTROPHYSIOLOGY | Facility: CLINIC | Age: 48
End: 2018-07-31

## 2018-07-31 PROBLEM — Z95.810 ICD (IMPLANTABLE CARDIOVERTER-DEFIBRILLATOR) IN PLACE: Status: ACTIVE | Noted: 2018-07-31

## 2018-07-31 NOTE — TELEPHONE ENCOUNTER
Ms. Barone with [facility] contacted the clinic on today requesting the above patient  monitoring service to be released and transferred to the care of Dr. Wilfredo Anand at [facility].

## 2018-08-01 ENCOUNTER — TELEPHONE (OUTPATIENT)
Dept: ELECTROPHYSIOLOGY | Facility: CLINIC | Age: 48
End: 2018-08-01

## 2018-08-01 NOTE — TELEPHONE ENCOUNTER
----- Message from Elizabeth Larsen MA sent at 8/1/2018 10:13 AM CDT -----  Contact: Sarah James  [facility] someone about the patient device. Please call [phone]. Thank you.

## 2018-09-18 PROBLEM — N17.0 ACUTE RENAL FAILURE WITH TUBULAR NECROSIS: Status: RESOLVED | Noted: 2018-05-19 | Resolved: 2018-09-18

## 2018-09-18 PROBLEM — I42.0 DCM (DILATED CARDIOMYOPATHY): Status: ACTIVE | Noted: 2018-09-18

## 2018-09-18 PROBLEM — E78.5 DYSLIPIDEMIA: Status: ACTIVE | Noted: 2018-09-18

## 2018-09-18 PROBLEM — I27.20 PULMONARY HTN: Status: RESOLVED | Noted: 2018-07-02 | Resolved: 2018-09-18

## 2018-09-18 PROBLEM — Z95.810 AICD (AUTOMATIC CARDIOVERTER/DEFIBRILLATOR) PRESENT: Status: RESOLVED | Noted: 2018-07-02 | Resolved: 2018-09-18

## 2018-10-09 PROBLEM — F10.10 ALCOHOL ABUSE: Status: ACTIVE | Noted: 2018-10-09

## 2018-10-09 PROBLEM — E53.8 B12 DEFICIENCY: Status: ACTIVE | Noted: 2018-10-09

## 2018-12-18 PROBLEM — I51.7 LVH (LEFT VENTRICULAR HYPERTROPHY): Status: ACTIVE | Noted: 2018-12-18

## 2018-12-18 PROBLEM — I47.29 NSVT (NONSUSTAINED VENTRICULAR TACHYCARDIA): Status: ACTIVE | Noted: 2018-12-18

## 2019-03-19 PROBLEM — I51.89 DIASTOLIC DYSFUNCTION: Status: ACTIVE | Noted: 2019-03-19

## 2019-03-19 PROBLEM — I48.91 ATRIAL FIBRILLATION: Status: ACTIVE | Noted: 2019-03-19

## 2019-03-19 PROBLEM — I44.0 FIRST DEGREE ATRIOVENTRICULAR BLOCK BY ELECTROCARDIOGRAM: Status: ACTIVE | Noted: 2019-03-19

## 2019-04-03 ENCOUNTER — PATIENT OUTREACH (OUTPATIENT)
Dept: ADMINISTRATIVE | Facility: HOSPITAL | Age: 49
End: 2019-04-03

## 2019-04-17 PROBLEM — J06.9 UPPER RESPIRATORY INFECTION: Status: ACTIVE | Noted: 2019-04-17

## 2019-09-17 PROBLEM — I51.7 LEFT VENTRICULAR ENLARGEMENT: Status: ACTIVE | Noted: 2019-09-17

## 2019-09-17 PROBLEM — I48.91 ATRIAL FIBRILLATION: Status: RESOLVED | Noted: 2019-03-19 | Resolved: 2019-09-17

## 2019-09-17 PROBLEM — I47.10 SVT (SUPRAVENTRICULAR TACHYCARDIA): Status: ACTIVE | Noted: 2019-09-17

## 2020-01-08 PROBLEM — I48.0 PAROXYSMAL ATRIAL FIBRILLATION: Status: ACTIVE | Noted: 2020-01-08

## 2020-01-08 PROBLEM — I07.1 TRICUSPID VALVE INSUFFICIENCY: Status: ACTIVE | Noted: 2020-01-08

## 2020-01-08 PROBLEM — I34.0 MITRAL VALVE INSUFFICIENCY: Status: ACTIVE | Noted: 2020-01-08

## 2020-01-08 PROBLEM — I51.7 VENTRICULAR ENLARGEMENT, RIGHT: Status: ACTIVE | Noted: 2020-01-08

## 2020-01-09 PROBLEM — I07.1 MILD TRICUSPID REGURGITATION: Status: RESOLVED | Noted: 2018-07-02 | Resolved: 2020-01-09

## 2020-01-09 PROBLEM — I07.1 TRICUSPID VALVE INSUFFICIENCY: Status: RESOLVED | Noted: 2020-01-08 | Resolved: 2020-01-09

## 2020-01-09 PROBLEM — I34.0 MITRAL VALVE INSUFFICIENCY: Status: RESOLVED | Noted: 2020-01-08 | Resolved: 2020-01-09

## 2020-01-09 PROBLEM — I27.20 PULMONARY HYPERTENSION: Status: ACTIVE | Noted: 2020-01-09

## 2020-01-09 PROBLEM — I51.7 ATRIAL ENLARGEMENT, RIGHT: Status: ACTIVE | Noted: 2020-01-09

## 2020-01-09 PROBLEM — R42 DIZZY: Status: ACTIVE | Noted: 2020-01-09

## 2020-01-09 PROBLEM — R00.2 PALPITATIONS: Status: ACTIVE | Noted: 2020-01-09

## 2020-01-09 PROBLEM — I34.0 MILD MITRAL REGURGITATION: Status: RESOLVED | Noted: 2018-07-02 | Resolved: 2020-01-09

## 2020-05-14 PROBLEM — F32.A DEPRESSION WITH SUICIDAL IDEATION: Status: ACTIVE | Noted: 2020-05-14

## 2020-05-14 PROBLEM — R45.851 DEPRESSION WITH SUICIDAL IDEATION: Status: ACTIVE | Noted: 2020-05-14

## 2020-05-15 PROBLEM — R63.4 WEIGHT LOSS, UNINTENTIONAL: Status: ACTIVE | Noted: 2020-05-15

## 2020-05-19 PROBLEM — F32.2 SEVERE MAJOR DEPRESSION: Status: ACTIVE | Noted: 2020-05-19

## 2020-05-19 PROBLEM — F10.20 ALCOHOL USE DISORDER, SEVERE, DEPENDENCE: Status: ACTIVE | Noted: 2020-05-19

## 2020-05-19 PROBLEM — F41.1 GENERALIZED ANXIETY DISORDER: Status: ACTIVE | Noted: 2020-05-19

## 2020-06-10 PROBLEM — I47.29 POLYMORPHIC VENTRICULAR TACHYCARDIA: Status: ACTIVE | Noted: 2020-06-10

## 2020-10-19 PROBLEM — I37.1 NONRHEUMATIC PULMONARY VALVE INSUFFICIENCY: Status: ACTIVE | Noted: 2020-10-19

## 2020-10-19 PROBLEM — I48.0 PAF (PAROXYSMAL ATRIAL FIBRILLATION): Status: ACTIVE | Noted: 2020-10-19

## 2020-10-19 PROBLEM — I51.7 LEFT VENTRICULAR ENLARGEMENT: Status: RESOLVED | Noted: 2019-09-17 | Resolved: 2020-10-19

## 2020-10-19 PROBLEM — I51.7 ATRIAL ENLARGEMENT, RIGHT: Status: RESOLVED | Noted: 2020-01-09 | Resolved: 2020-10-19

## 2020-10-19 PROBLEM — H53.8 BLURRY VISION: Status: ACTIVE | Noted: 2020-10-19

## 2020-10-19 PROBLEM — Z79.01 CHRONIC ANTICOAGULATION: Status: ACTIVE | Noted: 2020-10-19

## 2020-10-19 PROBLEM — I35.1 AORTIC VALVE REGURGITATION: Status: ACTIVE | Noted: 2020-10-19

## 2020-10-19 PROBLEM — I48.0 PAROXYSMAL ATRIAL FIBRILLATION: Status: RESOLVED | Noted: 2020-01-08 | Resolved: 2020-10-19

## 2020-10-19 PROBLEM — Z95.810 ICD (IMPLANTABLE CARDIOVERTER-DEFIBRILLATOR), DUAL, IN SITU: Status: ACTIVE | Noted: 2020-10-19

## 2020-10-19 PROBLEM — E05.00 GRAVES DISEASE: Status: ACTIVE | Noted: 2020-10-19

## 2020-10-19 PROBLEM — Z95.810 ICD (IMPLANTABLE CARDIOVERTER-DEFIBRILLATOR) IN PLACE: Status: RESOLVED | Noted: 2018-07-31 | Resolved: 2020-10-19

## 2020-10-19 PROBLEM — I34.0 NONRHEUMATIC MITRAL VALVE REGURGITATION: Status: ACTIVE | Noted: 2020-10-19

## 2020-10-19 PROBLEM — K92.1 BLOOD IN STOOL: Status: ACTIVE | Noted: 2020-10-19

## 2020-10-19 PROBLEM — I50.20 HEART FAILURE WITH REDUCED EJECTION FRACTION, NYHA CLASS I: Status: RESOLVED | Noted: 2018-07-02 | Resolved: 2020-10-19

## 2020-10-19 PROBLEM — I50.20 HEART FAILURE WITH REDUCED EJECTION FRACTION, NYHA CLASS II: Status: ACTIVE | Noted: 2020-10-19

## 2020-10-19 PROBLEM — I51.7 VENTRICULAR ENLARGEMENT, RIGHT: Status: RESOLVED | Noted: 2020-01-08 | Resolved: 2020-10-19

## 2020-10-19 PROBLEM — I36.1 NONRHEUMATIC TRICUSPID VALVE REGURGITATION: Status: ACTIVE | Noted: 2020-10-19

## 2020-11-09 DIAGNOSIS — Z11.59 SPECIAL SCREENING EXAMINATION FOR UNSPECIFIED VIRAL DISEASE: ICD-10-CM

## 2020-11-16 ENCOUNTER — HOSPITAL ENCOUNTER (OUTPATIENT)
Dept: PREADMISSION TESTING | Facility: HOSPITAL | Age: 50
Discharge: HOME | End: 2020-11-16
Attending: INTERNAL MEDICINE
Payer: MEDICAID

## 2020-11-16 DIAGNOSIS — Z11.59 SPECIAL SCREENING EXAMINATION FOR UNSPECIFIED VIRAL DISEASE: ICD-10-CM

## 2020-11-16 PROCEDURE — U0003 INFECTIOUS AGENT DETECTION BY NUCLEIC ACID (DNA OR RNA); SEVERE ACUTE RESPIRATORY SYNDROME CORONAVIRUS 2 (SARS-COV-2) (CORONAVIRUS DISEASE [COVID-19]), AMPLIFIED PROBE TECHNIQUE, MAKING USE OF HIGH THROUGHPUT TECHNOLOGIES AS DESCRIBED BY CMS-2020-01-R: HCPCS

## 2020-11-17 LAB — SARS-COV-2 RNA RESP QL NAA+PROBE: NOT DETECTED

## 2020-11-18 ENCOUNTER — HOSPITAL ENCOUNTER (OUTPATIENT)
Dept: SLEEP MEDICINE | Facility: HOSPITAL | Age: 50
Discharge: HOME | End: 2020-11-18
Attending: INTERNAL MEDICINE
Payer: MEDICAID

## 2020-11-18 DIAGNOSIS — G47.33 OBSTRUCTIVE SLEEP APNEA (ADULT) (PEDIATRIC): ICD-10-CM

## 2020-11-18 PROCEDURE — 95811 POLYSOM 6/>YRS CPAP 4/> PARM: CPT

## 2020-12-31 PROBLEM — Z12.12 SCREENING FOR COLORECTAL CANCER: Status: ACTIVE | Noted: 2020-12-31

## 2020-12-31 PROBLEM — Z12.11 SCREENING FOR COLORECTAL CANCER: Status: ACTIVE | Noted: 2020-12-31
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
## 2021-02-03 PROBLEM — I35.1 AORTIC VALVE REGURGITATION: Status: RESOLVED | Noted: 2020-10-19 | Resolved: 2021-02-03

## 2021-03-08 PROBLEM — H01.00A BLEPHARITIS OF UPPER AND LOWER EYELIDS OF BOTH EYES: Status: ACTIVE | Noted: 2021-03-08

## 2021-03-08 PROBLEM — H01.00B BLEPHARITIS OF UPPER AND LOWER EYELIDS OF BOTH EYES: Status: ACTIVE | Noted: 2021-03-08

## 2021-03-15 PROBLEM — Z83.3 FAMILY HISTORY OF DIABETES MELLITUS: Status: ACTIVE | Noted: 2021-03-15

## 2021-05-18 PROBLEM — R06.02 SOB (SHORTNESS OF BREATH): Status: ACTIVE | Noted: 2021-05-18

## 2021-05-18 PROBLEM — I48.0 PAF (PAROXYSMAL ATRIAL FIBRILLATION): Status: RESOLVED | Noted: 2020-10-19 | Resolved: 2021-05-18

## 2021-05-18 PROBLEM — I50.20 HFREF (HEART FAILURE WITH REDUCED EJECTION FRACTION): Status: RESOLVED | Noted: 2021-05-18 | Resolved: 2021-05-18

## 2021-05-18 PROBLEM — I50.20 HFREF (HEART FAILURE WITH REDUCED EJECTION FRACTION): Status: ACTIVE | Noted: 2021-05-18

## 2021-05-18 PROBLEM — I37.1 NONRHEUMATIC PULMONARY VALVE INSUFFICIENCY: Status: RESOLVED | Noted: 2020-10-19 | Resolved: 2021-05-18

## 2021-05-18 PROBLEM — R06.02 SOB (SHORTNESS OF BREATH): Status: RESOLVED | Noted: 2021-05-18 | Resolved: 2021-05-18

## 2021-05-18 PROBLEM — I50.20 HEART FAILURE WITH REDUCED EJECTION FRACTION, NYHA CLASS II: Status: RESOLVED | Noted: 2020-10-19 | Resolved: 2021-05-18

## 2021-06-15 PROBLEM — R73.02 IGT (IMPAIRED GLUCOSE TOLERANCE): Status: ACTIVE | Noted: 2021-06-15

## 2022-01-09 PROBLEM — F33.1 MDD (MAJOR DEPRESSIVE DISORDER), RECURRENT EPISODE, MODERATE: Status: ACTIVE | Noted: 2022-01-09

## 2022-01-09 PROBLEM — J93.9 PNEUMOTHORAX: Status: ACTIVE | Noted: 2022-01-09

## 2022-01-09 PROBLEM — E80.6 HYPERBILIRUBINEMIA: Status: ACTIVE | Noted: 2022-01-09

## 2022-01-09 PROBLEM — I50.32 CHRONIC HEART FAILURE WITH PRESERVED EJECTION FRACTION (HFPEF): Status: ACTIVE | Noted: 2022-01-09

## 2022-01-09 PROBLEM — E05.90 SUBCLINICAL HYPERTHYROIDISM: Status: ACTIVE | Noted: 2022-01-09

## 2022-01-09 PROBLEM — I48.91 A-FIB: Status: ACTIVE | Noted: 2019-09-17

## 2022-01-09 PROBLEM — Z79.899 POLYPHARMACY: Status: ACTIVE | Noted: 2022-01-09

## 2022-01-09 PROBLEM — F10.229 ALCOHOL DEPENDENCE WITH ACUTE ALCOHOLIC INTOXICATION: Status: ACTIVE | Noted: 2018-10-09

## 2022-01-09 PROBLEM — J94.2 HEMOPNEUMOTHORAX ON LEFT: Status: ACTIVE | Noted: 2022-01-09

## 2022-01-09 PROBLEM — R57.8 OBSTRUCTIVE CARDIOVASCULAR SHOCK: Status: ACTIVE | Noted: 2022-01-09

## 2022-01-09 PROBLEM — I21.4 NSTEMI (NON-ST ELEVATED MYOCARDIAL INFARCTION): Status: ACTIVE | Noted: 2022-01-09

## 2022-01-09 PROBLEM — S36.899A TRAUMATIC HEMOPERITONEUM: Status: ACTIVE | Noted: 2022-01-09

## 2022-01-09 PROBLEM — R14.0 ABDOMINAL DISTENTION: Status: ACTIVE | Noted: 2022-01-09

## 2022-01-09 PROBLEM — K66.1 HEMOPERITONEUM: Status: ACTIVE | Noted: 2022-01-09

## 2022-01-09 PROBLEM — D72.829 LEUKOCYTOSIS: Status: ACTIVE | Noted: 2022-01-09

## 2022-01-09 PROBLEM — E05.00 GRAVES DISEASE: Status: ACTIVE | Noted: 2022-01-09

## 2022-01-09 PROBLEM — S21.119A STAB WOUND OF CHEST: Status: ACTIVE | Noted: 2022-01-09

## 2022-01-09 PROBLEM — K56.7 ILEUS: Status: ACTIVE | Noted: 2022-01-09

## 2022-01-09 PROBLEM — D50.9 MICROCYTIC ANEMIA: Status: ACTIVE | Noted: 2022-01-09

## 2022-01-09 PROBLEM — R73.03 PREDIABETES: Status: ACTIVE | Noted: 2022-01-09

## 2022-01-11 PROBLEM — R14.0 ABDOMINAL DISTENTION: Status: RESOLVED | Noted: 2022-01-09 | Resolved: 2022-01-11

## 2022-01-11 PROBLEM — S21.119A STAB WOUND OF CHEST: Status: RESOLVED | Noted: 2022-01-09 | Resolved: 2022-01-11

## 2022-01-11 PROBLEM — I21.4 NSTEMI (NON-ST ELEVATED MYOCARDIAL INFARCTION): Status: RESOLVED | Noted: 2022-01-09 | Resolved: 2022-01-11

## 2022-01-11 PROBLEM — E80.6 HYPERBILIRUBINEMIA: Status: RESOLVED | Noted: 2022-01-09 | Resolved: 2022-01-11

## 2022-01-11 PROBLEM — E87.20 METABOLIC ACIDEMIA: Status: RESOLVED | Noted: 2018-05-19 | Resolved: 2022-01-11

## 2022-01-11 PROBLEM — Z79.899 POLYPHARMACY: Status: RESOLVED | Noted: 2022-01-09 | Resolved: 2022-01-11

## 2022-01-11 PROBLEM — J94.2 HEMOPNEUMOTHORAX: Status: RESOLVED | Noted: 2022-01-09 | Resolved: 2022-01-11

## 2022-01-11 PROBLEM — F10.229 ALCOHOL DEPENDENCE WITH ACUTE ALCOHOLIC INTOXICATION: Status: RESOLVED | Noted: 2018-10-09 | Resolved: 2022-01-11

## 2022-01-11 PROBLEM — D72.829 LEUKOCYTOSIS: Status: RESOLVED | Noted: 2022-01-09 | Resolved: 2022-01-11

## 2022-01-11 PROBLEM — R57.8 OBSTRUCTIVE CARDIOVASCULAR SHOCK: Status: RESOLVED | Noted: 2022-01-09 | Resolved: 2022-01-11

## 2022-01-11 PROBLEM — K56.7 ILEUS: Status: RESOLVED | Noted: 2022-01-09 | Resolved: 2022-01-11

## 2022-01-11 PROBLEM — I27.20 MILD PULMONARY HYPERTENSION: Status: RESOLVED | Noted: 2020-01-09 | Resolved: 2022-01-11

## 2022-01-11 PROBLEM — J96.01 ACUTE HYPOXEMIC RESPIRATORY FAILURE: Status: RESOLVED | Noted: 2018-05-19 | Resolved: 2022-01-11

## 2022-01-25 PROBLEM — R06.09 DYSPNEA ON EXERTION: Status: ACTIVE | Noted: 2022-01-25

## 2022-01-25 PROBLEM — I51.89 DIASTOLIC DYSFUNCTION: Status: ACTIVE | Noted: 2022-01-25

## 2022-01-25 PROBLEM — I10 BENIGN ESSENTIAL HTN: Status: RESOLVED | Noted: 2018-05-19 | Resolved: 2022-01-25

## 2022-01-25 PROBLEM — I50.32 CHRONIC HEART FAILURE WITH PRESERVED EJECTION FRACTION (HFPEF): Status: RESOLVED | Noted: 2022-01-09 | Resolved: 2022-01-25

## 2022-01-25 PROBLEM — I47.10 SVT (SUPRAVENTRICULAR TACHYCARDIA): Status: ACTIVE | Noted: 2022-01-25

## 2022-01-25 PROBLEM — I10 ESSENTIAL HYPERTENSION: Status: ACTIVE | Noted: 2022-01-25

## 2022-01-25 PROBLEM — G47.33 OSA ON CPAP: Status: ACTIVE | Noted: 2022-01-25

## 2022-01-25 PROBLEM — Z95.810 ICD (IMPLANTABLE CARDIOVERTER-DEFIBRILLATOR), DUAL, IN SITU: Status: ACTIVE | Noted: 2022-01-25

## 2022-01-25 PROBLEM — I27.20 PULMONARY HYPERTENSION: Status: ACTIVE | Noted: 2022-01-25

## 2022-01-25 PROBLEM — I50.20 HEART FAILURE WITH REDUCED EJECTION FRACTION, NYHA CLASS II: Status: ACTIVE | Noted: 2022-01-25

## 2022-01-25 PROBLEM — I48.0 PAF (PAROXYSMAL ATRIAL FIBRILLATION): Status: ACTIVE | Noted: 2022-01-25

## 2022-05-17 PROBLEM — E66.01 CLASS 2 SEVERE OBESITY DUE TO EXCESS CALORIES WITH SERIOUS COMORBIDITY AND BODY MASS INDEX (BMI) OF 36.0 TO 36.9 IN ADULT: Status: RESOLVED | Noted: 2018-05-19 | Resolved: 2022-05-17

## 2022-05-17 PROBLEM — R73.02 IGT (IMPAIRED GLUCOSE TOLERANCE): Status: RESOLVED | Noted: 2021-06-15 | Resolved: 2022-05-17

## 2022-05-17 PROBLEM — I48.91 A-FIB: Status: RESOLVED | Noted: 2019-09-17 | Resolved: 2022-05-17

## 2022-05-17 PROBLEM — I27.20 MILD PULMONARY HYPERTENSION: Status: RESOLVED | Noted: 2020-01-09 | Resolved: 2022-05-17

## 2022-05-17 PROBLEM — Z71.89 CPAP USE COUNSELING: Status: ACTIVE | Noted: 2022-05-17

## 2022-05-17 PROBLEM — Z95.810 S/P ICD (INTERNAL CARDIAC DEFIBRILLATOR) PROCEDURE: Status: RESOLVED | Noted: 2020-10-19 | Resolved: 2022-05-17

## 2022-08-17 PROBLEM — I48.11 LONGSTANDING PERSISTENT ATRIAL FIBRILLATION: Status: ACTIVE | Noted: 2022-01-25

## 2022-08-17 PROBLEM — Z45.02 ICD (IMPLANTABLE CARDIOVERTER-DEFIBRILLATOR) DISCHARGE: Status: ACTIVE | Noted: 2022-08-17

## 2022-08-18 PROBLEM — I50.9 CONGESTIVE HEART FAILURE: Status: ACTIVE | Noted: 2022-01-25

## 2022-09-14 PROBLEM — K92.1 MELENA: Status: ACTIVE | Noted: 2022-09-14

## 2022-09-14 PROBLEM — I50.9 CHF EXACERBATION: Status: RESOLVED | Noted: 2022-09-14 | Resolved: 2022-09-14

## 2022-09-14 PROBLEM — I63.89 ACUTE BILAT WATERSHED INFARCTION: Status: RESOLVED | Noted: 2018-05-21 | Resolved: 2022-09-14

## 2022-09-14 PROBLEM — E87.20 METABOLIC ACIDOSIS: Status: RESOLVED | Noted: 2018-05-19 | Resolved: 2022-09-14

## 2022-09-14 PROBLEM — I50.23 ACUTE ON CHRONIC SYSTOLIC HEART FAILURE: Status: ACTIVE | Noted: 2022-09-14

## 2022-09-14 PROBLEM — I50.9 CHF EXACERBATION: Status: ACTIVE | Noted: 2022-09-14

## 2022-09-16 PROBLEM — I50.23 ACUTE ON CHRONIC SYSTOLIC HEART FAILURE: Status: RESOLVED | Noted: 2022-09-14 | Resolved: 2022-09-16

## 2022-09-16 PROBLEM — K92.1 MELENA: Status: RESOLVED | Noted: 2022-09-14 | Resolved: 2022-09-16

## 2022-09-30 PROBLEM — R80.9 PROTEINURIA: Status: ACTIVE | Noted: 2022-09-30

## 2022-10-11 PROBLEM — Z83.49 FAMILY HISTORY OF THYROID DISEASE IN MOTHER: Status: ACTIVE | Noted: 2022-10-11

## 2022-10-11 PROBLEM — Z79.899 LONG TERM CURRENT USE OF AMIODARONE: Status: ACTIVE | Noted: 2022-10-11

## 2022-10-28 PROBLEM — I50.9 CONGESTIVE HEART FAILURE: Status: RESOLVED | Noted: 2022-01-25 | Resolved: 2022-10-28

## 2022-10-28 PROBLEM — I50.41: Status: RESOLVED | Noted: 2018-05-19 | Resolved: 2022-10-28

## 2022-12-02 PROBLEM — R42 DIZZY: Status: RESOLVED | Noted: 2020-01-09 | Resolved: 2022-12-02

## 2022-12-02 PROBLEM — I48.11 LONGSTANDING PERSISTENT ATRIAL FIBRILLATION: Status: RESOLVED | Noted: 2022-01-25 | Resolved: 2022-12-02

## 2022-12-02 PROBLEM — E05.00 GRAVES DISEASE: Status: RESOLVED | Noted: 2022-01-09 | Resolved: 2022-12-02

## 2022-12-08 ENCOUNTER — TELEPHONE (OUTPATIENT)
Dept: GASTROENTEROLOGY | Facility: CLINIC | Age: 52
End: 2022-12-08
Payer: MEDICAID

## 2022-12-08 NOTE — TELEPHONE ENCOUNTER
----- Message from Mackenzie Quiles LPN sent at 12/8/2022 11:15 AM CST -----  Regarding: referral  Pt was seen in clinic this am (12/8/22) by Dr. Richardson, he would appreciate it if Dr. Juarez would see him for anemia and a small bowel evaluation. Any questions please reach out on here.   Thanks,  Mackenzie

## 2022-12-08 NOTE — TELEPHONE ENCOUNTER
Darian, wife, will have pt contact clinic to schedule appt..  Clinic number given and repeated correctly.

## 2022-12-09 ENCOUNTER — TELEPHONE (OUTPATIENT)
Dept: GASTROENTEROLOGY | Facility: CLINIC | Age: 52
End: 2022-12-09
Payer: MEDICAID

## 2022-12-09 NOTE — TELEPHONE ENCOUNTER
Clinic appt scheduled with pt on Wednesday, January 4. 2022 at 915am from referral by Dr. Dalton.  Pt given clinic adderss and repeated correctly.

## 2023-01-04 ENCOUNTER — TELEPHONE (OUTPATIENT)
Dept: GASTROENTEROLOGY | Facility: CLINIC | Age: 53
End: 2023-01-04
Payer: MEDICARE

## 2023-01-04 NOTE — TELEPHONE ENCOUNTER
----- Message from Mayank Nieto sent at 1/4/2023  8:15 AM CST -----  Contact: pt  Type: Requesting to speak with nurse        Who Called: PT  Regarding:would like to reschedule 1/4/23 appointment   Would the patient rather a call back or a response via [facility]? Call back  Best Call Back Number: [phone]  Additional Information:

## 2023-03-13 PROBLEM — Z45.02 ICD (IMPLANTABLE CARDIOVERTER-DEFIBRILLATOR) DISCHARGE: Status: RESOLVED | Noted: 2022-08-17 | Resolved: 2023-03-13

## 2023-04-25 ENCOUNTER — TELEPHONE (OUTPATIENT)
Dept: GASTROENTEROLOGY | Facility: CLINIC | Age: 53
End: 2023-04-25
Payer: MEDICARE

## 2023-04-25 NOTE — TELEPHONE ENCOUNTER
----- Message from Martha Alvarenga NP sent at 4/21/2023  1:58 PM CDT -----  Regarding: Appt  Good Afternoon,     I saw Mr. Los Mendez in clinic today. Noted several unsuccessful attempts to contact patient to schedule small bowel evaluation. Patient would like to have that appt scheduled. He has a new telephone number [phone].     Thanks,   Martha Milligan

## 2023-04-25 NOTE — TELEPHONE ENCOUNTER
Clinic appt scheduled with pt on Wednesday, April 26, 2023 at 930am.  Pt given clinic address and turn by turn directions to clinic.

## 2023-04-26 ENCOUNTER — LAB VISIT (OUTPATIENT)
Dept: LAB | Facility: HOSPITAL | Age: 53
End: 2023-04-26
Attending: INTERNAL MEDICINE
Payer: MEDICARE

## 2023-04-26 ENCOUNTER — OFFICE VISIT (OUTPATIENT)
Dept: GASTROENTEROLOGY | Facility: CLINIC | Age: 53
End: 2023-04-26
Payer: MEDICAID

## 2023-04-26 VITALS
DIASTOLIC BLOOD PRESSURE: 69 MMHG | HEIGHT: 72 IN | TEMPERATURE: 98 F | WEIGHT: 253.31 LBS | SYSTOLIC BLOOD PRESSURE: 110 MMHG | HEART RATE: 87 BPM | BODY MASS INDEX: 34.31 KG/M2

## 2023-04-26 DIAGNOSIS — D50.0 IRON DEFICIENCY ANEMIA DUE TO CHRONIC BLOOD LOSS: Primary | ICD-10-CM

## 2023-04-26 DIAGNOSIS — D50.0 IRON DEFICIENCY ANEMIA DUE TO CHRONIC BLOOD LOSS: ICD-10-CM

## 2023-04-26 DIAGNOSIS — K62.5 RECTAL BLEEDING: ICD-10-CM

## 2023-04-26 DIAGNOSIS — K64.8 INTERNAL HEMORRHOIDS: ICD-10-CM

## 2023-04-26 PROCEDURE — 99214 OFFICE O/P EST MOD 30 MIN: CPT | Mod: PBBFAC,PO | Performed by: INTERNAL MEDICINE

## 2023-04-26 PROCEDURE — 86340 INTRINSIC FACTOR ANTIBODY: CPT | Performed by: INTERNAL MEDICINE

## 2023-04-26 PROCEDURE — 86256 FLUORESCENT ANTIBODY TITER: CPT | Performed by: INTERNAL MEDICINE

## 2023-04-26 RX ORDER — SODIUM, POTASSIUM,MAG SULFATES 17.5-3.13G
1 SOLUTION, RECONSTITUTED, ORAL ORAL DAILY
Qty: 1 KIT | Refills: 0 | Status: SHIPPED | OUTPATIENT
Start: 2023-04-26 | End: 2023-04-28

## 2023-04-26 NOTE — PATIENT INSTRUCTIONS
Labs today, 1st floor MOB, Patient Diagnostics.    SUPREP Instructions    [facility] [address]  Clinic Office [phone]  Endoscopy Lab [phone]    You are scheduled for a Egd/Colonoscopy with Dr. Juarez  on Thursday, May 4, 2023 at [facility] in [place].    Check in at the Hospital -1st floor, Information desk.   Call [phone] to reschedule.    An adult friend/family member must come with you to drive you home.  You cannot drive, take a taxi, Uber/Lyft or bus to leave the Endoscopy Center alone.  If you do not have someone to drive you home, your test will be cancelled.     Please follow the directions of your doctor if you take any pills that thin your blood. If you take these meds: Aggrenox, Brilinta, Effient, Eliquis, Lovenox, Plavix, Pletal, Pradaxa, Ticilid, Xarelto or Coumadin, let the doctor's office know.    DON'T: On the morning of the test do not take insulin or pills for diabetes.     DO: On the morning of the test, do take any pills for blood pressure, heart, anti-rejection and or seizures with a small sip of water. Bring any inhalers with you.    To have a good prep, you must follow these instructions - please do not use the directions from the pharmacy.    The doctor will send a prescription for the SUPREP.      The Day Before the test:    You can only drink CLEAR LIQUIDS the whole day before your test.  You can't eat any food for the whole day.    You CAN have:  Water, Coffee or decaf coffee (no milk or cream)  Tea  Soft drinks - regular and sugar free  Jello (green or yellow)  Apple Juice, white grape juice, white cranberry juice  Gatorade, Power Aid, Crystal Light, Billy Aid  Lemonade and Limeade  Bouillon, clear soup  Snowball, popsicles  YOU CAN'T DRINK ANYTHING RED, PURPLE ORANGE OR BLUE   YOU CAN'T DRINK ALCOHOL  ONLY DRINK WHAT IS ON THE LIST      At 5 pm the night before your test:    Pour the 1st bottle of SUPREP into the cup provided in the box.  Add water to the line on the cup and mix well.  Drink the whole cup and then drink 2 more full cups of water over 1 hour.  This can be easier to drink if it is cold. You can mix it 20 minutes ahead of time and place in the refrigerator before you drink it.  You must drink it within 30-45 minutes of mixing it.  Do NOT pour the drink over ice.  You can drink it with a straw.    The Day of the test - We will call you 2 days before your test to tell you what time to get there.    5 hours before you come to the hospital (this may be in the middle of the night)  Pour the 2nd bottle of SUPREP into the cup provided in the box. Add water to the line on the cup and mix well.  Drink the whole cup and then drink 2 more full cups of water over 1 hour.  It might be easier to drink if it is cold. You can mix it 20 minutes ahead of time and place in the refrigerator before you drink it.  You must drink it within 30-45 minutes of mixing it.  Do NOT pour the drink over ice.  You can drink it with a straw.    YOU CAN'T EAT OR DRINK ANYTHING ELSE ONCE YOU FINISH THE PREP    Leave all valuables and jewelry at home. You will be at the hospital for 2-4 hours.    Call the Endoscopy department at [phone] with any questions about your procedure.

## 2023-04-26 NOTE — PROGRESS NOTES
[facility]    CC: Anemia     HPI 52 y.o. male with history of HFrEF s/p ICD, atrial fibrillation (on eliquis), CVA, HTN, and hypothyroidism presenting for evaluation of chronic, severe iron-deficiency anemia. He has had iron-deficiency since 2020 and previously underwent EGD/Colonoscopy in 2020 which were unrevealing for a source of GI blood loss. He required PRBC transfusions a few years ago and last needed parenteral iron ~6 months ago. He has had chronic, painless intermittent bright red blood per rectum with occasional blood clots. He denies any melena. He has intermittent epistaxis which has never required cauterization. He denies any family members with epistaxis or bleeding disorders. He currently takes Eliquis for his atrial fibrillation.     Endoscopic history:  EGD (12/31/2020): Normal esophagus, stomach, and duodenum.   Colonoscopy (12/31/2020): Sigmoid diverticulosis, internal hemorrhoids    Past Medical History  Iron Deficiency Anemia  Atrial fibrillation (on eliquis)  Alcohol abuse   CHF  CVA  MDD/Anxiety  HTN  Seizures  Thyroid disease     Physical Examination  /69 (BP Location: Right arm, Patient Position: Sitting, BP Method: Medium (Automatic))   Pulse 87   Temp 97.9 °F (36.6 °C) (Oral)   Ht 6' (1.829 m)   Wt 114.9 kg (253 lb 4.9 oz)   BMI 34.35 kg/m²   General appearance: alert, cooperative, no distress  HENT: Normocephalic, atraumatic, neck symmetrical, no nasal discharge   Lungs: clear to auscultation bilaterally, no dullness to percussion bilaterally  Heart: irregular heart rate without rub; no displacement of the PMI   Abdomen: soft, non-tender; bowel sounds normoactive; no organomegaly    Labs (4/19/2023):  BUN 8/Cr 0.6  Hgb 10.6 (MCV 87)    Ferritin 145 (19 in 8/2022)  Iron 88  TIBC 309  % sat 28      Assessment:   52 y.o. male with chronic iron-deficiency anemia with prior need for PRBC transfusion and parenteral iron with prior negative endoscopic evaluation for  source of GI blood loss with EGD/colonoscopy in 2020. He has persistent, intermittent rectal bleeding likely due to internal hemorrhoids which is exacerbated by Eliquis use. His iron-deficiency anemia may be related to chronic GI blood loss from small bowel angioectasias vs. Internal hemorrhoids.     Plan:  - EGD with pediatric colonoscope with gastric pH measurement and colonoscopy with TI intubation on 5/4/2023  - Anesthesia consult prior to endoscopy given significant cardiac diease history   - Suprep ordered  - Continue Eliquis prior to procedure  - Anti-parietal cell and anti-intrinsic factor antibodies ordered  - If EGD/Colonoscopy negative for etiology of anemia, will perform VCE  - If VCE is negative, will consider chronic, intermittent blood loss from internal hemorrhoids as a possible etiology for MILAD which will likely require hemorrhoidal banding     Phillip Juarez MD   [address] [place] [address], [address]   [place] [address] [phone]

## 2023-04-27 LAB
ANNOTATION COMMENT IMP: NORMAL
IF BLOCK AB SER QL: NEGATIVE

## 2023-04-28 LAB — PCA AB TITR SER IF: NORMAL {TITER}

## 2023-05-02 ENCOUNTER — TELEPHONE (OUTPATIENT)
Dept: ENDOSCOPY | Facility: HOSPITAL | Age: 53
End: 2023-05-02
Payer: MEDICARE

## 2023-05-02 NOTE — TELEPHONE ENCOUNTER
Left messages instructing patient to call dept @ [phone] between 8am-3pm.    Arrival time to be given @ 172  EGD/Colon (Suprep)  (Message sent via [facility] portal)

## 2023-05-03 ENCOUNTER — TELEPHONE (OUTPATIENT)
Dept: ENDOSCOPY | Facility: HOSPITAL | Age: 53
End: 2023-05-03
Payer: MEDICARE

## 2023-05-03 NOTE — TELEPHONE ENCOUNTER
Spoke with patient about arrival time @ 0845.   EGD/Colon    Prep instructions reviewed: the day before the procedure, follow a clear liquid diet all day, then start the first 1/2 of prep at 5pm and take 2nd 1/2 of prep @ 0330.  Pt must be completely NPO when prep completed @ 0530.              Medications: Do not take Insulin or oral diabetic medications the day of the procedure.  Take as prescribed: heart, seizure and blood pressure medication in the morning with a sip of water (less than an ounce).  Take any breathing medications and bring inhalers to hospital with you Leave all valuables and jewelry at home.     Wear comfortable clothes to procedure to change into hospital gown You cannot drive for 24 hours after your procedure because you will receive sedation for your procedure to make you comfortable.  A ride must be provided at discharge.

## 2023-05-04 ENCOUNTER — ANESTHESIA EVENT (OUTPATIENT)
Dept: ENDOSCOPY | Facility: HOSPITAL | Age: 53
End: 2023-05-04
Payer: MEDICARE

## 2023-05-04 ENCOUNTER — HOSPITAL ENCOUNTER (OUTPATIENT)
Facility: HOSPITAL | Age: 53
Discharge: HOME | End: 2023-05-04
Attending: INTERNAL MEDICINE | Admitting: INTERNAL MEDICINE
Payer: MEDICARE

## 2023-05-04 ENCOUNTER — ANESTHESIA (OUTPATIENT)
Dept: ENDOSCOPY | Facility: HOSPITAL | Age: 53
End: 2023-05-04
Payer: MEDICARE

## 2023-05-04 VITALS
HEIGHT: 72 IN | HEART RATE: 74 BPM | OXYGEN SATURATION: 100 % | BODY MASS INDEX: 34.4 KG/M2 | RESPIRATION RATE: 21 BRPM | SYSTOLIC BLOOD PRESSURE: 132 MMHG | DIASTOLIC BLOOD PRESSURE: 65 MMHG | WEIGHT: 254 LBS | TEMPERATURE: 98 F

## 2023-05-04 DIAGNOSIS — D50.0 IRON DEFICIENCY ANEMIA DUE TO CHRONIC BLOOD LOSS: ICD-10-CM

## 2023-05-04 DIAGNOSIS — D50.0 IRON DEFICIENCY ANEMIA DUE TO CHRONIC BLOOD LOSS: Primary | ICD-10-CM

## 2023-05-04 DIAGNOSIS — D64.9 ANEMIA: ICD-10-CM

## 2023-05-04 DIAGNOSIS — D50.9 MICROCYTIC ANEMIA: Primary | ICD-10-CM

## 2023-05-04 PROCEDURE — D9220A PRA ANESTHESIA: ICD-10-PCS | Mod: ANES,,, | Performed by: ANESTHESIOLOGY

## 2023-05-04 PROCEDURE — 25000003 PHARM REV CODE 250: Performed by: INTERNAL MEDICINE

## 2023-05-04 PROCEDURE — 25000003 PHARM REV CODE 250: Performed by: NURSE ANESTHETIST, CERTIFIED REGISTERED

## 2023-05-04 PROCEDURE — 37000009 HC ANESTHESIA EA ADD 15 MINS: Performed by: INTERNAL MEDICINE

## 2023-05-04 PROCEDURE — 00731 ANES UPR GI NDSC PX NOS: CPT | Performed by: INTERNAL MEDICINE

## 2023-05-04 PROCEDURE — D9220A PRA ANESTHESIA: Mod: CRNA,,, | Performed by: NURSE ANESTHETIST, CERTIFIED REGISTERED

## 2023-05-04 PROCEDURE — 45378 DIAGNOSTIC COLONOSCOPY: CPT | Performed by: INTERNAL MEDICINE

## 2023-05-04 PROCEDURE — D9220A PRA ANESTHESIA: ICD-10-PCS | Mod: CRNA,,, | Performed by: NURSE ANESTHETIST, CERTIFIED REGISTERED

## 2023-05-04 PROCEDURE — 63600175 PHARM REV CODE 636 W HCPCS: Performed by: NURSE ANESTHETIST, CERTIFIED REGISTERED

## 2023-05-04 PROCEDURE — 37000008 HC ANESTHESIA 1ST 15 MINUTES: Performed by: INTERNAL MEDICINE

## 2023-05-04 PROCEDURE — 44376 SMALL BOWEL ENDOSCOPY: CPT | Performed by: INTERNAL MEDICINE

## 2023-05-04 PROCEDURE — D9220A PRA ANESTHESIA: Mod: ANES,,, | Performed by: ANESTHESIOLOGY

## 2023-05-04 RX ORDER — SODIUM CHLORIDE 0.9 % (FLUSH) 0.9 %
10 SYRINGE (ML) INJECTION
Status: DISCONTINUED | OUTPATIENT
Start: 2023-05-04 | End: 2023-05-04 | Stop reason: HOSPADM

## 2023-05-04 RX ORDER — SODIUM CHLORIDE 9 MG/ML
INJECTION, SOLUTION INTRAVENOUS CONTINUOUS
Status: DISCONTINUED | OUTPATIENT
Start: 2023-05-04 | End: 2023-05-04 | Stop reason: HOSPADM

## 2023-05-04 RX ORDER — LIDOCAINE HYDROCHLORIDE 20 MG/ML
INJECTION INTRAVENOUS
Status: DISCONTINUED | OUTPATIENT
Start: 2023-05-04 | End: 2023-05-04

## 2023-05-04 RX ORDER — PROPOFOL 10 MG/ML
VIAL (ML) INTRAVENOUS CONTINUOUS PRN
Status: DISCONTINUED | OUTPATIENT
Start: 2023-05-04 | End: 2023-05-04

## 2023-05-04 RX ORDER — MIDAZOLAM HYDROCHLORIDE 1 MG/ML
INJECTION, SOLUTION INTRAMUSCULAR; INTRAVENOUS
Status: DISCONTINUED | OUTPATIENT
Start: 2023-05-04 | End: 2023-05-04

## 2023-05-04 RX ORDER — PROPOFOL 10 MG/ML
VIAL (ML) INTRAVENOUS
Status: DISCONTINUED | OUTPATIENT
Start: 2023-05-04 | End: 2023-05-04

## 2023-05-04 RX ADMIN — PROPOFOL 120 MCG/KG/MIN: 10 INJECTION, EMULSION INTRAVENOUS at 10:05

## 2023-05-04 RX ADMIN — LIDOCAINE HYDROCHLORIDE 100 MG: 20 INJECTION, SOLUTION INTRAVENOUS at 10:05

## 2023-05-04 RX ADMIN — SODIUM CHLORIDE: 0.9 INJECTION, SOLUTION INTRAVENOUS at 10:05

## 2023-05-04 RX ADMIN — PROPOFOL 20 MG: 10 INJECTION, EMULSION INTRAVENOUS at 11:05

## 2023-05-04 RX ADMIN — PROPOFOL 100 MG: 10 INJECTION, EMULSION INTRAVENOUS at 10:05

## 2023-05-04 RX ADMIN — MIDAZOLAM 1 MG: 1 INJECTION INTRAMUSCULAR; INTRAVENOUS at 11:05

## 2023-05-04 RX ADMIN — SODIUM CHLORIDE: 0.9 INJECTION, SOLUTION INTRAVENOUS at 09:05

## 2023-05-04 NOTE — [employer] PATIENT INSTRUCTIONS
Discharge Summary/Instructions after an Endoscopic Procedure  Patient Name: Los Mendez  Patient MRN: [id number]  Patient YOB: 1970  Thursday, May 4, 2023  Phillip Juarez MD  Dear patient,  As a result of recent federal legislation (The Federal Cures Act), you may   receive lab or pathology results from your procedure in your [facility]   account before your physician is able to contact you. Your physician or   their representative will relay the results to you with their   recommendations at their soonest availability.  Thank you,  Your health is very important to us during the Covid Crisis. Following your   procedure today, you will receive a daily text for 2 weeks asking about   signs or symptoms of Covid 19.  Please respond to this text when you   receive it so we can follow up and keep you as safe as possible.   RESTRICTIONS:  During your procedure today, you received medications for sedation.  These   medications may affect your judgment, balance and coordination.  Therefore,   for 24 hours, you have the following restrictions:   - DO NOT drive a car, operate machinery, make legal/financial decisions,   sign important papers or drink alcohol.    ACTIVITY:  Today: no heavy lifting, straining or running due to procedural   sedation/anesthesia.  The following day: return to full activity including work.  DIET:  Eat and drink normally unless instructed otherwise.     TREATMENT FOR COMMON SIDE EFFECTS:  - Mild abdominal pain, nausea, belching, bloating or excessive gas:  rest,   eat lightly and use a heating pad.  - Sore Throat: treat with throat lozenges and/or gargle with warm salt   water.  - Because air was used during the procedure, expelling large amounts of air   from your rectum or belching is normal.  - If a bowel prep was taken, you may not have a bowel movement for 1-3 days.    This is normal.  SYMPTOMS TO WATCH FOR AND REPORT TO YOUR PHYSICIAN:  1. Abdominal pain or bloating, other than gas  cramps.  2. Chest pain.  3. Back pain.  4. Signs of infection such as: chills or fever occurring within 24 hours   after the procedure.  5. Rectal bleeding, which would show as bright red, maroon, or black stools.   (A tablespoon of blood from the rectum is not serious, especially if   hemorrhoids are present.)  6. Vomiting.  7. Weakness or dizziness.  GO DIRECTLY TO THE NEAREST EMERGENCY ROOM IF YOU HAVE ANY OF THE FOLLOWING:      Difficulty breathing              Chills and/or fever over 101 F   Persistent vomiting and/or vomiting blood   Severe abdominal pain   Severe chest pain   Black, tarry stools   Bleeding- more than one tablespoon   Any other symptom or condition that you feel may need urgent attention  Your doctor recommends these additional instructions:  If any biopsies were taken, your doctors clinic will contact you in 1 to 2   weeks with any results.  - Discharge patient to home.   - Resume previous diet.   - Resume eliquis today.  - Repeat colonoscopy in 10 years for screening purposes.   - Recommend VCE as next step in diagnostic evaluation for anemia.  - If VCE unrevealing, recommend colorectal surgery evaluation for   hemorrhoidal banding.  For questions, problems or results please call your physician - Phillip Juarez MD.  EMERGENCY PHONE NUMBER: [phone],  LAB RESULTS: [phone]  IF A COMPLICATION OR EMERGENCY SITUATION ARISES AND YOU ARE UNABLE TO REACH   YOUR PHYSICIAN - GO DIRECTLY TO THE EMERGENCY ROOM.  MD Phillip Wing MD  5/4/2023 11:53:35 AM  This report has been verified and signed electronically.  Dear patient,  As a result of recent federal legislation (The Federal Cures Act), you may   receive lab or pathology results from your procedure in your [facility]   account before your physician is able to contact you. Your physician or   their representative will relay the results to you with their   recommendations at their soonest availability.  Thank  you,  [employer]ATION

## 2023-05-04 NOTE — H&P
[facility]    CC: Anemia     HPI 52 y.o. male with history of HFrEF s/p ICD, atrial fibrillation (on eliquis), CVA, HTN, and hypothyroidism presenting for evaluation of chronic, severe iron-deficiency anemia. He has had iron-deficiency since 2020 and previously underwent EGD/Colonoscopy in 2020 which were unrevealing for a source of GI blood loss. He required PRBC transfusions a few years ago and last needed parenteral iron ~6 months ago. He has had chronic, painless intermittent bright red blood per rectum with occasional blood clots. He denies any melena. He has intermittent epistaxis which has never required cauterization. He denies any family members with epistaxis or bleeding disorders. He currently takes Eliquis for his atrial fibrillation.     Endoscopic history:  EGD (12/31/2020): Normal esophagus, stomach, and duodenum.   Colonoscopy (12/31/2020): Sigmoid diverticulosis, internal hemorrhoids    Past Medical History  Iron Deficiency Anemia  Atrial fibrillation (on eliquis)  Alcohol abuse   CHF  CVA  MDD/Anxiety  HTN  Seizures  Thyroid disease     Physical Examination  /63 (BP Location: Left arm, Patient Position: Lying)   Pulse 75   Temp 98.2 °F (36.8 °C) (Temporal)   Resp 18   Ht 6' (1.829 m)   Wt 115.2 kg (254 lb)   SpO2 97%   BMI 34.45 kg/m²   General appearance: alert, cooperative, no distress  Abdomen: soft, non-tender; bowel sounds normoactive; no organomegaly    Labs (4/19/2023):  BUN 8/Cr 0.6  Hgb 10.6 (MCV 87)    Ferritin 145 (19 in 8/2022)  Iron 88  TIBC 309  % sat 28    Anti-parietal cell antibody negative  Anti IF blocking ab negative    Assessment:   52 y.o. male with chronic iron-deficiency anemia with prior need for PRBC transfusion and parenteral iron with prior negative endoscopic evaluation for source of GI blood loss with EGD/colonoscopy in 2020. He has persistent, intermittent rectal bleeding likely due to internal hemorrhoids which is exacerbated by Eliquis  use. His iron-deficiency anemia may be related to chronic GI blood loss from small bowel angioectasias vs. Internal hemorrhoids.     Plan:  - EGD with pediatric colonoscope with gastric pH measurement and colonoscopy with TI intubation today (continued on eliquis)  - If EGD/Colonoscopy negative for etiology of anemia, will perform VCE      Phillip Juarez MD   [address] [facility], [address]   [place] [address] [phone]

## 2023-05-04 NOTE — ANESTHESIA PREPROCEDURE EVALUATION
05/04/2023  Los Mendez is a 52 y.o., male.      Pre-op Assessment    I have reviewed the Patient Summary Reports.     I have reviewed the Nursing Notes. I have reviewed the NPO Status.   I have reviewed the Medications.     Review of Systems  Anesthesia Hx:  Denies Family Hx of Anesthesia complications.   Denies Personal Hx of Anesthesia complications.   Cardiovascular:   Hypertension CHF Has ICD  Last EF 40%   Pulmonary:   Sleep Apnea        Physical Exam    Airway:  Mallampati: II   Mouth Opening: Normal  Neck ROM: Normal ROM        Anesthesia Plan  Type of Anesthesia, risks & benefits discussed:    Anesthesia Type: Gen Natural Airway  Intra-op Monitoring Plan: Standard ASA Monitors  Post Op Pain Control Plan: multimodal analgesia  Induction:  IV  Informed Consent: Informed consent signed with the Patient and all parties understand the risks and agree with anesthesia plan.  All questions answered.   ASA Score: 4  Day of Surgery Review of History & Physical: H&P Update referred to the surgeon/provider.    Ready For Surgery From Anesthesia Perspective.     .

## 2023-05-04 NOTE — PROVATION PATIENT INSTRUCTIONS
Discharge Summary/Instructions after an Endoscopic Procedure  Patient Name: Los Mendez  Patient MRN: [id number]  Patient YOB: 1970  Thursday, May 4, 2023  Phillip Juarez MD  Dear patient,  As a result of recent federal legislation (The Federal Cures Act), you may   receive lab or pathology results from your procedure in your [facility]   account before your physician is able to contact you. Your physician or   their representative will relay the results to you with their   recommendations at their soonest availability.  Thank you,  Your health is very important to us during the Covid Crisis. Following your   procedure today, you will receive a daily text for 2 weeks asking about   signs or symptoms of Covid 19.  Please respond to this text when you   receive it so we can follow up and keep you as safe as possible.   RESTRICTIONS:  During your procedure today, you received medications for sedation.  These   medications may affect your judgment, balance and coordination.  Therefore,   for 24 hours, you have the following restrictions:   - DO NOT drive a car, operate machinery, make legal/financial decisions,   sign important papers or drink alcohol.    ACTIVITY:  Today: no heavy lifting, straining or running due to procedural   sedation/anesthesia.  The following day: return to full activity including work.  DIET:  Eat and drink normally unless instructed otherwise.     TREATMENT FOR COMMON SIDE EFFECTS:  - Mild abdominal pain, nausea, belching, bloating or excessive gas:  rest,   eat lightly and use a heating pad.  - Sore Throat: treat with throat lozenges and/or gargle with warm salt   water.  - Because air was used during the procedure, expelling large amounts of air   from your rectum or belching is normal.  - If a bowel prep was taken, you may not have a bowel movement for 1-3 days.    This is normal.  SYMPTOMS TO WATCH FOR AND REPORT TO YOUR PHYSICIAN:  1. Abdominal pain or bloating, other than gas  cramps.  2. Chest pain.  3. Back pain.  4. Signs of infection such as: chills or fever occurring within 24 hours   after the procedure.  5. Rectal bleeding, which would show as bright red, maroon, or black stools.   (A tablespoon of blood from the rectum is not serious, especially if   hemorrhoids are present.)  6. Vomiting.  7. Weakness or dizziness.  GO DIRECTLY TO THE NEAREST EMERGENCY ROOM IF YOU HAVE ANY OF THE FOLLOWING:      Difficulty breathing              Chills and/or fever over 101 F   Persistent vomiting and/or vomiting blood   Severe abdominal pain   Severe chest pain   Black, tarry stools   Bleeding- more than one tablespoon   Any other symptom or condition that you feel may need urgent attention  Your doctor recommends these additional instructions:  If any biopsies were taken, your doctors clinic will contact you in 1 to 2   weeks with any results.  VCE as next step   If normal, the cause of MILAD in this case may be frequent hemorrhoidal   bleeding such that hemorrhoidal banding would be indicated. I am also   concerned about the possibility of a new diagnosis of portal hypertension   considering a previous history of fatty liver on ultrasound, heavy alcohol   intake and borderline thrombocytopenia on lab. Check for hepatic fibrosis   if no improvement with hemorrhoidal banding.  For questions, problems or results please call your physician - Phillip Juarez MD.  EMERGENCY PHONE NUMBER: [phone],  LAB RESULTS: [phone]  IF A COMPLICATION OR EMERGENCY SITUATION ARISES AND YOU ARE UNABLE TO REACH   YOUR PHYSICIAN - GO DIRECTLY TO THE EMERGENCY ROOM.  MD Phillip Wing MD  5/4/2023 11:42:53 AM  This report has been verified and signed electronically.  Dear patient,  As a result of recent federal legislation (The Federal Cures Act), you may   receive lab or pathology results from your procedure in your [facility]   account before your physician is able to contact you. Your  physician or   their representative will relay the results to you with their   recommendations at their soonest availability.  Thank you,  PROVATION

## 2023-05-04 NOTE — TRANSFER OF CARE
Anesthesia Transfer of Care Note    Patient: Los Mendez    Procedure(s) Performed: Procedure(s) (LRB):  COLONOSCOPY (N/A)  EGD (ESOPHAGOGASTRODUODENOSCOPY) with PCF scope (N/A)    Patient location: GI    Anesthesia Type: general    Transport from OR: Transported from OR on room air with adequate spontaneous ventilation    Post pain: adequate analgesia    Post assessment: no apparent anesthetic complications and tolerated procedure well    Post vital signs: stable    Level of consciousness: awake and alert    Nausea/Vomiting: no nausea/vomiting    Complications: none    Transfer of care protocol was followed      Last vitals:   Visit Vitals  /63 (BP Location: Left arm, Patient Position: Lying)   Pulse 75   Temp 36.8 °C (98.2 °F) (Temporal)   Resp 18   Ht 6' (1.829 m)   Wt 115.2 kg (254 lb)   SpO2 97%   BMI 34.45 kg/m²

## 2023-05-04 NOTE — PROGRESS NOTES
[facility]    CC: Anemia     HPI 52 y.o. male with history of HFrEF s/p ICD, atrial fibrillation (on eliquis), CVA, HTN, and hypothyroidism presenting for evaluation of chronic, severe iron-deficiency anemia. He has had iron-deficiency since 2020 and previously underwent EGD/Colonoscopy in 2020 which were unrevealing for a source of GI blood loss. He required PRBC transfusions a few years ago and last needed parenteral iron ~6 months ago. He has had chronic, painless intermittent bright red blood per rectum with occasional blood clots. He denies any melena. He has intermittent epistaxis which has never required cauterization. He denies any family members with epistaxis or bleeding disorders. He currently takes Eliquis for his atrial fibrillation.     Endoscopic history:  EGD (12/31/2020): Normal esophagus, stomach, and duodenum.   Colonoscopy (12/31/2020): Sigmoid diverticulosis, internal hemorrhoids    Past Medical History  Iron Deficiency Anemia  Atrial fibrillation (on eliquis)  Alcohol abuse   CHF  CVA  MDD/Anxiety  HTN  Seizures  Thyroid disease     Physical Examination  There were no vitals taken for this visit.  General appearance: alert, cooperative, no distress  Abdomen: soft, non-tender; bowel sounds normoactive; no organomegaly    Labs (4/19/2023):  BUN 8/Cr 0.6  Hgb 10.6 (MCV 87)    Ferritin 145 (19 in 8/2022)  Iron 88  TIBC 309  % sat 28    Anti-parietal cell antibody negative  Anti IF blocking ab negative    Assessment:   52 y.o. male with chronic iron-deficiency anemia with prior need for PRBC transfusion and parenteral iron with prior negative endoscopic evaluation for source of GI blood loss with EGD/colonoscopy in 2020. He has persistent, intermittent rectal bleeding likely due to internal hemorrhoids which is exacerbated by Eliquis use. His iron-deficiency anemia may be related to chronic GI blood loss from small bowel angioectasias vs. Internal hemorrhoids.     Plan:  - EGD with  pediatric colonoscope with gastric pH measurement and colonoscopy with TI intubation today (continued on eliquis)  - If EGD/Colonoscopy negative for etiology of anemia, will perform VCE      Phillip Juarez MD   [address], [address]   [place] [address] [phone]

## 2023-05-04 NOTE — ANESTHESIA POSTPROCEDURE EVALUATION
Anesthesia Post Evaluation    Patient: Los Mendez    Procedure(s) Performed: Procedure(s) (LRB):  COLONOSCOPY (N/A)  EGD (ESOPHAGOGASTRODUODENOSCOPY) with PCF scope (N/A)    Final Anesthesia Type: general      Patient location during evaluation: PACU  Patient participation: Yes- Able to Participate  Level of consciousness: awake and alert  Post-procedure vital signs: reviewed and stable  Pain management: adequate  Airway patency: patent    PONV status at discharge: No PONV  Anesthetic complications: no      Cardiovascular status: stable  Respiratory status: spontaneous ventilation  Hydration status: euvolemic  Follow-up not needed.          Vitals Value Taken Time   /65 05/04/23 1200   Temp 36 05/04/23 1226   Pulse 74 05/04/23 1200   Resp 21 05/04/23 1200   SpO2 100 % 05/04/23 1200         No case tracking events are documented in the log.      Pain/Nan Score: Nan Score: 10 (5/4/2023 12:00 PM)

## 2023-05-12 ENCOUNTER — TELEPHONE (OUTPATIENT)
Dept: ENDOSCOPY | Facility: HOSPITAL | Age: 53
End: 2023-05-12
Payer: MEDICARE

## 2023-05-15 ENCOUNTER — TELEPHONE (OUTPATIENT)
Dept: ENDOSCOPY | Facility: HOSPITAL | Age: 53
End: 2023-05-15
Payer: MEDICARE

## 2023-05-15 NOTE — TELEPHONE ENCOUNTER
Spoke to patient about arrival time @ 0800.  [facility] [place]    DAY BEFORE:  Tuesday, 05/16/23     1. You will start your clear liquid diet (see attached list) beginning at NOON.      You CAN have:  Water, Coffee or decaf coffee (no milk or cream)  Tea  Soft drinks - regular and sugar free  Jello (green or yellow)  Apple Juice, white grape juice, white cranberry juice  Gatorade, Power Aid, Crystal Light, Billy Aid  Lemonade and Limeade  Bouillon, clear soup  Snowball, popsicles  YOU CAN'T DRINK ANYTHING RED, PURPLE ORANGE OR BLUE   YOU CAN'T DRINK ALCOHOL  ONLY DRINK WHAT IS ON THE LIST                    After 7pm you will take nothing by mouth except necessary medication with a small sip of water and your bowel prep.      2. At noon, mix the 4.1 oz bottle (119 gram) of Miralax and the 32oz Gatorade (use white/clear color) products, place in the refrigerator (do not add ice).      3. At 7pm you will drink 8oz of Miralax/Gatorade mixture every 15 minutes until mixture is gone (a total of 32oz)      4. Just before going to bed, take one dose of over the counter Phazyme or Simethicone as directed on the box

## 2023-05-16 ENCOUNTER — TELEPHONE (OUTPATIENT)
Dept: ENDOSCOPY | Facility: HOSPITAL | Age: 53
End: 2023-05-16
Payer: MEDICARE

## 2023-05-16 ENCOUNTER — TELEPHONE (OUTPATIENT)
Dept: GASTROENTEROLOGY | Facility: CLINIC | Age: 53
End: 2023-05-16
Payer: MEDICARE

## 2023-05-16 NOTE — TELEPHONE ENCOUNTER
----- Message from Mikhail Borrego sent at 5/15/2023  1:28 PM CDT -----  Pt Requesting Call Back    Who called: pt   Who called for pt:  Best call back #: [phone]  Add notes: pt said he needs his directions of prep for his procedure set for 5/17/23; pt says he  can't access his portal so he needs the instructions told to him via phone call verbally

## 2023-05-16 NOTE — TELEPHONE ENCOUNTER
Attempted to call patient re: vce instructions, went to voice mail. Voice message left, instructions were sent to [facility] portal on Friday, 05/12/23.

## 2023-08-15 DIAGNOSIS — R60.0 EDEMA OF BOTH FEET: ICD-10-CM

## 2023-08-15 PROBLEM — I83.93 VARICOSE VEINS OF BOTH LOWER EXTREMITIES: Status: ACTIVE | Noted: 2023-08-15

## 2023-08-15 PROBLEM — M15.9 PRIMARY OSTEOARTHRITIS INVOLVING MULTIPLE JOINTS: Status: ACTIVE | Noted: 2023-08-15

## 2023-08-15 PROBLEM — M15.0 PRIMARY OSTEOARTHRITIS INVOLVING MULTIPLE JOINTS: Status: ACTIVE | Noted: 2023-08-15

## 2023-08-15 PROBLEM — E78.49 OTHER HYPERLIPIDEMIA: Status: ACTIVE | Noted: 2023-08-15

## 2023-08-15 RX ORDER — TRIAMTERENE/HYDROCHLOROTHIAZID 37.5-25 MG
1 TABLET ORAL DAILY
COMMUNITY
Start: 2021-06-16 | End: 2023-08-15 | Stop reason: SDUPTHER

## 2023-08-15 RX ORDER — TRIAMTERENE/HYDROCHLOROTHIAZID 37.5-25 MG
1 TABLET ORAL DAILY
Qty: 30 TABLET | Refills: 0 | Status: SHIPPED | OUTPATIENT
Start: 2023-08-15 | End: 2023-08-29 | Stop reason: SDUPTHER

## 2023-08-15 NOTE — TELEPHONE ENCOUNTER
Rx Refill Request Telephone Encounter    Name:  Clayton Ta  : 1970     Medication Name:  TRIAMTERENE HCTZ  Dose (Optional):    37.5-25 MG  Quantity (Optional):    30  Directions (Optional):   TAKE 1 TAB DAILY    Specific Pharmacy location:  [facility]    Date of last appointment:    Date of next appointment:  23 - FIRST AVAILABLE

## 2023-08-29 ENCOUNTER — OFFICE VISIT (OUTPATIENT)
Dept: PRIMARY CARE | Facility: CLINIC | Age: 53
End: 2023-08-29
Payer: COMMERCIAL

## 2023-08-29 ENCOUNTER — LAB (OUTPATIENT)
Dept: LAB | Facility: LAB | Age: 53
End: 2023-08-29
Payer: COMMERCIAL

## 2023-08-29 VITALS
HEART RATE: 91 BPM | WEIGHT: 254.2 LBS | OXYGEN SATURATION: 95 % | SYSTOLIC BLOOD PRESSURE: 158 MMHG | BODY MASS INDEX: 34.43 KG/M2 | TEMPERATURE: 98.7 F | DIASTOLIC BLOOD PRESSURE: 94 MMHG | RESPIRATION RATE: 16 BRPM | HEIGHT: 72 IN

## 2023-08-29 DIAGNOSIS — Z12.5 SCREENING PSA (PROSTATE SPECIFIC ANTIGEN): ICD-10-CM

## 2023-08-29 DIAGNOSIS — M15.9 PRIMARY OSTEOARTHRITIS INVOLVING MULTIPLE JOINTS: ICD-10-CM

## 2023-08-29 DIAGNOSIS — E78.49 OTHER HYPERLIPIDEMIA: ICD-10-CM

## 2023-08-29 DIAGNOSIS — L30.9 CHRONIC ECZEMA: ICD-10-CM

## 2023-08-29 DIAGNOSIS — Z00.00 ANNUAL PHYSICAL EXAM: ICD-10-CM

## 2023-08-29 DIAGNOSIS — M25.561 ACUTE PAIN OF RIGHT KNEE: ICD-10-CM

## 2023-08-29 DIAGNOSIS — M15.9 OSTEOARTHRITIS OF MULTIPLE JOINTS, UNSPECIFIED OSTEOARTHRITIS TYPE: Primary | ICD-10-CM

## 2023-08-29 DIAGNOSIS — R60.0 EDEMA OF BOTH FEET: ICD-10-CM

## 2023-08-29 DIAGNOSIS — E66.09 CLASS 1 OBESITY DUE TO EXCESS CALORIES WITHOUT SERIOUS COMORBIDITY WITH BODY MASS INDEX (BMI) OF 34.0 TO 34.9 IN ADULT: ICD-10-CM

## 2023-08-29 DIAGNOSIS — M15.9 OSTEOARTHRITIS OF MULTIPLE JOINTS, UNSPECIFIED OSTEOARTHRITIS TYPE: ICD-10-CM

## 2023-08-29 LAB
ALANINE AMINOTRANSFERASE (SGPT) (U/L) IN SER/PLAS: 37 U/L (ref 10–52)
ALBUMIN (G/DL) IN SER/PLAS: 4.5 G/DL (ref 3.4–5)
ALKALINE PHOSPHATASE (U/L) IN SER/PLAS: 80 U/L (ref 33–120)
ANION GAP IN SER/PLAS: 13 MMOL/L (ref 10–20)
ASPARTATE AMINOTRANSFERASE (SGOT) (U/L) IN SER/PLAS: 30 U/L (ref 9–39)
BASOPHILS (10*3/UL) IN BLOOD BY AUTOMATED COUNT: 0.05 X10E9/L (ref 0–0.1)
BASOPHILS/100 LEUKOCYTES IN BLOOD BY AUTOMATED COUNT: 0.7 % (ref 0–2)
BILIRUBIN TOTAL (MG/DL) IN SER/PLAS: 0.8 MG/DL (ref 0–1.2)
CALCIUM (MG/DL) IN SER/PLAS: 9.4 MG/DL (ref 8.6–10.3)
CARBON DIOXIDE, TOTAL (MMOL/L) IN SER/PLAS: 27 MMOL/L (ref 21–32)
CHLORIDE (MMOL/L) IN SER/PLAS: 103 MMOL/L (ref 98–107)
CHOLESTEROL (MG/DL) IN SER/PLAS: 266 MG/DL (ref 0–199)
CHOLESTEROL IN HDL (MG/DL) IN SER/PLAS: 49.2 MG/DL
CHOLESTEROL/HDL RATIO: 5.4
CREATININE (MG/DL) IN SER/PLAS: 0.86 MG/DL (ref 0.5–1.3)
EOSINOPHILS (10*3/UL) IN BLOOD BY AUTOMATED COUNT: 0.09 X10E9/L (ref 0–0.7)
EOSINOPHILS/100 LEUKOCYTES IN BLOOD BY AUTOMATED COUNT: 1.2 % (ref 0–6)
ERYTHROCYTE DISTRIBUTION WIDTH (RATIO) BY AUTOMATED COUNT: 13.1 % (ref 11.5–14.5)
ERYTHROCYTE MEAN CORPUSCULAR HEMOGLOBIN CONCENTRATION (G/DL) BY AUTOMATED: 33.8 G/DL (ref 32–36)
ERYTHROCYTE MEAN CORPUSCULAR VOLUME (FL) BY AUTOMATED COUNT: 90 FL (ref 80–100)
ERYTHROCYTES (10*6/UL) IN BLOOD BY AUTOMATED COUNT: 5.14 X10E12/L (ref 4.5–5.9)
GFR MALE: >90 ML/MIN/1.73M2
GLUCOSE (MG/DL) IN SER/PLAS: 102 MG/DL (ref 74–99)
HEMATOCRIT (%) IN BLOOD BY AUTOMATED COUNT: 46.1 % (ref 41–52)
HEMOGLOBIN (G/DL) IN BLOOD: 15.6 G/DL (ref 13.5–17.5)
IMMATURE GRANULOCYTES/100 LEUKOCYTES IN BLOOD BY AUTOMATED COUNT: 0.1 % (ref 0–0.9)
LDL: 192 MG/DL (ref 0–99)
LEUKOCYTES (10*3/UL) IN BLOOD BY AUTOMATED COUNT: 7.5 X10E9/L (ref 4.4–11.3)
LYMPHOCYTES (10*3/UL) IN BLOOD BY AUTOMATED COUNT: 1.58 X10E9/L (ref 1.2–4.8)
LYMPHOCYTES/100 LEUKOCYTES IN BLOOD BY AUTOMATED COUNT: 21.2 % (ref 13–44)
MONOCYTES (10*3/UL) IN BLOOD BY AUTOMATED COUNT: 0.66 X10E9/L (ref 0.1–1)
MONOCYTES/100 LEUKOCYTES IN BLOOD BY AUTOMATED COUNT: 8.8 % (ref 2–10)
NEUTROPHILS (10*3/UL) IN BLOOD BY AUTOMATED COUNT: 5.07 X10E9/L (ref 1.2–7.7)
NEUTROPHILS/100 LEUKOCYTES IN BLOOD BY AUTOMATED COUNT: 68 % (ref 40–80)
PLATELETS (10*3/UL) IN BLOOD AUTOMATED COUNT: 198 X10E9/L (ref 150–450)
POTASSIUM (MMOL/L) IN SER/PLAS: 3.8 MMOL/L (ref 3.5–5.3)
PROSTATE SPECIFIC ANTIGEN,SCREEN: 0.42 NG/ML (ref 0–4)
PROTEIN TOTAL: 7.5 G/DL (ref 6.4–8.2)
SODIUM (MMOL/L) IN SER/PLAS: 139 MMOL/L (ref 136–145)
TRIGLYCERIDE (MG/DL) IN SER/PLAS: 126 MG/DL (ref 0–149)
UREA NITROGEN (MG/DL) IN SER/PLAS: 9 MG/DL (ref 6–23)
VLDL: 25 MG/DL (ref 0–40)

## 2023-08-29 PROCEDURE — 1036F TOBACCO NON-USER: CPT | Performed by: FAMILY MEDICINE

## 2023-08-29 PROCEDURE — 3008F BODY MASS INDEX DOCD: CPT | Performed by: FAMILY MEDICINE

## 2023-08-29 PROCEDURE — 85025 COMPLETE CBC W/AUTO DIFF WBC: CPT

## 2023-08-29 PROCEDURE — 80053 COMPREHEN METABOLIC PANEL: CPT

## 2023-08-29 PROCEDURE — 36415 COLL VENOUS BLD VENIPUNCTURE: CPT

## 2023-08-29 PROCEDURE — 84153 ASSAY OF PSA TOTAL: CPT

## 2023-08-29 PROCEDURE — 99396 PREV VISIT EST AGE 40-64: CPT | Performed by: FAMILY MEDICINE

## 2023-08-29 PROCEDURE — 80061 LIPID PANEL: CPT

## 2023-08-29 RX ORDER — AMMONIUM LACTATE 12 G/100G
LOTION TOPICAL 2 TIMES DAILY
COMMUNITY
Start: 2022-09-19 | End: 2023-08-29 | Stop reason: SDUPTHER

## 2023-08-29 RX ORDER — SCOLOPAMINE TRANSDERMAL SYSTEM 1 MG/1
1 PATCH, EXTENDED RELEASE TRANSDERMAL
COMMUNITY
Start: 2022-09-19 | End: 2023-08-29 | Stop reason: ALTCHOICE

## 2023-08-29 RX ORDER — TRIAMTERENE/HYDROCHLOROTHIAZID 37.5-25 MG
1 TABLET ORAL DAILY
Qty: 90 TABLET | Refills: 3 | Status: SHIPPED | OUTPATIENT
Start: 2023-08-29

## 2023-08-29 RX ORDER — AMMONIUM LACTATE 12 G/100G
LOTION TOPICAL AS NEEDED
Qty: 225 G | Refills: 2 | Status: SHIPPED | OUTPATIENT
Start: 2023-08-29

## 2023-08-29 RX ORDER — IBUPROFEN 800 MG/1
800 TABLET ORAL 2 TIMES DAILY PRN
Qty: 90 TABLET | Refills: 0 | Status: SHIPPED | OUTPATIENT
Start: 2023-08-29 | End: 2024-03-04

## 2023-08-29 ASSESSMENT — PATIENT HEALTH QUESTIONNAIRE - PHQ9
1. LITTLE INTEREST OR PLEASURE IN DOING THINGS: NOT AT ALL
SUM OF ALL RESPONSES TO PHQ9 QUESTIONS 1 AND 2: 0
2. FEELING DOWN, DEPRESSED OR HOPELESS: NOT AT ALL

## 2023-08-29 NOTE — PROGRESS NOTES
"Subjective   Patient ID: Clayton Ta is a 53 y.o. male who presents for Annual Exam.  HPI    Patient presents in office today for an Annual physical. Last eye exam 1-2 years ago, last dental exam 2 weeks ago. No new family h/o of cancer or heart disease. Does not need paperwork filled out today. Is fatiashley for BW.     Requesting refill of Maxzide for edema of feet.   He only takes these during the warmer months. States he is on his feet a lot and that is when his feet swell.  Medication works well for this.     He also states that he \"tweaked\" his knee. He overextended it on a ladder. He was stretching from a ladder to another object. He states he can feel it pop when he walks. Denies a lot pain. Daughter is a nurse and gave him Ibuprofen 800 mg.  He would like a prescription of this.     He has a muscle that feels like a bulges in left lower back.  He would like looked at. Denies pain. States this does not bother him at all. Admits he had a lipoma on neck, unsure if it could be that.    Taking current medications which were reviewed.  Problem list discussed.    Overall doing well.  Eating okay.  Staying active.    Has no other new problem /question.     ROS  Constitutional- No activity change. No appetite change.  Eyes- Denies vision changes.  Respiratory- No shortness of breath.  Cardiovascular- No palpitations. No chest pain.  GI- No nausea or vomiting. No diarrhea or constipation. Denies abdominal pain.  Musculoskeletal- myalgias   Extremities- No edema.  Neurological- Denies headaches. Denies dizziness.  Skin- No rashes.  Psychiatric/Behavioral- Denies significant anxiety, or depressed mood.     Objective     BP (!) 158/94 (BP Location: Left arm, Patient Position: Sitting, BP Cuff Size: Adult)   Pulse 91   Temp 37.1 °C (98.7 °F) (Temporal)   Resp 16   Ht 1.829 m (6')   Wt 115 kg (254 lb 3.2 oz)   SpO2 95%   BMI 34.48 kg/m²     No Known Allergies    Constitutional-- Well-nourished.  No distress  Head- " unremarkable.  Eyes- PERRL.  Conjunctiva normal.  Nose- Normal.  No rhinorrhea noted.  Throat- Oropharynx is clear and moist.  Neck- Supple with no thyromegaly.  No significant cervical adenopathy noted.  Pulmonary/Chest- Breath sounds normal with normal effort.  No wheezing.  Heart- Regular rate and rhythm.  No murmur.  Abdomen- Soft and non-tender.  No masses noted.  Musculoskeletal- Normal ROM.  OA changes hands noted  Extremities- No edema.   Neurological- Alert.  No noted deficits.  Skin- Warm.  No rashes.  Psychiatric/Behavioral- Mood and affect normal.  Behavior normal.     Assessment/Plan   1. Osteoarthritis of multiple joints, unspecified osteoarthritis type  CBC and Auto Differential    ibuprofen 800 mg tablet      2. Annual physical exam  Comprehensive Metabolic Panel    Lipid Panel      3. Other hyperlipidemia  CBC and Auto Differential    Comprehensive Metabolic Panel    Lipid Panel      4. Primary osteoarthritis involving multiple joints  CBC and Auto Differential      5. Screening PSA (prostate specific antigen)  Prostate Specific Antigen, Screen      6. Chronic eczema  ammonium lactate (Lac-Hydrin) 12 % lotion      7. Edema of both feet  CBC and Auto Differential    triamterene-hydrochlorothiazid (Maxzide-25) 37.5-25 mg tablet      8. Acute pain of right knee  ibuprofen 800 mg tablet      9. BMI 34.0-34.9,adult        10. Class 1 obesity due to excess calories without serious comorbidity with body mass index (BMI) of 34.0 to 34.9 in adult               Long talk. Treatment options reviewed.    Discussed lower back pain. Discussed knee pain.  Educated on arthritis. Take ibuprofen as needed.      Complete blood work after fasting for 10 hours.      Discussed patient's BMI and to institute calorie reduction and increase exercise to decrease risk of diabetes and heart disease in the future.    Advised patient to stay up to date on routine maintenance and screenings.     Continue and take your medications  as prescribed.    Health Maintenance issues discussed.    Importance of healthy diet and regular exercise regimen discussed.    We will contact you with any test results ordered. If you do not hear from us, please contact.    Follow-up 6 months or sooner if any problems or symptoms do not resolve as expected.        Scribe Attestation  By signing my name below, Maddie GUZMÁN Scribe   attest that this documentation has been prepared under the direction and in the presence of Dangelo Harrison MD.

## 2023-08-30 ENCOUNTER — TELEPHONE (OUTPATIENT)
Dept: PRIMARY CARE | Facility: CLINIC | Age: 53
End: 2023-08-30
Payer: COMMERCIAL

## 2023-08-30 DIAGNOSIS — E78.49 OTHER HYPERLIPIDEMIA: ICD-10-CM

## 2023-08-30 RX ORDER — ROSUVASTATIN CALCIUM 5 MG/1
5 TABLET, COATED ORAL DAILY
Qty: 30 TABLET | Refills: 5 | Status: SHIPPED | OUTPATIENT
Start: 2023-08-30 | End: 2023-09-22

## 2023-08-30 NOTE — TELEPHONE ENCOUNTER
Dangelo Harrison MD  [id number] [place] [id number] Clinical Support Staff  Labs are within normal limits or stable except your cholesterol is much higher than last year and significantly elevated.  Options include starting cholesterol-lowering medication or making dramatic low-fat low-cholesterol changes in your diet and repeating a lipid profile in 6 weeks.  Please let me know which he prefers.  Thanks

## 2023-08-30 NOTE — TELEPHONE ENCOUNTER
Patient would like to start the medication. Please send to [facility]. He is aware to check with the pharmacy after 5, and have blood work in 6 weeks.

## 2023-09-22 DIAGNOSIS — E78.49 OTHER HYPERLIPIDEMIA: ICD-10-CM

## 2023-09-22 RX ORDER — ROSUVASTATIN CALCIUM 5 MG/1
5 TABLET, COATED ORAL DAILY
Qty: 30 TABLET | Refills: 0 | Status: SHIPPED | OUTPATIENT
Start: 2023-09-22 | End: 2023-10-02 | Stop reason: SDUPTHER

## 2023-09-28 NOTE — TRANSFER OF CARE
"Anesthesia Transfer of Care Note    Patient: Los Mendez    Procedure(s) Performed: Procedure(s) (LRB):  ICD DC new (Left)    Patient location: PACU    Anesthesia Type: general    Transport from OR: Transported from OR on 6-10 L/min O2 by face mask with adequate spontaneous ventilation    Post pain: adequate analgesia    Post assessment: no apparent anesthetic complications and tolerated procedure well    Post vital signs: stable    Level of consciousness: awake, alert and oriented    Nausea/Vomiting: no nausea/vomiting    Complications: none    Transfer of care protocol was followed      Last vitals:   Visit Vitals  /80 (BP Location: Right arm, Patient Position: Lying)   Pulse 84   Temp 36.7 °C (98.1 °F) (Oral)   Resp 20   Ht 5' 8" (1.727 m)   Wt 109.9 kg (242 lb 4.6 oz)   SpO2 96%   BMI 36.84 kg/m²     "
.

## 2023-10-02 ENCOUNTER — LAB (OUTPATIENT)
Dept: LAB | Facility: LAB | Age: 53
End: 2023-10-02
Payer: COMMERCIAL

## 2023-10-02 DIAGNOSIS — E78.49 OTHER HYPERLIPIDEMIA: ICD-10-CM

## 2023-10-02 LAB
CHOLEST SERPL-MCNC: 219 MG/DL (ref 0–199)
CHOLESTEROL/HDL RATIO: 3.9
HDLC SERPL-MCNC: 56.1 MG/DL
LDLC SERPL CALC-MCNC: 139 MG/DL (ref 140–190)
NON HDL CHOLESTEROL: 163 MG/DL (ref 0–149)
TRIGL SERPL-MCNC: 119 MG/DL (ref 0–149)
VLDL: 24 MG/DL (ref 0–40)

## 2023-10-02 PROCEDURE — 36415 COLL VENOUS BLD VENIPUNCTURE: CPT

## 2023-10-02 RX ORDER — ROSUVASTATIN CALCIUM 5 MG/1
5 TABLET, COATED ORAL DAILY
Qty: 30 TABLET | Refills: 0 | Status: SHIPPED | OUTPATIENT
Start: 2023-10-02 | End: 2023-10-03 | Stop reason: SDUPTHER

## 2023-10-02 NOTE — TELEPHONE ENCOUNTER
Rx Refill Request Telephone Encounter    Name:  Clayton Ta  : 1970     Medication Name:  Crestor 5 Mg  Quantity (Optional):    30  Directions (Optional):   TAKE 1 TABLET BY MOUTH EVERY DAY     [facility]     Date of last appointment:  23    Pt is out of medication.  I advised him to go and get his repeat lab. He wasn't sure if Dr. Harrison wanted  him to stay on this medication.

## 2023-10-03 DIAGNOSIS — E78.49 OTHER HYPERLIPIDEMIA: ICD-10-CM

## 2023-10-03 RX ORDER — ROSUVASTATIN CALCIUM 10 MG/1
10 TABLET, COATED ORAL DAILY
Qty: 30 TABLET | Refills: 3 | Status: SHIPPED | OUTPATIENT
Start: 2023-10-03 | End: 2023-10-30

## 2023-10-03 NOTE — TELEPHONE ENCOUNTER
Dangelo Harrison MD  [id number] [place] [id number] Clinical Support Staff  Cholesterol numbers much better than last month.  We can continue the same dose of Crestor 5 mg daily however if he is not having any side effects or problems with the medication I would recommend increasing it to 10 mg daily.  If he agrees please arrange for Crestor 10 mg 30 pills with 3 refills to be taken daily.  Then repeat lipid profile in 1 month.  Please let him know and arrange

## 2023-10-28 DIAGNOSIS — E78.49 OTHER HYPERLIPIDEMIA: ICD-10-CM

## 2023-10-30 RX ORDER — ROSUVASTATIN CALCIUM 10 MG/1
10 TABLET, COATED ORAL DAILY
Qty: 90 TABLET | Refills: 0 | Status: SHIPPED | OUTPATIENT
Start: 2023-10-30

## 2023-11-15 PROBLEM — I25.5 ISCHEMIC CARDIOMYOPATHY: Status: ACTIVE | Noted: 2023-11-15

## 2023-11-15 PROBLEM — I50.42 CHRONIC COMBINED SYSTOLIC (CONGESTIVE) AND DIASTOLIC (CONGESTIVE) HEART FAILURE: Status: ACTIVE | Noted: 2023-11-15

## 2023-11-15 PROBLEM — T82.9XXA DISORDER OF IMPLANTED CARDIAC DEFIBRILLATOR ELECTRODE: Status: ACTIVE | Noted: 2023-11-15

## 2024-03-04 DIAGNOSIS — M15.9 OSTEOARTHRITIS OF MULTIPLE JOINTS, UNSPECIFIED OSTEOARTHRITIS TYPE: ICD-10-CM

## 2024-03-04 DIAGNOSIS — M25.561 ACUTE PAIN OF RIGHT KNEE: ICD-10-CM

## 2024-03-04 RX ORDER — IBUPROFEN 800 MG/1
800 TABLET ORAL 2 TIMES DAILY PRN
Qty: 60 TABLET | Refills: 0 | Status: SHIPPED | OUTPATIENT
Start: 2024-03-04 | End: 2024-04-02

## 2024-04-02 DIAGNOSIS — M15.9 OSTEOARTHRITIS OF MULTIPLE JOINTS, UNSPECIFIED OSTEOARTHRITIS TYPE: ICD-10-CM

## 2024-04-02 DIAGNOSIS — M25.561 ACUTE PAIN OF RIGHT KNEE: ICD-10-CM

## 2024-04-02 RX ORDER — IBUPROFEN 800 MG/1
TABLET ORAL
Qty: 60 TABLET | Refills: 0 | Status: SHIPPED | OUTPATIENT
Start: 2024-04-02

## 2024-09-23 ENCOUNTER — APPOINTMENT (OUTPATIENT)
Dept: PRIMARY CARE | Facility: CLINIC | Age: 54
End: 2024-09-23
Payer: COMMERCIAL

## 2024-09-23 VITALS
DIASTOLIC BLOOD PRESSURE: 90 MMHG | WEIGHT: 251 LBS | BODY MASS INDEX: 32.21 KG/M2 | RESPIRATION RATE: 18 BRPM | OXYGEN SATURATION: 98 % | HEART RATE: 75 BPM | HEIGHT: 74 IN | SYSTOLIC BLOOD PRESSURE: 160 MMHG

## 2024-09-23 DIAGNOSIS — M25.561 ACUTE PAIN OF RIGHT KNEE: ICD-10-CM

## 2024-09-23 DIAGNOSIS — E78.49 OTHER HYPERLIPIDEMIA: ICD-10-CM

## 2024-09-23 DIAGNOSIS — L30.9 CHRONIC ECZEMA: ICD-10-CM

## 2024-09-23 DIAGNOSIS — M15.9 OSTEOARTHRITIS OF MULTIPLE JOINTS, UNSPECIFIED OSTEOARTHRITIS TYPE: ICD-10-CM

## 2024-09-23 DIAGNOSIS — Z00.00 ANNUAL PHYSICAL EXAM: Primary | ICD-10-CM

## 2024-09-23 DIAGNOSIS — T75.3XXA MOTION SICKNESS, INITIAL ENCOUNTER: ICD-10-CM

## 2024-09-23 DIAGNOSIS — Z12.5 SCREENING PSA (PROSTATE SPECIFIC ANTIGEN): ICD-10-CM

## 2024-09-23 DIAGNOSIS — I10 HTN (HYPERTENSION), BENIGN: ICD-10-CM

## 2024-09-23 DIAGNOSIS — R60.0 EDEMA OF BOTH FEET: ICD-10-CM

## 2024-09-23 PROCEDURE — 99396 PREV VISIT EST AGE 40-64: CPT | Performed by: FAMILY MEDICINE

## 2024-09-23 PROCEDURE — 3077F SYST BP >= 140 MM HG: CPT | Performed by: FAMILY MEDICINE

## 2024-09-23 PROCEDURE — 1036F TOBACCO NON-USER: CPT | Performed by: FAMILY MEDICINE

## 2024-09-23 PROCEDURE — 3008F BODY MASS INDEX DOCD: CPT | Performed by: FAMILY MEDICINE

## 2024-09-23 PROCEDURE — 3080F DIAST BP >= 90 MM HG: CPT | Performed by: FAMILY MEDICINE

## 2024-09-23 RX ORDER — BENAZEPRIL HYDROCHLORIDE 20 MG/1
20 TABLET ORAL DAILY
Qty: 30 TABLET | Refills: 3 | Status: SHIPPED | OUTPATIENT
Start: 2024-09-23 | End: 2025-01-21

## 2024-09-23 RX ORDER — IBUPROFEN 800 MG/1
800 TABLET ORAL 2 TIMES DAILY PRN
Qty: 60 TABLET | Refills: 5 | Status: SHIPPED | OUTPATIENT
Start: 2024-09-23

## 2024-09-23 RX ORDER — SCOLOPAMINE TRANSDERMAL SYSTEM 1 MG/1
1 PATCH, EXTENDED RELEASE TRANSDERMAL
Qty: 5 PATCH | Refills: 1 | Status: SHIPPED | OUTPATIENT
Start: 2024-09-23 | End: 2024-11-22

## 2024-09-23 RX ORDER — AMMONIUM LACTATE 12 G/100G
LOTION TOPICAL AS NEEDED
Qty: 225 G | Refills: 2 | Status: SHIPPED | OUTPATIENT
Start: 2024-09-23

## 2024-09-23 NOTE — PROGRESS NOTES
"Subjective   Patient ID: Clayton Ta is a 54 y.o. male who presents for Annual Exam, Hypertension, and Hyperlipidemia.  HPI    Patient presents in office today for an Annual physical. Last eye exam 2 weeks ago, last dental exam earlier this year. No new family h/o of cancer or heart disease. Does not need paperwork filled out today.      HTN and HLD follow up  Denies chest pain,SOB, swelling, headaches, lightheadedness or dizziness.   Eats a generally healthy diet, Exercises.   Does check BP at home.   Medication working well.   Patient admits that he went for his 3 year wellness check at work and his BP was elevated at 169/100. Patient admits that he has recently changed from nights at work to working days.     Patient would like a RX for motion sickness patches.     Taking current medications which were reviewed.  Problem list discussed.    Overall doing well.  Eating okay.  Staying active.    Has no other new problem /question.     ROS  Constitutional- No activity change. No appetite change.  Eyes- Denies vision changes.  Respiratory- No shortness of breath.  Cardiovascular- No palpitations. No chest pain.  GI- No nausea or vomiting. No diarrhea or constipation. Denies abdominal pain.  Musculoskeletal- Denies joint swelling.  Extremities- No edema.  Neurological- Denies headaches. Denies dizziness.  Skin- No rashes.  Psychiatric/Behavioral- Denies significant anxiety, or depressed mood.     Objective     /90   Pulse 75   Resp 18   Ht 1.88 m (6' 2\")   Wt 114 kg (251 lb)   SpO2 98%   BMI 32.23 kg/m²     No Known Allergies    Constitutional-- Well-nourished.  No distress  Head- unremarkable.  Eyes- PERRL.  Conjunctiva normal.  Nose- Normal.  No rhinorrhea noted.  Throat- Oropharynx is clear and moist.  Neck- Supple with no thyromegaly.  No significant cervical adenopathy noted.  Pulmonary/Chest- Breath sounds normal with normal effort.  No wheezing.  Heart- Regular rate and rhythm.  No " murmur.  Abdomen- Soft and non-tender.  No masses noted.  Musculoskeletal- Normal ROM.  No significant joint swelling  Extremities- No edema.   Neurological- Alert.  No noted deficits.  Skin- Warm.  No rashes.  Psychiatric/Behavioral- Mood and affect normal.  Behavior normal.     Assessment/Plan   1. Annual physical exam        2. Acute pain of right knee  ibuprofen 800 mg tablet      3. Osteoarthritis of multiple joints, unspecified osteoarthritis type  ibuprofen 800 mg tablet      4. Chronic eczema  ammonium lactate (Lac-Hydrin) 12 % lotion      5. Screening PSA (prostate specific antigen)  Prostate Specific Antigen, Screen      6. Other hyperlipidemia  Lipid Panel      7. Edema of both feet        8. HTN (hypertension), benign  CBC and Auto Differential    Comprehensive Metabolic Panel    benazepril (Lotensin) 20 mg tablet      9. Motion sickness, initial encounter  scopolamine (Transderm-Scop) 1 mg over 3 days patch 3 day             Long talk. Treatment options reviewed.    Reviewed most recent lab work with patient. Advised patient to remain up to date on routine maintenance and health screening.  Maintain appointments with specialists as scheduled.  Advised patient to remain up to date on immunizations.     Discussed Hypertension.  Take medication as discussed. Will continue to monitor.     Discussed hypertension. Educated the patient on osteoarthritis care and management. Educated on muscle strength and exercise. Discussed right sided knee pain.     Discussed importance of natural sources of nutrition.  Advised patient to consume vegetables, salads, fruits, nuts, and proteins such as fish and chicken.  Discussed portion control.      Discussed the importance of routine stretching and exercise.     Complete blood work as discussed. Advised patient to remain properly hydrated.     Continue and take your medications as prescribed.    Health Maintenance issues discussed.    Importance of healthy diet and regular  exercise regimen discussed.    We will contact you with any test results ordered. If you do not hear from us, please contact.    Follow-up as instructed or sooner if any problems or symptoms do not resolve as expected.          Scribe Attestation  By signing my name below, Maddie GUZMÁN Scribe   attest that this documentation has been prepared under the direction and in the presence of Dangelo Harrison MD.

## 2024-09-30 ENCOUNTER — APPOINTMENT (OUTPATIENT)
Dept: PRIMARY CARE | Facility: CLINIC | Age: 54
End: 2024-09-30
Payer: COMMERCIAL

## 2024-09-30 ENCOUNTER — LAB (OUTPATIENT)
Dept: LAB | Facility: LAB | Age: 54
End: 2024-09-30
Payer: COMMERCIAL

## 2024-09-30 VITALS — DIASTOLIC BLOOD PRESSURE: 72 MMHG | SYSTOLIC BLOOD PRESSURE: 102 MMHG

## 2024-09-30 DIAGNOSIS — I10 HTN (HYPERTENSION), BENIGN: ICD-10-CM

## 2024-09-30 DIAGNOSIS — Z12.5 SCREENING PSA (PROSTATE SPECIFIC ANTIGEN): ICD-10-CM

## 2024-09-30 DIAGNOSIS — E78.49 OTHER HYPERLIPIDEMIA: ICD-10-CM

## 2024-09-30 LAB
ALBUMIN SERPL BCP-MCNC: 4.6 G/DL (ref 3.4–5)
ALP SERPL-CCNC: 94 U/L (ref 33–120)
ALT SERPL W P-5'-P-CCNC: 29 U/L (ref 10–52)
ANION GAP SERPL CALC-SCNC: 13 MMOL/L (ref 10–20)
AST SERPL W P-5'-P-CCNC: 22 U/L (ref 9–39)
BASOPHILS # BLD AUTO: 0.05 X10*3/UL (ref 0–0.1)
BASOPHILS NFR BLD AUTO: 0.6 %
BILIRUB SERPL-MCNC: 0.8 MG/DL (ref 0–1.2)
BUN SERPL-MCNC: 15 MG/DL (ref 6–23)
CALCIUM SERPL-MCNC: 9.4 MG/DL (ref 8.6–10.3)
CHLORIDE SERPL-SCNC: 104 MMOL/L (ref 98–107)
CHOLEST SERPL-MCNC: 270 MG/DL (ref 0–199)
CHOLESTEROL/HDL RATIO: 5.7
CO2 SERPL-SCNC: 27 MMOL/L (ref 21–32)
CREAT SERPL-MCNC: 0.89 MG/DL (ref 0.5–1.3)
EGFRCR SERPLBLD CKD-EPI 2021: >90 ML/MIN/1.73M*2
EOSINOPHIL # BLD AUTO: 0.12 X10*3/UL (ref 0–0.7)
EOSINOPHIL NFR BLD AUTO: 1.5 %
ERYTHROCYTE [DISTWIDTH] IN BLOOD BY AUTOMATED COUNT: 13.5 % (ref 11.5–14.5)
GLUCOSE SERPL-MCNC: 81 MG/DL (ref 74–99)
HCT VFR BLD AUTO: 49.4 % (ref 41–52)
HDLC SERPL-MCNC: 47.3 MG/DL
HGB BLD-MCNC: 16.5 G/DL (ref 13.5–17.5)
IMM GRANULOCYTES # BLD AUTO: 0.03 X10*3/UL (ref 0–0.7)
IMM GRANULOCYTES NFR BLD AUTO: 0.4 % (ref 0–0.9)
LDLC SERPL CALC-MCNC: 182 MG/DL
LYMPHOCYTES # BLD AUTO: 2.12 X10*3/UL (ref 1.2–4.8)
LYMPHOCYTES NFR BLD AUTO: 26.1 %
MCH RBC QN AUTO: 31.3 PG (ref 26–34)
MCHC RBC AUTO-ENTMCNC: 33.4 G/DL (ref 32–36)
MCV RBC AUTO: 94 FL (ref 80–100)
MONOCYTES # BLD AUTO: 0.9 X10*3/UL (ref 0.1–1)
MONOCYTES NFR BLD AUTO: 11.1 %
NEUTROPHILS # BLD AUTO: 4.9 X10*3/UL (ref 1.2–7.7)
NEUTROPHILS NFR BLD AUTO: 60.3 %
NON HDL CHOLESTEROL: 223 MG/DL (ref 0–149)
NRBC BLD-RTO: 0 /100 WBCS (ref 0–0)
PLATELET # BLD AUTO: 178 X10*3/UL (ref 150–450)
POTASSIUM SERPL-SCNC: 4.2 MMOL/L (ref 3.5–5.3)
PROT SERPL-MCNC: 7.2 G/DL (ref 6.4–8.2)
PSA SERPL-MCNC: 0.7 NG/ML
RBC # BLD AUTO: 5.28 X10*6/UL (ref 4.5–5.9)
SODIUM SERPL-SCNC: 140 MMOL/L (ref 136–145)
TRIGL SERPL-MCNC: 203 MG/DL (ref 0–149)
VLDL: 41 MG/DL (ref 0–40)
WBC # BLD AUTO: 8.1 X10*3/UL (ref 4.4–11.3)

## 2024-09-30 PROCEDURE — 80061 LIPID PANEL: CPT

## 2024-09-30 PROCEDURE — 80053 COMPREHEN METABOLIC PANEL: CPT

## 2024-09-30 PROCEDURE — G0103 PSA SCREENING: HCPCS

## 2024-09-30 PROCEDURE — 85025 COMPLETE CBC W/AUTO DIFF WBC: CPT

## 2024-09-30 PROCEDURE — 36415 COLL VENOUS BLD VENIPUNCTURE: CPT

## 2024-09-30 NOTE — PROGRESS NOTES
----- Message from Sis Berg sent at 12/11/2020 10:11 AM EST -----  Regarding: Dr Iva Donnelly: [phone]  Mom would like to talk about the lab results she got from other doctor. Please advise. Patient presents today for BP check. Currently takes Benazepril 20 mg and Maxzide 37.5-25 mg. Denies lightheadedness, dizziness and headaches.   Today's BP in office is:  102/72, sitting, right arm    After discussing with Dr. Harrison:  Decrease Maxzide to 1/2 tablet daily.   Continue to monitor BP at home.

## 2024-12-17 DIAGNOSIS — I10 HTN (HYPERTENSION), BENIGN: ICD-10-CM

## 2024-12-18 RX ORDER — BENAZEPRIL HYDROCHLORIDE 20 MG/1
20 TABLET ORAL DAILY
Qty: 90 TABLET | Refills: 0 | Status: SHIPPED | OUTPATIENT
Start: 2024-12-18

## 2025-03-03 ENCOUNTER — TELEPHONE (OUTPATIENT)
Dept: PRIMARY CARE | Facility: CLINIC | Age: 55
End: 2025-03-03
Payer: COMMERCIAL

## 2025-03-03 DIAGNOSIS — Z12.11 COLON CANCER SCREENING: Primary | ICD-10-CM

## 2025-03-03 NOTE — TELEPHONE ENCOUNTER
PATIENT CALLING, HE IS WANTING TO KNOW IF HE CAN GET AN ORDER FOR HIS COLONOSCOPY. HE IS DUE NOW TO HAVE THAT DONE.    PLEASE ADVISE.

## 2025-03-12 DIAGNOSIS — Z12.11 COLON CANCER SCREENING: ICD-10-CM

## 2025-03-13 RX ORDER — SODIUM, POTASSIUM,MAG SULFATES 17.5-3.13G
SOLUTION, RECONSTITUTED, ORAL ORAL
Qty: 1 EACH | Refills: 0 | Status: SHIPPED | OUTPATIENT
Start: 2025-03-13

## 2025-03-27 ENCOUNTER — PATIENT MESSAGE (OUTPATIENT)
Dept: PRIMARY CARE | Facility: CLINIC | Age: 55
End: 2025-03-27
Payer: COMMERCIAL

## 2025-03-27 DIAGNOSIS — L30.9 CHRONIC ECZEMA: ICD-10-CM

## 2025-03-27 RX ORDER — AMMONIUM LACTATE 12 G/100G
LOTION TOPICAL AS NEEDED
Qty: 225 ML | Refills: 0 | Status: SHIPPED | OUTPATIENT
Start: 2025-03-27

## 2025-04-12 DIAGNOSIS — I10 HTN (HYPERTENSION), BENIGN: ICD-10-CM

## 2025-04-14 RX ORDER — BENAZEPRIL HYDROCHLORIDE 20 MG/1
20 TABLET ORAL DAILY
Qty: 90 TABLET | Refills: 0 | Status: SHIPPED | OUTPATIENT
Start: 2025-04-14 | End: 2025-04-16

## 2025-04-15 ENCOUNTER — ANESTHESIA EVENT (OUTPATIENT)
Dept: GASTROENTEROLOGY | Facility: EXTERNAL LOCATION | Age: 55
End: 2025-04-15

## 2025-04-16 DIAGNOSIS — I10 HTN (HYPERTENSION), BENIGN: ICD-10-CM

## 2025-04-16 RX ORDER — BENAZEPRIL HYDROCHLORIDE 20 MG/1
20 TABLET ORAL DAILY
Qty: 90 TABLET | Refills: 0 | Status: SHIPPED | OUTPATIENT
Start: 2025-04-16

## 2025-04-22 ENCOUNTER — TELEPHONE (OUTPATIENT)
Dept: PRIMARY CARE | Facility: CLINIC | Age: 55
End: 2025-04-22
Payer: COMMERCIAL

## 2025-04-22 DIAGNOSIS — L30.9 CHRONIC ECZEMA: ICD-10-CM

## 2025-04-22 RX ORDER — AMMONIUM LACTATE 12 G/100G
LOTION TOPICAL AS NEEDED
Qty: 225 ML | Refills: 0 | Status: SHIPPED | OUTPATIENT
Start: 2025-04-22

## 2025-04-28 ENCOUNTER — APPOINTMENT (OUTPATIENT)
Dept: GASTROENTEROLOGY | Facility: EXTERNAL LOCATION | Age: 55
End: 2025-04-28
Payer: COMMERCIAL

## 2025-04-28 ENCOUNTER — ANESTHESIA (OUTPATIENT)
Dept: GASTROENTEROLOGY | Facility: EXTERNAL LOCATION | Age: 55
End: 2025-04-28

## 2025-04-28 VITALS
OXYGEN SATURATION: 95 % | WEIGHT: 250 LBS | SYSTOLIC BLOOD PRESSURE: 122 MMHG | HEART RATE: 80 BPM | BODY MASS INDEX: 32.08 KG/M2 | TEMPERATURE: 99.7 F | HEIGHT: 74 IN | DIASTOLIC BLOOD PRESSURE: 77 MMHG | RESPIRATION RATE: 18 BRPM

## 2025-04-28 DIAGNOSIS — Z12.11 COLON CANCER SCREENING: Primary | ICD-10-CM

## 2025-04-28 PROCEDURE — 45380 COLONOSCOPY AND BIOPSY: CPT | Performed by: INTERNAL MEDICINE

## 2025-04-28 PROCEDURE — 45385 COLONOSCOPY W/LESION REMOVAL: CPT | Performed by: INTERNAL MEDICINE

## 2025-04-28 RX ORDER — LIDOCAINE HYDROCHLORIDE 20 MG/ML
INJECTION, SOLUTION INFILTRATION; PERINEURAL AS NEEDED
Status: DISCONTINUED | OUTPATIENT
Start: 2025-04-28 | End: 2025-04-28

## 2025-04-28 RX ORDER — ONDANSETRON HYDROCHLORIDE 2 MG/ML
INJECTION, SOLUTION INTRAVENOUS AS NEEDED
Status: DISCONTINUED | OUTPATIENT
Start: 2025-04-28 | End: 2025-04-28

## 2025-04-28 RX ORDER — PROPOFOL 10 MG/ML
INJECTION, EMULSION INTRAVENOUS AS NEEDED
Status: DISCONTINUED | OUTPATIENT
Start: 2025-04-28 | End: 2025-04-28

## 2025-04-28 RX ORDER — SODIUM CHLORIDE 9 MG/ML
INJECTION, SOLUTION INTRAVENOUS CONTINUOUS PRN
Status: DISCONTINUED | OUTPATIENT
Start: 2025-04-28 | End: 2025-04-28

## 2025-04-28 RX ADMIN — PROPOFOL 50 MG: 10 INJECTION, EMULSION INTRAVENOUS at 09:06

## 2025-04-28 RX ADMIN — PROPOFOL 50 MG: 10 INJECTION, EMULSION INTRAVENOUS at 09:08

## 2025-04-28 RX ADMIN — PROPOFOL 50 MG: 10 INJECTION, EMULSION INTRAVENOUS at 09:11

## 2025-04-28 RX ADMIN — LIDOCAINE HYDROCHLORIDE 50 MG: 20 INJECTION, SOLUTION INFILTRATION; PERINEURAL at 08:52

## 2025-04-28 RX ADMIN — PROPOFOL 50 MG: 10 INJECTION, EMULSION INTRAVENOUS at 08:56

## 2025-04-28 RX ADMIN — SODIUM CHLORIDE: 9 INJECTION, SOLUTION INTRAVENOUS at 08:47

## 2025-04-28 RX ADMIN — ONDANSETRON HYDROCHLORIDE 4 MG: 2 INJECTION, SOLUTION INTRAVENOUS at 08:47

## 2025-04-28 RX ADMIN — PROPOFOL 150 MG: 10 INJECTION, EMULSION INTRAVENOUS at 08:52

## 2025-04-28 RX ADMIN — PROPOFOL 50 MG: 10 INJECTION, EMULSION INTRAVENOUS at 09:02

## 2025-04-28 SDOH — HEALTH STABILITY: MENTAL HEALTH: CURRENT SMOKER: 0

## 2025-04-28 ASSESSMENT — PAIN SCALES - GENERAL
PAINLEVEL_OUTOF10: 0 - NO PAIN
PAIN_LEVEL: 0
PAINLEVEL_OUTOF10: 0 - NO PAIN

## 2025-04-28 ASSESSMENT — ENCOUNTER SYMPTOMS
JOINT SWELLING: 0
VOMITING: 0
LIGHT-HEADEDNESS: 0
FEVER: 0
ABDOMINAL PAIN: 0
SLEEP DISTURBANCE: 0
NAUSEA: 0
DIARRHEA: 0
COLOR CHANGE: 0
CONFUSION: 0
DIFFICULTY URINATING: 0
COUGH: 0
DIZZINESS: 0
ABDOMINAL DISTENTION: 0
HEADACHES: 0
WHEEZING: 0
BLOOD IN STOOL: 0
CONSTIPATION: 0
SPEECH DIFFICULTY: 0
TROUBLE SWALLOWING: 0
CHILLS: 0
SHORTNESS OF BREATH: 0
UNEXPECTED WEIGHT CHANGE: 0
ARTHRALGIAS: 0

## 2025-04-28 ASSESSMENT — COLUMBIA-SUICIDE SEVERITY RATING SCALE - C-SSRS
1. IN THE PAST MONTH, HAVE YOU WISHED YOU WERE DEAD OR WISHED YOU COULD GO TO SLEEP AND NOT WAKE UP?: NO
6. HAVE YOU EVER DONE ANYTHING, STARTED TO DO ANYTHING, OR PREPARED TO DO ANYTHING TO END YOUR LIFE?: NO
2. HAVE YOU ACTUALLY HAD ANY THOUGHTS OF KILLING YOURSELF?: NO

## 2025-04-28 ASSESSMENT — PAIN - FUNCTIONAL ASSESSMENT
PAIN_FUNCTIONAL_ASSESSMENT: 0-10

## 2025-04-28 NOTE — ANESTHESIA POSTPROCEDURE EVALUATION
Patient: Clayton Ta    Procedure Summary       Date: 04/28/25 Room / Location: [facility]    Anesthesia Start: 0847 Anesthesia Stop:     Procedure: COLONOSCOPY Diagnosis:       Colon cancer screening      History of colon polyps      Colon cancer screening    Scheduled Providers: Miles Reyna MD; Yamilex Fletcher RN Responsible Provider: FELTON Zayas    Anesthesia Type: MAC ASA Status: 2            Anesthesia Type: MAC    Vitals Value Taken Time   BP 94/63 04/28/25 09:15   Temp 37.6 °C (99.7 °F) 04/28/25 09:15   Pulse 78 04/28/25 09:15   Resp 16 04/28/25 09:15   SpO2 99 % 04/28/25 09:15       Anesthesia Post Evaluation    Patient location during evaluation: bedside  Patient participation: complete - patient cannot participate  Level of consciousness: awake and responsive to verbal stimuli  Pain score: 0  Pain management: adequate  Airway patency: patent  Cardiovascular status: acceptable and hemodynamically stable  Respiratory status: acceptable  Hydration status: acceptable  Postoperative Nausea and Vomiting: none        No notable events documented.

## 2025-04-28 NOTE — H&P
History Of Present Illness  Clayton Ta is a 54 y.o. male presenting withh/o TA.   Colon cancer screening  ASA2  MP2  Deepsedation     Past Medical History  Medical History[1]  Surgical History  Surgical History[2]  Social History  He reports that he has quit smoking. His smoking use included cigarettes. He has never used smokeless tobacco. He reports current alcohol use. He reports that he does not use drugs.    Family History  Family History[3]     Allergies  Allergies[4]  Review of Systems   Constitutional:  Negative for chills, fever and unexpected weight change.   HENT:  Negative for congestion and trouble swallowing.    Respiratory:  Negative for cough, shortness of breath and wheezing.    Cardiovascular:  Negative for chest pain.   Gastrointestinal:  Negative for abdominal distention, abdominal pain, blood in stool, constipation, diarrhea, nausea and vomiting.   Genitourinary:  Negative for difficulty urinating.   Musculoskeletal:  Negative for arthralgias and joint swelling.   Skin:  Negative for color change.   Neurological:  Negative for dizziness, speech difficulty, light-headedness and headaches.   Psychiatric/Behavioral:  Negative for confusion and sleep disturbance.         Physical Exam  Constitutional:       Appearance: Normal appearance.   HENT:      Head: Normocephalic and atraumatic.   Cardiovascular:      Rate and Rhythm: Normal rate and regular rhythm.   Pulmonary:      Effort: Pulmonary effort is normal.      Breath sounds: Normal breath sounds.   Abdominal:      General: Abdomen is flat. Bowel sounds are normal.      Palpations: Abdomen is soft.      Tenderness: There is no abdominal tenderness.   Musculoskeletal:         General: Normal range of motion.   Skin:     General: Skin is warm.   Neurological:      General: No focal deficit present.      Mental Status: He is alert.          Last Recorded Vitals  Blood pressure (!) 148/99, pulse 101, temperature 36.9 °C (98.4 °F), temperature  "source Temporal, resp. rate 13, height 1.88 m (6' 2\"), weight 113 kg (250 lb), SpO2 95%.    Assessment/Plan   Colon cancer screening  Colonoscopy      Miles Reyna MD       [1]   Past Medical History:  Diagnosis Date    Encounter for general adult medical examination without abnormal findings 04/06/2021    Well adult exam    Encounter for immunization     Encounter for immunization    Encounter for screening for malignant neoplasm of colon 11/30/2021    Special screening for malignant neoplasm of colon    Encounter for screening for malignant neoplasm of colon 09/15/2020    Screening for colon cancer    Encounter for screening for malignant neoplasm of prostate 09/15/2020    Screening PSA (prostate specific antigen)    Hypertension     Other fecal abnormalities     Positive colorectal cancer screening using Cologuard test    Personal history of other specified conditions 09/15/2020    History of flank pain    Persons encountering health services in other specified circumstances 09/15/2020    Encounter to establish care   [2]   Past Surgical History:  Procedure Laterality Date    OTHER SURGICAL HISTORY  09/15/2020    Oral surgery    OTHER SURGICAL HISTORY  09/15/2020    Gallbladder surgery    OTHER SURGICAL HISTORY  03/30/2021    Colonoscopy   [3] No family history on file.  [4] No Known Allergies    "

## 2025-04-28 NOTE — SIGNIFICANT EVENT
"Wife notified; returning to GI center; pt declines po fluids; states \"stomach  is hurting me\" vague; denies nausea; Dr Reyna to speak with pt; CRNA updated as well  "

## 2025-04-28 NOTE — ANESTHESIA PREPROCEDURE EVALUATION
Patient: Clayton Ta    Procedure Information       Date/Time: 04/28/25 0830    Scheduled providers: Miles Reyna MD; Yamilex Fletcher RN    Procedure: COLONOSCOPY    Location: [facility]            Relevant Problems   Cardiac   (+) Other hyperlipidemia      Musculoskeletal   (+) Primary osteoarthritis involving multiple joints       Clinical information reviewed:   Tobacco  Allergies  Meds   Med Hx  Surg Hx   Fam Hx  Soc Hx        NPO Detail:  No data recorded     Physical Exam    Airway  Mallampati: II  TM distance: >3 FB  Neck ROM: full     Cardiovascular - normal exam   Dental - normal exam     Pulmonary - normal examBreath sounds clear to auscultation     Abdominal            Anesthesia Plan    History of general anesthesia?: yes  History of complications of general anesthesia?: no    ASA 2     MAC     The patient is not a current smoker.    intravenous induction   Anesthetic plan and risks discussed with patient.    Plan discussed with CRNA.

## 2025-04-28 NOTE — DISCHARGE INSTRUCTIONS
Patient Instructions Post Procedure      The anesthetics, sedatives or narcotics which were given to you today will be acting in your body for the next 24 hours, so you might feel a little sleepy or groggy.  This feeling should slowly wear off. Carefully read and follow the instructions.     You received sedation today:  - Do not drive or operate any machinery or power tools of any kind.   - No alcoholic beverages today, not even beer or wine.  - Do not make any important decisions or sign any legal documents.  - No over the counter medications that contain alcohol or that may cause drowsiness.    While it is common to experience mild to moderate abdominal distention, gas, or belching after your procedure, if any of these symptoms occur following discharge from the GI Lab or within one week of having your procedure, call the [facility] to be advised whether a visit to your nearest Urgent Care or Emergency Department is indicated.  Take this paper with you if you go.   - If you develop an allergic reaction to the medications that were given during your procedure such as difficulty breathing, rash, hives, severe nausea, vomiting or lightheadedness.  - If you experience chest pain, shortness of breath, severe abdominal pain, fevers and chills.  -If you develop signs and symptoms of bleeding such as blood in your spit, if your stools turn black, tarry, or bloody  - If you have not urinated within 8 hours following your procedure.  - If your IV site becomes painful, red, inflamed, or looks infected.    If you received a biopsy/polypectomy/sphincterotomy the following instructions apply below:  __ Do not use Aspirin containing products, non-steroidal medications or anti-coagulants for one week following your procedure. (Examples of these types of medications are: Advil, Arthrotec, Aleve, Coumadin, Ecotrin, Heparin, Ibuprofen, Indocin, Motrin, Naprosyn, Nuprin, Plavix, Vioxx, and Voltarin, or their generic  "forms.  This list is not all-inclusive.  Check with your physician or pharmacist before resuming medications.)   __ Eat a soft diet today.  Avoid foods that are poorly digested for the next 24 hours.  These foods would include: nuts, beans, lettuce, red meats, and fried foods. Start with liquids and advance your diet as tolerated, gradually work up to eating solids.   __ Do not have a Barium Study or Enema for one week.    Your physician recommends the additional following instructions:    -You have a contact number available for emergencies. The signs and symptoms of potential delayed complications were discussed with you. You may return to normal activities tomorrow.  -Resume your previous diet or other if specified.  -Continue your present medications.   -We are waiting for your pathology results, if applicable.  -The findings and recommendations have been discussed with you and/or family.  - Please see Medication Reconciliation Form for new medication/medications prescribed.     If you experience any problems or have any questions following discharge from the GI Lab, please call: [phone] from 7 am- 4:30 pm.  In the event of an emergency please go to the closest Emergency Department or call Dr. Reyna : [phone]    You will receive a follow up text message tomorrow morning, if you are in need of a call back to address a non urgent question please respond with \"YES\", and we will call you the following business day Monday through Friday. If you do not need to a call back please respond with the word \"NO\" and we will remove you from the call back list.   "

## 2025-05-08 LAB
LABORATORY COMMENT REPORT: NORMAL
PATH REPORT.FINAL DX SPEC: NORMAL
PATH REPORT.GROSS SPEC: NORMAL
PATH REPORT.TOTAL CANCER: NORMAL

## 2025-05-08 NOTE — RESULT ENCOUNTER NOTE
During recent colonoscopy a polyp was seen and completely removed.  Pathology results show this polyp as tubular adenoma.  This kind of polyp is benign but precancerous.  Based on pathology results I recommend repeating colonoscopy in 5 years.  Do not hesitate to contact me if you have any questions or concerns.  Sincerely,

## 2025-07-27 DIAGNOSIS — M15.9 OSTEOARTHRITIS OF MULTIPLE JOINTS, UNSPECIFIED OSTEOARTHRITIS TYPE: ICD-10-CM

## 2025-07-27 DIAGNOSIS — M25.561 ACUTE PAIN OF RIGHT KNEE: ICD-10-CM

## 2025-07-29 RX ORDER — IBUPROFEN 800 MG/1
800 TABLET, FILM COATED ORAL 2 TIMES DAILY PRN
Qty: 60 TABLET | Refills: 5 | Status: SHIPPED | OUTPATIENT
Start: 2025-07-29

## 2025-07-29 NOTE — TELEPHONE ENCOUNTER
Recent Visits  Date Type Provider Dept   09/23/24 [facility] Visit [id number]   Showing recent visits within past 540 days and meeting all other requirements  Future Appointments  No visits were found meeting these conditions.  Showing future appointments within next 180 days and meeting all other requirements

## 2025-10-01 ENCOUNTER — APPOINTMENT (OUTPATIENT)
Dept: PRIMARY CARE | Facility: CLINIC | Age: 55
End: 2025-10-01
Payer: COMMERCIAL